# Patient Record
Sex: FEMALE | Race: WHITE | Employment: OTHER | URBAN - METROPOLITAN AREA
[De-identification: names, ages, dates, MRNs, and addresses within clinical notes are randomized per-mention and may not be internally consistent; named-entity substitution may affect disease eponyms.]

---

## 2017-01-18 ENCOUNTER — TELEPHONE (OUTPATIENT)
Dept: FAMILY MEDICINE | Facility: CLINIC | Age: 77
End: 2017-01-18

## 2017-01-18 NOTE — TELEPHONE ENCOUNTER
"Pt concerned about her \"hip stiffness and being tired\".  Feels it maybe caused by the Metformin?  Discussed many things that could cause these symptoms.  Blood sugars are running 142-160 b/4 breakfast.  Pt asking if Metformin could be decreased?  Pt in [place] and not back until May.  Advise.  Vin      "

## 2017-01-18 NOTE — TELEPHONE ENCOUNTER
Reason for call:  Patient reporting a symptom    Symptom or request: Pt read the package insert for her Metformin and she states that she has every single side-effect.  She wants to know if she should quit taking the Metformin.       Duration (how long have symptoms been present): ongoing    Have you been treated for this before? No    Additional comments:     Phone Number patient can be reached at:  Other phone number:  [phone]    Best Time:  any    Can we leave a detailed message on this number:  YES    Call taken on 1/18/2017 at 10:54 AM by Tawanna Astudillo

## 2017-01-18 NOTE — TELEPHONE ENCOUNTER
Based on those blood sugars, I would not recommend decreasing the metformin, however she is free to make this decision if she would like to try going without it to see if symptoms improve.      I would recommend seeing a physician in [place] for further evaluation, blood work, etc.     Morena Mcintosh M.D.

## 2017-02-02 ENCOUNTER — TELEPHONE (OUTPATIENT)
Dept: FAMILY MEDICINE | Facility: CLINIC | Age: 77
End: 2017-02-02

## 2017-02-02 NOTE — TELEPHONE ENCOUNTER
Patient calls with concern of being in [place] and had company over one with bronchitis and one with the flu. She now has a cough and shortness of breath and is sleeping a lot. She would like to have a nebulizer called in. Advised she needs to be seen down in [place] to determine what she has so she can be treated appropriately. She agreed with this plan.    Johanny Edward RN

## 2017-02-02 NOTE — TELEPHONE ENCOUNTER
Reason for Call:  Other shortness of breath     Detailed comments: pt is calling because she is short of breath and has been sick for over a week   [place] is out of state   And has been sleeping a lot and thinks she maybe getting over the flu   You can her the pt on the phone working to catch breath   She is wanting Nebs     Phone Number Patient can be reached at: Other phone number:  [phone]    Best Time: sent to RN     Can we leave a detailed message on this number? Not Applicable    Call taken on 2/2/2017 at 1:46 PM by Megan Mcdonald

## 2017-09-25 DIAGNOSIS — I10 ESSENTIAL HYPERTENSION WITH GOAL BLOOD PRESSURE LESS THAN 140/90: ICD-10-CM

## 2017-09-25 NOTE — TELEPHONE ENCOUNTER
metoprolol       Last Written Prescription Date: 9/1/16  Last Fill Quantity: 90, # refills: 3    Last Office Visit with G, P or [facility] prescribing provider:  9/23/16   Future Office Visit:        BP Readings from Last 3 Encounters:   09/23/16 131/56   09/01/16 118/59   10/29/15 (!) 112/93     2

## 2017-09-26 RX ORDER — METOPROLOL SUCCINATE 200 MG/1
TABLET, EXTENDED RELEASE ORAL
Qty: 90 TABLET | Refills: 0 | Status: SHIPPED | OUTPATIENT
Start: 2017-09-26 | End: 2017-10-23

## 2017-10-02 ENCOUNTER — TELEPHONE (OUTPATIENT)
Dept: FAMILY MEDICINE | Facility: CLINIC | Age: 77
End: 2017-10-02

## 2017-10-02 NOTE — TELEPHONE ENCOUNTER
"Reason for call:  Patient reporting a symptom    Symptom or request: Pt states that she has been \"dizzy for days.\"  Transferred to RN     Duration (how long have symptoms been present): \"days\"    Have you been treated for this before? No    Additional comments:     Phone Number patient can be reached at:  Home number on file [phone] (home)    Best Time:  any    Can we leave a detailed message on this number:  YES    Call taken on 10/2/2017 at 3:29 PM by Tawanna Astudillo    "

## 2017-10-02 NOTE — TELEPHONE ENCOUNTER
S-(situation): Patient has 10/23/17 for physical    B-(background): Patient has had dizzy spells for this week.      A-(assessment): Patient states she has been dizzy for about 6 days.  Patient is diabetic.  Patient checks her blood sugar in the AM at about 140.  Patient reports she has symptoms at any given time.  Patient could be sitting or standing and fell dizzy. Patient states it feels like it could be vertigo.  Patient denies any weakness or numbness.  Patient denies any difficulty walking or speaking.  Patient denies any fevers or cool moist skin.  Patient denies any headache or vision change.       R-(recommendations): Patient was advised to be seen in the next 2-4 hours per nurse triage guidelines.  Patient refuses to be seen today.  Patient will schedule for 10/3/17.  Patient was advised to check her blood sugar more than once a day.  Advised patient to check her blood sugar if she has an episode.  Patient was advised not to check every time just once or twice.  Patient agrees with the plan.    Amanda MCCOY RN

## 2017-10-03 ENCOUNTER — TELEPHONE (OUTPATIENT)
Dept: FAMILY MEDICINE | Facility: CLINIC | Age: 77
End: 2017-10-03

## 2017-10-03 ENCOUNTER — OFFICE VISIT (OUTPATIENT)
Dept: FAMILY MEDICINE | Facility: CLINIC | Age: 77
End: 2017-10-03
Payer: COMMERCIAL

## 2017-10-03 VITALS
WEIGHT: 206 LBS | HEART RATE: 73 BPM | SYSTOLIC BLOOD PRESSURE: 122 MMHG | BODY MASS INDEX: 36.5 KG/M2 | TEMPERATURE: 97.8 F | DIASTOLIC BLOOD PRESSURE: 60 MMHG | HEIGHT: 63 IN

## 2017-10-03 DIAGNOSIS — H81.10 BENIGN PAROXYSMAL POSITIONAL VERTIGO, UNSPECIFIED LATERALITY: Primary | ICD-10-CM

## 2017-10-03 DIAGNOSIS — E11.22 TYPE 2 DIABETES MELLITUS WITH STAGE 3 CHRONIC KIDNEY DISEASE (H): Primary | ICD-10-CM

## 2017-10-03 DIAGNOSIS — L98.9 SKIN LESION: ICD-10-CM

## 2017-10-03 DIAGNOSIS — R60.0 BILATERAL EDEMA OF LOWER EXTREMITY: ICD-10-CM

## 2017-10-03 DIAGNOSIS — N18.30 TYPE 2 DIABETES MELLITUS WITH STAGE 3 CHRONIC KIDNEY DISEASE (H): Primary | ICD-10-CM

## 2017-10-03 PROCEDURE — 99214 OFFICE O/P EST MOD 30 MIN: CPT | Performed by: FAMILY MEDICINE

## 2017-10-03 RX ORDER — FUROSEMIDE 40 MG
80 TABLET ORAL DAILY
Qty: 180 TABLET | Refills: 3 | Status: SHIPPED | OUTPATIENT
Start: 2017-10-03 | End: 2018-09-28

## 2017-10-03 NOTE — PROGRESS NOTES
"  SUBJECTIVE:   Milagro Wallace is a 77 year old female who presents to clinic today for the following health issues:      Dizziness  Onset: 8 days     Description:   Do you feel faint:  YES  Does it feel like the surroundings (bed, room) are moving: YES  Unsteady/off balance: YES  Have you passed out or fallen: no     Intensity: moderate    Progression of Symptoms:  same    Accompanying Signs & Symptoms:  Heart palpitations: no   Nausea, vomiting: no   Weakness in arms or legs: no   Fatigue: no   Vision or speech changes: no   Ringing in ears (Tinnitus): no   Hearing Loss: no     History:   Head trauma/concussion hx: no   Previous similar symptoms: YES  Recent bleeding history: no     Precipitating factors:   Worse with activity or head movement: YES  Any new medications (BP?): no   Alcohol/drug abuse/withdrawal: no     Alleviating factors:   Does staying in a fixed position give relief:  YES    Therapies Tried and outcome: none          Problem list and histories reviewed & adjusted, as indicated.  Additional history:         Reviewed and updated as needed this visit by clinical staffTobacco  Allergies       Reviewed and updated as needed this visit by Provider      Further history obtained, clarified or corrected by physician:  She has several concerns today. She has had lightheadedness or dizziness or a spinning type sensation over the last week though it is gradually getting better. She was worried this may be from her blood sugars although her readings have been generally around 150. Symptoms are worse when she turns her head from side to side particularly rapidly. Secondly she has a skin lesion on her upper lip that has been there for quite some time and she would like it removed. Thirdly she complains of pain in the lower back and back of the thighs bilaterally that is worse in the morning and then improves as the day goes on.    OBJECTIVE:  /60  Pulse 73  Temp 97.8  F (36.6  C)  Ht 5' 2.5\" " (1.588 m)  Wt 206 lb (93.4 kg)  BMI 37.08 kg/m2  LUNGS: clear to auscultation, normal breath sounds  CV: RRR without murmur  ABD: BS+, soft, nontender, no masses, no hepatosplenomegaly  EXTREMITIES: without joint tenderness, swelling or erythema.  No muscle tenderness or abnormality.  SKIN: No rashes or abnormalities, there is a small cystlike lesion on the upper lip right side  NEURO:non focal exam    ASSESSMENT:     Benign paroxysmal positional vertigo, unspecified laterality  Bilateral edema of lower extremity  Skin lesion    PLAN:  She is going to be careful with issues that make cause vertigo but just monitor her symptoms as they are improving, she will return if they persist or worsen  Refill Lasix  Dermatology consult for skin lesion removal  Stretching exercises for the back and leg discomfort.    Orders Placed This Encounter     DERMATOLOGY REFERRAL     furosemide (LASIX) 40 MG tablet

## 2017-10-03 NOTE — MR AVS SNAPSHOT
After Visit Summary   10/3/2017    Milagro Wallace    MRN: [id number]           Patient Information     Date Of Birth          1940        Visit Information        Provider Department      10/3/2017 1:00 PM Nathan Al MD [facility]        Today's Diagnoses     Benign paroxysmal positional vertigo, unspecified laterality    -  1    Bilateral edema of lower extremity        Skin lesion          Care Instructions          Thank you for choosing [facility].  You may be receiving a survey in the mail from [facility] regarding your visit today.  Please take a few minutes to complete and return the survey to let us know how we are doing.      Our Clinic hours are:  Mondays    7:20 am - 7 pm  Tues -  Fri  7:20 am - 5 pm    Clinic Phone: [phone]    The clinic lab opens at 7:30 am Mon - Fri and appointments are required.    [facility]. [phone]  Monday-Thursday 8 am - 7pm  Tues/Wed/Fri 8 am - 5:30 pm                 Follow-ups after your visit        Additional Services     DERMATOLOGY REFERRAL       Your provider has referred you to: [facility]: [facility] [phone]   [email or web address]    Please be aware that coverage of these services is subject to the terms and limitations of your health insurance plan.  Call member services at your health plan with any benefit or coverage questions.      Please bring the following with you to your appointment:    (1) Any X-Rays, CTs or MRIs which have been performed.  Contact the facility where they were done to arrange for  prior to your scheduled appointment.    (2) List of current medications  (3) This referral request   (4) Any documents/labs given to you for this referral                  Your next 10 appointments already scheduled     Oct 23, 2017  9:40 AM CDT   SHORT with Morena Mcintosh MD   [facility] ([facility]  "[place]    [address]  [facility] [address]   [phone]              Who to contact     If you have questions or need follow up information about today's clinic visit or your schedule please contact [facility] directly at [phone].  Normal or non-critical lab and imaging results will be communicated to you by MyChart, letter or phone within 4 business days after the clinic has received the results. If you do not hear from us within 7 days, please contact the clinic through MyChart or phone. If you have a critical or abnormal lab result, we will notify you by phone as soon as possible.  Submit refill requests through [facility] or call your pharmacy and they will forward the refill request to us. Please allow 3 business days for your refill to be completed.          Additional Information About Your Visit        MyChart Information     [facility] lets you send messages to your doctor, view your test results, renew your prescriptions, schedule appointments and more. To sign up, go to [email or web address] . Click on \"Log in\" on the left side of the screen, which will take you to the Welcome page. Then click on \"Sign up Now\" on the right side of the page.     You will be asked to enter the access code listed below, as well as some personal information. Please follow the directions to create your username and password.     Your access code is: [email or web address]  Expires: 2018  1:24 PM     Your access code will  in 90 days. If you need help or a new code, please call your [facility] or [phone].        Care EveryWhere ID     This is your Care EveryWhere ID. This could be used by other organizations to access your [facility] medical records  [id number]        Your Vitals Were     Pulse Temperature Height BMI (Body Mass Index)          73 97.8  F (36.6  C) 5' 2.5\" (1.588 m) 37.08 kg/m2         Blood Pressure from Last 3 Encounters:   10/03/17 122/60   16 131/56 "   09/01/16 118/59    Weight from Last 3 Encounters:   10/03/17 206 lb (93.4 kg)   09/23/16 213 lb (96.6 kg)   09/01/16 218 lb (98.9 kg)              We Performed the Following     DERMATOLOGY REFERRAL          Where to get your medicines      These medications were sent to [id number] - [place], [place] - [address] AT [facility] [address]  [address], [facility] [address]     Phone:  [phone]     furosemide 40 MG tablet          Primary Care Provider Office Phone # Fax #    Morena Mcintosh [phone] [address]  [facility] [address]        Equal Access to Services     JOSEY LU : Hadii aad ku hadasho Socarleyali, waaxda luqadaha, qaybta kaalmada adeegyada, waxjacques dsouza. So [facility] [phone].    ATENCIÓN: Si habla español, tiene a allan disposición servicios gratuitos de asistencia lingüística. [facility] [phone].    We comply with applicable federal civil rights laws and [place] laws. We do not discriminate on the basis of race, color, national origin, age, disability, sex, sexual orientation, or gender identity.            Thank you!     Thank you for choosing [facility]  for your care. Our goal is always to provide you with excellent care. Hearing back from our patients is one way we can continue to improve our services. Please take a few minutes to complete the written survey that you may receive in the mail after your visit with us. Thank you!             Your Updated Medication List - Protect others around you: Learn how to safely use, store and throw away your medicines at www.disposemymeds.org.          This list is accurate as of: 10/3/17  1:39 PM.  Always use your most recent med list.                   Brand Name Dispense Instructions for use Diagnosis    amLODIPine 5 MG tablet    NORVASC    90 tablet    Take 1 tablet (5 mg) by mouth daily    Essential hypertension with goal blood  pressure less than 140/90       aspirin 81 MG tablet      1 TABLET DAILY        blood glucose lancets standard    no brand specified    1 box    use 1 daily    Type II or unspecified type diabetes mellitus without mention of complication, uncontrolled       blood glucose monitoring test strip    ONE TOUCH ULTRA    100 each    Use to test blood sugar 1 times daily or as directed.    Type 2 diabetes mellitus with stage 3 chronic kidney disease (H)       Blood Pressure Kit     1 kit    use as directed    Unspecified essential hypertension       furosemide 40 MG tablet    LASIX    180 tablet    Take 2 tablets (80 mg) by mouth daily    Bilateral edema of lower extremity       lisinopril 40 MG tablet    PRINIVIL/ZESTRIL    90 tablet    Take 1 tablet (40 mg) by mouth daily    Essential hypertension with goal blood pressure less than 140/90       metFORMIN 500 MG tablet    GLUCOPHAGE    180 tablet    Take 1 tablet (500 mg) by mouth 2 times daily (with meals)    Essential hypertension with goal blood pressure less than 140/90       metoprolol 200 MG 24 hr tablet    TOPROL-XL    90 tablet    TAKE 1 TABLET(200 MG) BY MOUTH DAILY    Essential hypertension with goal blood pressure less than 140/90       ONETOUCH DELICA LANCETS 33G Misc     100 each    1 lancet daily Use to test blood sugars 1 times daily or as directed.    Type 2 diabetes, HbA1c goal < 7% (H)       order for DME     1 each    Glucometer, brand as covered by insurance.    Type 2 diabetes, HbA1c goal < 7% (H)       simvastatin 20 MG tablet    ZOCOR    90 tablet    Take 1 tablet (20 mg) by mouth At Bedtime    Hyperlipidemia LDL goal <100

## 2017-10-03 NOTE — PATIENT INSTRUCTIONS
Thank you for choosing [facility].  You may be receiving a survey in the mail from [facility] regarding your visit today.  Please take a few minutes to complete and return the survey to let us know how we are doing.      Our Clinic hours are:  Mondays    7:20 am - 7 pm  Tues -  Fri  7:20 am - 5 pm    Clinic Phone: [phone]    The clinic lab opens at 7:30 am Mon - Fri and appointments are required.    [facility]. [phone]  Monday-Thursday 8 am - 7pm  Tues/Wed/Fri 8 am - 5:30 pm

## 2017-10-03 NOTE — TELEPHONE ENCOUNTER
Reason for Call:  Other prescription    Detailed comments: Patient states she needs a new one touch ultra mini glucometer.    Phone Number Patient can be reached at: Home number on file [phone] (home)    Best Time: any    Can we leave a detailed message on this number? YES    Call taken on 10/3/2017 at 2:25 PM by Julia Graham

## 2017-10-11 ENCOUNTER — TELEPHONE (OUTPATIENT)
Dept: FAMILY MEDICINE | Facility: CLINIC | Age: 77
End: 2017-10-11

## 2017-10-11 NOTE — TELEPHONE ENCOUNTER
Pt states Otilia needs Morena Mcintosh to complete form [facility] (pharmacy faxed over) and have it faxed back so that pt can  her script.  Pt frustrated.    Josefina Rice, [place]

## 2017-10-19 DIAGNOSIS — I10 ESSENTIAL HYPERTENSION WITH GOAL BLOOD PRESSURE LESS THAN 140/90: ICD-10-CM

## 2017-10-19 RX ORDER — AMLODIPINE BESYLATE 5 MG/1
TABLET ORAL
Qty: 90 TABLET | Refills: 0 | Status: CANCELLED | OUTPATIENT
Start: 2017-10-19

## 2017-10-23 ENCOUNTER — MEDICAL CORRESPONDENCE (OUTPATIENT)
Dept: HEALTH INFORMATION MANAGEMENT | Facility: CLINIC | Age: 77
End: 2017-10-23

## 2017-10-23 ENCOUNTER — OFFICE VISIT (OUTPATIENT)
Dept: FAMILY MEDICINE | Facility: CLINIC | Age: 77
End: 2017-10-23
Payer: COMMERCIAL

## 2017-10-23 VITALS
RESPIRATION RATE: 18 BRPM | BODY MASS INDEX: 35.79 KG/M2 | HEART RATE: 69 BPM | WEIGHT: 202 LBS | SYSTOLIC BLOOD PRESSURE: 130 MMHG | DIASTOLIC BLOOD PRESSURE: 60 MMHG | HEIGHT: 63 IN

## 2017-10-23 DIAGNOSIS — N18.30 TYPE 2 DIABETES MELLITUS WITH STAGE 3 CHRONIC KIDNEY DISEASE, WITHOUT LONG-TERM CURRENT USE OF INSULIN (H): Primary | ICD-10-CM

## 2017-10-23 DIAGNOSIS — I89.0 SECONDARY LYMPHEDEMA: ICD-10-CM

## 2017-10-23 DIAGNOSIS — N18.30 CKD (CHRONIC KIDNEY DISEASE) STAGE 3, GFR 30-59 ML/MIN (H): ICD-10-CM

## 2017-10-23 DIAGNOSIS — I10 ESSENTIAL HYPERTENSION WITH GOAL BLOOD PRESSURE LESS THAN 140/90: ICD-10-CM

## 2017-10-23 DIAGNOSIS — E78.5 HYPERLIPIDEMIA LDL GOAL <100: ICD-10-CM

## 2017-10-23 DIAGNOSIS — E11.22 TYPE 2 DIABETES MELLITUS WITH STAGE 3 CHRONIC KIDNEY DISEASE, WITHOUT LONG-TERM CURRENT USE OF INSULIN (H): Primary | ICD-10-CM

## 2017-10-23 LAB
ANION GAP SERPL CALCULATED.3IONS-SCNC: 6 MMOL/L (ref 3–14)
BUN SERPL-MCNC: 20 MG/DL (ref 7–30)
CALCIUM SERPL-MCNC: 9.2 MG/DL (ref 8.5–10.1)
CHLORIDE SERPL-SCNC: 106 MMOL/L (ref 94–109)
CHOLEST SERPL-MCNC: 112 MG/DL
CO2 SERPL-SCNC: 30 MMOL/L (ref 20–32)
CREAT SERPL-MCNC: 1.15 MG/DL (ref 0.52–1.04)
CREAT UR-MCNC: 27 MG/DL
GFR SERPL CREATININE-BSD FRML MDRD: 46 ML/MIN/1.7M2
GLUCOSE SERPL-MCNC: 184 MG/DL (ref 70–99)
HBA1C MFR BLD: 7.2 % (ref 4.3–6)
HDLC SERPL-MCNC: 49 MG/DL
HGB BLD-MCNC: 12 G/DL (ref 11.7–15.7)
LDLC SERPL CALC-MCNC: 39 MG/DL
MICROALBUMIN UR-MCNC: 5 MG/L
MICROALBUMIN/CREAT UR: 20.3 MG/G CR (ref 0–25)
NONHDLC SERPL-MCNC: 63 MG/DL
POTASSIUM SERPL-SCNC: 3.9 MMOL/L (ref 3.4–5.3)
SODIUM SERPL-SCNC: 142 MMOL/L (ref 133–144)
TRIGL SERPL-MCNC: 122 MG/DL

## 2017-10-23 PROCEDURE — 99214 OFFICE O/P EST MOD 30 MIN: CPT | Performed by: FAMILY MEDICINE

## 2017-10-23 PROCEDURE — 85018 HEMOGLOBIN: CPT | Performed by: FAMILY MEDICINE

## 2017-10-23 PROCEDURE — 82043 UR ALBUMIN QUANTITATIVE: CPT | Performed by: FAMILY MEDICINE

## 2017-10-23 PROCEDURE — 80061 LIPID PANEL: CPT | Performed by: FAMILY MEDICINE

## 2017-10-23 PROCEDURE — 80048 BASIC METABOLIC PNL TOTAL CA: CPT | Performed by: FAMILY MEDICINE

## 2017-10-23 PROCEDURE — 36415 COLL VENOUS BLD VENIPUNCTURE: CPT | Performed by: FAMILY MEDICINE

## 2017-10-23 PROCEDURE — 83036 HEMOGLOBIN GLYCOSYLATED A1C: CPT | Performed by: FAMILY MEDICINE

## 2017-10-23 RX ORDER — LISINOPRIL 40 MG/1
40 TABLET ORAL DAILY
Qty: 90 TABLET | Refills: 1 | Status: SHIPPED | OUTPATIENT
Start: 2017-10-23 | End: 2018-05-09

## 2017-10-23 RX ORDER — SIMVASTATIN 20 MG
20 TABLET ORAL AT BEDTIME
Qty: 90 TABLET | Refills: 1 | Status: SHIPPED | OUTPATIENT
Start: 2017-10-23 | End: 2018-04-26

## 2017-10-23 RX ORDER — METOPROLOL SUCCINATE 200 MG/1
200 TABLET, EXTENDED RELEASE ORAL DAILY
Qty: 90 TABLET | Refills: 1 | Status: SHIPPED | OUTPATIENT
Start: 2017-10-23 | End: 2018-04-02

## 2017-10-23 RX ORDER — AMLODIPINE BESYLATE 5 MG/1
5 TABLET ORAL DAILY
Qty: 90 TABLET | Refills: 1 | Status: SHIPPED | OUTPATIENT
Start: 2017-10-23 | End: 2018-04-15

## 2017-10-23 NOTE — LETTER
[facility]  [address]  [facility] [address]  Phone: [phone]      10/25/2017     Milagro Wallace  [address]   [facility] [address]      Dear Milagro:    Thank you for allowing me to participate in your care. Your recent test results were reviewed and listed below.  Kidney function is slightly decreased from last year.  Continue to drink plenty of fluids.   No protein in the urine which is good. Hemoglobin is normal.     Your results are provided below for your review  Results for orders placed or performed in visit on 10/23/17   Basic metabolic panel   Result Value Ref Range    Sodium 142 133 - 144 mmol/L    Potassium 3.9 3.4 - 5.3 mmol/L    Chloride 106 94 - 109 mmol/L    Carbon Dioxide 30 20 - 32 mmol/L    Anion Gap 6 3 - 14 mmol/L    Glucose 184 (H) 70 - 99 mg/dL    Urea Nitrogen 20 7 - 30 mg/dL    Creatinine 1.15 (H) 0.52 - 1.04 mg/dL    GFR Estimate 46 (L) >60 mL/min/1.7m2    GFR Estimate If Black 55 (L) >60 mL/min/1.7m2    Calcium 9.2 8.5 - 10.1 mg/dL   Lipid panel reflex to direct LDL   Result Value Ref Range    Cholesterol 112 <200 mg/dL    Triglycerides 122 <150 mg/dL    HDL Cholesterol 49 (L) >49 mg/dL    LDL Cholesterol Calculated 39 <100 mg/dL    Non HDL Cholesterol 63 <130 mg/dL   Albumin Random Urine Quantitative with Creat Ratio   Result Value Ref Range    Creatinine Urine 27 mg/dL    Albumin Urine mg/L 5 mg/L    Albumin Urine mg/g Cr 20.30 0 - 25 mg/g Cr   Hemoglobin A1c   Result Value Ref Range    Hemoglobin A1C 7.2 (H) 4.3 - 6.0 %   Hemoglobin   Result Value Ref Range    Hemoglobin 12.0 11.7 - 15.7 g/dL                 Thank you for choosing [place]. As a result, please continue with the treatment plan discussed in the office. Return as discussed or sooner if symptoms worsen or fail to improve. If you have any further questions or concerns, please do not hesitate to contact us.      Sincerely,        Dr. Morena Mcintosh

## 2017-10-23 NOTE — NURSING NOTE
"Chief Complaint   Patient presents with     Diabetes       Initial /60  Pulse 69  Resp 18  Ht 5' 2.5\" (1.588 m)  Wt 202 lb (91.6 kg)  BMI 36.36 kg/m2 Estimated body mass index is 36.36 kg/(m^2) as calculated from the following:    Height as of this encounter: 5' 2.5\" (1.588 m).    Weight as of this encounter: 202 lb (91.6 kg).  Medication Reconciliation: complete    "

## 2017-10-23 NOTE — MR AVS SNAPSHOT
After Visit Summary   10/23/2017    Milagro Wallace    MRN: [id number]           Patient Information     Date Of Birth          1940        Visit Information        Provider Department      10/23/2017 9:40 AM Morena Mcintosh MD [facility]        Today's Diagnoses     Type 2 diabetes mellitus with stage 3 chronic kidney disease, without long-term current use of insulin (H)    -  1    Essential hypertension with goal blood pressure less than 140/90        Hyperlipidemia LDL goal <100        CKD (chronic kidney disease) stage 3, GFR 30-59 ml/min        Secondary lymphedema          Care Instructions          Thank you for choosing [facility].  You may be receiving a survey in the mail from [employer] regarding your visit today.  Please take a few minutes to complete and return the survey to let us know how we are doing.      Our Clinic hours are:  Mondays    7:20 am - 7 pm  Tues -  Fri  7:20 am - 5 pm    Clinic Phone: [phone]    The clinic lab opens at 7:30 am Mon - Fri and appointments are required.    [facility]. [phone]  Monday-Thursday 8 am - 7pm  Tues/Wed/Fri 8 am - 5:30 pm                 Follow-ups after your visit        Additional Services     PHYSICAL THERAPY REFERRAL       *This therapy referral will be filtered to a centralized scheduling office at [facility] and the patient will receive a call to schedule an appointment at a [place] location most convenient for them. *     [facility] provides Physical Therapy evaluation and treatment and many specialty services across the [place] system.  If requesting a specialty program, please choose from the list below.    If you have not heard from the scheduling office within 2 business days, please call [phone] for all locations, with the exception of [place], please call [phone].  Treatment: Evaluation & Treatment  Special  "Instructions/Modalities:    Special Programs: [facility]    Please be aware that coverage of these services is subject to the terms and limitations of your health insurance plan.  Call member services at your health plan with any benefit or coverage questions.      **Note to Provider:  If you are referring outside of [place] for the therapy appointment, please list the name of the location in the \"special instructions\" above, print the referral and give to the patient to schedule the appointment.                  Your next 10 appointments already scheduled     Nov 08, 2017  1:00 PM CST   New Visit with Yadiel Glynn MD   [facility]    [address] [address] [phone]              Who to contact     If you have questions or need follow up information about today's clinic visit or your schedule please contact [facility] directly at [phone].  Normal or non-critical lab and imaging results will be communicated to you by MyChart, letter or phone within 4 business days after the clinic has received the results. If you do not hear from us within 7 days, please contact the clinic through MyChart or phone. If you have a critical or abnormal lab result, we will notify you by phone as soon as possible.  Submit refill requests through [employer] or call your pharmacy and they will forward the refill request to us. Please allow 3 business days for your refill to be completed.          Additional Information About Your Visit        [employer] Information     [employer] lets you send messages to your doctor, view your test results, renew your prescriptions, schedule appointments and more. To sign up, go to [email or web address] . Click on \"Log in\" on the left side of the screen, which will take you to the Welcome page. Then click on \"Sign up Now\" on the right side of the page.     You will be asked to enter the access " "code listed below, as well as some personal information. Please follow the directions to create your username and password.     Your access code is: [email or web address]  Expires: 2018  1:24 PM     Your access code will  in 90 days. If you need help or a new code, please call your [place] clinic or [phone].        Care EveryWhere ID     This is your Care EveryWhere ID. This could be used by other organizations to access your [place] medical records  [id number]        Your Vitals Were     Pulse Respirations Height BMI (Body Mass Index)          69 18 5' 2.5\" (1.588 m) 36.36 kg/m2         Blood Pressure from Last 3 Encounters:   10/23/17 130/60   10/03/17 122/60   16 131/56    Weight from Last 3 Encounters:   10/23/17 202 lb (91.6 kg)   10/03/17 206 lb (93.4 kg)   16 213 lb (96.6 kg)              We Performed the Following     Albumin Random Urine Quantitative with Creat Ratio     Basic metabolic panel     Hemoglobin A1c     Hemoglobin     Lipid panel reflex to direct LDL     PHYSICAL THERAPY REFERRAL          Today's Medication Changes          These changes are accurate as of: 10/23/17 10:10 AM.  If you have any questions, ask your nurse or doctor.               These medicines have changed or have updated prescriptions.        Dose/Directions    metoprolol 200 MG 24 hr tablet   Commonly known as:  TOPROL-XL   This may have changed:  See the new instructions.   Used for:  Essential hypertension with goal blood pressure less than 140/90   Changed by:  Morena Mcintosh MD        Dose:  200 mg   Take 1 tablet (200 mg) by mouth daily   Quantity:  90 tablet   Refills:  1            Where to get your medicines      [facility] [address] [place], [place] [address] [facility] AT [facility] OF [address] [place] [address]  [address] [facility] [address]     Phone:  [phone]     amLODIPine 5 MG tablet    blood glucose monitoring test strip    lisinopril 40 MG tablet "    metFORMIN 500 MG tablet    metoprolol 200 MG 24 hr tablet    simvastatin 20 MG tablet         Some of these will need a paper prescription and others can be bought over the counter.  Ask your nurse if you have questions.     Bring a paper prescription for each of these medications     order for DME                Primary Care Provider Office Phone # Fax #    Morena Mcintosh [phone] [address] [facility] [address]        Equal Access to Services     JOSEY LU : Hadii aad ku hadasho Soomaali, waaxda luqadaha, qaybta kaalmada adeegyada, waxay idiin hayaan adeeg kharash la'lety . So [facility] [phone].    ATENCIÓN: Si habla espfreddie, tiene a allan disposición servicios gratuitos de asistencia lingüística. Llame al [phone].    We comply with applicable federal civil rights laws and [place] laws. We do not discriminate on the basis of race, color, national origin, age, disability, sex, sexual orientation, or gender identity.            Thank you!     Thank you for choosing [facility]  for your care. Our goal is always to provide you with excellent care. Hearing back from our patients is one way we can continue to improve our services. Please take a few minutes to complete the written survey that you may receive in the mail after your visit with us. Thank you!             Your Updated Medication List - Protect others around you: Learn how to safely use, store and throw away your medicines at www.disposemymeds.org.          This list is accurate as of: 10/23/17 10:10 AM.  Always use your most recent med list.                   Brand Name Dispense Instructions for use Diagnosis    amLODIPine 5 MG tablet    NORVASC    90 tablet    Take 1 tablet (5 mg) by mouth daily    Essential hypertension with goal blood pressure less than 140/90       aspirin 81 MG tablet      1 TABLET DAILY        blood glucose lancets standard    no brand specified    1 box    use 1 daily     Type II or unspecified type diabetes mellitus without mention of complication, uncontrolled       blood glucose monitoring meter device kit    no brand specified    1 kit    Use to test blood sugar 1 time daily or as directed. One Touch Ultra Mini Glucometer.    Type 2 diabetes mellitus with stage 3 chronic kidney disease (H)       blood glucose monitoring test strip    ONE TOUCH ULTRA    100 each    Use to test blood sugar 1 times daily or as directed.    Type 2 diabetes mellitus with stage 3 chronic kidney disease, without long-term current use of insulin (H)       Blood Pressure Kit     1 kit    use as directed    Unspecified essential hypertension       furosemide 40 MG tablet    LASIX    180 tablet    Take 2 tablets (80 mg) by mouth daily    Bilateral edema of lower extremity       lisinopril 40 MG tablet    PRINIVIL/ZESTRIL    90 tablet    Take 1 tablet (40 mg) by mouth daily    Essential hypertension with goal blood pressure less than 140/90       metFORMIN 500 MG tablet    GLUCOPHAGE    180 tablet    Take 1 tablet (500 mg) by mouth 2 times daily (with meals)    Essential hypertension with goal blood pressure less than 140/90       metoprolol 200 MG 24 hr tablet    TOPROL-XL    90 tablet    Take 1 tablet (200 mg) by mouth daily    Essential hypertension with goal blood pressure less than 140/90       ONEUCH DELICA LANCETS 33G Misc     100 each    1 lancet daily Use to test blood sugars 1 times daily or as directed.    Type 2 diabetes, HbA1c goal < 7% (H)       order for DME     1 each    Glucometer, brand as covered by insurance.    Type 2 diabetes mellitus with stage 3 chronic kidney disease, without long-term current use of insulin (H)       simvastatin 20 MG tablet    ZOCOR    90 tablet    Take 1 tablet (20 mg) by mouth At Bedtime    Hyperlipidemia LDL goal <100

## 2017-10-23 NOTE — PATIENT INSTRUCTIONS
Thank you for choosing [facility].  You may be receiving a survey in the mail from [facility] regarding your visit today.  Please take a few minutes to complete and return the survey to let us know how we are doing.      Our Clinic hours are:  Mondays    7:20 am - 7 pm  Tues -  Fri  7:20 am - 5 pm    Clinic Phone: [phone]    The clinic lab opens at 7:30 am Mon - Fri and appointments are required.    [facility]. [phone]  Monday-Thursday 8 am - 7pm  Tues/Wed/Fri 8 am - 5:30 pm

## 2017-10-23 NOTE — PROGRESS NOTES
"  SUBJECTIVE:   Milagro Wallace is a 77 year old female who presents to clinic today for the following health issues:      Diabetes Follow-up      Patient is checking blood sugars: 1 time a week averaging 150's    Diabetic concerns: None     Symptoms of hypoglycemia (low blood sugar): none     Paresthesias (numbness or burning in feet) or sores: No     Date of last diabetic eye exam: DUE for visit    Hyperlipidemia Follow-Up      Rate your low fat/cholesterol diet?: fair    Taking statin?  Yes, no muscle aches from statin    Other lipid medications/supplements?:  none    Hypertension Follow-up      Outpatient blood pressures are not being checked.    Low Salt Diet: no added salt        Amount of exercise or physical activity: None    Problems taking medications regularly: No    Medication side effects: none    Diet: regular (no restrictions) and low salt        Chronic Kidney Disease Follow-up      Current NSAID use?  No          Problem list and histories reviewed & adjusted, as indicated.  Additional history: as documented    BP Readings from Last 3 Encounters:   10/23/17 130/60   10/03/17 122/60   09/23/16 131/56    Wt Readings from Last 3 Encounters:   10/23/17 202 lb (91.6 kg)   10/03/17 206 lb (93.4 kg)   09/23/16 213 lb (96.6 kg)             Working on weight loss.          Reviewed and updated as needed this visit by clinical staffTobacco  Allergies  Med Hx  Surg Hx  Fam Hx  Soc Hx      Reviewed and updated as needed this visit by Provider        /60  Pulse 69  Resp 18  Ht 5' 2.5\" (1.588 m)  Wt 202 lb (91.6 kg)  BMI 36.36 kg/m2   EXAM: GENERAL APPEARANCE: Alert, no acute distress  RESP: lungs clear to auscultation   CV: normal rate, regular rhythm, no murmur or gallop  ABDOMEN: soft, no organomegaly, masses or tenderness  MS: right leg 2+ edema, left leg 1+ edema  PSYCH: mentation appears normal., affect and mood normal      Results for orders placed or performed in visit on 10/23/17 "   Hemoglobin A1c   Result Value Ref Range    Hemoglobin A1C 7.2 (H) 4.3 - 6.0 %   Hemoglobin   Result Value Ref Range    Hemoglobin 12.0 11.7 - 15.7 g/dL         ASSESSMENT/PLAN:      ICD-10-CM    1. Type 2 diabetes mellitus with stage 3 chronic kidney disease, without long-term current use of insulin (H) E11.22 Basic metabolic panel    N18.3 Albumin Random Urine Quantitative with Creat Ratio     Hemoglobin A1c     blood glucose monitoring (ONE TOUCH ULTRA) test strip     Hemoglobin     order for DME   2. Essential hypertension with goal blood pressure less than 140/90 I10 Basic metabolic panel     metoprolol (TOPROL-XL) 200 MG 24 hr tablet     lisinopril (PRINIVIL/ZESTRIL) 40 MG tablet     amLODIPine (NORVASC) 5 MG tablet     metFORMIN (GLUCOPHAGE) 500 MG tablet   3. Hyperlipidemia LDL goal <100 E78.5 Lipid panel reflex to direct LDL     simvastatin (ZOCOR) 20 MG tablet   4. CKD (chronic kidney disease) stage 3, GFR 30-59 ml/min N18.3    5. Secondary lymphedema I89.0 PHYSICAL THERAPY REFERRAL     Recheck in 6 months, when back from [place] in the spring.     Strongly recommended low carbohydrate dietary changes.     Recommend lymphedema clinic/PT for her lower extremity edema.     Morena Mcintosh M.D.      Patient Instructions         Thank you for choosing [facility].  You may be receiving a survey in the mail from [facility] [facility] your visit today.  Please take a few minutes to complete and return the survey to let us know how we are doing.      Our Clinic hours are:  Mondays    7:20 am - 7 pm  Tues -  Fri  7:20 am - 5 pm    Clinic Phone: [phone]    The clinic lab opens at 7:30 am Mon - Fri and appointments are required.    [facility]. [phone]  Monday-Thursday 8 am - 7pm  Tues/Wed/Fri 8 am - 5:30 pm

## 2017-10-30 ENCOUNTER — MEDICAL CORRESPONDENCE (OUTPATIENT)
Dept: HEALTH INFORMATION MANAGEMENT | Facility: CLINIC | Age: 77
End: 2017-10-30

## 2017-11-08 ENCOUNTER — OFFICE VISIT (OUTPATIENT)
Dept: DERMATOLOGY | Facility: CLINIC | Age: 77
End: 2017-11-08
Payer: COMMERCIAL

## 2017-11-08 VITALS — SYSTOLIC BLOOD PRESSURE: 128 MMHG | HEART RATE: 66 BPM | OXYGEN SATURATION: 93 % | DIASTOLIC BLOOD PRESSURE: 62 MMHG

## 2017-11-08 DIAGNOSIS — L81.4 LENTIGO: ICD-10-CM

## 2017-11-08 DIAGNOSIS — D18.00 ANGIOMA: ICD-10-CM

## 2017-11-08 DIAGNOSIS — L72.0 EPIDERMAL CYST: ICD-10-CM

## 2017-11-08 DIAGNOSIS — L82.1 SK (SEBORRHEIC KERATOSIS): Primary | ICD-10-CM

## 2017-11-08 PROCEDURE — 99203 OFFICE O/P NEW LOW 30 MIN: CPT | Mod: 25 | Performed by: DERMATOLOGY

## 2017-11-08 PROCEDURE — 17110 DESTRUCTION B9 LES UP TO 14: CPT | Mod: 51 | Performed by: DERMATOLOGY

## 2017-11-08 PROCEDURE — 11441 EXC FACE-MM B9+MARG 0.6-1 CM: CPT | Mod: 59 | Performed by: DERMATOLOGY

## 2017-11-08 NOTE — NURSING NOTE
"Chief Complaint   Patient presents with     Derm Problem     spot on lip       Initial /62  Pulse 66  SpO2 93% Estimated body mass index is 36.36 kg/(m^2) as calculated from the following:    Height as of 10/23/17: 1.588 m (5' 2.5\").    Weight as of 10/23/17: 91.6 kg (202 lb).  BP completed using cuff size: ruba Marques LPN    "

## 2017-11-08 NOTE — PATIENT INSTRUCTIONS
Wound Care Instructions     FOR SUPERFICIAL WOUNDS     [facility] [phone]    [facility] [phone]                       AFTER 24 HOURS YOU SHOULD REMOVE THE BANDAGE AND BEGIN DAILY DRESSING CHANGES AS FOLLOWS:     1) Remove Dressing.     2) Clean and dry the area with tap water using a Q-tip or sterile gauze pad.     3) Apply Vaseline, Aquaphor, Polysporin ointment or Bacitracin ointment over entire wound.  Do NOT use Neosporin ointment.     4) Cover the wound with a band-aid, or a sterile non-stick gauze pad and micropore paper tape      REPEAT THESE INSTRUCTIONS AT LEAST ONCE A DAY UNTIL THE WOUND HAS COMPLETELY HEALED.    It is an old wives tale that a wound heals better when it is exposed to air and allowed to dry out. The wound will heal faster with a better cosmetic result if it is kept moist with ointment and covered with a bandage.    **Do not let the wound dry out.**      Supplies Needed:      *Cotton tipped applicators (Q-tips)    *Polysporin Ointment or Bacitracin Ointment (NOT NEOSPORIN)    *Band-aids or non-stick gauze pads and micropore paper tape.      PATIENT INFORMATION:    During the healing process you will notice a number of changes. All wounds develop a small halo of redness surrounding the wound.  This means healing is occurring. Severe itching with extensive redness usually indicates sensitivity to the ointment or bandage tape used to dress the wound.  You should call our office if this develops.      Swelling  and/or discoloration around your surgical site is common, particularly when performed around the eye.    All wounds normally drain.  The larger the wound the more drainage there will be.  After 7-10 days, you will notice the wound beginning to shrink and new skin will begin to grow.  The wound is healed when you can see skin has formed over the entire area.  A healed wound has a healthy, shiny look to the surface and is red to dark pink in color  to normalize.  Wounds may take approximately 4-6 weeks to heal.  Larger wounds may take 6-8 weeks.  After the wound is healed you may discontinue dressing changes.    You may experience a sensation of tightness as your wound heals. This is normal and will gradually subside.    Your healed wound may be sensitive to temperature changes. This sensitivity improves with time, but if you re having a lot of discomfort, try to avoid temperature extremes.    Patients frequently experience itching after their wound appears to have healed because of the continue healing under the skin.  Plain Vaseline will help relieve the itching.        POSSIBLE COMPLICATIONS    BLEEDIN. Leave the bandage in place.  2. Use tightly rolled up gauze or a cloth to apply direct pressure over the bandage for 30  minutes.  3. Reapply pressure for an additional 30 minutes if necessary  4. Use additional gauze and tape to maintain pressure once the bleeding has stopped.      WOUND CARE INSTRUCTIONS   FOR CRYOSURGERY   This area treated with liquid nitrogen will form a blister. You do not need to bandage the area until after the blister forms and breaks (which may be a few days). When the blister breaks, begin daily dressing changes as follows:   1) Clean and dry the area with tap water using clean Q-tip or sterile gauze pad.   2) Apply Polysporin ointment or Bacitracin ointment over entire wound. Do NOT use Neosporin ointment.   3) Cover the wound with a band-aid or sterile non-stick gauze pad and micropore paper tape.   REPEAT THESE INSTRUCTIONS AT LEAST ONCE A DAY UNTIL THE WOUND HAS COMPLETELY HEALED.   It is an old wives tale that a wound heals better when it is exposed to air and allowed to dry out. The wound will heal faster with a better cosmetic result if it is kept moist with ointment and covered with a bandage.   Do not let the wound dry out.   IMPORTANT INFORMATION ON REVERSE SIDE   Supplies Needed:   *Cotton tipped applicators  (Q-tips)   *Polysporin ointment or Bacitracin ointment (NOT NEOSPORIN)   *Band-aids, or non stick gauze pads and micropore paper tape   PATIENT INFORMATION   During the healing process you will notice a number of changes. All wounds develop a small halo of redness surrounding the wound. This means healing is occurring. Severe itching with extensive redness usually indicates sensitivity to the ointment or bandage tape used to dress the wound. You should call our office if this develops.   Swelling and/or discoloration around your surgical site is common, particularly when performed around the eye.   All wounds normally drain. The larger the wound the more drainage there will be. After 7-10 days, you will notice the wound beginning to shrink and new skin will begin to grow. The wound is healed when you can see skin has formed over the entire area. A healed wound has a healthy, shiny look to the surface and is red to dark pink in color to normalize. Wounds may take approximately 4-6 weeks to heal. Larger wounds may take 6-8 weeks. After the wound is healed you may discontinue dressing changes.   You may experience a sensation of tightness as your wound heals. This is normal and will gradually subside.   Your healed wound may be sensitive to temperature changes. This sensitivity improves with time, but if you re having a lot of discomfort, try to avoid temperature extremes.   Patients frequently experience itching after their wound appears to have healed because of the continue healing under the skin. Plain Vaseline will help relieve the itching.

## 2017-11-08 NOTE — MR AVS SNAPSHOT
After Visit Summary   11/8/2017    Milagro Wallace    MRN: [id number]           Patient Information     Date Of Birth          1940        Visit Information        Provider Department      11/8/2017 1:00 PM Yadiel Glynn MD [facility]        Today's Diagnoses     SK (seborrheic keratosis)    -  1    Lentigo        Epidermal cyst        Angioma          Care Instructions          Wound Care Instructions     FOR SUPERFICIAL WOUNDS     [facility] [phone]    [facility] [phone]                       AFTER 24 HOURS YOU SHOULD REMOVE THE BANDAGE AND BEGIN DAILY DRESSING CHANGES AS FOLLOWS:     1) Remove Dressing.     2) Clean and dry the area with tap water using a Q-tip or sterile gauze pad.     3) Apply Vaseline, Aquaphor, Polysporin ointment or Bacitracin ointment over entire wound.  Do NOT use Neosporin ointment.     4) Cover the wound with a band-aid, or a sterile non-stick gauze pad and micropore paper tape      REPEAT THESE INSTRUCTIONS AT LEAST ONCE A DAY UNTIL THE WOUND HAS COMPLETELY HEALED.    It is an old wives tale that a wound heals better when it is exposed to air and allowed to dry out. The wound will heal faster with a better cosmetic result if it is kept moist with ointment and covered with a bandage.    **Do not let the wound dry out.**      Supplies Needed:      *Cotton tipped applicators (Q-tips)    *Polysporin Ointment or Bacitracin Ointment (NOT NEOSPORIN)    *Band-aids or non-stick gauze pads and micropore paper tape.      PATIENT INFORMATION:    During the healing process you will notice a number of changes. All wounds develop a small halo of redness surrounding the wound.  This means healing is occurring. Severe itching with extensive redness usually indicates sensitivity to the ointment or bandage tape used to dress the wound.  You should call our office if this develops.      Swelling  and/or discoloration around  your surgical site is common, particularly when performed around the eye.    All wounds normally drain.  The larger the wound the more drainage there will be.  After 7-10 days, you will notice the wound beginning to shrink and new skin will begin to grow.  The wound is healed when you can see skin has formed over the entire area.  A healed wound has a healthy, shiny look to the surface and is red to dark pink in color to normalize.  Wounds may take approximately 4-6 weeks to heal.  Larger wounds may take 6-8 weeks.  After the wound is healed you may discontinue dressing changes.    You may experience a sensation of tightness as your wound heals. This is normal and will gradually subside.    Your healed wound may be sensitive to temperature changes. This sensitivity improves with time, but if you re having a lot of discomfort, try to avoid temperature extremes.    Patients frequently experience itching after their wound appears to have healed because of the continue healing under the skin.  Plain Vaseline will help relieve the itching.        POSSIBLE COMPLICATIONS    BLEEDIN. Leave the bandage in place.  2. Use tightly rolled up gauze or a cloth to apply direct pressure over the bandage for 30  minutes.  3. Reapply pressure for an additional 30 minutes if necessary  4. Use additional gauze and tape to maintain pressure once the bleeding has stopped.      WOUND CARE INSTRUCTIONS   FOR CRYOSURGERY   This area treated with liquid nitrogen will form a blister. You do not need to bandage the area until after the blister forms and breaks (which may be a few days). When the blister breaks, begin daily dressing changes as follows:   1) Clean and dry the area with tap water using clean Q-tip or sterile gauze pad.   2) Apply Polysporin ointment or Bacitracin ointment over entire wound. Do NOT use Neosporin ointment.   3) Cover the wound with a band-aid or sterile non-stick gauze pad and micropore paper tape.   REPEAT  THESE INSTRUCTIONS AT LEAST ONCE A DAY UNTIL THE WOUND HAS COMPLETELY HEALED.   It is an old wives tale that a wound heals better when it is exposed to air and allowed to dry out. The wound will heal faster with a better cosmetic result if it is kept moist with ointment and covered with a bandage.   Do not let the wound dry out.   IMPORTANT INFORMATION ON REVERSE SIDE   Supplies Needed:   *Cotton tipped applicators (Q-tips)   *Polysporin ointment or Bacitracin ointment (NOT NEOSPORIN)   *Band-aids, or non stick gauze pads and micropore paper tape   PATIENT INFORMATION   During the healing process you will notice a number of changes. All wounds develop a small halo of redness surrounding the wound. This means healing is occurring. Severe itching with extensive redness usually indicates sensitivity to the ointment or bandage tape used to dress the wound. You should call our office if this develops.   Swelling and/or discoloration around your surgical site is common, particularly when performed around the eye.   All wounds normally drain. The larger the wound the more drainage there will be. After 7-10 days, you will notice the wound beginning to shrink and new skin will begin to grow. The wound is healed when you can see skin has formed over the entire area. A healed wound has a healthy, shiny look to the surface and is red to dark pink in color to normalize. Wounds may take approximately 4-6 weeks to heal. Larger wounds may take 6-8 weeks. After the wound is healed you may discontinue dressing changes.   You may experience a sensation of tightness as your wound heals. This is normal and will gradually subside.   Your healed wound may be sensitive to temperature changes. This sensitivity improves with time, but if you re having a lot of discomfort, try to avoid temperature extremes.   Patients frequently experience itching after their wound appears to have healed because of the continue healing under the skin. Plain  "Vaseline will help relieve the itching.                 Follow-ups after your visit        Who to contact     If you have questions or need follow up information about today's clinic visit or your schedule please contact [facility] directly at [phone].  Normal or non-critical lab and imaging results will be communicated to you by [employer], letter or phone within 4 business days after the clinic has received the results. If you do not hear from us within 7 days, please contact the clinic through [employer] or phone. If you have a critical or abnormal lab result, we will notify you by phone as soon as possible.  Submit refill requests through [employer] or call your pharmacy and they will forward the refill request to us. Please allow 3 business days for your refill to be completed.          Additional Information About Your Visit        [employer] Information     [employer] lets you send messages to your doctor, view your test results, renew your prescriptions, schedule appointments and more. To sign up, go to [email or web address] . Click on \"Log in\" on the left side of the screen, which will take you to the Welcome page. Then click on \"Sign up Now\" on the right side of the page.     You will be asked to enter the access code listed below, as well as some personal information. Please follow the directions to create your username and password.     Your access code is: [email or web address]  Expires: 2018 12:24 PM     Your access code will  in 90 days. If you need help or a new code, please call your [facility] or [phone].        Care EveryWhere ID     This is your Care EveryWhere ID. This could be used by other organizations to access your [facility] medical records  [id number]        Your Vitals Were     Pulse Pulse Oximetry                66 93%           Blood Pressure from Last 3 Encounters:   17 128/62   10/23/17 130/60   10/03/17 122/60    Weight from Last 3 Encounters:   10/23/17 91.6 " kg (202 lb)   10/03/17 93.4 kg (206 lb)   09/23/16 96.6 kg (213 lb)              We Performed the Following     95998 - EXC BENIGN SKIN LESION FACE/EARS 0.6-1.0 CM     DESTRUCT BENIGN LESION, UP TO 14        Primary Care Provider Office Phone # Fax #    Morena Mcintosh [phone]       [address] [facility] [address]        Equal Access to Services     JOSEY LU : Hadii aad ku hadasho Soomaali, waaxda luqadaha, qaybta kaalmada adeegyada, waxay idiin hayaan adeeg kharash la'aan . So [facility] [phone].    ATENCIÓN: Si habla español, tiene a allan disposición servicios gratuitos de asistencia lingüística. [facility] [place] [phone].    We comply with applicable federal civil rights laws and [place] laws. We do not discriminate on the basis of race, color, national origin, age, disability, sex, sexual orientation, or gender identity.            Thank you!     Thank you for choosing [facility]  for your care. Our goal is always to provide you with excellent care. Hearing back from our patients is one way we can continue to improve our services. Please take a few minutes to complete the written survey that you may receive in the mail after your visit with us. Thank you!             Your Updated Medication List - Protect others around you: Learn how to safely use, store and throw away your medicines at www.disposemymeds.org.          This list is accurate as of: 11/8/17  1:22 PM.  Always use your most recent med list.                   Brand Name Dispense Instructions for use Diagnosis    amLODIPine 5 MG tablet    NORVASC    90 tablet    Take 1 tablet (5 mg) by mouth daily    Essential hypertension with goal blood pressure less than 140/90       aspirin 81 MG tablet      1 TABLET DAILY        blood glucose lancets standard    no brand specified    1 box    use 1 daily    Type II or unspecified type diabetes mellitus without mention of complication, uncontrolled       blood glucose  monitoring meter device kit    no brand specified    1 kit    Use to test blood sugar 1 time daily or as directed. One Touch Ultra Mini Glucometer.    Type 2 diabetes mellitus with stage 3 chronic kidney disease (H)       blood glucose monitoring test strip    ONETOUCH ULTRA    100 each    Use to test blood sugar 1 times daily or as directed.    Type 2 diabetes mellitus with stage 3 chronic kidney disease, without long-term current use of insulin (H)       Blood Pressure Kit     1 kit    use as directed    Unspecified essential hypertension       furosemide 40 MG tablet    LASIX    180 tablet    Take 2 tablets (80 mg) by mouth daily    Bilateral edema of lower extremity       lisinopril 40 MG tablet    PRINIVIL/ZESTRIL    90 tablet    Take 1 tablet (40 mg) by mouth daily    Essential hypertension with goal blood pressure less than 140/90       metFORMIN 500 MG tablet    GLUCOPHAGE    180 tablet    Take 1 tablet (500 mg) by mouth 2 times daily (with meals)    Essential hypertension with goal blood pressure less than 140/90       metoprolol 200 MG 24 hr tablet    TOPROL-XL    90 tablet    Take 1 tablet (200 mg) by mouth daily    Essential hypertension with goal blood pressure less than 140/90       ONETOUCH DELICA LANCETS 33G Misc     100 each    1 lancet daily Use to test blood sugars 1 times daily or as directed.    Type 2 diabetes, HbA1c goal < 7% (H)       order for DME     1 each    Glucometer, brand as covered by insurance.    Type 2 diabetes mellitus with stage 3 chronic kidney disease, without long-term current use of insulin (H)       simvastatin 20 MG tablet    ZOCOR    90 tablet    Take 1 tablet (20 mg) by mouth At Bedtime    Hyperlipidemia LDL goal <100

## 2017-11-08 NOTE — LETTER
2017         RE: Milagro Wallace  [address] [place] [address]        Dear Colleague,    Thank you for referring your patient, Milagro Wallace, to the [facility]. Please see a copy of my visit note below.    Milagro Wallace is a 77 year old year old female patient here today for itching spots on lip[ and breast.   .  Patient states this has been present for years.  Patient reports the following symptoms:  itching.  Patient reports the following previous treatments none.  Patient reports the following modifying factors none.  Associated symptoms: none.  Patient has no other skin complaints today.  Remainder of the HPI, Meds, PMH, Allergies, FH, and SH was reviewed in chart.      Past Medical History:   Diagnosis Date     Difficult intubation      Hypertension      Malignant hyperthermia      PONV (postoperative nausea and vomiting)      Tobacco use disorder      Type 2 diabetes mellitus without complications (H)        Past Surgical History:   Procedure Laterality Date     COLONOSCOPY       COLONOSCOPY N/A 10/29/2015    Procedure: COLONOSCOPY;  Surgeon: Adan De Santiago MD;  Location: [facility]     EYE SURGERY       PHACOEMULSIFICATION WITH STANDARD INTRAOCULAR LENS IMPLANT  2014    Procedure: PHACOEMULSIFICATION WITH STANDARD INTRAOCULAR LENS IMPLANT;  Surgeon: Jj Payne MD;  Location: [facility]     SURGICAL HISTORY OF -            SURGICAL HISTORY OF -       hysterectomy after C section        Family History   Problem Relation Age of Onset     Alzheimer Disease Mother      C.A.D. Mother        age 75     CEREBROVASCULAR DISEASE Father        Social History     Social History     Marital status:      Spouse name: N/A     Number of children: N/A     Years of education: N/A     Occupational History     Not on file.     Social History Main Topics     Smoking status: Former Smoker     Packs/day: 0.70     Years: 26.00     Types: Cigarettes     Quit  date: 1/16/2009     Smokeless tobacco: Never Used      Comment:       Alcohol use Yes      Comment: 1 wine a day     Drug use: No     Sexual activity: Yes     Partners: Male     Other Topics Concern     Parent/Sibling W/ Cabg, Mi Or Angioplasty Before 65f 55m? No     Social History Narrative       Outpatient Encounter Prescriptions as of 11/8/2017   Medication Sig Dispense Refill     metoprolol (TOPROL-XL) 200 MG 24 hr tablet Take 1 tablet (200 mg) by mouth daily 90 tablet 1     lisinopril (PRINIVIL/ZESTRIL) 40 MG tablet Take 1 tablet (40 mg) by mouth daily 90 tablet 1     simvastatin (ZOCOR) 20 MG tablet Take 1 tablet (20 mg) by mouth At Bedtime 90 tablet 1     amLODIPine (NORVASC) 5 MG tablet Take 1 tablet (5 mg) by mouth daily 90 tablet 1     metFORMIN (GLUCOPHAGE) 500 MG tablet Take 1 tablet (500 mg) by mouth 2 times daily (with meals) 180 tablet 1     blood glucose monitoring (ONE TOUCH ULTRA) test strip Use to test blood sugar 1 times daily or as directed. 100 each 3     order for DME Glucometer, brand as covered by insurance. 1 each 0     furosemide (LASIX) 40 MG tablet Take 2 tablets (80 mg) by mouth daily 180 tablet 3     blood glucose monitoring (NO BRAND SPECIFIED) meter device kit Use to test blood sugar 1 time daily or as directed. One Touch Ultra Mini Glucometer. 1 kit 0     ONETOUCH DELICA LANCETS 33G MISC 1 lancet daily Use to test blood sugars 1 times daily or as directed. 100 each 6     LANCETS MISC. KIT use 1 daily 1 box 12     BLOOD PRESSURE KIT use as directed 1 kit 0     ASPIRIN 81 MG OR TABS 1 TABLET DAILY       No facility-administered encounter medications on file as of 11/8/2017.              Review Of Systems  Skin: As above  Eyes: negative  Ears/Nose/Throat: negative  Respiratory: No shortness of breath, dyspnea on exertion, cough, or hemoptysis  Cardiovascular: negative  Gastrointestinal: negative  Genitourinary: negative  Musculoskeletal: negative  Neurologic: negative  Psychiatric:  negative  Hematologic/Lymphatic/Immunologic: negative  Endocrine: negative      O:   NAD, WDWN, Alert & Oriented, Mood & Affect wnl, Vitals stable   Here today alone   /62  Pulse 66  SpO2 93%   General appearance normal   Vitals stable   Alert, oriented and in no acute distress      Following lymph nodes palpated: Occipital, Cervical, Supraclavicular nolad   Stuck on papules and brown macules on trunk and ext    Red papuleson trunk   Inflamed seborrheic keratosis on trunk    R upper lip firm 8mm nodule with comedone        The remainder of expanded problem focused exam was unremarkable; the following areas were examined:  scalp/hair, conjunctiva/lids, face, neck, lips, back, ab, , chest, digits/nails, RUE, LUE.      Eyes: Conjunctivae/lids:Normal     ENT: Lips, buccal mucosa, tongue: normal    MSK:Normal    Cardiovascular: peripheral edema none    Pulm: Breathing Normal    Lymph Nodes: No Head and Neck Lymphadenopathy     Neuro/Psych: Orientation:Normal; Mood/Affect:Normal      A/P:  1. R upper lip eic  TANGENTIAL EXCISION:  After consent, anesthesia with LEC and prep, tangential excision performed.  No complications and routine wound care.    2. Inflamed seborrheic keratosis x10 breast  LN2:  Treated with LN2 for 5s for 1-2 cycles. Warned risks of blistering, pain, pigment change, scarring, and incomplete resolution.  Advised patient to return if lesions do not completely resolve.  Wound care sheet given.  3. Seborrheic keratosis, lentigo, angioma  BENIGN LESIONS DISCUSSED WITH PATIENT:  I discussed the specifics of tumor, prognosis, and genetics of benign lesions.  I explained that treatment of these lesions would be purely cosmetic and not medically neccessary.  I discussed with patient different removal options including excision, cautery and /or laser.      Nature and genetics of benign skin lesions dicussed with patient.  Signs and Symptoms of skin cancer discussed with patient.  Patient encouraged to  perform monthly skin exams.  UV precautions reviewed with patient.  Skin care regimen reviewed with patient: Eliminate harsh soaps, i.e. Dial, zest, irsih spring; Mild soaps such as Cetaphil or Dove sensitive skin, avoid hot or cold showers, aggressive use of emollients including vanicream, cetaphil or cerave discussed with patient.    Risks of non-melanoma skin cancer discussed with patient   Return to clinic 12 pmtnhs      Again, thank you for allowing me to participate in the care of your patient.        Sincerely,        Yadiel Glynn MD

## 2017-11-08 NOTE — PROGRESS NOTES
Milagro Wallace is a 77 year old year old female patient here today for itching spots on lip[ and breast.   .  Patient states this has been present for years.  Patient reports the following symptoms:  itching.  Patient reports the following previous treatments none.  Patient reports the following modifying factors none.  Associated symptoms: none.  Patient has no other skin complaints today.  Remainder of the HPI, Meds, PMH, Allergies, FH, and SH was reviewed in chart.      Past Medical History:   Diagnosis Date     Difficult intubation      Hypertension      Malignant hyperthermia      PONV (postoperative nausea and vomiting)      Tobacco use disorder      Type 2 diabetes mellitus without complications (H)        Past Surgical History:   Procedure Laterality Date     COLONOSCOPY       COLONOSCOPY N/A 10/29/2015    Procedure: COLONOSCOPY;  Surgeon: Adan De Santiago MD;  Location: [facility]     EYE SURGERY       PHACOEMULSIFICATION WITH STANDARD INTRAOCULAR LENS IMPLANT  2014    Procedure: PHACOEMULSIFICATION WITH STANDARD INTRAOCULAR LENS IMPLANT;  Surgeon: Jj Payne MD;  Location: [facility]     SURGICAL HISTORY OF -            SURGICAL HISTORY OF 1979    hysterectomy after C section        Family History   Problem Relation Age of Onset     Alzheimer Disease Mother      C.A.D. Mother        age 75     CEREBROVASCULAR DISEASE Father        Social History     Social History     Marital status:      Spouse name: N/A     Number of children: N/A     Years of education: N/A     Occupational History     Not on file.     Social History Main Topics     Smoking status: Former Smoker     Packs/day: 0.70     Years: 26.00     Types: Cigarettes     Quit date: 2009     Smokeless tobacco: Never Used      Comment:       Alcohol use Yes      Comment: 1 wine a day     Drug use: No     Sexual activity: Yes     Partners: Male     Other Topics Concern     Parent/Sibling W/ Cabg, Mi Or Angioplasty Before  65f 55m? No     Social History Narrative       Outpatient Encounter Prescriptions as of 11/8/2017   Medication Sig Dispense Refill     metoprolol (TOPROL-XL) 200 MG 24 hr tablet Take 1 tablet (200 mg) by mouth daily 90 tablet 1     lisinopril (PRINIVIL/ZESTRIL) 40 MG tablet Take 1 tablet (40 mg) by mouth daily 90 tablet 1     simvastatin (ZOCOR) 20 MG tablet Take 1 tablet (20 mg) by mouth At Bedtime 90 tablet 1     amLODIPine (NORVASC) 5 MG tablet Take 1 tablet (5 mg) by mouth daily 90 tablet 1     metFORMIN (GLUCOPHAGE) 500 MG tablet Take 1 tablet (500 mg) by mouth 2 times daily (with meals) 180 tablet 1     blood glucose monitoring (ONE TOUCH ULTRA) test strip Use to test blood sugar 1 times daily or as directed. 100 each 3     order for DME Glucometer, brand as covered by insurance. 1 each 0     furosemide (LASIX) 40 MG tablet Take 2 tablets (80 mg) by mouth daily 180 tablet 3     blood glucose monitoring (NO BRAND SPECIFIED) meter device kit Use to test blood sugar 1 time daily or as directed. One Touch Ultra Mini Glucometer. 1 kit 0     ONETOUCH DELICA LANCETS 33G MISC 1 lancet daily Use to test blood sugars 1 times daily or as directed. 100 each 6     LANCETS MISC. KIT use 1 daily 1 box 12     BLOOD PRESSURE KIT use as directed 1 kit 0     ASPIRIN 81 MG OR TABS 1 TABLET DAILY       No facility-administered encounter medications on file as of 11/8/2017.              Review Of Systems  Skin: As above  Eyes: negative  Ears/Nose/Throat: negative  Respiratory: No shortness of breath, dyspnea on exertion, cough, or hemoptysis  Cardiovascular: negative  Gastrointestinal: negative  Genitourinary: negative  Musculoskeletal: negative  Neurologic: negative  Psychiatric: negative  Hematologic/Lymphatic/Immunologic: negative  Endocrine: negative      O:   NAD, WDWN, Alert & Oriented, Mood & Affect wnl, Vitals stable   Here today alone   /62  Pulse 66  SpO2 93%   General appearance normal   Vitals stable   Alert,  oriented and in no acute distress      Following lymph nodes palpated: Occipital, Cervical, Supraclavicular nolad   Stuck on papules and brown macules on trunk and ext    Red papuleson trunk   Inflamed seborrheic keratosis on trunk    R upper lip firm 8mm nodule with comedone        The remainder of expanded problem focused exam was unremarkable; the following areas were examined:  scalp/hair, conjunctiva/lids, face, neck, lips, back, ab, , chest, digits/nails, RUE, LUE.      Eyes: Conjunctivae/lids:Normal     ENT: Lips, buccal mucosa, tongue: normal    MSK:Normal    Cardiovascular: peripheral edema none    Pulm: Breathing Normal    Lymph Nodes: No Head and Neck Lymphadenopathy     Neuro/Psych: Orientation:Normal; Mood/Affect:Normal      A/P:  1. R upper lip eic  TANGENTIAL EXCISION:  After consent, anesthesia with LEC and prep, tangential excision performed.  No complications and routine wound care.    2. Inflamed seborrheic keratosis x10 breast  LN2:  Treated with LN2 for 5s for 1-2 cycles. Warned risks of blistering, pain, pigment change, scarring, and incomplete resolution.  Advised patient to return if lesions do not completely resolve.  Wound care sheet given.  3. Seborrheic keratosis, lentigo, angioma  BENIGN LESIONS DISCUSSED WITH PATIENT:  I discussed the specifics of tumor, prognosis, and genetics of benign lesions.  I explained that treatment of these lesions would be purely cosmetic and not medically neccessary.  I discussed with patient different removal options including excision, cautery and /or laser.      Nature and genetics of benign skin lesions dicussed with patient.  Signs and Symptoms of skin cancer discussed with patient.  Patient encouraged to perform monthly skin exams.  UV precautions reviewed with patient.  Skin care regimen reviewed with patient: Eliminate harsh soaps, i.e. Dial, zest, irsih spring; Mild soaps such as Cetaphil or Dove sensitive skin, avoid hot or cold showers, aggressive  use of emollients including vanicream, cetaphil or cerave discussed with patient.    Risks of non-melanoma skin cancer discussed with patient   Return to clinic [facility]

## 2018-02-08 ENCOUNTER — TELEPHONE (OUTPATIENT)
Dept: FAMILY MEDICINE | Facility: CLINIC | Age: 78
End: 2018-02-08

## 2018-02-08 NOTE — TELEPHONE ENCOUNTER
Patient is in [place] for a few months.  LOV back in 10/2017 it was recommended she see lymphedema clinic.  Patient has not decided she will do this but down in [place] - she will call insurance first and see who is in network and if referral needed.  If referral needed she will call us back and request.    OV notes 10-23-17:  5. Secondary lymphedema I89.0 PHYSICAL THERAPY REFERRAL   Recheck in 6 months, when back from [place] in the spring.   Strongly recommended low carbohydrate dietary changes.   Recommend lymphedema clinic/PT for her lower extremity edema.      Jadyn CASTILLO RN

## 2018-02-08 NOTE — TELEPHONE ENCOUNTER
Reason for Call: Request for an order or referral:    Order or referral being requested[facility]    Date needed: as soon as possible    Has the patient been seen by the PCP for this problem? YES    Additional comments: patients daughter is calling stating her Mother is in [place] for a few months and has now decided she will do the leg wraps from the [facility]. They are in [place], [place]. Daughter states that the XL Pillo Hose will not go on her legs    Phone number Patient can be reached at:  [phone]    Best Time:  any    Can we leave a detailed message on this number?  YES   Haven Reed  Clinic Station  [facility]      Call taken on 2/8/2018 at 12:12 PM by Haven Reed

## 2018-02-21 ENCOUNTER — TRANSFERRED RECORDS (OUTPATIENT)
Dept: HEALTH INFORMATION MANAGEMENT | Facility: CLINIC | Age: 78
End: 2018-02-21

## 2018-02-22 ENCOUNTER — TELEPHONE (OUTPATIENT)
Dept: FAMILY MEDICINE | Facility: CLINIC | Age: 78
End: 2018-02-22

## 2018-02-22 DIAGNOSIS — I89.0 SECONDARY LYMPHEDEMA: Primary | ICD-10-CM

## 2018-02-22 NOTE — TELEPHONE ENCOUNTER
Reason for Call:  Other Referral    Detailed comments: Charu is calling for a referral for Milagro to have lymphedema therapy.  She needs orders to say eval, treat and order supplies for treatment as needed.  Charu works at [facility].  This can be faxed to [phone].  Please advise.    Phone Number Patient can be reached at: Other phone number:  [facility]  Charu  [phone] opt 1    Best Time: any    Can we leave a detailed message on this number? YES    Call taken on 2/22/2018 at 9:45 AM by Soraya Bauer

## 2018-03-05 ENCOUNTER — TELEPHONE (OUTPATIENT)
Dept: FAMILY MEDICINE | Facility: CLINIC | Age: 78
End: 2018-03-05

## 2018-03-05 NOTE — TELEPHONE ENCOUNTER
Reason for Call:  Form, our goal is to have forms completed with 72 hours, however, some forms may require a visit or additional information.    Type of letter, form or note:  medical    Who is the form from?: Home care    Where did the form come from: form was faxed in    What clinic location was the form placed at?: [facility]    Where the form was placed: 's Box    What number is listed as a contact on the form?: fax [phone]       Additional comments:     Call taken on 3/5/2018 at 2:05 PM by Johanny James

## 2018-03-21 ENCOUNTER — MEDICAL CORRESPONDENCE (OUTPATIENT)
Dept: HEALTH INFORMATION MANAGEMENT | Facility: CLINIC | Age: 78
End: 2018-03-21

## 2018-04-02 ENCOUNTER — TELEPHONE (OUTPATIENT)
Dept: FAMILY MEDICINE | Facility: CLINIC | Age: 78
End: 2018-04-02

## 2018-04-02 DIAGNOSIS — I10 ESSENTIAL HYPERTENSION WITH GOAL BLOOD PRESSURE LESS THAN 140/90: ICD-10-CM

## 2018-04-02 RX ORDER — METOPROLOL TARTRATE 50 MG
50 TABLET ORAL 2 TIMES DAILY
Qty: 180 TABLET | Refills: 3 | Status: SHIPPED | OUTPATIENT
Start: 2018-04-02 | End: 2019-03-28

## 2018-04-03 NOTE — TELEPHONE ENCOUNTER
Looks like Dr. Al sent something in already.    Morena Mcintosh M.D.    
Pt is out of meds and  is out of office today and tomorrow.  See note below.  Advise.  Vin  
Reason for Call:  Medication or medication refill:    Do you use a [facility]?  Name of the pharmacy and phone number for the current request:  [facility]    Name of the medication requested: Metoprolol    Other request: She is out of her medication.   She is asking for  Metoprolol Tartrate instead of the Metoprolol Succinate.  The Tartrate is cheaper per the pharmacy.  Please advise.    Can we leave a detailed message on this number? YES    Phone number patient can be reached at: Other phone number:  [phone]    Best Time: any    Call taken on 4/2/2018 at 10:58 AM by Soraya Bauer      
Spoke with pt and she is requesting an order for Metoprolol Tartrate to replace her Metoprolol Succinate due to it being too expensive.  She is ok with taking 2 tabs /day if needed.  Advise.Vin   
- - -

## 2018-04-15 DIAGNOSIS — I10 ESSENTIAL HYPERTENSION WITH GOAL BLOOD PRESSURE LESS THAN 140/90: ICD-10-CM

## 2018-04-16 RX ORDER — AMLODIPINE BESYLATE 5 MG/1
TABLET ORAL
Qty: 90 TABLET | Refills: 0 | Status: SHIPPED | OUTPATIENT
Start: 2018-04-16 | End: 2018-06-06

## 2018-04-16 NOTE — TELEPHONE ENCOUNTER
"Requested Prescriptions   Pending Prescriptions Disp Refills     amLODIPine (NORVASC) 5 MG tablet [Pharmacy Med Name: AMLODIPINE BESYLATE 5MG TABLETS]  Last Written Prescription Date:  10/23/2017  Last Fill Quantity: 90,  # refills: 1   Last office visit: 10/23/2017 with prescribing provider:  Natalie   Future Office Visit:     90 tablet 0     Sig: TAKE 1 TABLET(5 MG) BY MOUTH DAILY    Calcium Channel Blockers Protocol  Failed    4/15/2018 11:57 AM       Failed - Normal serum creatinine on file in past 12 months    Recent Labs   Lab Test  10/23/17   1015   CR  1.15*            Passed - Blood pressure under 140/90 in past 12 months    BP Readings from Last 3 Encounters:   11/08/17 128/62   10/23/17 130/60   10/03/17 122/60                Passed - Recent (12 mo) or future (30 days) visit within the authorizing provider's specialty    Patient had office visit in the last 12 months or has a visit in the next 30 days with authorizing provider or within the authorizing provider's specialty.  See \"Patient Info\" tab in inbasket, or \"Choose Columns\" in Meds & Orders section of the refill encounter.           Passed - Patient is age 18 or older       Passed - No active pregnancy on record       Passed - No positive pregnancy test in past 12 months        Christian Rosen RT (R)    "

## 2018-04-16 NOTE — TELEPHONE ENCOUNTER
Routing refill request to provider for review/approval because:  Labs out of range:  Cr 1.15    Thank you    Amanda MCCOY RN

## 2018-04-26 DIAGNOSIS — E78.5 HYPERLIPIDEMIA LDL GOAL <100: ICD-10-CM

## 2018-04-26 NOTE — TELEPHONE ENCOUNTER
"Requested Prescriptions   Pending Prescriptions Disp Refills     simvastatin (ZOCOR) 20 MG tablet [Pharmacy Med Name: SIMVASTATIN 20MG TABLETS]  Last Written Prescription Date:  10/23/17  Last Fill Quantity: 90,  # refills: 1   Last office visit: 10/23/2017 with prescribing provider:  10/23/17   Future Office Visit:     90 tablet 0     Sig: TAKE 1 TABLET BY MOUTH AT BEDTIME    Statins Protocol Passed    4/26/2018 11:05 AM       Passed - LDL on file in past 12 months    Recent Labs   Lab Test  10/23/17   1015   LDL  39          Passed - No abnormal creatine kinase in past 12 months    No lab results found.        Passed - Recent (12 mo) or future (30 days) visit within the authorizing provider's specialty    Patient had office visit in the last 12 months or has a visit in the next 30 days with authorizing provider or within the authorizing provider's specialty.  See \"Patient Info\" tab in inbasket, or \"Choose Columns\" in Meds & Orders section of the refill encounter.         Passed - Patient is age 18 or older       Passed - No active pregnancy on record       Passed - No positive pregnancy test in past 12 months          "

## 2018-04-27 RX ORDER — SIMVASTATIN 20 MG
TABLET ORAL
Qty: 90 TABLET | Refills: 0 | Status: SHIPPED | OUTPATIENT
Start: 2018-04-27 | End: 2018-07-29

## 2018-05-09 DIAGNOSIS — I10 ESSENTIAL HYPERTENSION WITH GOAL BLOOD PRESSURE LESS THAN 140/90: ICD-10-CM

## 2018-05-09 RX ORDER — LISINOPRIL 40 MG/1
TABLET ORAL
Qty: 90 TABLET | Refills: 1 | Status: SHIPPED | OUTPATIENT
Start: 2018-05-09 | End: 2018-11-04

## 2018-05-13 ENCOUNTER — TELEPHONE (OUTPATIENT)
Dept: FAMILY MEDICINE | Facility: CLINIC | Age: 78
End: 2018-05-13

## 2018-05-13 DIAGNOSIS — E11.22 TYPE 2 DIABETES MELLITUS WITH STAGE 3 CHRONIC KIDNEY DISEASE, WITHOUT LONG-TERM CURRENT USE OF INSULIN (H): Primary | ICD-10-CM

## 2018-05-13 DIAGNOSIS — N18.30 TYPE 2 DIABETES MELLITUS WITH STAGE 3 CHRONIC KIDNEY DISEASE, WITHOUT LONG-TERM CURRENT USE OF INSULIN (H): Primary | ICD-10-CM

## 2018-05-13 DIAGNOSIS — I10 ESSENTIAL HYPERTENSION WITH GOAL BLOOD PRESSURE LESS THAN 140/90: ICD-10-CM

## 2018-05-15 DIAGNOSIS — I10 ESSENTIAL HYPERTENSION WITH GOAL BLOOD PRESSURE LESS THAN 140/90: ICD-10-CM

## 2018-05-15 NOTE — TELEPHONE ENCOUNTER
"Requested Prescriptions   Pending Prescriptions Disp Refills     metFORMIN (GLUCOPHAGE) 500 MG tablet [Pharmacy Med Name: METFORMIN 500MG TABLETS]        Last Written Prescription Date:  10-23-17  Last Fill Quantity: 180,   # refills: 1  Last Office Visit: 10-23-17  Future Office visit:        180 tablet 0     Sig: TAKE 1 TABLET(500 MG) BY MOUTH TWICE DAILY WITH MEALS    Biguanide Agents Failed    5/15/2018  1:11 PM       Failed - Blood pressure less than 140/90 in past 6 months    BP Readings from Last 3 Encounters:   11/08/17 128/62   10/23/17 130/60   10/03/17 122/60                Failed - Patient has documented A1c within the specified period of time.    If HgbA1C is 8 or greater, it needs to be on file within the past 3 months.  If less than 8, must be on file within the past 6 months.     Recent Labs   Lab Test  10/23/17   1015   A1C  7.2*            Failed - Recent (6 mo) or future (30 days) visit within the authorizing provider's specialty    Patient had office visit in the last 6 months or has a visit in the next 30 days with authorizing provider or within the authorizing provider's specialty.  See \"Patient Info\" tab in inbasket, or \"Choose Columns\" in Meds & Orders section of the refill encounter.           Passed - Patient has documented LDL within the past 12 mos.    Recent Labs   Lab Test  10/23/17   1015   LDL  39            Passed - Patient has had a Microalbumin in the past 12 mos.    Recent Labs   Lab Test  10/23/17   1016   MICROL  5   UMALCR  20.30            Passed - Patient is age 10 or older       Passed - Patient's CR is NOT>1.4 OR Patient's EGFR is NOT<45 within past 12 mos.    Recent Labs   Lab Test  10/23/17   1015   GFRESTIMATED  46*   GFRESTBLACK  55*       Recent Labs   Lab Test  10/23/17   1015   CR  1.15*            Passed - Patient does NOT have a diagnosis of CHF.       Passed - Patient is not pregnant       Passed - Patient has not had a positive pregnancy test within the past 12 " mos.

## 2018-05-15 NOTE — TELEPHONE ENCOUNTER
Routing refill request to provider for review/approval because:  Labs out of range:  See below    Johanny Edward RN

## 2018-06-06 ENCOUNTER — OFFICE VISIT (OUTPATIENT)
Dept: FAMILY MEDICINE | Facility: CLINIC | Age: 78
End: 2018-06-06
Payer: COMMERCIAL

## 2018-06-06 VITALS
OXYGEN SATURATION: 94 % | DIASTOLIC BLOOD PRESSURE: 58 MMHG | SYSTOLIC BLOOD PRESSURE: 128 MMHG | RESPIRATION RATE: 18 BRPM | BODY MASS INDEX: 35.26 KG/M2 | WEIGHT: 199 LBS | HEART RATE: 61 BPM | HEIGHT: 63 IN

## 2018-06-06 DIAGNOSIS — E11.22 TYPE 2 DIABETES MELLITUS WITH STAGE 3 CHRONIC KIDNEY DISEASE, WITHOUT LONG-TERM CURRENT USE OF INSULIN (H): Primary | ICD-10-CM

## 2018-06-06 DIAGNOSIS — I10 ESSENTIAL HYPERTENSION WITH GOAL BLOOD PRESSURE LESS THAN 140/90: ICD-10-CM

## 2018-06-06 DIAGNOSIS — N18.30 CKD (CHRONIC KIDNEY DISEASE) STAGE 3, GFR 30-59 ML/MIN (H): ICD-10-CM

## 2018-06-06 DIAGNOSIS — N18.30 TYPE 2 DIABETES MELLITUS WITH STAGE 3 CHRONIC KIDNEY DISEASE, WITHOUT LONG-TERM CURRENT USE OF INSULIN (H): Primary | ICD-10-CM

## 2018-06-06 LAB
ANION GAP SERPL CALCULATED.3IONS-SCNC: 8 MMOL/L (ref 3–14)
BUN SERPL-MCNC: 22 MG/DL (ref 7–30)
CALCIUM SERPL-MCNC: 9.1 MG/DL (ref 8.5–10.1)
CHLORIDE SERPL-SCNC: 102 MMOL/L (ref 94–109)
CO2 SERPL-SCNC: 29 MMOL/L (ref 20–32)
CREAT SERPL-MCNC: 1.1 MG/DL (ref 0.52–1.04)
GFR SERPL CREATININE-BSD FRML MDRD: 48 ML/MIN/1.7M2
GLUCOSE SERPL-MCNC: 153 MG/DL (ref 70–99)
HBA1C MFR BLD: 7.3 % (ref 0–5.6)
POTASSIUM SERPL-SCNC: 4.2 MMOL/L (ref 3.4–5.3)
SODIUM SERPL-SCNC: 139 MMOL/L (ref 133–144)
TSH SERPL DL<=0.005 MIU/L-ACNC: 1.81 MU/L (ref 0.4–4)

## 2018-06-06 PROCEDURE — 36415 COLL VENOUS BLD VENIPUNCTURE: CPT | Performed by: FAMILY MEDICINE

## 2018-06-06 PROCEDURE — 84443 ASSAY THYROID STIM HORMONE: CPT | Performed by: FAMILY MEDICINE

## 2018-06-06 PROCEDURE — 80048 BASIC METABOLIC PNL TOTAL CA: CPT | Performed by: FAMILY MEDICINE

## 2018-06-06 PROCEDURE — 99214 OFFICE O/P EST MOD 30 MIN: CPT | Performed by: FAMILY MEDICINE

## 2018-06-06 PROCEDURE — 83036 HEMOGLOBIN GLYCOSYLATED A1C: CPT | Performed by: FAMILY MEDICINE

## 2018-06-06 RX ORDER — AMLODIPINE BESYLATE 5 MG/1
TABLET ORAL
Qty: 90 TABLET | Refills: 3 | Status: SHIPPED | OUTPATIENT
Start: 2018-06-06 | End: 2019-06-17

## 2018-06-06 ASSESSMENT — PAIN SCALES - GENERAL: PAINLEVEL: NO PAIN (0)

## 2018-06-06 NOTE — MR AVS SNAPSHOT
After Visit Summary   6/6/2018    Milagro Wallace    MRN: [id number]           Patient Information     Date Of Birth          1940        Visit Information        Provider Department      6/6/2018 1:00 PM Morena Mcintosh MD [facility]        Today's Diagnoses     Type 2 diabetes mellitus with stage 3 chronic kidney disease (H)    -  1    Type 2 diabetes mellitus with stage 3 chronic kidney disease, without long-term current use of insulin (H)        Essential hypertension with goal blood pressure less than 140/90        CKD (chronic kidney disease) stage 3, GFR 30-59 ml/min          Care Instructions          Thank you for choosing [facility].  You may be receiving a survey in the mail from [employer] regarding your visit today.  Please take a few minutes to complete and return the survey to let us know how we are doing.      Our Clinic hours are:  Mondays    7:20 am - 7 pm  Tues -  Fri  7:20 am - 5 pm    Clinic Phone: [phone]    The clinic lab opens at 7:30 am Mon - Fri and appointments are required.    [facility]  Ph. [phone]  Monday-Thursday 8 am - 7pm  Tues/Wed/Fri 8 am - 5:30 pm                 Follow-ups after your visit        Who to contact     If you have questions or need follow up information about today's clinic visit or your schedule please contact [facility] directly at [phone].  Normal or non-critical lab and imaging results will be communicated to you by MyChart, letter or phone within 4 business days after the clinic has received the results. If you do not hear from us within 7 days, please contact the clinic through MyChart or phone. If you have a critical or abnormal lab result, we will notify you by phone as soon as possible.  Submit refill requests through [employer] or call your pharmacy and they will forward the refill request to us. Please allow 3 business days for your refill to be completed.     "      Additional Information About Your Visit        Care EveryWhere ID     This is your Care EveryWhere ID. This could be used by other organizations to access your [place] medical records  [id number]        Your Vitals Were     Pulse Respirations Height Pulse Oximetry Breastfeeding? BMI (Body Mass Index)    61 18 5' 2.5\" (1.588 m) 94% No 35.82 kg/m2       Blood Pressure from Last 3 Encounters:   06/06/18 128/58   11/08/17 128/62   10/23/17 130/60    Weight from Last 3 Encounters:   06/06/18 199 lb (90.3 kg)   10/23/17 202 lb (91.6 kg)   10/03/17 206 lb (93.4 kg)              We Performed the Following     Basic metabolic panel     Hemoglobin A1c     TSH with free T4 reflex          Where to get your medicines      These medications were sent to [id number] - [place] [address] AT [facility] OF [address] [place] [address]  [address] [facility] [address]     Phone:  [phone]     amLODIPine 5 MG tablet    metFORMIN 500 MG tablet          Primary Care Provider Office Phone # Fax #    Morena Mcintosh [phone] [address] [facility] [address]        Equal Access to Services     JOSEY LU AH: Hadii aad ku hadasho Soomaali, waaxda luqadaha, qaybta kaalmada adeegyada, waxay idiin hayaan adeeg renetta layenny dsouza. So [facility] [phone].    ATENCIÓN: Si habla español, tiene a allan disposición servicios gratuitos de asistencia lingüística. Llame al [phone].    We comply with applicable federal civil rights laws and [place] laws. We do not discriminate on the basis of race, color, national origin, age, disability, sex, sexual orientation, or gender identity.            Thank you!     Thank you for choosing [facility]  for your care. Our goal is always to provide you with excellent care. Hearing back from our patients is one way we can continue to improve our services. Please take a few minutes to complete the written survey that you " may receive in the mail after your visit with us. Thank you!             Your Updated Medication List - Protect others around you: Learn how to safely use, store and throw away your medicines at www.disposemymeds.org.          This list is accurate as of 6/6/18  1:40 PM.  Always use your most recent med list.                   Brand Name Dispense Instructions for use Diagnosis    amLODIPine 5 MG tablet    NORVASC    90 tablet    TAKE 1 TABLET(5 MG) BY MOUTH DAILY    Essential hypertension with goal blood pressure less than 140/90       aspirin 81 MG tablet      1 TABLET DAILY        blood glucose lancets standard    no brand specified    1 box    use 1 daily    Type II or unspecified type diabetes mellitus without mention of complication, uncontrolled       blood glucose monitoring meter device kit    no brand specified    1 kit    Use to test blood sugar 1 time daily or as directed. One Touch Ultra Mini Glucometer.    Type 2 diabetes mellitus with stage 3 chronic kidney disease (H)       blood glucose monitoring test strip    ONETOUCH ULTRA    100 each    Use to test blood sugar 1 times daily or as directed.    Type 2 diabetes mellitus with stage 3 chronic kidney disease, without long-term current use of insulin (H)       Blood Pressure Kit     1 kit    use as directed    Unspecified essential hypertension       CENTRUM SILVER PO           furosemide 40 MG tablet    LASIX    180 tablet    Take 2 tablets (80 mg) by mouth daily    Bilateral edema of lower extremity       lisinopril 40 MG tablet    PRINIVIL/ZESTRIL    90 tablet    TAKE 1 TABLET(40 MG) BY MOUTH DAILY    Essential hypertension with goal blood pressure less than 140/90       metFORMIN 500 MG tablet    GLUCOPHAGE    180 tablet    TAKE 1 TABLET(500 MG) BY MOUTH TWICE DAILY WITH MEALS    Type 2 diabetes mellitus with stage 3 chronic kidney disease, without long-term current use of insulin (H)       metoprolol tartrate 50 MG tablet    LOPRESSOR    180 tablet     Take 1 tablet (50 mg) by mouth 2 times daily    Essential hypertension with goal blood pressure less than 140/90       ONETOUCH DELICA LANCETS 33G Misc     100 each    1 lancet daily Use to test blood sugars 1 times daily or as directed.    Type 2 diabetes, HbA1c goal < 7% (H)       order for DME     1 each    Glucometer, brand as covered by insurance.    Type 2 diabetes mellitus with stage 3 chronic kidney disease, without long-term current use of insulin (H)       simvastatin 20 MG tablet    ZOCOR    90 tablet    TAKE 1 TABLET BY MOUTH AT BEDTIME    Hyperlipidemia LDL goal <100

## 2018-06-06 NOTE — PATIENT INSTRUCTIONS
Thank you for choosing [facility].  You may be receiving a survey in the mail from [facility] regarding your visit today.  Please take a few minutes to complete and return the survey to let us know how we are doing.      Our Clinic hours are:  Mondays    7:20 am - 7 pm  Tues -  Fri  7:20 am - 5 pm    Clinic Phone: [phone]    The clinic lab opens at 7:30 am Mon - Fri and appointments are required.    [facility]. [phone]  Monday-Thursday 8 am - 7pm  Tues/Wed/Fri 8 am - 5:30 pm

## 2018-06-06 NOTE — PROGRESS NOTES
"  SUBJECTIVE:   Milagro Wallace is a 78 year old female who presents to clinic today for the following health issues:      Diabetes Follow-up      Patient is checking blood sugars: rarely.   160-180    Diabetic concerns: None     Symptoms of hypoglycemia (low blood sugar): dizzy     Paresthesias (numbness or burning in feet) or sores: Yes left big toe     Date of last diabetic eye exam: DUE    Hyperlipidemia Follow-Up      Rate your low fat/cholesterol diet?: fair    Taking statin?  Yes, no muscle aches from statin    Other lipid medications/supplements?:  none    Hypertension Follow-up      Outpatient blood pressures are not being checked.    Low Salt Diet: no added salt    BP Readings from Last 2 Encounters:   06/06/18 128/58   11/08/17 128/62     Hemoglobin A1C (%)   Date Value   06/06/2018 7.3 (H)   10/23/2017 7.2 (H)     LDL Cholesterol Calculated (mg/dL)   Date Value   10/23/2017 39   09/01/2016 54       Amount of exercise or physical activity: 6-7 days/week for an average of 15-30 minutes    Problems taking medications regularly: No    Medication side effects: none    Diet: regular (no restrictions)    Has been doing lymphedema therapy and has found that to be helpful.  Wearing the wrap on her right leg all day and taking it off at night.  Doing lymphedema therapy and was using a machine that didn't seem to work - \"Tactile\" and was billed over $8k - used once for 49 minutes and sent it back.  This was ordered at the recommendation of the Lymphedema therapist.      Added centrum silver vitamin - felt like she was getting a lot of colds that were lasting \"too long\".     Metoprolol was changed from succinate to tartrate due to cost.      Just got over a 2.5 week cold.  Wished she had an inhler and didn't.      /58  Pulse 61  Resp 18  Ht 5' 2.5\" (1.588 m)  Wt 199 lb (90.3 kg)  SpO2 94%  Breastfeeding? No  BMI 35.82 kg/m2  EXAM: GENERAL APPEARANCE: Alert, no acute distress  RESP: lungs clear to " auscultation   CV: normal rate, regular rhythm, no murmur or gallop  ABDOMEN: soft, no organomegaly, masses or tenderness  LYMPHATICS: right leg lymphedema wrap in place.  Foot with 2+ edema    Results for orders placed or performed in visit on 06/06/18   Hemoglobin A1c   Result Value Ref Range    Hemoglobin A1C 7.3 (H) 0 - 5.6 %         ASSESSMENT/PLAN:      ICD-10-CM    1. Type 2 diabetes mellitus with stage 3 chronic kidney disease, without long-term current use of insulin (H) E11.22 metFORMIN (GLUCOPHAGE) 500 MG tablet    N18.3 TSH with free T4 reflex     Basic metabolic panel   2. Essential hypertension with goal blood pressure less than 140/90 I10 amLODIPine (NORVASC) 5 MG tablet   3. CKD (chronic kidney disease) stage 3, GFR 30-59 ml/min N18.3        Patient Instructions         Thank you for choosing [facility].  You may be receiving a survey in the mail from [employer] regarding your visit today.  Please take a few minutes to complete and return the survey to let us know how we are doing.      Our Clinic hours are:  Mondays    7:20 am - 7 pm  Tues -  Fri  7:20 am - 5 pm    Clinic Phone: [phone]    The clinic lab opens at 7:30 am Mon - Fri and appointments are required.    [facility]. [phone]  Monday-Thursday 8 am - 7pm  Tues/Wed/Fri 8 am - 5:30 pm

## 2018-06-07 NOTE — PROGRESS NOTES
Kidney function stable over the past 6 months.   HgbA1c is 7.3% as we discussed.      Thyroid is in normal range.    Morena Mcintosh M.D.

## 2018-07-29 DIAGNOSIS — E78.5 HYPERLIPIDEMIA LDL GOAL <100: ICD-10-CM

## 2018-07-30 NOTE — TELEPHONE ENCOUNTER
"Requested Prescriptions   Pending Prescriptions Disp Refills     simvastatin (ZOCOR) 20 MG tablet [Pharmacy Med Name: SIMVASTATIN 20MG TABLETS] 90 tablet 0     Sig: TAKE 1 TABLET BY MOUTH AT BEDTIME    Statins Protocol Passed    7/29/2018  9:00 PM       Passed - LDL on file in past 12 months    Recent Labs   Lab Test  10/23/17   1015   LDL  39            Passed - No abnormal creatine kinase in past 12 months    No lab results found.            Passed - Recent (12 mo) or future (30 days) visit within the authorizing provider's specialty    Patient had office visit in the last 12 months or has a visit in the next 30 days with authorizing provider or within the authorizing provider's specialty.  See \"Patient Info\" tab in inbasket, or \"Choose Columns\" in Meds & Orders section of the refill encounter.           Passed - Patient is age 18 or older       Passed - No active pregnancy on record       Passed - No positive pregnancy test in past 12 months      Last Written Prescription Date:  4/27/18  Last Fill Quantity: 90,  # refills: 3   Last office visit: 6/6/2018 with prescribing provider:     Future Office Visit:      "

## 2018-07-31 RX ORDER — SIMVASTATIN 20 MG
20 TABLET ORAL AT BEDTIME
Qty: 90 TABLET | Refills: 0 | Status: SHIPPED | OUTPATIENT
Start: 2018-07-31 | End: 2018-10-28

## 2018-08-30 ENCOUNTER — OFFICE VISIT (OUTPATIENT)
Dept: FAMILY MEDICINE | Facility: CLINIC | Age: 78
End: 2018-08-30
Payer: COMMERCIAL

## 2018-08-30 VITALS
WEIGHT: 202 LBS | HEART RATE: 62 BPM | RESPIRATION RATE: 18 BRPM | TEMPERATURE: 97.4 F | OXYGEN SATURATION: 94 % | SYSTOLIC BLOOD PRESSURE: 130 MMHG | BODY MASS INDEX: 35.79 KG/M2 | DIASTOLIC BLOOD PRESSURE: 54 MMHG | HEIGHT: 63 IN

## 2018-08-30 DIAGNOSIS — H69.92 DYSFUNCTION OF LEFT EUSTACHIAN TUBE: ICD-10-CM

## 2018-08-30 DIAGNOSIS — M53.3 COCCYDYNIA: ICD-10-CM

## 2018-08-30 DIAGNOSIS — H69.92 DYSFUNCTION OF LEFT EUSTACHIAN TUBE: Primary | ICD-10-CM

## 2018-08-30 DIAGNOSIS — M54.50 LEFT-SIDED LOW BACK PAIN WITHOUT SCIATICA, UNSPECIFIED CHRONICITY: ICD-10-CM

## 2018-08-30 DIAGNOSIS — J32.0 CHRONIC MAXILLARY SINUSITIS: ICD-10-CM

## 2018-08-30 DIAGNOSIS — H81.10 BENIGN PAROXYSMAL POSITIONAL VERTIGO, UNSPECIFIED LATERALITY: ICD-10-CM

## 2018-08-30 PROCEDURE — 99214 OFFICE O/P EST MOD 30 MIN: CPT | Performed by: FAMILY MEDICINE

## 2018-08-30 RX ORDER — CHLORHEXIDINE GLUCONATE ORAL RINSE 1.2 MG/ML
SOLUTION DENTAL
Refills: 0 | COMMUNITY
Start: 2018-08-06 | End: 2018-09-25

## 2018-08-30 RX ORDER — FLUTICASONE PROPIONATE 50 MCG
SPRAY, SUSPENSION (ML) NASAL
Qty: 48 ML | Refills: 11 | OUTPATIENT
Start: 2018-08-30

## 2018-08-30 RX ORDER — FLUTICASONE PROPIONATE 50 MCG
1-2 SPRAY, SUSPENSION (ML) NASAL DAILY
Qty: 1 BOTTLE | Refills: 11 | Status: SHIPPED | OUTPATIENT
Start: 2018-08-30 | End: 2019-09-08

## 2018-08-30 ASSESSMENT — PAIN SCALES - GENERAL: PAINLEVEL: SEVERE PAIN (6)

## 2018-08-30 NOTE — PROGRESS NOTES
"  SUBJECTIVE:   Milagro Wallace is a 78 year old female who presents to clinic today for the following health issues:      Chief Complaint   Patient presents with     Fall     Patient was pulling weeds 5 weeks ago and started getting vertigo and stood up and started falling backwards. Patient is having most pain while sitting and standing 6/10. Patient is having pain in tailbone. Patient has been using heat and asa.      Flank Pain     Patient is having left flank pain x 3 weeks and she is thinking it is kidney related. Pain came out of blue when she was outside weeding and currently pain has been getting better. Patient denies any urinary concerns.      Ear Problem     Patient's left ear has been plugged X 3 months and wondering if it is triggering by her dizziness.     See above.  Both coccyx and left lower back are gradually improving.  There is no radiation of pain into the legs.  There is no change in bowel/bladder function.  She has gotten a donought pillow to sit on and that has helped.    Left ear has been plugged feeling x 3 months, she also has had a loss of smell and taste.  Facial pain and some postnasal drainage also present she wonders if this is all related.      She has also intermittently felt dizzy with certain head movements, but this isn't consistent.  No visual changes.      /54  Pulse 62  Temp 97.4  F (36.3  C) (Tympanic)  Resp 18  Ht 5' 2.5\" (1.588 m)  Wt 202 lb (91.6 kg)  SpO2 94%  Breastfeeding? No  BMI 36.36 kg/m2  EXAM: GENERAL APPEARANCE: Alert, no acute distress  HENT: right TM normal, left TM abnormal, yellow fluid behind TM, retracted, oral mucous membranes moist, oropharynx clear  RESP: lungs clear to auscultation   CV: normal rate, regular rhythm, no murmur or gallop  ABDOMEN: soft, no organomegaly, masses or tenderness  MS: bilateral edema, right leg compression wrap in place  NEURO: Alert, oriented, speech and mentation normal, Cranial nerves 2-12 are normal., " Strength normal and symmetrical in upper and lower extremities.    ASSESSMENT/PLAN:      ICD-10-CM    1. Dysfunction of left eustachian tube H69.82 fluticasone (FLONASE) 50 MCG/ACT spray   2. Benign paroxysmal positional vertigo, unspecified laterality H81.10    3. Chronic maxillary sinusitis J32.0 amoxicillin-clavulanate (AUGMENTIN) 875-125 MG per tablet   4. Coccydynia M53.3    5. Left-sided low back pain without sciatica, unspecified chronicity M54.5      Patient to continue home measures for the low back pain and coccydynia.  No indication of a need for imaging at this point, she declined PT.    Suspect some of the facial pain, anosmia, left ear pain is due to sinus infection with foul smelling/tasting drainage.  Risks, benefits and alternatives discussed for trial of oral antibiotic for this - patient would like to try this. Augmentin sent to pharmacy.  If not improving, would have her seen ENT for further evaluation. Also discussed flonase and valsalva for auto-insufflation.     Morena Mcintosh M.D.      Patient Instructions         Thank you for choosing [facility]  You may be receiving a survey in the mail from [employer] [facility] regarding your visit today.  Please take a few minutes to complete and return the survey to let us know how we are doing.      Our Clinic hours are:  Mondays    7:20 am - 7 pm  Tues -  Fri  7:20 am - 5 pm    Clinic Phone: [phone]    The clinic lab opens at 7:30 am Mon - Fri and appointments are required.    [facility]. [phone]  Monday  8 am - 7pm  Tues - Fri 8 am - 5:30 pm

## 2018-08-30 NOTE — PATIENT INSTRUCTIONS
Thank you for choosing [facility].  You may be receiving a survey in the mail from [facility] regarding your visit today.  Please take a few minutes to complete and return the survey to let us know how we are doing.      Our Clinic hours are:  Mondays    7:20 am - 7 pm  Tues - Fri  7:20 am - 5 pm    Clinic Phone: [phone]    The clinic lab opens at 7:30 am Mon - Fri and appointments are required.    [facility]. [phone]  Monday  8 am - 7pm  Tues - Fri 8 am - 5:30 pm

## 2018-08-30 NOTE — MR AVS SNAPSHOT
After Visit Summary   8/30/2018    Milagro Wallace    MRN: [id number]           Patient Information     Date Of Birth          1940        Visit Information        Provider Department      8/30/2018 10:40 AM Morena Mcintosh MD [facility]        Today's Diagnoses     Dysfunction of left eustachian tube    -  1    Benign paroxysmal positional vertigo, unspecified laterality        Chronic maxillary sinusitis          Care Instructions          Thank you for choosing [facility].  You may be receiving a survey in the mail from [facility] regarding your visit today.  Please take a few minutes to complete and return the survey to let us know how we are doing.      Our Clinic hours are:  Mondays    7:20 am - 7 pm  Tues - Fri  7:20 am - 5 pm    Clinic Phone: [phone]    The clinic lab opens at 7:30 am Mon - Fri and appointments are required.    [facility]  Ph. [phone]  Monday  8 am - 7pm  Tues - Fri 8 am - 5:30 pm                 Follow-ups after your visit        Who to contact     If you have questions or need follow up information about today's clinic visit or your schedule please contact [facility] directly at [phone].  Normal or non-critical lab and imaging results will be communicated to you by MyChart, letter or phone within 4 business days after the clinic has received the results. If you do not hear from us within 7 days, please contact the clinic through MyChart or phone. If you have a critical or abnormal lab result, we will notify you by phone as soon as possible.  Submit refill requests through [employer] or call your pharmacy and they will forward the refill request to us. Please allow 3 business days for your refill to be completed.          Additional Information About Your Visit        Care EveryWhere ID     This is your Care EveryWhere ID. This could be used by other organizations to access your [place]  "medical records  [id number]        Your Vitals Were     Pulse Temperature Respirations Height Pulse Oximetry Breastfeeding?    62 97.4  F (36.3  C) (Tympanic) 18 5' 2.5\" (1.588 m) 94% No    BMI (Body Mass Index)                   36.36 kg/m2            Blood Pressure from Last 3 Encounters:   08/30/18 130/54   06/06/18 128/58   11/08/17 128/62    Weight from Last 3 Encounters:   08/30/18 202 lb (91.6 kg)   06/06/18 199 lb (90.3 kg)   10/23/17 202 lb (91.6 kg)              Today, you had the following     No orders found for display         Today's Medication Changes          These changes are accurate as of 8/30/18 11:17 AM.  If you have any questions, ask your nurse or doctor.               Start taking these medicines.        Dose/Directions    amoxicillin-clavulanate 875-125 MG per tablet   Commonly known as:  AUGMENTIN   Used for:  Chronic maxillary sinusitis   Started by:  Morena Mcintosh MD        Dose:  1 tablet   Take 1 tablet by mouth 2 times daily   Quantity:  20 tablet   Refills:  0       fluticasone 50 MCG/ACT spray   Commonly known as:  FLONASE   Used for:  Dysfunction of left eustachian tube   Started by:  Morena Mcintosh MD        Dose:  1-2 spray   Spray 1-2 sprays into both nostrils daily   Quantity:  1 Bottle   Refills:  11            Where to get your medicines      These medications were sent to [facility] [address] [place], [place] [address] [facility] AT [facility] [address] [facility]  [address] [facility] [address]     Phone:  [phone]     amoxicillin-clavulanate 875-125 MG per tablet    fluticasone 50 MCG/ACT spray                Primary Care Provider Office Phone # Fax #    Morena Mcintosh [phone] [address] [facility] [address]        Equal Access to Services     [facility]: Natalio Smith, waaxda luqadaha, qaybta kaalmakeri mccabe, lisa dsouza. So [facility] [phone].    ATENCIÓN: Si " cas lewis, tiene a allan disposición servicios gratuitos de asistencia lingüística. Prisclila barboza [phone].    We comply with applicable federal civil rights laws and [place] laws. We do not discriminate on the basis of race, color, national origin, age, disability, sex, sexual orientation, or gender identity.            Thank you!     Thank you for choosing [facility]  for your care. Our goal is always to provide you with excellent care. Hearing back from our patients is one way we can continue to improve our services. Please take a few minutes to complete the written survey that you may receive in the mail after your visit with us. Thank you!             Your Updated Medication List - Protect others around you: Learn how to safely use, store and throw away your medicines at www.disposemymeds.org.          This list is accurate as of 8/30/18 11:17 AM.  Always use your most recent med list.                   Brand Name Dispense Instructions for use Diagnosis    amLODIPine 5 MG tablet    NORVASC    90 tablet    TAKE 1 TABLET(5 MG) BY MOUTH DAILY    Essential hypertension with goal blood pressure less than 140/90       amoxicillin-clavulanate 875-125 MG per tablet    AUGMENTIN    20 tablet    Take 1 tablet by mouth 2 times daily    Chronic maxillary sinusitis       aspirin 81 MG tablet      1 TABLET DAILY        blood glucose lancets standard    no brand specified    1 box    use 1 daily    Type II or unspecified type diabetes mellitus without mention of complication, uncontrolled       blood glucose monitoring meter device kit    no brand specified    1 kit    Use to test blood sugar 1 time daily or as directed. One Touch Ultra Mini Glucometer.    Type 2 diabetes mellitus with stage 3 chronic kidney disease (H)       blood glucose monitoring test strip    ONETOUCH ULTRA    100 each    Use to test blood sugar 1 times daily or as directed.    Type 2 diabetes mellitus with stage 3 chronic kidney  disease, without long-term current use of insulin (H)       Blood Pressure Kit     1 kit    use as directed    Unspecified essential hypertension       CENTRUM SILVER PO           chlorhexidine 0.12 % solution    PERIDEX     USE SYRINGE UTD IN UPPER RIGHT SIDE ONCE D AFTER BRUSHING        fluticasone 50 MCG/ACT spray    FLONASE    1 Bottle    Spray 1-2 sprays into both nostrils daily    Dysfunction of left eustachian tube       furosemide 40 MG tablet    LASIX    180 tablet    Take 2 tablets (80 mg) by mouth daily    Bilateral edema of lower extremity       lisinopril 40 MG tablet    PRINIVIL/ZESTRIL    90 tablet    TAKE 1 TABLET(40 MG) BY MOUTH DAILY    Essential hypertension with goal blood pressure less than 140/90       metFORMIN 500 MG tablet    GLUCOPHAGE    180 tablet    TAKE 1 TABLET(500 MG) BY MOUTH TWICE DAILY WITH MEALS    Type 2 diabetes mellitus with stage 3 chronic kidney disease, without long-term current use of insulin (H)       metoprolol tartrate 50 MG tablet    LOPRESSOR    180 tablet    Take 1 tablet (50 mg) by mouth 2 times daily    Essential hypertension with goal blood pressure less than 140/90       [facility] LANCETS 33G Misc     100 each    1 lancet daily Use to test blood sugars 1 times daily or as directed.    Type 2 diabetes, HbA1c goal < 7% (H)       order for DME     1 each    Glucometer, brand as covered by insurance.    Type 2 diabetes mellitus with stage 3 chronic kidney disease, without long-term current use of insulin (H)       simvastatin 20 MG tablet    ZOCOR    90 tablet    Take 1 tablet (20 mg) by mouth At Bedtime Appt and Labs DUE October 2018    Hyperlipidemia LDL goal <100

## 2018-09-05 ENCOUNTER — TELEPHONE (OUTPATIENT)
Dept: FAMILY MEDICINE | Facility: CLINIC | Age: 78
End: 2018-09-05

## 2018-09-05 NOTE — TELEPHONE ENCOUNTER
Yes, can do immodium.  I'd also recommend a probiotic which can be purchased over the counter, talk to pharmacist for recommendation.     If the diarrhea continues we may need to do some stool studies to r/o c diff.    Morena Mcintosh M.D.

## 2018-09-05 NOTE — TELEPHONE ENCOUNTER
Reason for Call:  Other medication question    Detailed comments: pt is taking amoxicillin for a sinus infection and now has diarrhea. She is wondering if she can take imodium?    Phone Number Patient can be reached at: Home number on file [phone] (home)    Best Time: any    Can we leave a detailed message on this number? YES    Call taken on 9/5/2018 at 10:36 AM by Maria Barnett

## 2018-09-05 NOTE — TELEPHONE ENCOUNTER
Pt is on a 10 day course of Amoxicillin for Sinus Infection.  Pt has 4 days left and is having diarrhea.  Has had loose stools 5 x today.  Asking if she can take Imodium?  Advise.  Vin

## 2018-09-18 ENCOUNTER — APPOINTMENT (OUTPATIENT)
Dept: CT IMAGING | Facility: CLINIC | Age: 78
End: 2018-09-18
Attending: PHYSICIAN ASSISTANT
Payer: MEDICARE

## 2018-09-18 ENCOUNTER — HOSPITAL ENCOUNTER (EMERGENCY)
Facility: CLINIC | Age: 78
Discharge: HOME | End: 2018-09-18
Attending: PHYSICIAN ASSISTANT | Admitting: PHYSICIAN ASSISTANT
Payer: MEDICARE

## 2018-09-18 ENCOUNTER — TELEPHONE (OUTPATIENT)
Dept: FAMILY MEDICINE | Facility: CLINIC | Age: 78
End: 2018-09-18

## 2018-09-18 VITALS
OXYGEN SATURATION: 92 % | BODY MASS INDEX: 35.82 KG/M2 | TEMPERATURE: 98.3 F | SYSTOLIC BLOOD PRESSURE: 117 MMHG | HEART RATE: 66 BPM | WEIGHT: 199 LBS | RESPIRATION RATE: 14 BRPM | DIASTOLIC BLOOD PRESSURE: 75 MMHG

## 2018-09-18 DIAGNOSIS — M54.5 ACUTE MIDLINE LOW BACK PAIN, WITH SCIATICA PRESENCE UNSPECIFIED: ICD-10-CM

## 2018-09-18 DIAGNOSIS — M25.552 HIP PAIN, LEFT: ICD-10-CM

## 2018-09-18 PROCEDURE — G0463 HOSPITAL OUTPT CLINIC VISIT: HCPCS | Mod: 25 | Performed by: PHYSICIAN ASSISTANT

## 2018-09-18 PROCEDURE — 72192 CT PELVIS W/O DYE: CPT

## 2018-09-18 PROCEDURE — 72131 CT LUMBAR SPINE W/O DYE: CPT

## 2018-09-18 PROCEDURE — 99214 OFFICE O/P EST MOD 30 MIN: CPT | Mod: Z6 | Performed by: PHYSICIAN ASSISTANT

## 2018-09-18 ASSESSMENT — ENCOUNTER SYMPTOMS
NEUROLOGICAL NEGATIVE: 1
BACK PAIN: 1
RESPIRATORY NEGATIVE: 1
CONSTITUTIONAL NEGATIVE: 1
GASTROINTESTINAL NEGATIVE: 1
CARDIOVASCULAR NEGATIVE: 1

## 2018-09-18 NOTE — TELEPHONE ENCOUNTER
Reason for call:  Patient reporting a symptom    Symptom or request: Pt's right leg started hurting yesterday.  She did have a fall on her tailbone 7 weeks ago.  She can barely bear weight on her right leg.  Pt could hardly sleep last night, due to the leg pain.  Please call patient and advise.      Duration (how long have symptoms been present): 2 days    Have you been treated for this before? Yes    Additional comments:     Phone Number patient can be reached at:  Home number on file [phone] (home)    Best Time:  any    Can we leave a detailed message on this number:  YES    Call taken on 9/18/2018 at 11:22 AM by Tawanna Astudillo

## 2018-09-18 NOTE — ED AVS SNAPSHOT
[facility] Emergency Department    [address] [facility] [address]    Phone:  [phone]    Fax:  [phone]                                       Milagro Wallace   MRN: [id number]    Department:  [facility] Emergency Department   Date of Visit:  9/18/2018           After Visit Summary Signature Page     I have received my discharge instructions, and my questions have been answered. I have discussed any challenges I see with this plan with the nurse or doctor.    ..........................................................................................................................................  Patient/Patient Representative Signature      ..........................................................................................................................................  Patient Representative Print Name and Relationship to Patient    ..................................................               ................................................  Date                                   Time    ..........................................................................................................................................  Reviewed by Signature/Title    ...................................................              ..............................................  Date                                               Time          22EPIC Rev 08/18

## 2018-09-18 NOTE — ED PROVIDER NOTES
"  History     Chief Complaint   Patient presents with     Leg Pain     fell 5 weeks ago, yesterday started having pain in legs and hip. unable to walk without pain     HPI  Milagro Wallace is a 78 year old female with history of type 2 diabetes mellitus, stage III chronic kidney disease who presents with complaints of left hip pain.  Patient reports that she had a fall onto her tailbone approximately 5 weeks ago.  She states she has had persistent pain throughout her tailbone and low back since that time however reports that she developed right hip pain last night that made it difficult to sleep and ambulate.  She then states the pain seemed to improve in her right hip and subsequently started worsening throughout her left hip.  Patient states she has never broken her hip but states this is what she imagines it feels like.  Patient reports increasing pain throughout her left hip and upper leg with weightbearing.  She has started to use a cane to assist with ambulation.  Denies saddle anesthesia, bowel or bladder incontinence, or lower extremity numbness/tingling/weakness.  Gait is steady but guarded.  Denies fevers, chills, nausea, vomiting, abdominal pain, urinary symptoms, or leg pain/swelling.        Problem List:    Patient Active Problem List    Diagnosis Date Noted     Type 2 diabetes mellitus with stage 3 chronic kidney disease (H) 10/31/2010     Priority: High     Hyperlipidemia LDL goal <100 10/06/2010     Priority: High     overweight BMI >35 10/18/2015     Priority: Medium     CKD (chronic kidney disease) stage 3, GFR 30-59 ml/min 09/17/2015     Priority: Medium     Senile nuclear sclerosis 05/04/2014     Priority: Medium     Advanced directives, counseling/discussion 04/13/2012     Priority: Medium     Patient does not have an Advance/Health Care Directive (HCD), given \"What is Advance Care Planning?\" flyer, accepts referral to Facilitator and requests blank HCD form.    Aure Mcknight  April 13, 2012    "     Pain in thoracic spine 2006     Priority: Medium     HTN, goal below 140/90 2006     Priority: Medium     Hctz- hyponatremia  Lisinopril- cough    Cozaar and metoprolol       Other unspecified back disorder 2006     Priority: Medium     right sided sciatica - intermittent       Encounter for screening for osteoporosis 2006     Priority: Medium     Problem list name updated by automated process. Provider to review       Family history of other cardiovascular diseases 2006     Priority: Medium     MOTHER MI AT 76  Problem list name updated by automated process. Provider to review and confirm  Problem list name updated by automated process. Provider to review          Past Medical History:    Past Medical History:   Diagnosis Date     Difficult intubation      Hypertension      Malignant hyperthermia      PONV (postoperative nausea and vomiting)      Tobacco use disorder      Type 2 diabetes mellitus without complications (H)        Past Surgical History:    Past Surgical History:   Procedure Laterality Date     COLONOSCOPY       COLONOSCOPY N/A 10/29/2015    Procedure: COLONOSCOPY;  Surgeon: Adan De Santiago MD;  Location: [facility]     EYE SURGERY       PHACOEMULSIFICATION WITH STANDARD INTRAOCULAR LENS IMPLANT  2014    Procedure: PHACOEMULSIFICATION WITH STANDARD INTRAOCULAR LENS IMPLANT;  Surgeon: Jj Payne MD;  Location: [facility]     SURGICAL HISTORY OF -            SURGICAL HISTORY OF -       hysterectomy after C section       Family History:    Family History   Problem Relation Age of Onset     Alzheimer Disease Mother      C.A.D. Mother        age 75     Cerebrovascular Disease Father        Social History:  Marital Status:   [2]  Social History   Substance Use Topics     Smoking status: Former Smoker     Packs/day: 0.70     Years: 26.00     Types: Cigarettes     Quit date: 2009     Smokeless tobacco: Never Used      Comment:       Alcohol use  Yes      Comment: 1 wine a day        Medications:      amLODIPine (NORVASC) 5 MG tablet   ASPIRIN 81 MG OR TABS   blood glucose monitoring (NO BRAND SPECIFIED) meter device kit   blood glucose monitoring (ONE TOUCH ULTRA) test strip   BLOOD PRESSURE KIT   chlorhexidine (PERIDEX) 0.12 % solution   fluticasone (FLONASE) 50 MCG/ACT spray   furosemide (LASIX) 40 MG tablet   LANCETS MISC. KIT   lisinopril (PRINIVIL/ZESTRIL) 40 MG tablet   metFORMIN (GLUCOPHAGE) 500 MG tablet   metoprolol tartrate (LOPRESSOR) 50 MG tablet   Multiple Vitamins-Minerals (CENTRUM SILVER PO)   ONETOUCH DELICA LANCETS 33G MISC   order for DME   simvastatin (ZOCOR) 20 MG tablet         Review of Systems   Constitutional: Negative.    Respiratory: Negative.    Cardiovascular: Negative.    Gastrointestinal: Negative.    Genitourinary: Negative.    Musculoskeletal: Positive for back pain.        Left hip pain   Skin: Negative.    Neurological: Negative.    All other systems reviewed and are negative.      Physical Exam   BP: 117/75  Pulse: 66  Temp: 98.3  F (36.8  C)  Resp: 14  Weight: 90.3 kg (199 lb)  SpO2: 92 %      Physical Exam   Constitutional: She appears well-developed and well-nourished. No distress.   HENT:   Head: Normocephalic and atraumatic.   Eyes: Conjunctivae are normal.   Neck: Normal range of motion and full passive range of motion without pain. Neck supple. No spinous process tenderness and no muscular tenderness present. Normal range of motion present.   Cardiovascular: Intact distal pulses.    Pulmonary/Chest: Effort normal.   Musculoskeletal: Normal range of motion.        Right hip: She exhibits bony tenderness (posterior). She exhibits normal range of motion, normal strength, no swelling and no crepitus.        Left hip: She exhibits bony tenderness (posterior). She exhibits normal range of motion, normal strength, no swelling and no crepitus.        Right knee: Normal.        Left knee: Normal.        Cervical back:  Normal. She exhibits normal range of motion, no tenderness and no bony tenderness.        Thoracic back: Normal. She exhibits normal range of motion, no tenderness and no bony tenderness.        Lumbar back: She exhibits tenderness and bony tenderness. She exhibits normal range of motion.        Right upper leg: Normal.        Left upper leg: Normal.        Right lower leg: Normal.        Left lower leg: Normal.   Neurological: She is alert. She has normal strength and normal reflexes. She displays no tremor. No cranial nerve deficit or sensory deficit. She exhibits normal muscle tone.   Gait is guarded but steady with a cane   Skin: Skin is warm and dry.       ED Course     ED Course     Procedures    Results for orders placed or performed during the hospital encounter of 09/18/18 (from the past 24 hour(s))   CT Pelvis Bone wo Contrast    Narrative    CT PELVIS BONE WO CONTRAST    9/18/2018 4:50 PM     HISTORY: fall onto tail[employer]e, worsening pain and difficulties  weightbearing on right hip and now left;     TECHNIQUE:  Radiation dose for this scan was reduced using automated  exposure control, adjustment of the mA and/or kV according to patient  size, or iterative reconstruction technique.  No IV contrast .    COMPARISON: None.    FINDINGS: The sacrum appears intact. The iliac bones appear normal.  The superior and the inferior ischio pubic rami appear intact. The  hips appear intact. No femur fractures are identified on these views.  The sacrum and coccyx appear intact. The soft tissues of the pelvis  are unremarkable. No pelvic hematoma is identified. Mild degenerative  changes seen in the cysts hip joints and sacroiliac joints. Vascular  calcifications are noted.      Impression    IMPRESSION: No hip, pelvic, sacral, or coccygeal fractures are  identified.    LORETA ALCANTAR MD   Lumbar spine CT w/o contrast    Narrative    CT LUMBAR SPINE WITHOUT CONTRAST  9/18/2018 4:50 PM     HISTORY: fall onto tailbone, worsening  pain;      TECHNIQUE: Axial images of the lumbar spine were obtained without  intravenous contrast. Multiplanar reformations were performed.   Radiation dose for this scan was reduced using automated exposure  control, adjustment of the mA and/or kV according to patient size, or  iterative reconstruction technique.    COMPARISON: None.    FINDINGS:  There is mild compression of the superior endplate of L1.  This appears to be due to a chronic fracture. I do not see any acute  fracture lines at this level and there is a prominent anterior  osteophyte at this level. There is also a vacuum disc phenomenon  present at this level.   No other fractures are identified.    T12-L1:  Vacuum disc phenomenon. Mild annular disc bulge with  associated osteophytes. Slight compression of the superior endplate of  L1. This appears chronic.     L1-L2:  Normal disc, facet joints, spinal canal and neural foramina.    L2-L3:  Mild annular disc bulge. Central canal mildly narrowed. Mild  degenerative change in the facet joints.     L3-L4:  Loss of disc space height. Mild-moderate diffuse annular disc  bulge. Degenerative change in the facet joints. These factors are  leading to moderate central spinal stenosis. There is also mild  bilateral foraminal stenosis.     L4-L5:  Degeneration of the disc. Diffuse annular disc bulge.  Degenerative changes in the facet joints. These factors are leading to  moderate-severe central spinal stenosis. There is also mild bilateral  foraminal stenosis.     L5-S1:  Mild annular disc bulge. Mild degenerative change in the facet  joints. Neural foramen are patent.    Incidentally noted are calcifications within both right and left  kidneys. These appear to be vascular calcifications. There is also  calcification in the abdominal aorta.        Impression    IMPRESSION:    1. Compression fracture of the superior endplate of L1. This appears  chronic.  2. No acute fractures are identified.  3. Multilevel  degenerative change. The degenerative changes are  leading to moderate central spinal stenosis at L3-4 and  moderate-severe central spinal stenosis at L4-5.    LORETA ALCANTAR MD       Medications - No data to display    Assessments & Plan (with Medical Decision Making)     Pt is a 78 year old female with history of type 2 diabetes mellitus, stage III chronic kidney disease who presents with complaints of left hip pain.  Patient reports that she had a fall onto her tailbone approximately 5 weeks ago.  She states she has had persistent pain throughout her tailbone and low back since that time however reports that she developed right hip pain last night that made it difficult to sleep and ambulate.  She then states the pain seemed to improve in her right hip and subsequently started worsening throughout her left hip.  Patient states she has never broken her hip but states this is what she imagines it feels like.  Patient reports increasing pain throughout her left hip and upper leg with weightbearing.  She has started to use a cane to assist with ambulation.  Pt is afebrile on arrival.  No evidence of cauda equina.  Denies saddle anesthesia, bowel or bladder incontinence, or lower extremity numbness/tingling/weakness.  Normal lower extremity strength.  Gait is steady.  No indication for emergent MRI imaging today as pt has no new neurologic symptoms or objective findings of weakness or signs of cauda equina on exam.  CTs of the pelvis and lumbar spine were obtained given patient's increasing difficulties weightbearing secondary to pain throughout her back and hip following a fall 5 weeks ago.  CT of the pelvis bone without contrast was negative for hip, pelvic, sacral, or coccygeal fractures.  No hematoma is evident.  CT of the lumbar spine reveals an old chronic appearing compression fracture of the endplate of L1.  No acute fracture is identified.  Multilevel degenerative changes noted leading to moderate spinal stenosis  at L3 through L5.  Discussed results with patient.  We discussed that her pain may likely be coming from her back with radiation into both of her posterior hips and upper legs.  Encouraged symptomatic treatments at home.  We discussed that physical therapy may be an option if her pain persists.  Encouraged continued use of her cane to assist with ambulation.  Hand-outs were provided.    Patient was instructed to follow-up with PCP in 5-7 days for continued care and management or sooner if new or worsening symptoms.  She is to return to the ED for persistent and/or worsening symptoms.  Patient expressed understanding of the diagnosis and plan and was discharged home in good condition.    I have reviewed the nursing notes.    I have reviewed the findings, diagnosis, plan and need for follow up with the patient.    Discharge Medication List as of 9/18/2018  5:46 PM          Final diagnoses:   Acute midline low back pain, with sciatica presence unspecified   Hip pain, left       9/18/2018   [facility] EMERGENCY DEPARTMENT     Inga Novak PA-C  09/18/18 2351

## 2018-09-18 NOTE — TELEPHONE ENCOUNTER
"Pt having severe R leg pain.  Difficulty walking.  Feels she may have \"broke her hip\".  Recommended ER and she agreed.  KPavelRN  "

## 2018-09-18 NOTE — ED AVS SNAPSHOT
[facility]    [address] [facility] [address]    Phone:  [phone]    Fax:  [phone]                                       Milagro Wallace   MRN: [id number]    Department:  [facility]   Date of Visit:  9/18/2018           Patient Information     Date Of Birth          1940        Your diagnoses for this visit were:     Acute midline low back pain, with sciatica presence unspecified     Hip pain, left        You were seen by Inga Novak PA-C.      Follow-up Information     Follow up with Morena Mcintosh MD. Call in 5 days.    Specialty:  Family Practice    Why:  For follow-up    Contact information:    [address] [facility] [address]  [phone]          Follow up with [facility].    Specialty:  EMERGENCY MEDICINE    Why:  As needed, If symptoms worsen    Contact information:    [address] [place] [address] [phone]    Additional information:    The medical center is located at   [address] ([address]   [facility] [address] in [place], [place] miles north   of [place]).      24 Hour Appointment Hotline       To make an appointment at any [facility], call [phone] ([phone]). If you don't have a family doctor or clinic, we will help you find one. [facility] are conveniently located to serve the needs of you and your family.             Review of your medicines      Our records show that you are taking the medicines listed below. If these are incorrect, please call your family doctor or clinic.        Dose / Directions Last dose taken    amLODIPine 5 MG tablet   Commonly known as:  NORVASC   Quantity:  90 tablet        TAKE 1 TABLET(5 MG) BY MOUTH DAILY   Refills:  3        aspirin 81 MG tablet        1 TABLET DAILY   Refills:  0        blood glucose lancets standard   Commonly known as:  no brand specified   Quantity:  1 box        use 1 daily    Refills:  12        blood glucose monitoring meter device kit   Commonly known as:  no brand specified   Quantity:  1 kit        Use to test blood sugar 1 time daily or as directed. One Touch Ultra Mini Glucometer.   Refills:  0        blood glucose monitoring test strip   Commonly known as:  ONETOUCH ULTRA   Quantity:  100 each        Use to test blood sugar 1 times daily or as directed.   Refills:  3        Blood Pressure Kit   Quantity:  1 kit        use as directed   Refills:  0        CENTRUM SILVER PO        Refills:  0        chlorhexidine 0.12 % solution   Commonly known as:  PERIDEX        USE SYRINGE UTD IN UPPER RIGHT SIDE ONCE D AFTER BRUSHING   Refills:  0        fluticasone 50 MCG/ACT spray   Commonly known as:  FLONASE   Dose:  1-2 spray   Quantity:  1 Bottle        Spray 1-2 sprays into both nostrils daily   Refills:  11        furosemide 40 MG tablet   Commonly known as:  LASIX   Dose:  80 mg   Quantity:  180 tablet        Take 2 tablets (80 mg) by mouth daily   Refills:  3        lisinopril 40 MG tablet   Commonly known as:  PRINIVIL/ZESTRIL   Quantity:  90 tablet        TAKE 1 TABLET(40 MG) BY MOUTH DAILY   Refills:  1        metFORMIN 500 MG tablet   Commonly known as:  GLUCOPHAGE   Quantity:  180 tablet        TAKE 1 TABLET(500 MG) BY MOUTH TWICE DAILY WITH MEALS   Refills:  3        metoprolol tartrate 50 MG tablet   Commonly known as:  LOPRESSOR   Dose:  50 mg   Quantity:  180 tablet        Take 1 tablet (50 mg) by mouth 2 times daily   Refills:  3        ONETOUCH DELICA LANCETS 33G Misc   Dose:  1 lancet   Quantity:  100 each        1 lancet daily Use to test blood sugars 1 times daily or as directed.   Refills:  6        order for DME   Quantity:  1 each        Glucometer, brand as covered by insurance.   Refills:  0        simvastatin 20 MG tablet   Commonly known as:  ZOCOR   Dose:  20 mg   Quantity:  90 tablet        Take 1 tablet (20 mg) by mouth At Bedtime Appt and Labs DUE October  2018   Refills:  0                Procedures and tests performed during your visit     CT Pelvis Bone wo Contrast    Lumbar spine CT w/o contrast      Orders Needing Specimen Collection     None      Pending Results     Date and Time Order Name Status Description    9/18/2018 1537 CT Pelvis Bone wo Contrast Preliminary     9/18/2018 1537 Lumbar spine CT w/o contrast Preliminary             Pending Culture Results     No orders found from 9/16/2018 to 9/19/2018.            Pending Results Instructions     If you had any lab results that were not finalized at the time of your Discharge, you can call the ED Lab Result RN at [phone]. You will be contacted by this team for any positive Lab results or changes in treatment. The nurses are available 7 days a week from 10A to 6:30P.  You can leave a message 24 hours per day and they will return your call.        Test Results From Your Hospital Stay        9/18/2018  5:37 PM      Narrative     CT LUMBAR SPINE WITHOUT CONTRAST  9/18/2018 4:50 PM     HISTORY: fall onto tailbone, worsening pain;      TECHNIQUE: Axial images of the lumbar spine were obtained without  intravenous contrast. Multiplanar reformations were performed.   Radiation dose for this scan was reduced using automated exposure  control, adjustment of the mA and/or kV according to patient size, or  iterative reconstruction technique.    COMPARISON: None.    FINDINGS:  There is mild compression of the superior endplate of L1.  This appears to be due to a chronic fracture. I do not see any acute  fracture lines at this level and there is a prominent anterior  osteophyte at this level. There is also a vacuum disc phenomenon  present at this level.   No other fractures are identified.    T12-L1:  Vacuum disc phenomenon. Mild annular disc bulge with  associated osteophytes. Slight compression of the superior endplate of  L1. This appears chronic.     L1-L2:  Normal disc, facet joints, spinal canal and neural  foramina.    L2-L3:  Mild annular disc bulge. Central canal mildly narrowed. Mild  degenerative change in the facet joints.     L3-L4:  Loss of disc space height. Mild-moderate diffuse annular disc  bulge. Degenerative change in the facet joints. These factors are  leading to moderate central spinal stenosis. There is also mild  bilateral foraminal stenosis.     L4-L5:  Degeneration of the disc. Diffuse annular disc bulge.  Degenerative changes in the facet joints. These factors are leading to  moderate-severe central spinal stenosis. There is also mild bilateral  foraminal stenosis.     L5-S1:  Mild annular disc bulge. Mild degenerative change in the facet  joints. Neural foramen are patent.    Incidentally noted are calcifications within both right and left  kidneys. These appear to be vascular calcifications. There is also  calcification in the abdominal aorta.          Impression     IMPRESSION:    1. Compression fracture of the superior endplate of L1. This appears  chronic.  2. No acute fractures are identified.  3. Multilevel degenerative change. The degenerative changes are  leading to moderate central spinal stenosis at L3-4 and  moderate-severe central spinal stenosis at L4-5.         9/18/2018  5:12 PM      Narrative     CT PELVIS BONE WO CONTRAST    9/18/2018 4:50 PM     HISTORY: fall onto tailbone, worsening pain and difficulties  weightbearing on right hip and now left;     TECHNIQUE:  Radiation dose for this scan was reduced using automated  exposure control, adjustment of the mA and/or kV according to patient  size, or iterative reconstruction technique.  No IV contrast .    COMPARISON: None.    FINDINGS: The sacrum appears intact. The iliac bones appear normal.  The superior and the inferior ischio pubic rami appear intact. The  hips appear intact. No femur fractures are identified on these views.  The sacrum and coccyx appear intact. The soft tissues of the pelvis  are unremarkable. No pelvic  "hematoma is identified. Mild degenerative  changes seen in the cysts hip joints and sacroiliac joints. Vascular  calcifications are noted.        Impression     IMPRESSION: No hip, pelvic, sacral, or coccygeal fractures are  identified.                Thank you for choosing [facility]       Thank you for choosing [facility] for your care. Our goal is always to provide you with excellent care. Hearing back from our patients is one way we can continue to improve our services. Please take a few minutes to complete the written survey that you may receive in the mail after you visit with us. Thank you!        "[employer] Information     [employer] lets you send messages to your doctor, view your test results, renew your prescriptions, schedule appointments and more. To sign up, go to [email or web address] . Click on \"Log in\" on the left side of the screen, which will take you to the Welcome page. Then click on \"Sign up Now\" on the right side of the page.     You will be asked to enter the access code listed below, as well as some personal information. Please follow the directions to create your username and password.     Your access code is: [email or web address]  Expires: 2018  5:46 PM     Your access code will  in 90 days. If you need help or a new code, please call your [facility] clinic or [phone].        Care EveryWhere ID     This is your Care EveryWhere ID. This could be used by other organizations to access your [facility] medical records  [id number]        Equal Access to Services     JOSEY LU : Hadii ivy rodrigezo Socarleyali, waaxda luqadaha, qaybta kaalmada adeegyada, lisa rush . [facility] [phone].    ATENCIÓN: Si habla español, tiene a allan disposición servicios gratuitos de asistencia lingüística. Llame al [phone].    We comply with applicable federal civil rights laws and [place] laws. We do not discriminate on the basis of race, color, national origin, age, " disability, sex, sexual orientation, or gender identity.            After Visit Summary       This is your record. Keep this with you and show to your community pharmacist(s) and doctor(s) at your next visit.

## 2018-09-22 ENCOUNTER — TELEPHONE (OUTPATIENT)
Dept: FAMILY MEDICINE | Facility: CLINIC | Age: 78
End: 2018-09-22

## 2018-09-22 NOTE — TELEPHONE ENCOUNTER
Reason for Call:  Other appointment    Detailed comments: Patient was seen in ER last Tuesday and is needing a hospital follow up. Wants to get in on Monday. Can you fit you in? Please give patient a call to discuss. Thank you.    Phone Number Patient can be reached at: Home number on file [phone] (home)    Best Time: Anytime.    Can we leave a detailed message on this number? YES    Call taken on 9/22/2018 at 4:22 PM by Delroy Valle

## 2018-09-24 NOTE — TELEPHONE ENCOUNTER
LM to call clinic/RN.  ER f/u appt with PCP in 5-7 days.  No openings today, LUIS Díaz tomorrow.  Need to discuss symptoms.  Susan

## 2018-09-25 ENCOUNTER — OFFICE VISIT (OUTPATIENT)
Dept: FAMILY MEDICINE | Facility: CLINIC | Age: 78
End: 2018-09-25
Payer: COMMERCIAL

## 2018-09-25 VITALS
TEMPERATURE: 98.1 F | SYSTOLIC BLOOD PRESSURE: 122 MMHG | DIASTOLIC BLOOD PRESSURE: 54 MMHG | RESPIRATION RATE: 16 BRPM | HEIGHT: 63 IN | OXYGEN SATURATION: 94 % | HEART RATE: 65 BPM | BODY MASS INDEX: 35.82 KG/M2

## 2018-09-25 DIAGNOSIS — E66.01 MORBID OBESITY (H): ICD-10-CM

## 2018-09-25 DIAGNOSIS — M51.369 DDD (DEGENERATIVE DISC DISEASE), LUMBAR: ICD-10-CM

## 2018-09-25 DIAGNOSIS — S32.010S CLOSED COMPRESSION FRACTURE OF FIRST LUMBAR VERTEBRA, SEQUELA: Primary | ICD-10-CM

## 2018-09-25 PROCEDURE — 99214 OFFICE O/P EST MOD 30 MIN: CPT | Performed by: NURSE PRACTITIONER

## 2018-09-25 RX ORDER — TRAMADOL HYDROCHLORIDE 50 MG/1
50 TABLET ORAL EVERY 6 HOURS PRN
Qty: 20 TABLET | Refills: 0 | Status: SHIPPED | OUTPATIENT
Start: 2018-09-25 | End: 2018-09-28

## 2018-09-25 ASSESSMENT — PAIN SCALES - GENERAL: PAINLEVEL: EXTREME PAIN (9)

## 2018-09-25 NOTE — PROGRESS NOTES
"  SUBJECTIVE:   Milagro Wallace is a 78 year old female who presents to clinic today for the following health issues:      ED/UC Followup:    Facility:  [facility]  Date of visit: 918/2018  Reason for visit: Acute midline low back pain and left hip pain.  Current Status: not sleeping due to pain, she is unable to lay down and has to sleep in a chair. She is taking Tylenol 500 mg 4 times a day with no relief.             Problem list and histories reviewed & adjusted, as indicated.  Additional history: patient accompanied by her . She is vague with her history. She reports having back problems on and off but \"never like this\"  She was pulling weeds about 6 weeks ago and fell backwards onto her [place] driveway. She was seen in ED for this continuing pain 7 days ago.  CT of back revealed old compression fracture L1 and DDD and stenosis of lumbar spine. She states she has to sleep in a chair, she cannot lay down due to pain, so her edema has worsened. She has been taking tylenol 4 times daily and that isn't relieving her pain which is located in lumbar area. Denies radiation. Denies weakness into legs.        IMPRESSION:    1. Compression fracture of the superior endplate of L1. This appears  chronic.  2. No acute fractures are identified.  3. Multilevel degenerative change. The degenerative changes are  leading to moderate central spinal stenosis at L3-4 and  moderate-severe central spinal stenosis at L4-5.     LORETA ALCANTAR MD  Patient Active Problem List   Diagnosis     HTN, goal below 140/90     Other unspecified back disorder     Encounter for screening for osteoporosis     Family history of other cardiovascular diseases     Pain in thoracic spine     Hyperlipidemia LDL goal <100     Type 2 diabetes mellitus with stage 3 chronic kidney disease (H)     Advanced directives, counseling/discussion     Senile nuclear sclerosis     CKD (chronic kidney disease) stage 3, GFR 30-59 ml/min     overweight BMI >35 "     Past Surgical History:   Procedure Laterality Date     COLONOSCOPY       COLONOSCOPY N/A 10/29/2015    Procedure: COLONOSCOPY;  Surgeon: Adan De Santiago MD;  Location: [facility]     EYE SURGERY       PHACOEMULSIFICATION WITH STANDARD INTRAOCULAR LENS IMPLANT  2014    Procedure: PHACOEMULSIFICATION WITH STANDARD INTRAOCULAR LENS IMPLANT;  Surgeon: Jj Payne MD;  Location: [facility]     SURGICAL HISTORY OF 1979         SURGICAL HISTORY OF 1979    hysterectomy after C section       Social History   Substance Use Topics     Smoking status: Former Smoker     Packs/day: 0.70     Years: 26.00     Types: Cigarettes     Quit date: 2009     Smokeless tobacco: Never Used      Comment:       Alcohol use Yes      Comment: occ     Family History   Problem Relation Age of Onset     Alzheimer Disease Mother      C.A.D. Mother        age 75     Cerebrovascular Disease Father          Current Outpatient Prescriptions   Medication Sig Dispense Refill     amLODIPine (NORVASC) 5 MG tablet TAKE 1 TABLET(5 MG) BY MOUTH DAILY 90 tablet 3     ASPIRIN 81 MG OR TABS 1 TABLET DAILY       blood glucose monitoring (NO BRAND SPECIFIED) meter device kit Use to test blood sugar 1 time daily or as directed. One Touch Ultra Mini Glucometer. 1 kit 0     blood glucose monitoring (ONE TOUCH ULTRA) test strip Use to test blood sugar 1 times daily or as directed. 100 each 3     BLOOD PRESSURE KIT use as directed 1 kit 0     fluticasone (FLONASE) 50 MCG/ACT spray Spray 1-2 sprays into both nostrils daily 1 Bottle 11     furosemide (LASIX) 40 MG tablet Take 2 tablets (80 mg) by mouth daily 180 tablet 3     LANCETS MISC. KIT use 1 daily 1 box 12     lisinopril (PRINIVIL/ZESTRIL) 40 MG tablet TAKE 1 TABLET(40 MG) BY MOUTH DAILY 90 tablet 1     metFORMIN (GLUCOPHAGE) 500 MG tablet TAKE 1 TABLET(500 MG) BY MOUTH TWICE DAILY WITH MEALS 180 tablet 3     metoprolol tartrate (LOPRESSOR) 50 MG tablet Take 1 tablet (50 mg) by mouth 2  times daily 180 tablet 3     Multiple Vitamins-Minerals (CENTRUM SILVER PO)        ONETOUCH DELICA LANCETS 33G MISC 1 lancet daily Use to test blood sugars 1 times daily or as directed. 100 each 6     order for DME Glucometer, brand as covered by insurance. 1 each 0     simvastatin (ZOCOR) 20 MG tablet Take 1 tablet (20 mg) by mouth At Bedtime Appt and Labs DUE October 2018 90 tablet 0     traMADol (ULTRAM) 50 MG tablet Take 1 tablet (50 mg) by mouth every 6 hours as needed for severe pain 20 tablet 0     Allergies   Allergen Reactions     Hctz      Hyponatremia     Shellfish Allergy Hives     Recent Labs   Lab Test  06/06/18   1311  10/23/17   1015   09/01/16   1030  09/01/16   0900   09/16/15   1407   11/12/13   0938  10/17/12   0949   10/04/11   0801   12/13/10   1437   A1C  7.3*  7.2*   --   7.7*   --    --   7.1*   < >  7.0*  7.2*   < >  6.8*   --    --    LDL   --   39   --    --   54   --   58   --   58  83   --   71   --   71   HDL   --   49*   --    --   53   --    --    --   44*  39*   --    --    --    --    TRIG   --   122   --    --   135   --    --    --   131  120   --    --    --    --    ALT   --    --    --    --    --    --    --    --    --   26   --   29   --   23   CR  1.10*  1.15*   < >   --   1.35*   < >  0.99   < >  1.03   --    < >  0.75   --   0.73   GFRESTIMATED  48*  46*   < >   --   38*   < >  55*   < >  53*   --    < >  76   --   79   GFRESTBLACK  58*  55*   < >   --   46*   < >  66   < >  64   --    < >  >90   --   >90   POTASSIUM  4.2  3.9   < >   --   4.4   < >  4.7   < >  4.5   --    < >  5.0   --   4.4   TSH  1.81   --    --    --   2.90   --   1.71   --   3.09   --    --   3.21   < >   --     < > = values in this interval not displayed.      BP Readings from Last 3 Encounters:   09/25/18 122/54   09/18/18 117/75   08/30/18 130/54    Wt Readings from Last 3 Encounters:   09/18/18 199 lb (90.3 kg)   08/30/18 202 lb (91.6 kg)   06/06/18 199 lb (90.3 kg)                    Reviewed  "and updated as needed this visit by clinical staff  Tobacco  Allergies  Meds  Med Hx  Surg Hx  Fam Hx  Soc Hx      Reviewed and updated as needed this visit by Provider          ROS: 10 point ROS neg other than the symptoms noted above in the HPI.    OBJECTIVE:     /54 (BP Location: Right arm, Patient Position: Chair, Cuff Size: Adult Large)  Pulse 65  Temp 98.1  F (36.7  C) (Oral)  Resp 16  Ht 5' 2.5\" (1.588 m)  LMP 09/25/1979 (Approximate)  SpO2 94%  BMI 35.82 kg/m2  Body mass index is 35.82 kg/(m^2).  GENERAL: healthy, alert and no distress  HENT: pharynx without erythema  NECK: no adenopathy, no asymmetry  RESP: lungs clear to auscultation - no rales, rhonchi or wheezes  CV: regular rate and rhythm  ABDOMEN: soft, obese  MS: she has stooped posture, walking without an obvious limp, using cane and wheelchair, right lower leg edema with wrap on      Diagnostic Test Results:  No results found for this or any previous visit (from the past 24 hour(s)).    ASSESSMENT/PLAN:             1. Closed compression fracture of first lumbar vertebra, sequela    - PHYSICAL THERAPY REFERRAL; Future  - ORTHO  REFERRAL  - traMADol (ULTRAM) 50 MG tablet; Take 1 tablet (50 mg) by mouth every 6 hours as needed for severe pain  Dispense: 20 tablet; Refill: 0  Discussed how to take the medication(s), expected outcomes, potential side effects.  Use medication with caution, reviewed risks with patient and . Follow up with spine specialist to see if she would be a candidate for injection or other interventions to alleviate pain.  Recommended PT as well.    2. DDD (degenerative disc disease), lumbar    - PHYSICAL THERAPY REFERRAL; Future  - ORTHO  REFERRAL  - traMADol (ULTRAM) 50 MG tablet; Take 1 tablet (50 mg) by mouth every 6 hours as needed for severe pain  Dispense: 20 tablet; Refill: 0    3. Morbid obesity (H)        See Patient Instructions  Patient Instructions   Follow up with spine " specialist and start PT.  You may continue with tylenol and use the Tramadol as needed for really bad pain.  Follow up if symptoms persist or worsen and as needed.        Thank you for choosing [facility].  You may be receiving a survey in the mail from [employer] regarding your visit today.  Please take a few minutes to complete and return the survey to let us know how we are doing.      Our Clinic hours are:  Mondays    7:20 am - 7 pm  Tues -  Fri  7:20 am - 5 pm    Clinic Phone: [phone]    The clinic lab opens at 7:30 am Mon - Fri and appointments are required.    [facility] [place]  Ph. [phone]  Monday  8 am - 7pm  Tues - Fri 8 am - 5:30 pm             SUE Alejandro CNP  [facility]

## 2018-09-25 NOTE — MR AVS SNAPSHOT
After Visit Summary   9/25/2018    Milagro Wallace    MRN: [id number]           Patient Information     Date Of Birth          1940        Visit Information        Provider Department      9/25/2018 1:40 PM Johanny Ayon APRN CNP [facility]        Today's Diagnoses     Closed compression fracture of first lumbar vertebra, sequela    -  1    DDD (degenerative disc disease), lumbar          Care Instructions    Follow up with spine specialist and start PT.  You may continue with tylenol and use the Tramadol as needed for really bad pain.  Follow up if symptoms persist or worsen and as needed.        Thank you for choosing [facility].  You may be receiving a survey in the mail from [employer] regarding your visit today.  Please take a few minutes to complete and return the survey to let us know how we are doing.      Our Clinic hours are:  Mondays    7:20 am - 7 pm  Tues - Fri  7:20 am - 5 pm    Clinic Phone: [phone]    The clinic lab opens at 7:30 am Mon - Fri and appointments are required.    [facility]. [phone]  Monday  8 am - 7pm  Tues - Fri 8 am - 5:30 pm                 Follow-ups after your visit        Additional Services     ORTHO  REFERRAL       [facility] is referring you to the Orthopedic  Services at [facility].       The  Representative will assist you in the coordination of your Orthopedic and Musculoskeletal Care as prescribed by your physician.    The  Representative will call you within 1 business day to help schedule your appointment, or you may contact the  Representative at:    All areas ~ [phone]     Type of Referral : Spine: Lumbar: Medical Spine/Non-Surgical Specialist        Timeframe requested: 1 - 2 days    Coverage of these services is subject to the terms and limitations of your health insurance plan.  Please call  member services at your health plan with any benefit or coverage questions.      If X-rays, CT or MRI's have been performed, please contact the facility where they were done to arrange for , prior to your scheduled appointment.  Please bring this referral request to your appointment and present it to your specialist.            PHYSICAL THERAPY REFERRAL       If you have not heard from the scheduling office within 2 business days, please call [phone] for all locations, with the exception of [place], please call [phone] and [place], please call [phone].    Please be aware that coverage of these services is subject to the terms and limitations of your health insurance plan.  Call member services at your health plan with any benefit or coverage questions.                  Future tests that were ordered for you today     Open Future Orders        Priority Expected Expires Ordered    PHYSICAL THERAPY REFERRAL Routine  9/25/2019 9/25/2018            Who to contact     If you have questions or need follow up information about today's clinic visit or your schedule please contact [facility] directly at [phone].  Normal or non-critical lab and imaging results will be communicated to you by MyChart, letter or phone within 4 business days after the clinic has received the results. If you do not hear from us within 7 days, please contact the clinic through MyChart or phone. If you have a critical or abnormal lab result, we will notify you by phone as soon as possible.  Submit refill requests through [employer] or call your pharmacy and they will forward the refill request to us. Please allow 3 business days for your refill to be completed.          Additional Information About Your Visit        [employer] Information     [employer] lets you send messages to your doctor, view your test results, renew your prescriptions, schedule appointments and more. To sign up, go to  [email or web address] . Click on \"Log in\" on the left side of the screen, which will take you to the Welcome page. Then click on \"Sign up Now\" on the right side of the page.     You will be asked to enter the access code listed below, as well as some personal information. Please follow the directions to create your username and password.     Your access code is: [email or web address]  Expires: 2018  5:46 PM     Your access code will  in 90 days. If you need help or a new code, please call your [facility] clinic or [phone].        Care EveryWhere ID     This is your Care EveryWhere ID. This could be used by other organizations to access your [facility] medical records  [id number]        Your Vitals Were     Pulse Temperature Respirations Height Last Period Pulse Oximetry    65 98.1  F (36.7  C) (Oral) 16 5' 2.5\" (1.588 m) 1979 (Approximate) 94%    BMI (Body Mass Index)                   35.82 kg/m2            Blood Pressure from Last 3 Encounters:   18 122/54   18 117/75   18 130/54    Weight from Last 3 Encounters:   18 199 lb (90.3 kg)   18 202 lb (91.6 kg)   18 199 lb (90.3 kg)              We Performed the Following     ORTHO  REFERRAL          Today's Medication Changes          These changes are accurate as of 18  2:18 PM.  If you have any questions, ask your nurse or doctor.               Start taking these medicines.        Dose/Directions    traMADol 50 MG tablet   Commonly known as:  ULTRAM   Used for:  Closed compression fracture of first lumbar vertebra, sequela, DDD (degenerative disc disease), lumbar   Started by:  Johanny Ayon APRN CNP        Dose:  50 mg   Take 1 tablet (50 mg) by mouth every 6 hours as needed for severe pain   Quantity:  20 tablet   Refills:  0            Where to get your medicines      Some of these will need a paper prescription and others can be bought over the counter.  Ask your nurse if you have questions.  "    Bring a paper prescription for each of these medications     traMADol 50 MG tablet               Information about OPIOIDS     PRESCRIPTION OPIOIDS: WHAT YOU NEED TO KNOW   We gave you an opioid (narcotic) pain medicine. It is important to manage your pain, but opioids are not always the best choice. You should first try all the other options your care team gave you. Take this medicine for as short a time (and as few doses) as possible.    Some activities can increase your pain, such as bandage changes or therapy sessions. It may help to take your pain medicine 30 to 60 minutes before these activities. Reduce your stress by getting enough sleep, working on hobbies you enjoy and practicing relaxation or meditation. Talk to your care team about ways to manage your pain beyond prescription opioids.    These medicines have risks:    DO NOT drive when on new or higher doses of pain medicine. These medicines can affect your alertness and reaction times, and you could be arrested for driving under the influence (DUI). If you need to use opioids long-term, talk to your care team about driving.    DO NOT operate heavy machinery    DO NOT do any other dangerous activities while taking these medicines.    DO NOT drink any alcohol while taking these medicines.     If the opioid prescribed includes acetaminophen, DO NOT take with any other medicines that contain acetaminophen. Read all labels carefully. Look for the word  acetaminophen  or  Tylenol.  Ask your pharmacist if you have questions or are unsure.    You can get addicted to pain medicines, especially if you have a history of addiction (chemical, alcohol or substance dependence). Talk to your care team about ways to reduce this risk.    All opioids tend to cause constipation. Drink plenty of water and eat foods that have a lot of fiber, such as fruits, vegetables, prune juice, apple juice and high-fiber cereal. Take a laxative (Miralax, milk of magnesia, Colace,  Senna) if you don t move your bowels at least every other day. Other side effects include upset stomach, sleepiness, dizziness, throwing up, tolerance (needing more of the medicine to have the same effect), physical dependence and slowed breathing.    Store your pills in a secure place, locked if possible. We will not replace any lost or stolen medicine. If you don t finish your medicine, please throw away (dispose) as directed by your pharmacist. The [place] Pollution [employer] has more information about safe disposal: https://www.6sicuro.it.[facility].[place]/living-green/managing-unwanted-medications         Primary Care Provider Office Phone # Fax #    Morena Mcintosh [phone] [id number] [address] [facility] [address]        Equal Access to Services     JOSEY LU : Naatlio singh Soanjali, waaxkeri luqadaha, qaybta kaalmada adejeannieyakeri, lisa rush . So [facility] [phone].    ATENCIÓN: Si habla español, tiene a allan disposición servicios gratuitos de asistencia lingüística. Bryannaame al [phone].    We comply with applicable federal civil rights laws and [place] laws. We do not discriminate on the basis of race, color, national origin, age, disability, sex, sexual orientation, or gender identity.            Thank you!     Thank you for choosing [facility]  for your care. Our goal is always to provide you with excellent care. Hearing back from our patients is one way we can continue to improve our services. Please take a few minutes to complete the written survey that you may receive in the mail after your visit with us. Thank you!             Your Updated Medication List - Protect others around you: Learn how to safely use, store and throw away your medicines at www.disposemymeds.org.          This list is accurate as of 9/25/18  2:18 PM.  Always use your most recent med list.                   Brand Name Dispense Instructions for use  Diagnosis    amLODIPine 5 MG tablet    NORVASC    90 tablet    TAKE 1 TABLET(5 MG) BY MOUTH DAILY    Essential hypertension with goal blood pressure less than 140/90       aspirin 81 MG tablet      1 TABLET DAILY        blood glucose lancets standard    no brand specified    1 box    use 1 daily    Type II or unspecified type diabetes mellitus without mention of complication, uncontrolled       blood glucose monitoring meter device kit    no brand specified    1 kit    Use to test blood sugar 1 time daily or as directed. One Touch Ultra Mini Glucometer.    Type 2 diabetes mellitus with stage 3 chronic kidney disease (H)       blood glucose monitoring test strip    ONETOUCH ULTRA    100 each    Use to test blood sugar 1 times daily or as directed.    Type 2 diabetes mellitus with stage 3 chronic kidney disease, without long-term current use of insulin (H)       Blood Pressure Kit     1 kit    use as directed    Unspecified essential hypertension       CENTRUM SILVER PO           fluticasone 50 MCG/ACT spray    FLONASE    1 Bottle    Spray 1-2 sprays into both nostrils daily    Dysfunction of left eustachian tube       furosemide 40 MG tablet    LASIX    180 tablet    Take 2 tablets (80 mg) by mouth daily    Bilateral edema of lower extremity       lisinopril 40 MG tablet    PRINIVIL/ZESTRIL    90 tablet    TAKE 1 TABLET(40 MG) BY MOUTH DAILY    Essential hypertension with goal blood pressure less than 140/90       metFORMIN 500 MG tablet    GLUCOPHAGE    180 tablet    TAKE 1 TABLET(500 MG) BY MOUTH TWICE DAILY WITH MEALS    Type 2 diabetes mellitus with stage 3 chronic kidney disease, without long-term current use of insulin (H)       metoprolol tartrate 50 MG tablet    LOPRESSOR    180 tablet    Take 1 tablet (50 mg) by mouth 2 times daily    Essential hypertension with goal blood pressure less than 140/90       ONETOUCH DELICA LANCETS 33G Misc     100 each    1 lancet daily Use to test blood sugars 1 times daily or  as directed.    Type 2 diabetes, HbA1c goal < 7% (H)       order for DME     1 each    Glucometer, brand as covered by insurance.    Type 2 diabetes mellitus with stage 3 chronic kidney disease, without long-term current use of insulin (H)       simvastatin 20 MG tablet    ZOCOR    90 tablet    Take 1 tablet (20 mg) by mouth At Bedtime Appt and Labs DUE October 2018    Hyperlipidemia LDL goal <100       traMADol 50 MG tablet    ULTRAM    20 tablet    Take 1 tablet (50 mg) by mouth every 6 hours as needed for severe pain    Closed compression fracture of first lumbar vertebra, sequela, DDD (degenerative disc disease), lumbar

## 2018-09-28 ENCOUNTER — TELEPHONE (OUTPATIENT)
Dept: PALLIATIVE MEDICINE | Facility: CLINIC | Age: 78
End: 2018-09-28

## 2018-09-28 ENCOUNTER — TELEPHONE (OUTPATIENT)
Dept: FAMILY MEDICINE | Facility: CLINIC | Age: 78
End: 2018-09-28

## 2018-09-28 ENCOUNTER — OFFICE VISIT (OUTPATIENT)
Dept: ORTHOPEDICS | Facility: CLINIC | Age: 78
End: 2018-09-28
Payer: COMMERCIAL

## 2018-09-28 VITALS — HEIGHT: 63 IN | WEIGHT: 199 LBS | BODY MASS INDEX: 35.26 KG/M2

## 2018-09-28 DIAGNOSIS — S32.010S CLOSED COMPRESSION FRACTURE OF FIRST LUMBAR VERTEBRA, SEQUELA: ICD-10-CM

## 2018-09-28 DIAGNOSIS — G89.29 CHRONIC LEFT-SIDED LOW BACK PAIN WITH LEFT-SIDED SCIATICA: Primary | ICD-10-CM

## 2018-09-28 DIAGNOSIS — M51.369 DDD (DEGENERATIVE DISC DISEASE), LUMBAR: ICD-10-CM

## 2018-09-28 DIAGNOSIS — R60.0 BILATERAL EDEMA OF LOWER EXTREMITY: ICD-10-CM

## 2018-09-28 DIAGNOSIS — M54.42 CHRONIC LEFT-SIDED LOW BACK PAIN WITH LEFT-SIDED SCIATICA: Primary | ICD-10-CM

## 2018-09-28 PROCEDURE — 99204 OFFICE O/P NEW MOD 45 MIN: CPT | Performed by: FAMILY MEDICINE

## 2018-09-28 RX ORDER — FUROSEMIDE 40 MG
TABLET ORAL
Qty: 180 TABLET | Refills: 3 | Status: SHIPPED | OUTPATIENT
Start: 2018-09-28 | End: 2019-07-19

## 2018-09-28 RX ORDER — TRAMADOL HYDROCHLORIDE 50 MG/1
50 TABLET ORAL EVERY 6 HOURS PRN
Qty: 20 TABLET | Refills: 0 | Status: SHIPPED | OUTPATIENT
Start: 2018-09-28 | End: 2018-10-12

## 2018-09-28 NOTE — TELEPHONE ENCOUNTER
Routing refill request to provider for review/approval because:  Labs out of range:  Cr high  Lab result notes 6-6-18:   Notes Recorded by Morena Mcintosh MD on 6/7/2018 at 7:12 AM  Kidney function stable over the past 6 months.   HgbA1c is 7.3% as we discussed.      Thyroid is in normal range.    Morena Mcintosh M.D.    Are you ok to refill for a year?    Jaydn CASTILLO RN

## 2018-09-28 NOTE — MR AVS SNAPSHOT
After Visit Summary   9/28/2018    Milagro Wallace    MRN: [id number]           Patient Information     Date Of Birth          1940        Visit Information        Provider Department      9/28/2018 2:00 PM Paul Gautam MD [facility]        Today's Diagnoses     Chronic left-sided low back pain with left-sided sciatica    -  1    Closed compression fracture of first lumbar vertebra, sequela        DDD (degenerative disc disease), lumbar          Care Instructions      1.  Schedule low back MRI  2. Schedule physical therapy  3. Schedule Pain Clinic visit for lumbar injection after MRI  4. Get lumbar brace wear it for comfort as much as you feel you need to  5. Follow-up with your primary care provider in 1-2 months  6. Continue tylenol as you are taking it  7. Please limit tramadol use    Understanding Lumbar Radiculopathy    Lumbar radiculopathy is irritation or inflammation of a nerve root in the low back. It causes symptoms that spread out from the back down one or both legs. To understand this condition, it helps to understand the parts of the spine:    Vertebrae. These are bones that stack to form the spine. The lumbar spine contains the 5 bottom vertebrae.    Disks. These are soft pads of tissue between the vertebrae. They act as shock absorbers for the spine.    Spinal canal. This is a tunnel formed within the stacked vertebrae. In the lumbar spine, nerves run through this canal.    Nerves. These branch off and leave the spinal canal, traveling out to parts of the body. As they leave the spinal canal, nerves pass through openings between the vertebrae. The nerve root is the part of the nerve that is closest to the spinal canal.    Sciatic nerve. This is a large nerve formed from several nerve roots in the low back. This nerve extends down the back of the leg to the foot.  With lumbar radiculopathy, nerve roots in the low back become irritated. This  leads to pain and symptoms. The sciatic nerve is commonly involved, so the condition is often called sciatica.  What causes lumbar radiculopathy?  Aging, injury, poor posture, extra body weight, and other issues can lead to problems in the low back. These problems may then irritate nerve roots. They include:    Damage to a disk in the lumbar spine. The damaged disk may then press on nearby nerve roots.    Degeneration from wear and tear, and aging. This can lead to narrowing (stenosis) of the openings between the vertebrae. The narrowed openings press on nerve roots as they leave the spinal canal.    Unstable spine. This is when a vertebra slips forward. It can then press on a nerve root.  Other, less common things can put pressure on nerves in the low back. These include diabetes, infection, or a tumor.  Symptoms of lumbar radiculopathy  These include:    Pain in the low back    Pain, numbness, tingling, or weakness that travels into the buttocks, hip, groin, or leg    Muscle spasms  Treatment for lumbar radiculopathy  In most cases, your healthcare provider will first try treatments that help relieve symptoms. These may include:    Prescription and over-the-counter pain medicines. These help relieve pain, swelling, and irritation.    Limits on positions and activities that increase pain. But lying in bed or avoiding all movement is only recommended for a short period of time.    Physical therapy, including exercises and stretches. This helps decrease pain and increase movement and function.    Steroid shots into the lower back. This may help relieve symptoms for a time.    Weight-loss program. If you are overweight, losing extra pounds may help relieve symptoms.  In some cases, you may need surgery to fix the underlying problem. This depends on the cause, the symptoms, and how long the pain has lasted.  Possible complications  Over time, an irritated and inflamed nerve may become damaged. This may lead to  long-lasting (permanent) numbness or weakness in your legs and feet. If symptoms change suddenly or get worse, be sure to let your healthcare provider know.  When to call your healthcare provider  Call your healthcare provider right away if you have any of these:    New pain or pain that gets worse    New or increasing weakness, tingling, or numbness in your leg or foot    Problems controlling your bladder or bowel   Date Last Reviewed: 3/10/2016    2959-7159 [employer]. [address] [place] [address]. All rights reserved. This information is not intended as a substitute for professional medical care. Always follow your healthcare professional's instructions.                Follow-ups after your visit        Additional Services     DME REFERRAL       Please be aware that coverage of these services is subject to the terms and limitations of your health insurance plan.  Call member services at your health plan with any benefit or coverage questions.      Please bring the following to your appointment:  Any x-rays, CTs or MRIs which have been performed.  Contact the facility where they were done to arrange for  prior to your scheduled appointment.    List of current medications   This referral request   Any documents/labs given to you for this referral            PAIN MANAGEMENT REFERRAL       Your provider has referred you to: [facility]: [facility] -    Reason for Referral: Procedure Order Epidural:  Lumbar (Advanced imaging required in the last 3 years)      What is your diagnosis for the patient's pain? Left sciatica      For any questions, contact the [facility] at [phone].     **ANY DIAGNOSTIC TESTS THAT ARE NOT IN EPIC SHOULD BE SENT TO THE PAIN CENTER**    REGARDING OPIOID MEDICATIONS:  The discussion of opioids management, appropriateness of therapy, and dosing will be discussed in patients being seen for evaluation.  The pain  management clinics are not long-term prescribing clinics, with transition of prescribing of medications ultimately going back to the referring provider/PCP.  If prescribing is taken over at the pain clinic, it is in actively involved patients whom are appropriate for opioids, urine drug screening is completed, and long-term prescribing plan has been determined.  Therefore, we will not be automatically taking over prescribing at the patient's first visit.  Is this agreeable to you? agrees.     Please be aware that coverage of these services is subject to the terms and limitations of your health insurance plan.  Call member services at your health plan with any benefit or coverage questions.      Please bring the following with you to your appointment:    (1) Any X-Rays, CTs or MRIs which have been performed.  Contact the facility where they were done to arrange for  prior to your scheduled appointment.    (2) List of current medications   (3) This referral request   (4) Any documents/labs given to you for this referral            PHYSICAL THERAPY REFERRAL (Internal)       Physical Therapy Referral                  Follow-up notes from your care team     Return if symptoms worsen or fail to improve.      Future tests that were ordered for you today     Open Future Orders        Priority Expected Expires Ordered    MRI Lumbar spine w/o contrast Routine  9/28/2019 9/28/2018    PHYSICAL THERAPY REFERRAL (Internal) Routine  9/28/2019 9/28/2018            Who to contact     If you have questions or need follow up information about today's clinic visit or your schedule please contact [facility] directly at [phone].  Normal or non-critical lab and imaging results will be communicated to you by MyChart, letter or phone within 4 business days after the clinic has received the results. If you do not hear from us within 7 days, please contact the clinic through MyChart or phone. If  "you have a critical or abnormal lab result, we will notify you by phone as soon as possible.  Submit refill requests through [employer] or call your pharmacy and they will forward the refill request to us. Please allow 3 business days for your refill to be completed.          Additional Information About Your Visit        [employer] Information     [employer] lets you send messages to your doctor, view your test results, renew your prescriptions, schedule appointments and more. To sign up, go to [email or web address] . Click on \"Log in\" on the left side of the screen, which will take you to the Welcome page. Then click on \"Sign up Now\" on the right side of the page.     You will be asked to enter the access code listed below, as well as some personal information. Please follow the directions to create your username and password.     Your access code is: [email or web address]  Expires: 2018  5:46 PM     Your access code will  in 90 days. If you need help or a new code, please call your [place] clinic or [phone].        Care EveryWhere ID     This is your Care EveryWhere ID. This could be used by other organizations to access your [place] medical records  [id number]        Your Vitals Were     Height Last Period BMI (Body Mass Index)             5' 2.5\" (1.588 m) 1979 (Approximate) 35.82 kg/m2          Blood Pressure from Last 3 Encounters:   18 (P) 116/46   18 122/54   18 117/75    Weight from Last 3 Encounters:   18 199 lb (90.3 kg)   18 199 lb (90.3 kg)   18 202 lb (91.6 kg)              We Performed the Following     DME REFERRAL     PAIN MANAGEMENT REFERRAL          Today's Medication Changes          These changes are accurate as of 18  2:38 PM.  If you have any questions, ask your nurse or doctor.               These medicines have changed or have updated prescriptions.        Dose/Directions    furosemide 40 MG tablet   Commonly known as:  LASIX   This may " have changed:  See the new instructions.   Used for:  Bilateral edema of lower extremity   Changed by:  Nathan Al MD        TAKE 2 TABLETS(80 MG) BY MOUTH DAILY   Quantity:  180 tablet   Refills:  3            Where to get your medicines      These medications were sent to [id number] - [facility] [address] AT [facility] [address]  [address], [facility] [address]     Phone:  [phone]     furosemide 40 MG tablet         Some of these will need a paper prescription and others can be bought over the counter.  Ask your nurse if you have questions.     Bring a paper prescription for each of these medications     traMADol 50 MG tablet               Information about OPIOIDS     PRESCRIPTION OPIOIDS: WHAT YOU NEED TO KNOW   We gave you an opioid (narcotic) pain medicine. It is important to manage your pain, but opioids are not always the best choice. You should first try all the other options your care team gave you. Take this medicine for as short a time (and as few doses) as possible.    Some activities can increase your pain, such as bandage changes or therapy sessions. It may help to take your pain medicine 30 to 60 minutes before these activities. Reduce your stress by getting enough sleep, working on hobbies you enjoy and practicing relaxation or meditation. Talk to your care team about ways to manage your pain beyond prescription opioids.    These medicines have risks:    DO NOT drive when on new or higher doses of pain medicine. These medicines can affect your alertness and reaction times, and you could be arrested for driving under the influence (DUI). If you need to use opioids long-term, talk to your care team about driving.    DO NOT operate heavy machinery    DO NOT do any other dangerous activities while taking these medicines.    DO NOT drink any alcohol while taking these medicines.     If the opioid prescribed includes acetaminophen, DO NOT take  with any other medicines that contain acetaminophen. Read all labels carefully. Look for the word  acetaminophen  or  Tylenol.  Ask your pharmacist if you have questions or are unsure.    You can get addicted to pain medicines, especially if you have a history of addiction (chemical, alcohol or substance dependence). Talk to your care team about ways to reduce this risk.    All opioids tend to cause constipation. Drink plenty of water and eat foods that have a lot of fiber, such as fruits, vegetables, prune juice, apple juice and high-fiber cereal. Take a laxative (Miralax, milk of magnesia, Colace, Senna) if you don t move your bowels at least every other day. Other side effects include upset stomach, sleepiness, dizziness, throwing up, tolerance (needing more of the medicine to have the same effect), physical dependence and slowed breathing.    Store your pills in a secure place, locked if possible. We will not replace any lost or stolen medicine. If you don t finish your medicine, please throw away (dispose) as directed by your pharmacist. The [place] Pollution [employer] Agency has more information about safe disposal: [email or web address]://[email or web address].[facility].mn.us/living-green/[email or web address]-[email or web address]-medications         Primary Care Provider Office Phone # Fax #    Morena Mcintosh [phone] [address] [facility] [address]        Equal Access to Services     JOSEY LU : Hadii ivy dong hadasho Soomaali, waaxda luqadaha, qaybta kaalmada adeegyada, lisa dsouza. So [facility] [phone].    ATENCIÓN: Si habla español, tiene a allan disposición servicios gratuitos de asistencia lingüística. Llame al [phone].    We comply with applicable federal civil rights laws and [place] laws. We do not discriminate on the basis of race, color, national origin, age, disability, sex, sexual orientation, or gender identity.            Thank you!     Thank you for choosing [facility]  for your care. Our goal is always to provide you with excellent care. Hearing back from our patients is one way we can continue to improve our services. Please take a few minutes to complete the written survey that you may receive in the mail after your visit with us. Thank you!             Your Updated Medication List - Protect others around you: Learn how to safely use, store and throw away your medicines at www.disposemymeds.org.          This list is accurate as of 9/28/18  2:38 PM.  Always use your most recent med list.                   Brand Name Dispense Instructions for use Diagnosis    amLODIPine 5 MG tablet    NORVASC    90 tablet    TAKE 1 TABLET(5 MG) BY MOUTH DAILY    Essential hypertension with goal blood pressure less than 140/90       aspirin 81 MG tablet      1 TABLET DAILY        blood glucose lancets standard    no brand specified    1 box    use 1 daily    Type II or unspecified type diabetes mellitus without mention of complication, uncontrolled       blood glucose monitoring meter device kit    no brand specified    1 kit    Use to test blood sugar 1 time daily or as directed. One Touch Ultra Mini Glucometer.    Type 2 diabetes mellitus with stage 3 chronic kidney disease (H)       blood glucose monitoring test strip    ONETOUCH ULTRA    100 each    Use to test blood sugar 1 times daily or as directed.    Type 2 diabetes mellitus with stage 3 chronic kidney disease, without long-term current use of insulin (H)       Blood Pressure Kit     1 kit    use as directed    Unspecified essential hypertension       CENTRUM SILVER PO           fluticasone 50 MCG/ACT spray    FLONASE    1 Bottle    Spray 1-2 sprays into both nostrils daily    Dysfunction of left eustachian tube       furosemide 40 MG tablet    LASIX    180 tablet    TAKE 2 TABLETS(80 MG) BY MOUTH DAILY    Bilateral edema of lower extremity       lisinopril 40 MG tablet    PRINIVIL/ZESTRIL    90 tablet    TAKE 1  TABLET(40 MG) BY MOUTH DAILY    Essential hypertension with goal blood pressure less than 140/90       metFORMIN 500 MG tablet    GLUCOPHAGE    180 tablet    TAKE 1 TABLET(500 MG) BY MOUTH TWICE DAILY WITH MEALS    Type 2 diabetes mellitus with stage 3 chronic kidney disease, without long-term current use of insulin (H)       metoprolol tartrate 50 MG tablet    LOPRESSOR    180 tablet    Take 1 tablet (50 mg) by mouth 2 times daily    Essential hypertension with goal blood pressure less than 140/90       ONETOUCH DELICA LANCETS 33G Misc     100 each    1 lancet daily Use to test blood sugars 1 times daily or as directed.    Type 2 diabetes, HbA1c goal < 7% (H)       order for DME     1 each    Glucometer, brand as covered by insurance.    Type 2 diabetes mellitus with stage 3 chronic kidney disease, without long-term current use of insulin (H)       simvastatin 20 MG tablet    ZOCOR    90 tablet    Take 1 tablet (20 mg) by mouth At Bedtime Appt and Labs DUE October 2018    Hyperlipidemia LDL goal <100       traMADol 50 MG tablet    ULTRAM    20 tablet    Take 1 tablet (50 mg) by mouth every 6 hours as needed for severe pain    Closed compression fracture of first lumbar vertebra, sequela, DDD (degenerative disc disease), lumbar

## 2018-09-28 NOTE — LETTER
9/28/2018         RE: Milagro Wallace  [address] [place] [address]        Dear Colleague,    Thank you for referring your patient, Milagro Wallace, to the [facility]. Please see a copy of my visit note below.    ASSESSMENT & PLAN  Milagro was seen today for pain.    Diagnoses and all orders for this visit:    Chronic left-sided low back pain with left-sided sciatica  -     Cancel: Lumbar Pad XL  -     PAIN MANAGEMENT REFERRAL  -     PHYSICAL THERAPY REFERRAL (Internal); Future  -     DME REFERRAL  -     MRI Lumbar spine w/o contrast; Future    Closed compression fracture of first lumbar vertebra, sequela  -     traMADol (ULTRAM) 50 MG tablet; Take 1 tablet (50 mg) by mouth every 6 hours as needed for severe pain  -     PHYSICAL THERAPY REFERRAL (Internal); Future  -     DME REFERRAL    DDD (degenerative disc disease), lumbar  -     traMADol (ULTRAM) 50 MG tablet; Take 1 tablet (50 mg) by mouth every 6 hours as needed for severe pain  -     PHYSICAL THERAPY REFERRAL (Internal); Future    Patient is a 78-year-old female with past medical history of chronic low back pain with acute worsening over the past couple weeks in the setting of a fall.  CT showing chronic compression fracture with degenerative disc disease otherwise no acute fractures apparent.  No red flag sign/sx on today's visit.  No sig hip pain on examination.  Symptoms of sciatic and positive straight leg raise on the left side concerning for possible disc herniation on left side.  Sx could be related to stenosis given worse with standing and lying down as well.  Given this we will order MRI, refer to pain for consideration of a epidural steroid injection to help with her pain.  In the interim we will provide patient with back brace, have her see physical therapy.  She was given a limited refill of Tramadol and instructed to use it sparingly in addition to tylenol.  Pt agrees.  Pt to f/u with PCP after  MRI, pain eval for reassessment.  All questions were answered, pt understands and agrees with plan    -----    SUBJECTIVE  Milagro Wallace is a/an 78 year old female who is seen in consultation at the request of  Johanny Ayon C.N.P. for evaluation of low back pain. The patient is seen with their , Jonnathan.     Onset: 2 week(s) ago. Patient describes injury as fall outside in driveway 5 weeks ago.  Pain came on sudden 9/17 goes down left leg, never had this before.  Pt was started on Tramadol only takes q12 helps some.  Pt has to sleep in a chair, cannot lie flat or stand up.  Pt uses cane now since onset, never used in the past.  Pt also taking tylenol.  Pain about the same, limits sleep.  No urinary retention, bowel incontinence, saddle paresthesias.  No Sig LE weakness.  Pt wants to be mobile, move more.  She wondering about steroid injection and utility of PT.  She did go to ER had CT completed showing old compression fx with no acute injury apparent.  Location of Pain: left sided low back pain radiating to toes   Rating of Pain at worst: 8/10  Rating of Pain Currently: 8/10  Worsened by: lying down, standing up   Better with: Tramadol, Tylenol, cane   Treatments tried: rest/activity avoidance  Quality: aching, dull  Red flags: Weakness: no sig weakness, bowel/bladder loss: No, foot drop: No  Associated symptoms: numbness in left foot, none currently   Orthopedic history: YES - multiple falls   Relevant surgical history: NO  Past Medical History:   Diagnosis Date     Difficult intubation      Hypertension      Malignant hyperthermia      PONV (postoperative nausea and vomiting)      Tobacco use disorder      Type 2 diabetes mellitus without complications (H)      Social History     Social History     Marital status:      Spouse name: N/A     Number of children: N/A     Years of education: N/A     Social History Main Topics     Smoking status: Former Smoker     Packs/day: 0.70     Years: 26.00  "    Types: Cigarettes     Quit date: 1/16/2009     Smokeless tobacco: Never Used      Comment:       Alcohol use Yes      Comment: occ     Drug use: No     Sexual activity: No     Other Topics Concern     Parent/Sibling W/ Cabg, Mi Or Angioplasty Before 65f 55m? No     Social History Narrative         Patient's past medical, surgical, social, and family histories were reviewed today and no changes are noted.    REVIEW OF SYSTEMS:  10 point ROS is negative other than symptoms noted above in HPI, Past Medical History or as stated below  Constitutional: NEGATIVE for fever, chills, change in weight  Skin: NEGATIVE for worrisome rashes, moles or lesions  GI/: NEGATIVE for bowel or bladder changes  Neuro: NEGATIVE for weakness, dizziness or paresthesias    OBJECTIVE:  BP (P) 116/46  Ht 5' 2.5\" (1.588 m)  Wt 199 lb (90.3 kg)  LMP 09/25/1979 (Approximate)  BMI 35.82 kg/m2   General: healthy, alert and in no distress  HEENT: no scleral icterus or conjunctival erythema  Skin: no suspicious lesions or rash. No jaundice.  CV: no pedal edema  Resp: normal respiratory effort without conversational dyspnea   Psych: normal mood and affect  Gait: normal steady gait with appropriate coordination and balance  Neuro: normal light touch sensory exam of the bilateral lower extremities.  DTR's 1+ patella and achilles bilaterally.  MSK:  THORACIC/LUMBAR SPINE  Inspection:    No gross deformity/asymmetry  Palpation:    Tender about the lumbar facet joints and left para lumbar muscles. Otherwise remainder of landmarks are nontender.  Range of Motion:     Lumbar flexion limited slightly by pain    Lumbar extension limited slightly by pain  Strength:    Full strength throughout hips/quads/hamstrings  Special Tests:    Positive: straight leg raise (left)  Negative: slump test (bilateral), stork test (bilateral), ISRAEL (bilateral), FADIR (bilateral)  Sensation: Intact and symmetric in LE bilaterally  LE: Pitting edema to knees bilaterally " Rt>Lt    BILATERAL HIP  Inspection:    No obvious deformity or asymmetry, pelvis level  Palpation:    Tender about the gluteus medius insertion on left, none on right. Otherwise all other landmarks are nontender.  Active Range of Motion:     Flexion within normal limits, IR 10 deg left, 30 deg right, ER  30 deg left, 45 deg right  Strength:    Flexion 5/5, adduction 5/5, abduction 5/5  Special Tests:    Positive: None    Negative: ISRAEL Monet    Independent visualization of the below image:  No results found for this or any previous visit (from the past 24 hour(s)).      IMPRESSION:    1. Compression fracture of the superior endplate of L1. This appears  chronic.  2. No acute fractures are identified.  3. Multilevel degenerative change. The degenerative changes are  leading to moderate central spinal stenosis at L3-4 and  moderate-severe central spinal stenosis at L4-5.     LORETA ALCANTAR MD    Patient's conditions were thoroughly discussed during today's visit with greater than 50% of the visit spent counseling the patient with total time spent face-to-face with the patient being 25 minutes.    Paul Gautam MD [facility]      Again, thank you for allowing me to participate in the care of your patient.        Sincerely,        Paul Gautam MD

## 2018-09-28 NOTE — PROGRESS NOTES
ASSESSMENT & PLAN  Milagro was seen today for pain.    Diagnoses and all orders for this visit:    Chronic left-sided low back pain with left-sided sciatica  -     Cancel: Lumbar Pad XL  -     PAIN MANAGEMENT REFERRAL  -     PHYSICAL THERAPY REFERRAL (Internal); Future  -     DME REFERRAL  -     MRI Lumbar spine w/o contrast; Future    Closed compression fracture of first lumbar vertebra, sequela  -     traMADol (ULTRAM) 50 MG tablet; Take 1 tablet (50 mg) by mouth every 6 hours as needed for severe pain  -     PHYSICAL THERAPY REFERRAL (Internal); Future  -     DME REFERRAL    DDD (degenerative disc disease), lumbar  -     traMADol (ULTRAM) 50 MG tablet; Take 1 tablet (50 mg) by mouth every 6 hours as needed for severe pain  -     PHYSICAL THERAPY REFERRAL (Internal); Future    Patient is a 78-year-old female with past medical history of chronic low back pain with acute worsening over the past couple weeks in the setting of a fall.  CT showing chronic compression fracture with degenerative disc disease otherwise no acute fractures apparent.  No red flag sign/sx on today's visit.  No sig hip pain on examination.  Symptoms of sciatic and positive straight leg raise on the left side concerning for possible disc herniation on left side.  Sx could be related to stenosis given worse with standing and lying down as well.  Given this we will order MRI, refer to pain for consideration of a epidural steroid injection to help with her pain.  In the interim we will provide patient with back brace, have her see physical therapy.  She was given a limited refill of Tramadol and instructed to use it sparingly in addition to tylenol.  Pt agrees.  Pt to f/u with PCP after MRI, pain eval for reassessment.  All questions were answered, pt understands and agrees with plan    -----    SUBJECTIVE  Milagro JUNI Wallace is a/an 78 year old female who is seen in consultation at the request of  Johanny Ayon C.N.P. for evaluation of low back  pain. The patient is seen with their , Jonnathan.     Onset: 2 week(s) ago. Patient describes injury as fall outside in driveway 5 weeks ago.  Pain came on sudden 9/17 goes down left leg, never had this before.  Pt was started on Tramadol only takes q12 helps some.  Pt has to sleep in a chair, cannot lie flat or stand up.  Pt uses cane now since onset, never used in the past.  Pt also taking tylenol.  Pain about the same, limits sleep.  No urinary retention, bowel incontinence, saddle paresthesias.  No Sig LE weakness.  Pt wants to be mobile, move more.  She wondering about steroid injection and utility of PT.  She did go to ER had CT completed showing old compression fx with no acute injury apparent.  Location of Pain: left sided low back pain radiating to toes   Rating of Pain at worst: 8/10  Rating of Pain Currently: 8/10  Worsened by: lying down, standing up   Better with: Tramadol, Tylenol, cane   Treatments tried: rest/activity avoidance  Quality: aching, dull  Red flags: Weakness: no sig weakness, bowel/bladder loss: No, foot drop: No  Associated symptoms: numbness in left foot, none currently   Orthopedic history: YES - multiple falls   Relevant surgical history: NO  Past Medical History:   Diagnosis Date     Difficult intubation      Hypertension      Malignant hyperthermia      PONV (postoperative nausea and vomiting)      Tobacco use disorder      Type 2 diabetes mellitus without complications (H)      Social History     Social History     Marital status:      Spouse name: N/A     Number of children: N/A     Years of education: N/A     Social History Main Topics     Smoking status: Former Smoker     Packs/day: 0.70     Years: 26.00     Types: Cigarettes     Quit date: 1/16/2009     Smokeless tobacco: Never Used      Comment:       Alcohol use Yes      Comment: occ     Drug use: No     Sexual activity: No     Other Topics Concern     Parent/Sibling W/ Cabg, Mi Or Angioplasty Before 65f 55m? No  "    Social History Narrative         Patient's past medical, surgical, social, and family histories were reviewed today and no changes are noted.    REVIEW OF SYSTEMS:  10 point ROS is negative other than symptoms noted above in HPI, Past Medical History or as stated below  Constitutional: NEGATIVE for fever, chills, change in weight  Skin: NEGATIVE for worrisome rashes, moles or lesions  GI/: NEGATIVE for bowel or bladder changes  Neuro: NEGATIVE for weakness, dizziness or paresthesias    OBJECTIVE:  BP (P) 116/46  Ht 5' 2.5\" (1.588 m)  Wt 199 lb (90.3 kg)  LMP 09/25/1979 (Approximate)  BMI 35.82 kg/m2   General: healthy, alert and in no distress  HEENT: no scleral icterus or conjunctival erythema  Skin: no suspicious lesions or rash. No jaundice.  CV: no pedal edema  Resp: normal respiratory effort without conversational dyspnea   Psych: normal mood and affect  Gait: normal steady gait with appropriate coordination and balance  Neuro: normal light touch sensory exam of the bilateral lower extremities.  DTR's 1+ patella and achilles bilaterally.  MSK:  THORACIC/LUMBAR SPINE  Inspection:    No gross deformity/asymmetry  Palpation:    Tender about the lumbar facet joints and left para lumbar muscles. Otherwise remainder of landmarks are nontender.  Range of Motion:     Lumbar flexion limited slightly by pain    Lumbar extension limited slightly by pain  Strength:    Full strength throughout hips/quads/hamstrings  Special Tests:    Positive: straight leg raise (left)  Negative: slump test (bilateral), stork test (bilateral), ISRAEL (bilateral), FADIR (bilateral)  Sensation: Intact and symmetric in LE bilaterally  LE: Pitting edema to knees bilaterally Rt>Lt    BILATERAL HIP  Inspection:    No obvious deformity or asymmetry, pelvis level  Palpation:    Tender about the gluteus medius insertion on left, none on right. Otherwise all other landmarks are nontender.  Active Range of Motion:     Flexion within normal " limits, IR 10 deg left, 30 deg right, ER  30 deg left, 45 deg right  Strength:    Flexion 5/5, adduction 5/5, abduction 5/5  Special Tests:    Positive: None    Negative: ISRAEL Monet    Independent visualization of the below image:  No results found for this or any previous visit (from the past 24 hour(s)).      IMPRESSION:    1. Compression fracture of the superior endplate of L1. This appears  chronic.  2. No acute fractures are identified.  3. Multilevel degenerative change. The degenerative changes are  leading to moderate central spinal stenosis at L3-4 and  moderate-severe central spinal stenosis at L4-5.     LORETA ALCANTAR MD    Patient's conditions were thoroughly discussed during today's visit with greater than 50% of the visit spent counseling the patient with total time spent face-to-face with the patient being 25 minutes.    Paul Gautam MD [facility]

## 2018-09-28 NOTE — TELEPHONE ENCOUNTER
Called patient to schedule Lumbar CHELSEA. Patient stated that she wanted to schedule her MRI and PT first and would contact us when ready to schedule her injection. Advised phone number.      Gail Nicholas    [facility]

## 2018-09-28 NOTE — PATIENT INSTRUCTIONS
1.  Schedule low back MRI  2. Schedule physical therapy  3. Schedule Pain Clinic visit for lumbar injection after MRI  4. Get lumbar brace wear it for comfort as much as you feel you need to  5. Follow-up with your primary care provider in 1-2 months  6. Continue tylenol as you are taking it  7. Please limit tramadol use    Understanding Lumbar Radiculopathy    Lumbar radiculopathy is irritation or inflammation of a nerve root in the low back. It causes symptoms that spread out from the back down one or both legs. To understand this condition, it helps to understand the parts of the spine:    Vertebrae. These are bones that stack to form the spine. The lumbar spine contains the 5 bottom vertebrae.    Disks. These are soft pads of tissue between the vertebrae. They act as shock absorbers for the spine.    Spinal canal. This is a tunnel formed within the stacked vertebrae. In the lumbar spine, nerves run through this canal.    Nerves. These branch off and leave the spinal canal, traveling out to parts of the body. As they leave the spinal canal, nerves pass through openings between the vertebrae. The nerve root is the part of the nerve that is closest to the spinal canal.    Sciatic nerve. This is a large nerve formed from several nerve roots in the low back. This nerve extends down the back of the leg to the foot.  With lumbar radiculopathy, nerve roots in the low back become irritated. This leads to pain and symptoms. The sciatic nerve is commonly involved, so the condition is often called sciatica.  What causes lumbar radiculopathy?  Aging, injury, poor posture, extra body weight, and other issues can lead to problems in the low back. These problems may then irritate nerve roots. They include:    Damage to a disk in the lumbar spine. The damaged disk may then press on nearby nerve roots.    Degeneration from wear and tear, and aging. This can lead to narrowing (stenosis) of the openings between the vertebrae. The  narrowed openings press on nerve roots as they leave the spinal canal.    Unstable spine. This is when a vertebra slips forward. It can then press on a nerve root.  Other, less common things can put pressure on nerves in the low back. These include diabetes, infection, or a tumor.  Symptoms of lumbar radiculopathy  These include:    Pain in the low back    Pain, numbness, tingling, or weakness that travels into the buttocks, hip, groin, or leg    Muscle spasms  Treatment for lumbar radiculopathy  In most cases, your healthcare provider will first try treatments that help relieve symptoms. These may include:    Prescription and over-the-counter pain medicines. These help relieve pain, swelling, and irritation.    Limits on positions and activities that increase pain. But lying in bed or avoiding all movement is only recommended for a short period of time.    Physical therapy, including exercises and stretches. This helps decrease pain and increase movement and function.    Steroid shots into the lower back. This may help relieve symptoms for a time.    Weight-loss program. If you are overweight, losing extra pounds may help relieve symptoms.  In some cases, you may need surgery to fix the underlying problem. This depends on the cause, the symptoms, and how long the pain has lasted.  Possible complications  Over time, an irritated and inflamed nerve may become damaged. This may lead to long-lasting (permanent) numbness or weakness in your legs and feet. If symptoms change suddenly or get worse, be sure to let your healthcare provider know.  When to call your healthcare provider  Call your healthcare provider right away if you have any of these:    New pain or pain that gets worse    New or increasing weakness, tingling, or numbness in your leg or foot    Problems controlling your bladder or bowel   Date Last Reviewed: 3/10/2016    [id number] [employer]. [address] [place] [address], [place] [address]. All  rights reserved. This information is not intended as a substitute for professional medical care. Always follow your healthcare professional's instructions.

## 2018-09-28 NOTE — TELEPHONE ENCOUNTER
"Requested Prescriptions   Pending Prescriptions Disp Refills     furosemide (LASIX) 40 MG tablet [Pharmacy Med Name: FUROSEMIDE 40MG TABLETS] 180 tablet 0     Sig: TAKE 2 TABLETS(80 MG) BY MOUTH DAILY    Diuretics (Including Combos) Protocol Failed    9/28/2018 11:55 AM       Failed - Normal serum creatinine on file in past 12 months    Recent Labs   Lab Test  06/06/18   1311   CR  1.10*             Passed - Blood pressure under 140/90 in past 12 months    BP Readings from Last 3 Encounters:   09/25/18 122/54   09/18/18 117/75   08/30/18 130/54                Passed - Recent (12 mo) or future (30 days) visit within the authorizing provider's specialty    Patient had office visit in the last 12 months or has a visit in the next 30 days with authorizing provider or within the authorizing provider's specialty.  See \"Patient Info\" tab in inbasket, or \"Choose Columns\" in Meds & Orders section of the refill encounter.           Passed - Patient is age 18 or older       Passed - No active pregancy on record       Passed - Normal serum potassium on file in past 12 months    Recent Labs   Lab Test  06/06/18   1311   POTASSIUM  4.2                   Passed - Normal serum sodium on file in past 12 months    Recent Labs   Lab Test  06/06/18   1311   NA  139             Passed - No positive pregnancy test in past 12 months          "

## 2018-10-02 ENCOUNTER — HOSPITAL ENCOUNTER (OUTPATIENT)
Dept: MRI IMAGING | Facility: CLINIC | Age: 78
Discharge: HOME | End: 2018-10-02
Attending: FAMILY MEDICINE | Admitting: FAMILY MEDICINE
Payer: MEDICARE

## 2018-10-02 DIAGNOSIS — G89.29 CHRONIC LEFT-SIDED LOW BACK PAIN WITH LEFT-SIDED SCIATICA: ICD-10-CM

## 2018-10-02 DIAGNOSIS — M54.42 CHRONIC LEFT-SIDED LOW BACK PAIN WITH LEFT-SIDED SCIATICA: ICD-10-CM

## 2018-10-02 PROCEDURE — 72148 MRI LUMBAR SPINE W/O DYE: CPT

## 2018-10-09 ENCOUNTER — TELEPHONE (OUTPATIENT)
Dept: ORTHOPEDICS | Facility: CLINIC | Age: 78
End: 2018-10-09

## 2018-10-09 DIAGNOSIS — M51.369 DDD (DEGENERATIVE DISC DISEASE), LUMBAR: ICD-10-CM

## 2018-10-09 DIAGNOSIS — M48.062 SPINAL STENOSIS OF LUMBAR REGION WITH NEUROGENIC CLAUDICATION: Primary | ICD-10-CM

## 2018-10-09 DIAGNOSIS — S32.010S CLOSED COMPRESSION FRACTURE OF FIRST LUMBAR VERTEBRA, SEQUELA: ICD-10-CM

## 2018-10-09 NOTE — TELEPHONE ENCOUNTER
Reason for Call:  Request for results:    Name of test or procedure: Lumbar spine MRI    Date of test of procedure: 10/02/2018    Location of the test or procedure: [facility]    OK to leave the result message on voice mail or with a family member? YES    Phone number Patient can be reached at:  Home number on file [phone] (home)    Additional comments: NA    Call taken on 10/9/2018 at 3:12 PM by Denise Behrendt

## 2018-10-09 NOTE — TELEPHONE ENCOUNTER
Reason for Call:  Request for results:    Name of test or procedure: MRI of Lumbar Spine    Date of test of procedure: 10/2/18    Results:   Results for orders placed or performed during the hospital encounter of 10/02/18   MRI Lumbar spine w/o contrast    Narrative    MRI LUMBAR SPINE WITHOUT CONTRAST October 2, 2018 2:33 PM     HISTORY: Chronic left-sided low back pain with left-sided sciatica.      TECHNIQUE: Multiplanar multisequence MRI of the lumbar spine without  contrast.    COMPARISON: CT scan from 9/18/2018.    FINDINGS: The report is dictated assuming five lumbar-type vertebral  bodies. Sagittal images demonstrate a mild old superior endplate  compression of L1. No acute fracture identified. Bone marrow signal is  unremarkable. Tip of the conus medullaris and cauda equina are  unremarkable.     T12-L1: No disc herniation or stenosis. Facet joints are unremarkable.    L1-L2: No disc herniation or stenosis. Facet joints are unremarkable.       L2-L3: Mild broad-based disc bulge and facet hypertrophy result in  mild to moderate central stenosis. Neural foramen are patent.    L3-L4: Moderate to severe central stenosis as result of disc bulge and  facet hypertrophy. Mild to moderate bilateral foraminal stenosis.    L4-L5: Broad-based disc bulge and facet hypertrophy result in severe  central stenosis. Mild to moderate bilateral foraminal stenosis.    L5-S1: Mild degenerative disc disease and disc bulge. Mild facet  hypertrophy. No central stenosis. Neural foramen are patent.    Paraspinous soft tissues: Unremarkable.      Impression    IMPRESSION:    1. At L2-L3 there is mild to moderate central stenosis.  2. At L3-L4 there is moderate to severe central stenosis and mild to  moderate bilateral foraminal stenosis.  3. At L4-L5 there is severe central stenosis. Mild-to-moderate  bilateral foraminal stenosis.  4. At L5-S1 there is mild degenerative disc disease and disc bulge  without stenosis.    NATALIE REA MD        Plan for results from last OV: call patient, patient already referred for injection    OK to leave the result message on voice mail or with a family member?     Phone number Patient can be reached at:  Home number on file [phone] (home)    Additional comments:   Dr Gautam please advise on results.  Patient has not scheduled CHELSEA at this time.    Kavin Martinez ATC

## 2018-10-12 RX ORDER — TRAMADOL HYDROCHLORIDE 50 MG/1
50 TABLET ORAL EVERY 12 HOURS PRN
Qty: 20 TABLET | Refills: 0 | Status: SHIPPED | OUTPATIENT
Start: 2018-10-12 | End: 2018-10-13

## 2018-10-12 NOTE — TELEPHONE ENCOUNTER
Patient was called and informed of her results that included  severe lumbar stenosis in the L4/L5 region.  This is likely the contributing to her  symptoms.  She was informed that she should follow-up with physical therapy but reported she was in a significant pain particularly in the morning that she can barely walk.  Given this we will have her try an epidural steroid injection to see if that will help with her symptoms.  In the interim one last prescription of tramadol was ordered.  She was informed that no more opiate prescriptions would be signed by his provider.  Patient understands and agrees with plan.    Paul Gautam

## 2018-10-13 DIAGNOSIS — S32.010S CLOSED COMPRESSION FRACTURE OF FIRST LUMBAR VERTEBRA, SEQUELA: ICD-10-CM

## 2018-10-13 DIAGNOSIS — M51.369 DDD (DEGENERATIVE DISC DISEASE), LUMBAR: ICD-10-CM

## 2018-10-13 RX ORDER — TRAMADOL HYDROCHLORIDE 50 MG/1
50 TABLET ORAL EVERY 12 HOURS PRN
Qty: 20 TABLET | Refills: 0 | Status: SHIPPED | OUTPATIENT
Start: 2018-10-13 | End: 2018-11-16

## 2018-10-16 NOTE — TELEPHONE ENCOUNTER
Pre-screening Questions for Radiology Injections:    Injection to be done at which interventional clinic site? [facility]    Instruct patient to arrive as directed prior to the scheduled appointment time:    [place] AND [place]: 30 minutes before      Procedure ordered by MARIE    Procedure ordered? Lumbar Epidural Steroid Injection    What insurance would patient like us to bill for this procedure? [employer] PLATINUM      Worker's comp or MVA (motor vehicle accident) -Any injection DO NOT SCHEDULE and route to Lashay Banks.      [employer] - For SI joint injections, DO NOT SCHEDULE and route Lashay Banks. Tengion FREEDOM NO PA REQUIRED EFFECTIVE 11/1/2017      [employer]- MBB's must be scheduled at LEAST two weeks apart      [employer] - Any injection besides hip/shoulder/knee joint DO NOT SCHEDULE and route to Lashay Banks. She will obtain PA and call pt back to schedule procedure or notify pt of denial.       [employer]-Route to Lashay for review    Any chance of pregnancy? NO   If YES, do NOT schedule and route to RN pool    Is an  needed? No     Patient has a drive home? (mandatory) YES:     Is patient taking any blood thinners (plavix, coumadin, jantoven, warfarin, heparin, pradaxa or dabigatran )? No   If hold needed, do NOT schedule, route to RN pool     Is patient taking any aspirin products (includes Excedrin and Fiorinal)? Yes - Pt takes 81 mg daily; instructed to hold 0 day(s) prior to procedure.      If more than 325mg/day do NOT schedule; route to RN pool     For CERVICAL procedures, hold all aspirin products for 6 days.     Tell pt that if aspirin product is not held for 6 days, the procedure WILL BE cancelled.      Does the patient have a bleeding or clotting disorder? No     If YES, okay to schedule AND route to RN nurse pool    For any patients with platelet count <100, must be forwarded to provider    Is patient diabetic?  Yes  If YES, have them bring their  glucometer.    Does patient have an active infection or treated for one within the past week? No     Is patient currently taking any antibiotics?  No     For patients on chronic, preventative, or prophylactic antibiotics, procedures may be scheduled.     For patients on antibiotics for active or recent infection:    Jessica Flores Burton, [place]-antibiotic course must have been completed for 4 days    Is patient currently taking any steroid medications? (i.e. Prednisone, Medrol)  No     For patients on steroid medications:    Jessica Flores Burton, [place]-steroid course must have been completed for 4 days    Reviewed with patient:  If you are started on any steroids or antibiotics between now and your appointment, you must contact us because the procedure may need to be cancelled.  Yes    Is patient actively being treated for cancer or immunocompromised? No  If YES, do NOT schedule and route to RN pool     Are you able to get on and off an exam table with minimal or no assistance? Yes  If NO, do NOT schedule and route to RN pool    Are you able to roll over and lay on your stomach with minimal or no assistance? Yes  If NO, do NOT schedule and route to RN pool     Any allergies to contrast dye, iodine, shellfish, or numbing and steroid medications? Yes - SHELLFISH- NOTED IN APPT NOTES  If YES, route to RN pool AND add allergy information to appointment notes    Allergies: Hctz and Shellfish allergy      Has the patient had a flu shot or any other vaccinations within 7 days before or after the procedure.  No     Does patient have an MRI/CT?  YES: MRI  (SI joint, hip injections, lumbar sympathetic blocks, and stellate ganglion blocks do not require an MRI)    Was the MRI done w/in the last 3 years?  Yes    Was MRI done at [place]? Yes      If not, where was it done? N/A       If MRI was not done at [place], [facility] or [facility] do NOT schedule and route to nursing.  If pt has an imaging  disc, the injection may be scheduled but pt has to bring disc to appt. If they show up w/out disc the injection cannot be done    Reminders (please tell patient if applicable):       Instructed pt to arrive 30 minutes early for IV start if this is for a cervical procedure, ALL sympathetic (stellate ganglion, hypogastric, or lumbar sympathetic block) and all sedation procedures (RFA, spinal cord stimulation trials).  Not Applicable   -IVs are not routinely placed for Dr. Manzo cervical cases   -Dr. Padilla: IVs for cervical ESIs and cervical TBDs (not CMBBs/facet inj)      If NPO for sedation, informed patient that it is okay to take medications with sips of water (except if they are to hold blood thinners).  Not Applicable   *DO take blood pressure medication if it is prescribed*      If this is for a cervical CHELSEA, informed patient that aspirin needs to be held for 6 days.   Not Applicable      For all patients not having spinal cord stimulator (SCS) trials or radiofrequency ablations (RFAs), informed patient:    IV sedation is not provided for this procedure.  If you feel that an oral anti-anxiety medication is needed, you can discuss this further with your referring provider or primary care provider.  The Pain Clinic provider will discuss specifics of what the procedure includes at your appointment.  Most procedures last 10-20 minutes.  We use numbing medications to help with any discomfort during the procedure.  NO      Do not schedule procedures requiring IV placement in the first appointment of the day or first appointment after lunch. Do NOT schedule at 0745, 0815 or 1245.       For patients 85 or older we recommend having an adult stay w/ them for the remainder of the day.       Does the patient have any questions?  NO  Holli Sood  [facility]

## 2018-10-19 ENCOUNTER — RADIOLOGY INJECTION OFFICE VISIT (OUTPATIENT)
Dept: PALLIATIVE MEDICINE | Facility: CLINIC | Age: 78
End: 2018-10-19
Attending: FAMILY MEDICINE
Payer: COMMERCIAL

## 2018-10-19 ENCOUNTER — HOSPITAL ENCOUNTER (OUTPATIENT)
Dept: RADIOLOGY | Facility: CLINIC | Age: 78
Discharge: HOME | End: 2018-10-19
Attending: ANESTHESIOLOGY | Admitting: ANESTHESIOLOGY
Payer: MEDICARE

## 2018-10-19 VITALS — RESPIRATION RATE: 16 BRPM | SYSTOLIC BLOOD PRESSURE: 163 MMHG | DIASTOLIC BLOOD PRESSURE: 57 MMHG | HEART RATE: 70 BPM

## 2018-10-19 DIAGNOSIS — M51.369 DDD (DEGENERATIVE DISC DISEASE), LUMBAR: ICD-10-CM

## 2018-10-19 DIAGNOSIS — M54.16 LUMBAR RADICULOPATHY: Primary | ICD-10-CM

## 2018-10-19 DIAGNOSIS — M54.16 LUMBAR RADICULOPATHY: ICD-10-CM

## 2018-10-19 PROCEDURE — 25000128 H RX IP 250 OP 636: Performed by: ANESTHESIOLOGY

## 2018-10-19 PROCEDURE — 64483 NJX AA&/STRD TFRM EPI L/S 1: CPT | Mod: LT | Performed by: ANESTHESIOLOGY

## 2018-10-19 PROCEDURE — 62323 NJX INTERLAMINAR LMBR/SAC: CPT | Mod: TC

## 2018-10-19 RX ORDER — BUPIVACAINE HYDROCHLORIDE 5 MG/ML
10 INJECTION, SOLUTION PERINEURAL ONCE
Status: COMPLETED | OUTPATIENT
Start: 2018-10-19 | End: 2018-10-19

## 2018-10-19 RX ORDER — LIDOCAINE HYDROCHLORIDE 10 MG/ML
5 INJECTION, SOLUTION EPIDURAL; INFILTRATION; INTRACAUDAL; PERINEURAL ONCE
Status: COMPLETED | OUTPATIENT
Start: 2018-10-19 | End: 2018-10-19

## 2018-10-19 RX ORDER — METHYLPREDNISOLONE ACETATE 40 MG/ML
40 INJECTION, SUSPENSION INTRA-ARTICULAR; INTRALESIONAL; INTRAMUSCULAR; SOFT TISSUE ONCE
Status: COMPLETED | OUTPATIENT
Start: 2018-10-19 | End: 2018-10-19

## 2018-10-19 RX ORDER — IOPAMIDOL 612 MG/ML
3 INJECTION, SOLUTION INTRATHECAL ONCE
Status: COMPLETED | OUTPATIENT
Start: 2018-10-19 | End: 2018-10-19

## 2018-10-19 RX ORDER — DEXAMETHASONE SODIUM PHOSPHATE 10 MG/ML
20 INJECTION, SOLUTION INTRAMUSCULAR; INTRAVENOUS ONCE
Status: COMPLETED | OUTPATIENT
Start: 2018-10-19 | End: 2018-10-19

## 2018-10-19 RX ADMIN — IOPAMIDOL 1 ML: 612 INJECTION, SOLUTION INTRATHECAL at 08:09

## 2018-10-19 RX ADMIN — METHYLPREDNISOLONE ACETATE 40 MG: 40 INJECTION, SUSPENSION INTRA-ARTICULAR; INTRALESIONAL; INTRAMUSCULAR; SOFT TISSUE at 08:10

## 2018-10-19 RX ADMIN — DEXAMETHASONE SODIUM PHOSPHATE 5 MG: 10 INJECTION, SOLUTION INTRAMUSCULAR; INTRAVENOUS at 08:09

## 2018-10-19 RX ADMIN — BUPIVACAINE HYDROCHLORIDE 1 ML: 5 INJECTION, SOLUTION EPIDURAL; INTRACAUDAL at 08:09

## 2018-10-19 RX ADMIN — LIDOCAINE HYDROCHLORIDE 1 ML: 10 INJECTION, SOLUTION EPIDURAL; INFILTRATION; INTRACAUDAL; PERINEURAL at 08:10

## 2018-10-19 ASSESSMENT — PAIN SCALES - GENERAL
PAINLEVEL: NO PAIN (0)
PAINLEVEL: SEVERE PAIN (6)

## 2018-10-19 NOTE — NURSING NOTE
Pre-procedure Intake    Have you been fasting? No     If yes, for how long?     Are you taking a prescribed blood thinner such as coumadin, Plavix, Xarelto?    No    If yes, when did you take your last dose?     Do you take aspirin?  Yes -       If cervical procedure, have you held aspirin for 6 days?   No     Do you have any allergies to contrast dye, iodine, steroid and/or numbing medications?  NO    Are you currently taking antibiotics or have an active infection?  NO    Have you had a fever/elevated temperature within the past week? NO    Are you currently taking oral steroids? NO    Do you have a ? Yes       Are you pregnant or breastfeeding?  NO    Are the vital signs normal?  Yes

## 2018-10-19 NOTE — PROGRESS NOTES
Pre procedure Diagnosis: lumbar radiculopathy, lumbar degenerative disc disease   Post procedure Diagnosis: Same  Procedure performed: lumbar transforaminal epidural steroid injection at L4-5 on the left, fluoroscopically guided, contrast controlled  Anesthesia: none  Complications: none  Operators: Favian Shell MD     Indications:   Milagro Wallace is a 78 year old female was sent by Dr. Gautam for lumbar epidural steroid injection.  They have a history of chronic lower back pain with pain radiating down the left lower extremity.  Exam shows mildly antalgic gait, lumbar tenderness, and equivocal SLR and they have tried conservative treatment including physical therapy, medications.    MRI was done on 10/2/18 which showed   FINDINGS: The report is dictated assuming five lumbar-type vertebral  bodies. Sagittal images demonstrate a mild old superior endplate  compression of L1. No acute fracture identified. Bone marrow signal is  unremarkable. Tip of the conus medullaris and cauda equina are  unremarkable.      T12-L1: No disc herniation or stenosis. Facet joints are unremarkable.     L1-L2: No disc herniation or stenosis. Facet joints are unremarkable.       L2-L3: Mild broad-based disc bulge and facet hypertrophy result in  mild to moderate central stenosis. Neural foramen are patent.     L3-L4: Moderate to severe central stenosis as result of disc bulge and  facet hypertrophy. Mild to moderate bilateral foraminal stenosis.     L4-L5: Broad-based disc bulge and facet hypertrophy result in severe  central stenosis. Mild to moderate bilateral foraminal stenosis.     L5-S1: Mild degenerative disc disease and disc bulge. Mild facet  hypertrophy. No central stenosis. Neural foramen are patent.     Paraspinous soft tissues: Unremarkable.       IMPRESSION:    1. At L2-L3 there is mild to moderate central stenosis.  2. At L3-L4 there is moderate to severe central stenosis and mild to  moderate bilateral foraminal  stenosis.  3. At L4-L5 there is severe central stenosis. Mild-to-moderate  bilateral foraminal stenosis.  4. At L5-S1 there is mild degenerative disc disease and disc bulge  without stenosis.    Options/alternatives, benefits and risks were discussed with the patient including bleeding, infection, tissue trauma, numbness, weakness, paralysis, spinal cord injury, radiation exposure, headache and reaction to medications. Questions were answered to her satisfaction and she agrees to proceed. Voluntary informed consent was obtained and signed.     Vitals were reviewed: Yes  /57  Pulse 70  Resp 16  LMP 09/25/1979 (Approximate)  Allergies were reviewed:  Yes   Medications were reviewed:  Yes   Pre-procedure pain score: 6/10    Procedure:  After getting informed consent, patient was brought into the procedure suite and was placed in a prone position on the procedure table.   A Pause for the Cause was performed.  Patient was prepped and draped in sterile fashion.     After identifying the left L4-5 neuroforamen, the C-arm was rotated to a left lateral oblique angle.  A total of 1 ml of Lidocaine 1% was used to anesthetize the skin and the needle track at a skin entry site coaxial with the fluoroscopy beam, and overriding the superior aspect of the neuroforamen.  A 25 gauge 5 inch spinal needle was advanced under intermittent fluoroscopy until it entered the foramen superiorly.    The needle position was then inspected from anteroposterior and lateral views, and the needle adjusted appropriately.  A total of 1 ml of Isovue-300 was injected, confirming appropriate position, with spread into the nerve root sheath and the epidural space, with no intravascular uptake. 14 ml was wasted    Then, after repeated negative aspiration, 2.5 ml of a combination of Depomedrol 40 mg, Decadron 5 mg, 0.5% bupivacaine 1 ml was injected and the needle was flushed with lidocaine and removed.    During the procedure, there was not a  paresthesia.  Hemostasis was achieved, the area was cleaned, and bandaids were placed when appropriate.  The patient tolerated the procedure well, and was taken to the recovery room.    Images were saved to PACS.    Post-procedure pain score: 1/10  Follow-up includes:   -f/u phone call in one week  -f/u with referring provider    Favian Shell MD  [facility]

## 2018-10-19 NOTE — IP AVS SNAPSHOT
MRN:[id number]                      After Visit Summary   10/19/2018    Milagro Wallace    MRN: [id number]           Visit Information        Provider Department      10/19/2018  7:45 AM [facility]IEZ[facility]           Review of your medicines      UNREVIEWED medicines. Ask your doctor about these medicines        Dose / Directions    amLODIPine 5 MG tablet   Commonly known as:  NORVASC   Used for:  Essential hypertension with goal blood pressure less than 140/90        TAKE 1 TABLET(5 MG) BY MOUTH DAILY   Quantity:  90 tablet   Refills:  3       aspirin 81 MG tablet        1 TABLET DAILY   Refills:  0       CENTRUM SILVER PO        Refills:  0       fluticasone 50 MCG/ACT spray   Commonly known as:  FLONASE   Used for:  Dysfunction of left eustachian tube        Dose:  1-2 spray   Spray 1-2 sprays into both nostrils daily   Quantity:  1 Bottle   Refills:  11       furosemide 40 MG tablet   Commonly known as:  LASIX   Used for:  Bilateral edema of lower extremity        TAKE 2 TABLETS(80 MG) BY MOUTH DAILY   Quantity:  180 tablet   Refills:  3       lisinopril 40 MG tablet   Commonly known as:  PRINIVIL/ZESTRIL   Used for:  Essential hypertension with goal blood pressure less than 140/90        TAKE 1 TABLET(40 MG) BY MOUTH DAILY   Quantity:  90 tablet   Refills:  1       metFORMIN 500 MG tablet   Commonly known as:  GLUCOPHAGE   Used for:  Type 2 diabetes mellitus with stage 3 chronic kidney disease, without long-term current use of insulin (H)        TAKE 1 TABLET(500 MG) BY MOUTH TWICE DAILY WITH MEALS   Quantity:  180 tablet   Refills:  3       metoprolol tartrate 50 MG tablet   Commonly known as:  LOPRESSOR   Used for:  Essential hypertension with goal blood pressure less than 140/90        Dose:  50 mg   Take 1 tablet (50 mg) by mouth 2 times daily   Quantity:  180 tablet   Refills:  3       simvastatin 20 MG tablet   Commonly known as:  ZOCOR   Used for:  Hyperlipidemia LDL  goal <100        Dose:  20 mg   Take 1 tablet (20 mg) by mouth At Bedtime Appt and Labs DUE October 2018   Quantity:  90 tablet   Refills:  0       traMADol 50 MG tablet   Commonly known as:  ULTRAM   Used for:  Closed compression fracture of first lumbar vertebra, sequela, DDD (degenerative disc disease), lumbar        Dose:  50 mg   Take 1 tablet (50 mg) by mouth every 12 hours as needed for severe pain   Quantity:  20 tablet   Refills:  0         CONTINUE these medicines which have NOT CHANGED        Dose / Directions    blood glucose lancets standard   Commonly known as:  no brand specified   Used for:  Type II or unspecified type diabetes mellitus without mention of complication, uncontrolled        use 1 daily   Quantity:  1 box   Refills:  12       blood glucose monitoring meter device kit   Commonly known as:  no brand specified   Used for:  Type 2 diabetes mellitus with stage 3 chronic kidney disease (H)        Use to test blood sugar 1 time daily or as directed. One Touch Ultra Mini Glucometer.   Quantity:  1 kit   Refills:  0       blood glucose monitoring test strip   Commonly known as:  ONETOUCH ULTRA   Used for:  Type 2 diabetes mellitus with stage 3 chronic kidney disease, without long-term current use of insulin (H)        Use to test blood sugar 1 times daily or as directed.   Quantity:  100 each   Refills:  3       Blood Pressure Kit   Used for:  Unspecified essential hypertension        use as directed   Quantity:  1 kit   Refills:  0       ONETOUCH DELICA LANCETS 33G Misc   Used for:  Type 2 diabetes, HbA1c goal < 7% (H)        Dose:  1 lancet   1 lancet daily Use to test blood sugars 1 times daily or as directed.   Quantity:  100 each   Refills:  6       order for DME   Used for:  Type 2 diabetes mellitus with stage 3 chronic kidney disease, without long-term current use of insulin (H)        Glucometer, brand as covered by insurance.   Quantity:  1 each   Refills:  0                Protect  others around you: Learn how to safely use, store and throw away your medicines at www.disposemymeds.org.         Follow-ups after your visit        Your next 10 appointments already scheduled     Oct 23, 2018  3:30 PM CDT   Ortho Eval with Ranjan Ho, PT   [facility] ([facility])    [address]  [facility] [address]   [phone]               Care Instructions        Further instructions from your care team       [facility]   Procedure Discharge Instructions    Today you saw:    Dr. Favian Shell      You had an:  Lumbar Trans-foraminal Epidural steroid injection, L4-5    Medications used:  Lidocaine   Bupivacaine   Dexamethasone  Depo-medrol  IsoVue      Be cautious when walking. Numbness and/or weakness in the lower extremities may occur for up to 6-8 hours after the procedure due to effect of the local anesthetic    Do not drive for 6 hours. The effect of the local anesthetic could slow your reflexes.     You may resume your regular activities after 24 hours    Avoid strenuous activity for the first 24 hours    You may shower, however avoid swimming, tub baths or hot tubs for 24 hours following your procedure    You may have a mild to moderate increase in pain for several days following the injection.    It may take up to 14 days for the steroid medication to start working although you may feel the effect as early as a few days after the procedure.       You may use ice packs for 10-15 minutes, 3 to 4 times a day at the injection site for comfort    Do not use heat to painful areas for 6 to 8 hours. This will give the local anesthetic time to wear off and prevent you from accidentally burning your skin.     You may use anti-inflammatory medications (such as Ibuprofen or Aleve or Advil) or Tylenol for pain control if necessary    If you were fasting, you may resume your normal diet and medications after the procedure    If you  "have diabetes, check your blood sugar more frequently than usual as your blood sugar may be higher than normal for 10-14 days following a steroid injection. Contact your doctor who manages your diabetes if your blood sugar is higher than usual    If you experience any of the following, call the Pain Clinic during work hours at [phone] or the Provider Line after hours at [phone]:  -Fever over 100 degree F  -Swelling, bleeding, redness, drainage, warmth at the injection site  -Progressive weakness or numbness in your legs or arms  -Loss of bowel or bladder function  -Unusual headache that is not relieved by Tylenol or other pain reliever  -Unusual new onset of pain that is not improving           Additional Information About Your Visit        MyChart Information     [employer] lets you send messages to your doctor, view your test results, renew your prescriptions, schedule appointments and more. To sign up, go to [email or web address] . Click on \"Log in\" on the left side of the screen, which will take you to the Welcome page. Then click on \"Sign up Now\" on the right side of the page.     You will be asked to enter the access code listed below, as well as some personal information. Please follow the directions to create your username and password.     Your access code is: [email or web address]  Expires: 2018  5:46 PM     Your access code will  in 90 days. If you need help or a new code, please call your [facility] clinic or [phone].        Care EveryWhere ID     This is your Care EveryWhere ID. This could be used by other organizations to access your [facility] medical records  [id number]        Your Vitals Were     Last Period                   1979 (Approximate)            Primary Care Provider Office Phone # Fax #    Morena Mcintosh [phone]      Equal Access to Services     JOSEY LU : Natalio Smith, sixto zavala, lisa harris " shani herrera eliankhurram rush ah. So [facility] [phone].    ATENCIÓN: Si habla debbie, tiene a allan disposición servicios gratuitos de asistencia lingüística. Priscilla al [phone].    We comply with applicable federal civil rights laws and [place] laws. We do not discriminate on the basis of race, color, national origin, age, disability, sex, sexual orientation, or gender identity.            Thank you!     Thank you for choosing [facility] for your care. Our goal is always to provide you with excellent care. Hearing back from our patients is one way we can continue to improve our services. Please take a few minutes to complete the written survey that you may receive in the mail after you visit with us. Thank you!             Medication List: This is a list of all your medications and when to take them. Check marks below indicate your daily home schedule. Keep this list as a reference.      Medications           Morning Afternoon Evening Bedtime As Needed    amLODIPine 5 MG tablet   Commonly known as:  NORVASC   TAKE 1 TABLET(5 MG) BY MOUTH DAILY                                aspirin 81 MG tablet   1 TABLET DAILY                                blood glucose lancets standard   Commonly known as:  no brand specified   use 1 daily                                blood glucose monitoring meter device kit   Commonly known as:  no brand specified   Use to test blood sugar 1 time daily or as directed. One Touch Ultra Mini Glucometer.                                blood glucose monitoring test strip   Commonly known as:  ONETOUCH ULTRA   Use to test blood sugar 1 times daily or as directed.                                Blood Pressure Kit   use as directed                                CENTRUM SILVER PO                                fluticasone 50 MCG/ACT spray   Commonly known as:  FLONASE   Spray 1-2 sprays into both nostrils daily                                furosemide 40 MG tablet   Commonly known as:  LASIX   TAKE 2  TABLETS(80 MG) BY MOUTH DAILY                                lisinopril 40 MG tablet   Commonly known as:  PRINIVIL/ZESTRIL   TAKE 1 TABLET(40 MG) BY MOUTH DAILY                                metFORMIN 500 MG tablet   Commonly known as:  GLUCOPHAGE   TAKE 1 TABLET(500 MG) BY MOUTH TWICE DAILY WITH MEALS                                metoprolol tartrate 50 MG tablet   Commonly known as:  LOPRESSOR   Take 1 tablet (50 mg) by mouth 2 times daily                                ONETOUCH DELICA LANCETS 33G Misc   1 lancet daily Use to test blood sugars 1 times daily or as directed.                                order for DME   Glucometer, brand as covered by insurance.                                simvastatin 20 MG tablet   Commonly known as:  ZOCOR   Take 1 tablet (20 mg) by mouth At Bedtime Appt and Labs DUE October 2018                                traMADol 50 MG tablet   Commonly known as:  ULTRAM   Take 1 tablet (50 mg) by mouth every 12 hours as needed for severe pain

## 2018-10-19 NOTE — IP AVS SNAPSHOT
[facility]    [address]    [facility] [address]    Phone:  [phone]    Fax:  [phone]                                       After Visit Summary   10/19/2018    Milagro Wallace    MRN: [id number]           After Visit Summary Signature Page     I have received my discharge instructions, and my questions have been answered. I have discussed any challenges I see with this plan with the nurse or doctor.    ..........................................................................................................................................  Patient/Patient Representative Signature      ..........................................................................................................................................  Patient Representative Print Name and Relationship to Patient    ..................................................               ................................................  Date                                   Time    ..........................................................................................................................................  Reviewed by Signature/Title    ...................................................              ..............................................  Date                                               Time          22EPIC Rev 08/18

## 2018-10-23 ENCOUNTER — HOSPITAL ENCOUNTER (OUTPATIENT)
Dept: PHYSICAL THERAPY | Facility: CLINIC | Age: 78
Setting detail: THERAPIES SERIES
End: 2018-10-23
Attending: FAMILY MEDICINE
Payer: MEDICARE

## 2018-10-23 DIAGNOSIS — G89.29 CHRONIC LEFT-SIDED LOW BACK PAIN WITH LEFT-SIDED SCIATICA: ICD-10-CM

## 2018-10-23 DIAGNOSIS — M51.369 DDD (DEGENERATIVE DISC DISEASE), LUMBAR: ICD-10-CM

## 2018-10-23 DIAGNOSIS — S32.010S CLOSED COMPRESSION FRACTURE OF FIRST LUMBAR VERTEBRA, SEQUELA: ICD-10-CM

## 2018-10-23 DIAGNOSIS — M54.42 CHRONIC LEFT-SIDED LOW BACK PAIN WITH LEFT-SIDED SCIATICA: ICD-10-CM

## 2018-10-23 PROCEDURE — 97110 THERAPEUTIC EXERCISES: CPT | Mod: GP | Performed by: PHYSICAL THERAPIST

## 2018-10-23 PROCEDURE — G8978 MOBILITY CURRENT STATUS: HCPCS | Mod: GP,CK | Performed by: PHYSICAL THERAPIST

## 2018-10-23 PROCEDURE — 97140 MANUAL THERAPY 1/> REGIONS: CPT | Mod: GP | Performed by: PHYSICAL THERAPIST

## 2018-10-23 PROCEDURE — 97161 PT EVAL LOW COMPLEX 20 MIN: CPT | Mod: GP | Performed by: PHYSICAL THERAPIST

## 2018-10-23 PROCEDURE — 40000718 ZZHC STATISTIC PT DEPARTMENT ORTHO VISIT: Performed by: PHYSICAL THERAPIST

## 2018-10-23 PROCEDURE — G8979 MOBILITY GOAL STATUS: HCPCS | Mod: GP,CI | Performed by: PHYSICAL THERAPIST

## 2018-10-23 NOTE — PROGRESS NOTES
Milagro Wallace  1940 Physical Therapy Initial Evaluation  10/23/18 1500   General Information   Type of Visit Initial OP Ortho PT Evaluation   Start of Care Date 10/23/18   Referring Physician Paul Gautam   Patient/Family Goals Statement Walk without pain   Orders Evaluate and Treat   Date of Order 09/29/18   Insurance Type Medicare;[employer]   Medical Diagnosis Chronic left-sided low back pain with left-sided sciatica / Closed compression fracture of first lumbar vertebra / DDD (degenerative disc disease), lumbar      Surgical/Medical history reviewed No   Precautions/Limitations no known precautions/limitations   Body Part(s)   Body Part(s) Lumbar Spine/SI   Presentation and Etiology   Pertinent history of current problem (include personal factors and/or comorbidities that impact the POC) Had a fall in September. Was weeding in the yard and stood up and things started spinning and she fell backwards. Started having symptoms down her leg after that. Has had trouble sleeping since then. Sitting with pillow under knees will help with pain. Walking is difficult, standing to make dinner is also difficult. 4/10 pain currently. No longer having dizziness. Had an injection about 3 days ago. / Comorbidities - HTN, Diabetes mellitus Type 2 with chronic kidney disease stage 3    Impairments A. Pain;B. Decreased WB tolerance;C. Swelling;E. Decreased flexibility;F. Decreased strength and endurance;H. Impaired gait;L. Tingling;R. Other   Impairment comment Vertigo   Functional Limitations perform activities of daily living;perform desired leisure / sports activities   Symptom Location Lumbar spine and Left LE   How/Where did it occur With a fall   Onset date of current episode/exacerbation 09/29/18  (Date of Order)   Chronicity New   Pain rating (0-10 point scale) Best (/10);Worst (/10)   Best (/10) 3   Worst (/10) 7   Pain quality B. Dull   Frequency of pain/symptoms B. Intermittent   Pain/symptoms exacerbated by  B. Walking;I. Bending   Pain/symptoms eased by A. Sitting;K. Other   Pain eased by comment Cane   Progression of symptoms since onset: Unchanged   Current Level of Function   Patient role/employment history F. Retired   Living environment House/townhome   Home/community accessibility 0 stairs   Current equipment-Gait/Locomotion Standard cane   Fall Risk Screen   Fall screen completed by PT   Have you fallen 2 or more times in the past year? Yes   Have you fallen and had an injury in the past year? Yes   Timed Up and Go score (seconds) 18   Is patient a fall risk? Yes;Department fall risk interventions implemented   Lumbar Spine/SI Objective Findings   Gait/Locomotion Slow and deliberate gait with use of standard cane for balance.   Flexion ROM 8 inches from floor   Extension ROM 13 degrees   Right Side Bending ROM To knee joint   Left Side Bending ROM To knee joint with pain in left lumbar spine   Repeated Extension-Standing ROM Pain in lumbar spine   Repeated Flexion-Standing ROM No change in symptoms   Pelvic Screen Positive for sacral thrust and negative for SI compression, SI gapping and thigh thrust   Hip Flexion (L2) Strength 4/5 B   Hip Abduction Strength 4-/5 B   Hip Extension Strength 3+/5 B   Knee Extension (L3) Strength 4/5 B   Ankle Dorsiflexion (L4) Strength 4/5 B   Great Toe Extension (L5) Strength 4/5 B   Ankle Plantar Flexion (S1) Strength 4/5 B   Hamstring Flexibility Moderately restricted B   Piriformis Flexibility Moderately restricted B   SLR Negative B   Slump Test Negative B   Segmental Mobility Restricted L1-L5   Sensation Testing Deficits at L5-S1 dermatome B   Patellar Tendon Reflexes  2+ B   Palpation Hypertonicity of piriformis B and lumbar paraspinals B   Planned Therapy Interventions   Planned Therapy Interventions joint mobilization;manual therapy;neuromuscular re-education;ROM;strengthening;stretching;balance training;gait training   Planned Modality Interventions   Planned Modality  Interventions Cryotherapy;Hot packs;Traction;TENS   Clinical Impression   Criteria for Skilled Therapeutic Interventions Met yes, treatment indicated   PT Diagnosis Low back pain with likely radicular symptoms   Influenced by the following impairments Pain, Flexibility deficits, Strength deficits   Functional limitations due to impairments Difficulty with ambulation, standing   Clinical Presentation Stable/Uncomplicated   Clinical Presentation Rationale Several comorbidities impacting PT / 1 body system / Stable   Clinical Decision Making (Complexity) Low complexity   Therapy Frequency 1 time/week   Predicted Duration of Therapy Intervention (days/wks) 8 weeks   Risk & Benefits of therapy have been explained Yes   Patient, Family & other staff in agreement with plan of care Yes   Education Assessment   Preferred Learning Style Listening;Reading;Demonstration;Pictures/video   Barriers to Learning No barriers   ORTHO GOALS   PT Ortho Eval Goals 1;2;4;3   Ortho Goal 1   Goal Identifier HEP   Goal Description Pt will be independent in HEP in order to achieve and maintain long term treatment goals.   Target Date 11/23/18   Ortho Goal 2   Goal Identifier Ambulation   Goal Description Pt will be able to ambulate community distances without the use of an assistive device safely.   Target Date 12/18/18   Ortho Goal 3   Goal Identifier Stand   Goal Description Pt will be able to stand and make a meal without having to sit due to back or LE pain.   Target Date 12/18/18   Total Evaluation Time   Total Evaluation Time 22   Therapy Certification   Certification date from 10/23/18   Certification date to 12/18/18   Medical Diagnosis Chronic left-sided low back pain with left-sided sciatica / Closed compression fracture of first lumbar vertebra / DDD (degenerative disc disease), lumbar        Kareem Bloemke, PT, DPT

## 2018-10-23 NOTE — PROGRESS NOTES
[facility]          OUTPATIENT PHYSICAL THERAPY ORTHOPEDIC EVALUATION  PLAN OF TREATMENT FOR OUTPATIENT REHABILITATION  (COMPLETE FOR INITIAL CLAIMS ONLY)  Patient's Last Name, First Name, M.I.  YOB: 1940  Milagro Wallace    Provider s Name:  [facility]   Medical Record No.  [id number]   Start of Care Date:  10/23/18   Onset Date:  09/29/18 (Date of Order)   Type:     _X__PT   ___OT   ___SLP Medical Diagnosis:  Chronic left-sided low back pain with left-sided sciatica / Closed compression fracture of first lumbar vertebra / DDD (degenerative disc disease), lumbar        PT Diagnosis:  Low back pain with likely radicular symptoms   Visits from SOC:  1      _________________________________________________________________________________  Plan of Treatment/Functional Goals:  joint mobilization, manual therapy, neuromuscular re-education, ROM, strengthening, stretching, balance training, gait training     Cryotherapy, Hot packs, Traction, TENS     Goals  Goal Identifier: HEP  Goal Description: Pt will be independent in [facility] in order to achieve and maintain long term treatment goals.  Target Date: 11/23/18    Goal Identifier: Ambulation  Goal Description: Pt will be able to ambulate community distances without the use of an assistive device safely.  Target Date: 12/18/18    Goal Identifier: Stand  Goal Description: Pt will be able to stand and make a meal without having to sit due to back or LE pain.  Target Date: 12/18/18                                                           Therapy Frequency:  1 time/week  Predicted Duration of Therapy Intervention:  8 weeks    Ranjan Ho PT                 I CERTIFY THE NEED FOR THESE SERVICES FURNISHED UNDER        THIS PLAN OF TREATMENT AND WHILE UNDER MY CARE     (Physician co-signature of this document indicates review and certification of the therapy plan).                       Certification Date From:   10/23/18   Certification Date To:  12/18/18    Referring Provider:  Paul Gautam    Initial Assessment        See Epic Evaluation Start of Care Date: 10/23/18

## 2018-10-28 DIAGNOSIS — E11.22 TYPE 2 DIABETES MELLITUS WITH STAGE 3 CHRONIC KIDNEY DISEASE (H): Primary | ICD-10-CM

## 2018-10-28 DIAGNOSIS — N18.30 TYPE 2 DIABETES MELLITUS WITH STAGE 3 CHRONIC KIDNEY DISEASE (H): Primary | ICD-10-CM

## 2018-10-28 DIAGNOSIS — E78.5 HYPERLIPIDEMIA LDL GOAL <100: ICD-10-CM

## 2018-10-29 DIAGNOSIS — E78.5 HYPERLIPIDEMIA LDL GOAL <100: ICD-10-CM

## 2018-10-29 RX ORDER — SIMVASTATIN 20 MG
TABLET ORAL
Qty: 30 TABLET | Refills: 0 | Status: SHIPPED | OUTPATIENT
Start: 2018-10-29 | End: 2018-11-16

## 2018-10-29 RX ORDER — SIMVASTATIN 20 MG
TABLET ORAL
Qty: 90 TABLET | Refills: 0 | OUTPATIENT
Start: 2018-10-29

## 2018-10-29 NOTE — TELEPHONE ENCOUNTER
"Requested Prescriptions   Pending Prescriptions Disp Refills     simvastatin (ZOCOR) 20 MG tablet [Pharmacy Med Name: SIMVASTATIN 20MG TABLETS] 90 tablet 0     Sig: TAKE 1 TABLET BY MOUTH EVERY NIGHT AT BEDTIME    Statins Protocol Failed    10/28/2018  9:47 PM       Failed - LDL on file in past 12 months    Recent Labs   Lab Test  10/23/17   1015   LDL  39            Passed - No abnormal creatine kinase in past 12 months    No lab results found.            Passed - Recent (12 mo) or future (30 days) visit within the authorizing provider's specialty    Patient had office visit in the last 12 months or has a visit in the next 30 days with authorizing provider or within the authorizing provider's specialty.  See \"Patient Info\" tab in inbasket, or \"Choose Columns\" in Meds & Orders section of the refill encounter.             Passed - Patient is age 18 or older       Passed - No active pregnancy on record       Passed - No positive pregnancy test in past 12 months          "

## 2018-10-30 DIAGNOSIS — E78.5 HYPERLIPIDEMIA LDL GOAL <100: ICD-10-CM

## 2018-10-30 LAB
CHOLEST SERPL-MCNC: 129 MG/DL
ERYTHROCYTE [DISTWIDTH] IN BLOOD BY AUTOMATED COUNT: 13.2 % (ref 10–15)
HCT VFR BLD AUTO: 37.5 % (ref 35–47)
HDLC SERPL-MCNC: 49 MG/DL
HGB BLD-MCNC: 12.2 G/DL (ref 11.7–15.7)
LDLC SERPL CALC-MCNC: 49 MG/DL
MCH RBC QN AUTO: 30.2 PG (ref 26.5–33)
MCHC RBC AUTO-ENTMCNC: 32.5 G/DL (ref 31.5–36.5)
MCV RBC AUTO: 93 FL (ref 78–100)
NONHDLC SERPL-MCNC: 80 MG/DL
PLATELET # BLD AUTO: 344 10E9/L (ref 150–450)
RBC # BLD AUTO: 4.04 10E12/L (ref 3.8–5.2)
TRIGL SERPL-MCNC: 156 MG/DL
WBC # BLD AUTO: 12 10E9/L (ref 4–11)

## 2018-10-30 PROCEDURE — 36415 COLL VENOUS BLD VENIPUNCTURE: CPT | Performed by: FAMILY MEDICINE

## 2018-10-30 PROCEDURE — 80061 LIPID PANEL: CPT | Performed by: FAMILY MEDICINE

## 2018-10-30 PROCEDURE — 85027 COMPLETE CBC AUTOMATED: CPT | Performed by: FAMILY MEDICINE

## 2018-10-30 RX ORDER — SIMVASTATIN 20 MG
TABLET ORAL
Qty: 90 TABLET | Refills: 0 | OUTPATIENT
Start: 2018-10-30

## 2018-11-04 ENCOUNTER — TELEPHONE (OUTPATIENT)
Dept: FAMILY MEDICINE | Facility: CLINIC | Age: 78
End: 2018-11-04

## 2018-11-04 DIAGNOSIS — I10 ESSENTIAL HYPERTENSION WITH GOAL BLOOD PRESSURE LESS THAN 140/90: ICD-10-CM

## 2018-11-05 ENCOUNTER — HOSPITAL ENCOUNTER (OUTPATIENT)
Dept: PHYSICAL THERAPY | Facility: CLINIC | Age: 78
Setting detail: THERAPIES SERIES
End: 2018-11-05
Attending: FAMILY MEDICINE
Payer: MEDICARE

## 2018-11-05 PROCEDURE — 97110 THERAPEUTIC EXERCISES: CPT | Mod: GP | Performed by: PHYSICAL THERAPIST

## 2018-11-05 PROCEDURE — 40000718 ZZHC STATISTIC PT DEPARTMENT ORTHO VISIT: Performed by: PHYSICAL THERAPIST

## 2018-11-05 PROCEDURE — 97140 MANUAL THERAPY 1/> REGIONS: CPT | Mod: GP | Performed by: PHYSICAL THERAPIST

## 2018-11-05 RX ORDER — LISINOPRIL 40 MG/1
TABLET ORAL
Qty: 90 TABLET | Refills: 1 | Status: SHIPPED | OUTPATIENT
Start: 2018-11-05 | End: 2019-04-27

## 2018-11-05 NOTE — TELEPHONE ENCOUNTER
"Requested Prescriptions   Pending Prescriptions Disp Refills     lisinopril (PRINIVIL/ZESTRIL) 40 MG tablet [Pharmacy Med Name: LISINOPRIL 40MG TABLETS]  Last Written Prescription Date:  5/9/2018  Last Fill Quantity: 90,  # refills: 1  Last office visit: 8/30/2018 with prescribing provider:  Arnaldo    Future Office Visit:   Next 5 appointments (look out 90 days)     Nov 16, 2018  3:00 PM CST   [facility] with Morena Mcintosh MD   [facility]    [address]  [facility] [address]   [phone]                  90 tablet 0     Sig: TAKE 1 TABLET(40 MG) BY MOUTH DAILY    ACE Inhibitors (Including Combos) Protocol Failed    11/4/2018 10:49 AM       Failed - Blood pressure under 140/90 in past 12 months    BP Readings from Last 3 Encounters:   10/19/18 163/57   09/28/18 (P) 116/46   09/25/18 122/54                Failed - Normal serum creatinine on file in past 12 months    Recent Labs   Lab Test  06/06/18   1311   CR  1.10*            Passed - Recent (12 mo) or future (30 days) visit within the authorizing provider's specialty    Patient had office visit in the last 12 months or has a visit in the next 30 days with authorizing provider or within the authorizing provider's specialty.  See \"Patient Info\" tab in inbasket, or \"Choose Columns\" in Meds & Orders section of the refill encounter.             Passed - Patient is age 18 or older       Passed - No active pregnancy on record       Passed - Normal serum potassium on file in past 12 months    Recent Labs   Lab Test  06/06/18   1311   POTASSIUM  4.2            Passed - No positive pregnancy test in past 12 months          "

## 2018-11-05 NOTE — TELEPHONE ENCOUNTER
Routing refill request to provider for review/approval because:  Labs out of range:  bp and cr high    Bp 10-19-18 was taken prior to an inj  LOV bp 9-25-18 = 122/54    BMP 6-6-18 notes:  Notes Recorded by Morena Mcintosh MD on 6/7/2018 at 7:12 AM  Kidney function stable over the past 6 months.   HgbA1c is 7.3% as we discussed.      Thyroid is in normal range.    Morena Mcintosh M.D.    Jadyn CASTILLO RN

## 2018-11-05 NOTE — TELEPHONE ENCOUNTER
BP Readings from Last 6 Encounters:   10/19/18 163/57   09/28/18 (P) 116/46   09/25/18 122/54   09/18/18 117/75   08/30/18 130/54   06/06/18 128/58     Okay to refill.    Morena Mcintosh M.D.

## 2018-11-14 ENCOUNTER — HOSPITAL ENCOUNTER (OUTPATIENT)
Dept: PHYSICAL THERAPY | Facility: CLINIC | Age: 78
Setting detail: THERAPIES SERIES
End: 2018-11-14
Attending: FAMILY MEDICINE
Payer: MEDICARE

## 2018-11-14 PROCEDURE — 97110 THERAPEUTIC EXERCISES: CPT | Mod: GP | Performed by: PHYSICAL THERAPIST

## 2018-11-14 PROCEDURE — 40000718 ZZHC STATISTIC PT DEPARTMENT ORTHO VISIT: Performed by: PHYSICAL THERAPIST

## 2018-11-16 ENCOUNTER — OFFICE VISIT (OUTPATIENT)
Dept: FAMILY MEDICINE | Facility: CLINIC | Age: 78
End: 2018-11-16
Payer: COMMERCIAL

## 2018-11-16 VITALS
TEMPERATURE: 97.4 F | HEART RATE: 60 BPM | BODY MASS INDEX: 34.45 KG/M2 | SYSTOLIC BLOOD PRESSURE: 128 MMHG | DIASTOLIC BLOOD PRESSURE: 62 MMHG | OXYGEN SATURATION: 95 % | RESPIRATION RATE: 20 BRPM | WEIGHT: 191.4 LBS

## 2018-11-16 DIAGNOSIS — N18.30 CKD (CHRONIC KIDNEY DISEASE) STAGE 3, GFR 30-59 ML/MIN (H): ICD-10-CM

## 2018-11-16 DIAGNOSIS — I10 HTN, GOAL BELOW 140/90: ICD-10-CM

## 2018-11-16 DIAGNOSIS — S32.010S CLOSED COMPRESSION FRACTURE OF FIRST LUMBAR VERTEBRA, SEQUELA: ICD-10-CM

## 2018-11-16 DIAGNOSIS — M51.369 DDD (DEGENERATIVE DISC DISEASE), LUMBAR: ICD-10-CM

## 2018-11-16 DIAGNOSIS — E78.5 HYPERLIPIDEMIA LDL GOAL <100: ICD-10-CM

## 2018-11-16 DIAGNOSIS — N18.30 TYPE 2 DIABETES MELLITUS WITH STAGE 3 CHRONIC KIDNEY DISEASE, WITHOUT LONG-TERM CURRENT USE OF INSULIN (H): ICD-10-CM

## 2018-11-16 DIAGNOSIS — M48.061 SPINAL STENOSIS OF LUMBAR REGION AT MULTIPLE LEVELS: Primary | ICD-10-CM

## 2018-11-16 DIAGNOSIS — E11.22 TYPE 2 DIABETES MELLITUS WITH STAGE 3 CHRONIC KIDNEY DISEASE, WITHOUT LONG-TERM CURRENT USE OF INSULIN (H): ICD-10-CM

## 2018-11-16 LAB
ANION GAP SERPL CALCULATED.3IONS-SCNC: 6 MMOL/L (ref 3–14)
BUN SERPL-MCNC: 33 MG/DL (ref 7–30)
CALCIUM SERPL-MCNC: 9.1 MG/DL (ref 8.5–10.1)
CHLORIDE SERPL-SCNC: 101 MMOL/L (ref 94–109)
CO2 SERPL-SCNC: 30 MMOL/L (ref 20–32)
CREAT SERPL-MCNC: 1.18 MG/DL (ref 0.52–1.04)
CREAT UR-MCNC: 20 MG/DL
GFR SERPL CREATININE-BSD FRML MDRD: 44 ML/MIN/1.7M2
GLUCOSE SERPL-MCNC: 142 MG/DL (ref 70–99)
HBA1C MFR BLD: 7.7 % (ref 0–5.6)
MICROALBUMIN UR-MCNC: <5 MG/L
MICROALBUMIN/CREAT UR: NORMAL MG/G CR (ref 0–25)
POTASSIUM SERPL-SCNC: 4.4 MMOL/L (ref 3.4–5.3)
SODIUM SERPL-SCNC: 137 MMOL/L (ref 133–144)

## 2018-11-16 PROCEDURE — 82043 UR ALBUMIN QUANTITATIVE: CPT | Performed by: FAMILY MEDICINE

## 2018-11-16 PROCEDURE — 83036 HEMOGLOBIN GLYCOSYLATED A1C: CPT | Performed by: FAMILY MEDICINE

## 2018-11-16 PROCEDURE — 99214 OFFICE O/P EST MOD 30 MIN: CPT | Performed by: FAMILY MEDICINE

## 2018-11-16 PROCEDURE — 36415 COLL VENOUS BLD VENIPUNCTURE: CPT | Performed by: FAMILY MEDICINE

## 2018-11-16 PROCEDURE — 80048 BASIC METABOLIC PNL TOTAL CA: CPT | Performed by: FAMILY MEDICINE

## 2018-11-16 RX ORDER — TRAMADOL HYDROCHLORIDE 50 MG/1
50 TABLET ORAL EVERY 12 HOURS PRN
Qty: 60 TABLET | Refills: 0 | Status: SHIPPED | OUTPATIENT
Start: 2018-11-16 | End: 2018-12-17

## 2018-11-16 RX ORDER — SIMVASTATIN 20 MG
20 TABLET ORAL AT BEDTIME
Qty: 90 TABLET | Refills: 3 | Status: SHIPPED | OUTPATIENT
Start: 2018-11-16 | End: 2019-11-26

## 2018-11-16 RX ORDER — GABAPENTIN 300 MG/1
CAPSULE ORAL
Qty: 90 CAPSULE | Refills: 1 | Status: SHIPPED | OUTPATIENT
Start: 2018-11-16 | End: 2018-12-17

## 2018-11-16 ASSESSMENT — PAIN SCALES - GENERAL: PAINLEVEL: MODERATE PAIN (4)

## 2018-11-16 NOTE — PATIENT INSTRUCTIONS
Start Gabapentin (neurontin) 300 mg at night for three nights, then 300 mg twice daily for three days, then 300 mg three times daily  See me in 1 month to recheck.    See Dr. Wilson - spine surgeon          Thank you for choosing [facility]  You may be receiving a survey in the mail from [facility] regarding your visit today.  Please take a few minutes to complete and return the survey to let us know how we are doing.      Our Clinic hours are:  Mondays    7:20 am - 7 pm  Tues -  Fri  7:20 am - 5 pm    Clinic Phone: [phone]    The clinic lab opens at 7:30 am Mon - Fri and appointments are required.    [facility]. [phone]  Monday  8 am - 7pm  Tues - Fri 8 am - 5:30 pm

## 2018-11-16 NOTE — LETTER
[facility]  [address]  [place] [address]  Phone: [phone]      11/19/2018     Milagro Wallace  [address]   [facility] [address]      Dear Milagro:    Thank you for allowing me to participate in your care. Your recent test results were reviewed and listed below.      Your results are provided below for your review  Results for orders placed or performed in visit on 11/16/18   Albumin Random Urine Quantitative with Creat Ratio   Result Value Ref Range    Creatinine Urine 20 mg/dL    Albumin Urine mg/L <5 mg/L    Albumin Urine mg/g Cr Unable to calculate due to low value 0 - 25 mg/g Cr   Hemoglobin A1c   Result Value Ref Range    Hemoglobin A1C 7.7 (H) 0 - 5.6 %   Basic metabolic panel   Result Value Ref Range    Sodium 137 133 - 144 mmol/L    Potassium 4.4 3.4 - 5.3 mmol/L    Chloride 101 94 - 109 mmol/L    Carbon Dioxide 30 20 - 32 mmol/L    Anion Gap 6 3 - 14 mmol/L    Glucose 142 (H) 70 - 99 mg/dL    Urea Nitrogen 33 (H) 7 - 30 mg/dL    Creatinine 1.18 (H) 0.52 - 1.04 mg/dL    GFR Estimate 44 (L) >60 mL/min/1.7m2    GFR Estimate If Black 54 (L) >60 mL/min/1.7m2    Calcium 9.1 8.5 - 10.1 mg/dL     Kidney function stable.     No protein in the urine.       HgbA1c 7.7% as we discussed in clinic.             Thank you for choosing [facility]. As a result, please continue with the treatment plan discussed in the office. Return as discussed or sooner if symptoms worsen or fail to improve.     If you have any further questions or concerns, please do not hesitate to contact us.      Sincerely,        Dr. Morena Mcintosh

## 2018-11-16 NOTE — MR AVS SNAPSHOT
After Visit Summary   11/16/2018    Milagro Wallace    MRN: [id number]           Patient Information     Date Of Birth          1940        Visit Information        Provider Department      11/16/2018 3:00 PM Morena Mcintosh MD [facility]        Today's Diagnoses     Spinal stenosis of lumbar region at multiple levels    -  1    DDD (degenerative disc disease), lumbar        Hyperlipidemia LDL goal <100        HTN, goal below 140/90        CKD (chronic kidney disease) stage 3, GFR 30-59 ml/min (H)        Closed compression fracture of first lumbar vertebra, sequela        Type 2 diabetes mellitus with stage 3 chronic kidney disease, without long-term current use of insulin (H)          Care Instructions    Start Gabapentin (neurontin) 300 mg at night for three nights, then 300 mg twice daily for three days, then 300 mg three times daily  See me in 1 month to recheck.    See Dr. Wilson - spine surgeon          Thank you for choosing [facility].  You may be receiving a survey in the mail from [employer] regarding your visit today.  Please take a few minutes to complete and return the survey to let us know how we are doing.      Our Clinic hours are:  Mondays    7:20 am - 7 pm  Tues -  Fri  7:20 am - 5 pm    Clinic Phone: [phone]    The clinic lab opens at 7:30 am Mon - Fri and appointments are required.    [facility]  Ph. [phone]  Monday  8 am - 7pm  Tues - Fri 8 am - 5:30 pm                 Follow-ups after your visit        Additional Services     ORTHO  REFERRAL       [facility] is referring you to the Orthopedic  Services at [facility].       The  Representative will assist you in the coordination of your Orthopedic and Musculoskeletal Care as prescribed by your physician.    The  Representative will call you within 1 business day to help schedule your appointment,  or you may contact the [facility] Representative at:    All areas ~ [phone]     Type of Referral : Spine: Lumbar: Spine Surgeon        Timeframe requested: Routine    Coverage of these services is subject to the terms and limitations of your health insurance plan.  Please call member services at your health plan with any benefit or coverage questions.      If X-rays, CT or MRI's have been performed, please contact the facility where they were done to arrange for , prior to your scheduled appointment.  Please bring this referral request to your appointment and present it to your specialist.                  Your next 10 appointments already scheduled     Nov 19, 2018  2:30 PM CST   [facility]    [address] [address]   [phone]            Nov 26, 2018  2:30 PM CST   [facility]tho Treatment with [facility], PT   [facility]    [address] [address]   [phone]              Who to contact     If you have questions or need follow up information about today's clinic visit or your schedule please contact [facility] directly at [phone].  Normal or non-critical lab and imaging results will be communicated to you by MyChart, letter or phone within 4 business days after the clinic has received the results. If you do not hear from us within 7 days, please contact the clinic through MyChart or phone. If you have a critical or abnormal lab result, we will notify you by phone as soon as possible.  Submit refill requests through [employer] or call your pharmacy and they will forward the refill request to us. Please allow 3 business days for your refill to be completed.          Additional Information About Your Visit        Care EveryWhere ID     This is your Care EveryWhere ID. This could be used by  other organizations to access your [place] medical records  [id number]        Your Vitals Were     Pulse Temperature Respirations Last Period Pulse Oximetry BMI (Body Mass Index)    60 97.4  F (36.3  C) (Tympanic) 20 09/25/1979 (Approximate) 95% 34.45 kg/m2       Blood Pressure from Last 3 Encounters:   11/16/18 128/62   10/19/18 163/57   09/28/18 (P) 116/46    Weight from Last 3 Encounters:   11/16/18 191 lb 6.4 oz (86.8 kg)   09/28/18 199 lb (90.3 kg)   09/18/18 199 lb (90.3 kg)              We Performed the Following     Albumin Random Urine Quantitative with Creat Ratio     Basic metabolic panel     Hemoglobin A1c     ORTHO  REFERRAL          Today's Medication Changes          These changes are accurate as of 11/16/18  3:42 PM.  If you have any questions, ask your nurse or doctor.               Start taking these medicines.        Dose/Directions    gabapentin 300 MG capsule   Commonly known as:  NEURONTIN   Used for:  Spinal stenosis of lumbar region at multiple levels, DDD (degenerative disc disease), lumbar   Started by:  Morena Mcintosh MD        Take 1 tablet (300 mg) every night for 3 days, then 1 tablet twice daily for 3 days, then 1 tablet three times daily   Quantity:  90 capsule   Refills:  1         These medicines have changed or have updated prescriptions.        Dose/Directions    simvastatin 20 MG tablet   Commonly known as:  ZOCOR   This may have changed:  See the new instructions.   Used for:  Hyperlipidemia LDL goal <100   Changed by:  Morena Mcintosh MD        Dose:  20 mg   Take 1 tablet (20 mg) by mouth At Bedtime   Quantity:  90 tablet   Refills:  3            Where to get your medicines      These medications were sent to [facility] [address] [place], [place] [address] [facility] AT [facility] OF [address] [place] [address]  [address] [facility] [address]     Phone:  [phone]     gabapentin 300 MG capsule    simvastatin 20 MG tablet         Some of these will need a  paper prescription and others can be bought over the counter.  Ask your nurse if you have questions.     Bring a paper prescription for each of these medications     traMADol 50 MG tablet               Information about OPIOIDS     PRESCRIPTION OPIOIDS: WHAT YOU NEED TO KNOW   We gave you an opioid (narcotic) pain medicine. It is important to manage your pain, but opioids are not always the best choice. You should first try all the other options your care team gave you. Take this medicine for as short a time (and as few doses) as possible.    Some activities can increase your pain, such as bandage changes or therapy sessions. It may help to take your pain medicine 30 to 60 minutes before these activities. Reduce your stress by getting enough sleep, working on hobbies you enjoy and practicing relaxation or meditation. Talk to your care team about ways to manage your pain beyond prescription opioids.    These medicines have risks:    DO NOT drive when on new or higher doses of pain medicine. These medicines can affect your alertness and reaction times, and you could be arrested for driving under the influence (DUI). If you need to use opioids long-term, talk to your care team about driving.    DO NOT operate heavy machinery    DO NOT do any other dangerous activities while taking these medicines.    DO NOT drink any alcohol while taking these medicines.     If the opioid prescribed includes acetaminophen, DO NOT take with any other medicines that contain acetaminophen. Read all labels carefully. Look for the word  acetaminophen  or  Tylenol.  Ask your pharmacist if you have questions or are unsure.    You can get addicted to pain medicines, especially if you have a history of addiction (chemical, alcohol or substance dependence). Talk to your care team about ways to reduce this risk.    All opioids tend to cause constipation. Drink plenty of water and eat foods that have a lot of fiber, such as fruits, vegetables,  prune juice, apple juice and high-fiber cereal. Take a laxative (Miralax, milk of magnesia, Colace, Senna) if you don t move your bowels at least every other day. Other side effects include upset stomach, sleepiness, dizziness, throwing up, tolerance (needing more of the medicine to have the same effect), physical dependence and slowed breathing.    Store your pills in a secure place, locked if possible. We will not replace any lost or stolen medicine. If you don t finish your medicine, please throw away (dispose) as directed by your pharmacist. The [place] Pollution Control Agency has more information about safe disposal: https://www.pca.[facility].mn.us/living-green/managing-unwanted-medications         Primary Care Provider Office Phone # Fax #    Morena Mcintosh [phone] [address] [facility] [address]        Equal Access to Services     [facility] Center[facility] : Hadii ivy dong hadasho Soanjali, waaxda luqadaha, qaybta kaalmada adeegyada, lisa rush . So [facility] [phone].    ATENCIÓN: Si habla español, tiene a allan disposición servicios gratuitos de asistencia lingüística. Priscilla al [phone].    We comply with applicable federal civil rights laws and [place] laws. We do not discriminate on the basis of race, color, national origin, age, disability, sex, sexual orientation, or gender identity.            Thank you!     Thank you for choosing [facility]  for your care. Our goal is always to provide you with excellent care. Hearing back from our patients is one way we can continue to improve our services. Please take a few minutes to complete the written survey that you may receive in the mail after your visit with us. Thank you!             Your Updated Medication List - Protect others around you: Learn how to safely use, store and throw away your medicines at www.disposemymeds.org.          This list is accurate as of 11/16/18  3:42 PM.   Always use your most recent med list.                   Brand Name Dispense Instructions for use Diagnosis    amLODIPine 5 MG tablet    NORVASC    90 tablet    TAKE 1 TABLET(5 MG) BY MOUTH DAILY    Essential hypertension with goal blood pressure less than 140/90       aspirin 81 MG tablet      1 TABLET DAILY        blood glucose lancets standard    no brand specified    1 box    use 1 daily    Type II or unspecified type diabetes mellitus without mention of complication, uncontrolled       blood glucose monitoring meter device kit    no brand specified    1 kit    Use to test blood sugar 1 time daily or as directed. One Touch Ultra Mini Glucometer.    Type 2 diabetes mellitus with stage 3 chronic kidney disease (H)       blood glucose monitoring test strip    ONETOUCH ULTRA    100 each    Use to test blood sugar 1 times daily or as directed.    Type 2 diabetes mellitus with stage 3 chronic kidney disease, without long-term current use of insulin (H)       Blood Pressure Kit     1 kit    use as directed    Unspecified essential hypertension       CENTRUM SILVER PO           fluticasone 50 MCG/ACT spray    FLONASE    1 Bottle    Spray 1-2 sprays into both nostrils daily    Dysfunction of left eustachian tube       furosemide 40 MG tablet    LASIX    180 tablet    TAKE 2 TABLETS(80 MG) BY MOUTH DAILY    Bilateral edema of lower extremity       gabapentin 300 MG capsule    NEURONTIN    90 capsule    Take 1 tablet (300 mg) every night for 3 days, then 1 tablet twice daily for 3 days, then 1 tablet three times daily    Spinal stenosis of lumbar region at multiple levels, DDD (degenerative disc disease), lumbar       lisinopril 40 MG tablet    PRINIVIL/ZESTRIL    90 tablet    TAKE 1 TABLET(40 MG) BY MOUTH DAILY    Essential hypertension with goal blood pressure less than 140/90       metFORMIN 500 MG tablet    GLUCOPHAGE    180 tablet    TAKE 1 TABLET(500 MG) BY MOUTH TWICE DAILY WITH MEALS    Type 2 diabetes mellitus with  stage 3 chronic kidney disease, without long-term current use of insulin (H)       metoprolol tartrate 50 MG tablet    LOPRESSOR    180 tablet    Take 1 tablet (50 mg) by mouth 2 times daily    Essential hypertension with goal blood pressure less than 140/90       ONETOUCH DELICA LANCETS 33G Misc     100 each    1 lancet daily Use to test blood sugars 1 times daily or as directed.    Type 2 diabetes, HbA1c goal < 7% (H)       order for DME     1 each    Glucometer, brand as covered by insurance.    Type 2 diabetes mellitus with stage 3 chronic kidney disease, without long-term current use of insulin (H)       simvastatin 20 MG tablet    ZOCOR    90 tablet    Take 1 tablet (20 mg) by mouth At Bedtime    Hyperlipidemia LDL goal <100       traMADol 50 MG tablet    ULTRAM    60 tablet    Take 1 tablet (50 mg) by mouth every 12 hours as needed for severe pain    Closed compression fracture of first lumbar vertebra, sequela, DDD (degenerative disc disease), lumbar

## 2018-11-19 ENCOUNTER — HOSPITAL ENCOUNTER (OUTPATIENT)
Dept: PHYSICAL THERAPY | Facility: CLINIC | Age: 78
Setting detail: THERAPIES SERIES
End: 2018-11-19
Attending: FAMILY MEDICINE
Payer: MEDICARE

## 2018-11-19 PROCEDURE — G8978 MOBILITY CURRENT STATUS: HCPCS | Mod: GP,CK | Performed by: PHYSICAL THERAPIST

## 2018-11-19 PROCEDURE — G8979 MOBILITY GOAL STATUS: HCPCS | Mod: GP,CI | Performed by: PHYSICAL THERAPIST

## 2018-11-19 PROCEDURE — 97110 THERAPEUTIC EXERCISES: CPT | Mod: GP | Performed by: PHYSICAL THERAPIST

## 2018-11-19 PROCEDURE — 97140 MANUAL THERAPY 1/> REGIONS: CPT | Mod: GP | Performed by: PHYSICAL THERAPIST

## 2018-11-19 PROCEDURE — 40000718 ZZHC STATISTIC PT DEPARTMENT ORTHO VISIT: Performed by: PHYSICAL THERAPIST

## 2018-11-19 NOTE — PROGRESS NOTES
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 / Closed compression fracture of 1st lumbar vertebrae / Degenerative disc disease lumbar Physical Therapy Progress Note  11/19/18 1400   Signing Clinician's Name / Credentials   Signing clinician's name / credentials Kareem Ho, PT, DPT   Session Number   Session Number 4 (Start of Care Date - 10/23/2018)   Progress Note/Recertification   Progress Note Due Date 11/23/18   Progress Note Completed Date 11/19/18   Recertification Due Date 12/18/18   PT Medicare Only G-code   G-code Mobility: Walking & Moving Around   Mobility: Walking & Moving Around   Mobility Current Status,  (eval/re-eval & every progress note) CK: 40-59% impairment   Current Mobility Modifier Rationale Based on ability to ambulate and stand, RIK score, Strength, and clinical judgment   Mobility Goal,  (eval/re-eval, every progress note, & discharge) CI: 1-19% impairment   Adult Goals   PT Ortho Eval Goals 1;2;4;3   Ortho Goal 1   Goal Identifier HEP   Goal Description Pt will be independent in HEP in order to achieve and maintain long term treatment goals.   Target Date 11/23/18   Date Met 11/19/18   Ortho Goal 2   Goal Identifier Ambulation   Goal Description Pt will be able to ambulate community distances without the use of an assistive device safely.  (Not met)   Target Date 12/18/18   Ortho Goal 3   Goal Identifier Stand   Goal Description Pt will be able to stand and make a meal without having to sit due to back or LE pain.  (Not met)   Target Date 12/18/18   Subjective Report   Subjective Report Still having numbness and pain into right leg. Had discomfort after last session so did not do new exercises at home.    Objective Measures   Objective Measures Objective Measure 1;Objective Measure 2   Objective Measure 1   Objective Measure Observation   Details Hypomobility of L1-L5   Objective Measure 2   Objective Measure Transfers    Details Independent in transfer from sidelying to sitting   Treatment Interventions   Interventions Therapeutic Procedure/Exercise;Manual Therapy   Therapeutic Procedure/exercise   Minutes 12   Skilled Intervention Stretching and strengthening exercise instruction   Patient Response No increase in discomfort noted   Treatment Detail Supine marching x10 B with 5 second holds / Standing hip abduction x15 B / Reverse seated crunches x15 /     Manual Therapy   Minutes 14   Skilled Intervention STM / Joint mobilizations   Patient Response No increase in discomfort noted   Treatment Detail STM to lumbar paraspinals to reduce pain and improve motion / PA lumbar mobilizations grades 1-3 to improve motion and reduce pain   Education   Learner Patient   Readiness Acceptance   Method Booklet/handout;Explanation;Demonstration   Response Verbalizes Understanding;Demonstrates Understanding   Education Comments VHI handout   Plan   Home program Reverse seated crunches / Supine knee push / Lumbar rotations x5B with 10 second holds / Piriformis stretch x30 seconds B / HS stretch x30 seconds B / Pelvic tilts x10 with 5 second holds   Plan for next session STM / MET lumbar / Progress core strengthening   Comments   Comments Pt states that things are getting better overall but she had some discomfort following last session. Bridges were removed from HEP which should make a difference in pain level. Pt has currently met 1 of 3 goals and would benefit from continued skilled physical therapy in order to build on strenght gains and meet remaining goals.   Total Session Time   Timed Code Treatment Minutes 26   Total Treatment Time (sum of timed and untimed services) 26     Referring Physician - Paul Gautam

## 2018-11-26 ENCOUNTER — TELEPHONE (OUTPATIENT)
Dept: FAMILY MEDICINE | Facility: CLINIC | Age: 78
End: 2018-11-26

## 2018-11-26 DIAGNOSIS — B02.9 HERPES ZOSTER WITHOUT COMPLICATION: Primary | ICD-10-CM

## 2018-11-26 RX ORDER — VALACYCLOVIR HYDROCHLORIDE 1 G/1
1000 TABLET, FILM COATED ORAL 2 TIMES DAILY
Qty: 14 TABLET | Refills: 0 | Status: SHIPPED | OUTPATIENT
Start: 2018-11-26 | End: 2018-12-17

## 2018-11-26 NOTE — TELEPHONE ENCOUNTER
Reason for call:  Patient reporting a symptom    Symptom or request: Pt states that she broke out in shingles yesterday and she wants to know if she should continue taking the Gabapentin for her back pain, while she has the shingles?  Please call patient and advise.      Duration (how long have symptoms been present): today    Have you been treated for this before? No    Additional comments:     Phone Number patient can be reached at:  Home number on file [phone] (home)    Best Time:  any    Can we leave a detailed message on this number:  YES    Call taken on 11/26/2018 at 10:29 AM by Tawanna Astudillo

## 2018-11-26 NOTE — TELEPHONE ENCOUNTER
No pharmacy noted.     Valacyclovir is pended.  Can send it when we have a pharmacy.    Yes, can continue the gabapentin which should help with the pain.    Morena Mcintosh M.D.

## 2018-11-26 NOTE — TELEPHONE ENCOUNTER
Pt notified, [facility] preferred pharmacy.  Dose should be TWICE DAILY, verbal confirmation with Dr. Mcintosh.    GFR Estimate   Date Value Ref Range Status   11/16/2018 44 (L) >60 mL/min/1.7m2 Final     Comment:     Non  GFR Calc     GFR Estimate If Black   Date Value Ref Range Status   11/16/2018 54 (L) >60 mL/min/1.7m2 Final     Comment:      GFR Calc     Valerie LUIS RN

## 2018-11-28 ENCOUNTER — TELEPHONE (OUTPATIENT)
Dept: FAMILY MEDICINE | Facility: CLINIC | Age: 78
End: 2018-11-28

## 2018-11-28 NOTE — TELEPHONE ENCOUNTER
Patient notified and will follow instructions and monitor, verbalizes understanding that needs to be seen if not improving or worsening.    SUSHILA Gomez

## 2018-11-28 NOTE — TELEPHONE ENCOUNTER
Symptoms may be related to the gabapentin.  Try cutting back to just taking it at bedtime for now and monitor symptoms for a few days.  Do not take the daytime doses at this timer.     If symptoms are not improving or are worsening, should be seen.    Morena Mcintosh M.D.

## 2018-11-28 NOTE — TELEPHONE ENCOUNTER
Dr. Mcintosh,  Patient is reporting that since she started taking the 3 tablets of Gabapentin (says started on 11-23), patient has been having trouble with reading, says letters are blurry, legs are twitching, blood sugar is 201 and usually is 170 in the morning, says she has blisters that look like shingles on the left side of belly button that are painful. Patient is wondering if this medication caused these concerns. Last took Gabapentin at 11 pm last night. Patient says she has trouble walking but is not new, is not having headache, no chest pain or shortness of breath, no fever, but is reporting that she has urinated less over the last 2 days but denies having odor or blood inurine or flank pain. Patient says she usually urinates during the night but did not last night and reports only went 3 times per day yesterday. Patient says she is drinking water and is on Lasix. Patient told needs to be seen in urgent care, patient declines this advise and wants to know if PCP thinks this is related to the Gabapentin and if she should stop taking the medication or what she should do. Please advise.    SUSHILA Gomez

## 2018-11-28 NOTE — TELEPHONE ENCOUNTER
Reason for call:  Patient reporting a symptom    Symptom or request: Pt has been tapering up on Gabapentin dose since 11/17 and she has several concerns:  1.  Her legs are twitching  2.  Blood Sugar was 201 this am   3.  Double vision  4.  Could this have caused shingles flare-up?     Transferred call to Clinic RN as high priority    Duration (how long have symptoms been present): ongoing    Have you been treated for this before? Yes    Additional comments:     Phone Number patient can be reached at:  Home number on file [phone] (home)    Best Time:  any    Can we leave a detailed message on this number:  YES    Call taken on 11/28/2018 at 11:00 AM by Tawanna Astudillo

## 2018-11-30 ENCOUNTER — TELEPHONE (OUTPATIENT)
Dept: FAMILY MEDICINE | Facility: CLINIC | Age: 78
End: 2018-11-30

## 2018-11-30 NOTE — TELEPHONE ENCOUNTER
Reason for call:  Patient reporting a symptom    Symptom or request: Pt reporting bloating after starting Valacyclovir for shingles, she drank two pints of water yesterday and still was not urinating often, wondering if she can decrease dose to one tablet per day, please advise.    Phone Number patient can be reached at:  Home number on file [phone] (home)    Best Time:  any    Can we leave a detailed message on this number:  YES    Call taken on 11/30/2018 at 10:37 AM by Nancy Mcarthur

## 2018-12-11 ENCOUNTER — HOSPITAL ENCOUNTER (OUTPATIENT)
Dept: PHYSICAL THERAPY | Facility: CLINIC | Age: 78
Setting detail: THERAPIES SERIES
End: 2018-12-11
Attending: FAMILY MEDICINE
Payer: MEDICARE

## 2018-12-11 PROCEDURE — 97110 THERAPEUTIC EXERCISES: CPT | Mod: GP | Performed by: PHYSICAL THERAPIST

## 2018-12-17 ENCOUNTER — OFFICE VISIT (OUTPATIENT)
Dept: FAMILY MEDICINE | Facility: CLINIC | Age: 78
End: 2018-12-17
Payer: COMMERCIAL

## 2018-12-17 VITALS
SYSTOLIC BLOOD PRESSURE: 108 MMHG | WEIGHT: 193 LBS | TEMPERATURE: 97.8 F | RESPIRATION RATE: 18 BRPM | OXYGEN SATURATION: 95 % | HEART RATE: 64 BPM | HEIGHT: 63 IN | BODY MASS INDEX: 34.2 KG/M2 | DIASTOLIC BLOOD PRESSURE: 50 MMHG

## 2018-12-17 DIAGNOSIS — M51.369 DDD (DEGENERATIVE DISC DISEASE), LUMBAR: ICD-10-CM

## 2018-12-17 DIAGNOSIS — M48.061 SPINAL STENOSIS OF LUMBAR REGION AT MULTIPLE LEVELS: ICD-10-CM

## 2018-12-17 PROCEDURE — 99213 OFFICE O/P EST LOW 20 MIN: CPT | Performed by: FAMILY MEDICINE

## 2018-12-17 RX ORDER — GABAPENTIN 300 MG/1
300 CAPSULE ORAL AT BEDTIME
Qty: 90 CAPSULE | Refills: 3 | Status: SHIPPED | OUTPATIENT
Start: 2018-12-17 | End: 2019-06-17 | Stop reason: SINTOL

## 2018-12-17 ASSESSMENT — MIFFLIN-ST. JEOR: SCORE: 1321.63

## 2018-12-17 ASSESSMENT — PAIN SCALES - GENERAL: PAINLEVEL: MODERATE PAIN (5)

## 2018-12-17 NOTE — PATIENT INSTRUCTIONS
The new Shingrix is supposed to be more effective at preventing shingles.  Even if you had Zostavax, this is recommended. I encourage people to check with their pharmacy (or ours) and have them run it through to check the cost.  It's often better covered through your pharmacy benefit.  It is a two part vaccine series - one now and one in 2 months.        Thank you for choosing [facility].  You may be receiving a survey in the mail from [facility] regarding your visit today.  Please take a few minutes to complete and return the survey to let us know how we are doing.      Our Clinic hours are:  Mondays    7:20 am - 7 pm  Tues -  Fri  7:20 am - 5 pm    Clinic Phone: [phone]    The clinic lab opens at 7:30 am Mon - Fri and appointments are required.    [facility]. [phone]  Monday  8 am - 7pm  Tues - Fri 8 am - 5:30 pm

## 2018-12-17 NOTE — PROGRESS NOTES
"  SUBJECTIVE:   Milagro Wallace is a 78 year old female who presents to clinic today for the following health issues:      Chief Complaint   Patient presents with     Recheck Medication     Patient is here to follow-up on gabapentin. Patient tried increasing to 3 tablet and she didn't like the side effects and now is only taking 1 tablet at night. Patient rates pain at a 10/10 in the morning and throughout the day 5/10. Patient hasn't seen Dr. Wilson yet due to her having shingles.      Taking the increased dose of gabapentin seemed to cause jerking/shaking.    Hasn't made the apt to see the spine specialist yet.  Is sleeping better, taking just 300 mg at bedtime.     Has had shingles 4 times in 25 years.  This time with the valacylovir it seemed to be much more mild, she's thankful for that.         /50   Pulse 64   Temp 97.8  F (36.6  C) (Tympanic)   Resp 18   Ht 1.588 m (5' 2.5\")   Wt 87.5 kg (193 lb)   LMP 09/25/1979 (Approximate)   SpO2 95%   BMI 34.74 kg/m    EXAM: GENERAL APPEARANCE ADULT: Alert, no acute distress  CV: normal rate, regular rhythm, no murmur or gallop  PSYCH: mentation appears normal., affect and mood normal    ASSESSMENT/PLAN:      ICD-10-CM    1. Spinal stenosis of lumbar region at multiple levels M48.061 gabapentin (NEURONTIN) 300 MG capsule   2. DDD (degenerative disc disease), lumbar M51.36 gabapentin (NEURONTIN) 300 MG capsule     Continue working on PT  Continue gabapentin 300 mg at hs  See Dr. Steve Mcintosh M.D.      Patient Instructions   The new Shingrix is supposed to be more effective at preventing shingles.  Even if you had Zostavax, this is recommended. I encourage people to check with their pharmacy (or ours) and have them run it through to check the cost.  It's often better covered through your pharmacy benefit.  It is a two part vaccine series - one now and one in 2 months.        Thank you for choosing [facility].  You may be receiving a " survey in the mail from [employer] regarding your visit today.  Please take a few minutes to complete and return the survey to let us know how we are doing.      Our Clinic hours are:  Mondays    7:20 am - 7 pm  Tues -  Fri  7:20 am - 5 pm    Clinic Phone: [phone]    The clinic lab opens at 7:30 am Mon - Fri and appointments are required.    [facility]. [phone]  Monday  8 am - 7pm  Tues - Fri 8 am - 5:30 pm

## 2018-12-18 ENCOUNTER — HOSPITAL ENCOUNTER (OUTPATIENT)
Dept: PHYSICAL THERAPY | Facility: CLINIC | Age: 78
Setting detail: THERAPIES SERIES
End: 2018-12-18
Attending: FAMILY MEDICINE
Payer: MEDICARE

## 2018-12-18 PROCEDURE — G8979 MOBILITY GOAL STATUS: HCPCS | Mod: GP,CI | Performed by: PHYSICAL THERAPIST

## 2018-12-18 PROCEDURE — G8978 MOBILITY CURRENT STATUS: HCPCS | Mod: GP,CJ | Performed by: PHYSICAL THERAPIST

## 2018-12-18 PROCEDURE — 97140 MANUAL THERAPY 1/> REGIONS: CPT | Mod: GP | Performed by: PHYSICAL THERAPIST

## 2018-12-18 NOTE — PROGRESS NOTES
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 / Closed compression fracture of 1st lumbar vertebrae / DDD (degenerative disc disease) lumbar Physical Therapy Progress Note  12/18/18 1000   Signing Clinician's Name / Credentials   Signing clinician's name / credentials Kareem Ho, PT, DPT   Session Number   Session Number 5 (Start of Care Date - 10/23/2018)   Progress Note/Recertification   Progress Note Due Date 12/18/18   Progress Note Completed Date 12/18/18   Recertification Due Date 12/18/18   PT G-Codes   G-code Mobility: Walking and moving around   Mobility: Walking and Moving Around Current Status () CJ   Current Mobility Modifier Rationale Based on a ability to transfer, ambulate, pain level, RIK score, clinical judgement   Mobility: Walking and Moving Around Goal Status () CI   Ortho Goal 1   Goal Description Pt will be independent in HEP in order to achieve and maintain long term treatment goals.   Target Date 11/23/18   Date Met 12/18/18   Goal Identifier HEP   Ortho Goal 2   Goal Description Pt will be able to ambulate community distances without the use of an assistive device safely.  (Not met. Uses cane.)   Target Date 12/18/18   Goal Identifier Ambulation   Ortho Goal 3   Goal Description Pt will be able to stand and make a meal without having to sit due to back or LE pain.  (Mostly met. Uses cane to stand and that feels fine.)   Target Date 12/18/18   Goal Identifier Stand   Subjective Report   Subjective Report Things are better now than they were an hour ago. Stiff first thing the morning. 50% improvement since beginning physical therapy.    Objective Measures   Objective Measures Objective Measure 1;Objective Measure 2;Objective Measure 3   Objective Measure 1   Objective Measure Strength   Details HIp flexion - 4/5 / Knee extension - 4/5 B / Ankle DF - 5/5 B / Great toe extension - 3+/5   Objective Measure 2   Objective Measure  Transfers   Details Independent with sidelying to sit transfer   Objective Measure 3   Objective Measure RIK   Details 26.67   Treatment Interventions   Interventions Therapeutic Procedure/Exercise;Manual Therapy   Manual Therapy   Skilled Intervention STM / Joint mobilizations / MET   Patient Response Improved ROM   Treatment Detail MET for ERSL at L4 / STM to lumbar paraspinals to reduce pain / PA lumbar mobilizations grades 1-3 to reduce tone and pain   Manual Therapy: Mobilization, MFR, MLD, friction massage minutes ([id number]) 15   Plan   Home program Reverse seated crunches / Supine knee push / Lumbar rotations x5B with 10 second holds / Piriformis stretch x30 seconds B / HS stretch x30 seconds B / Pelvic tilts x10 with 5 second holds   Updates to plan of care 1 time / week for 6 weeks   Plan for next session STM / MET lumbar / Progress core strengthening   Comments   Comments Pt is making gains and has met 1 of 3 goals currently. Pt's strength is improving now the she is no long er feeling ill and is able to do her exercises again. Pt states that she has seen a 50% improvement in regards to her back pain and her RIK score has improved from 44% on her initial visit to 26.67% at today's visit. Pt would benefit from additional physical therapy to build on these gains and meet remaining goals.    Total Session Time   Timed Code Treatment Minutes 15   Total Treatment Time (sum of timed and untimed services) 15     Referring Physician - Paul Gautam

## 2018-12-18 NOTE — PROGRESS NOTES
[facility]      OUTPATIENT PHYSICAL THERAPY  PLAN OF TREATMENT FOR OUTPATIENT REHABILITATION    Patient's Last Name, First Name, M.I.                YOB: 1940  Milagro Wallace                        Provider's Name  [facility] Medical Record No.  [id number]                               Onset Date: 09/29/18 (Date of Order)   Start of Care Date: 10/23/2018   Type:     _X_PT   ___OT   ___SLP Medical Diagnosis: Chronic left-sided low back pain with left-sided sciatica / Closed compression fracture of first lumbar vertebra / DDD (degenerative disc disease), lumbar                        PT Diagnosis: Low back pain with likely radicular symptoms    _________________________________________________________________________________  Plan of Treatment:    Frequency/Duration: 1 time / week for 6 weeks     Goals:  Goal Identifier HEP   Goal Description Pt will be independent in HEP in order to achieve and maintain long term treatment goals.   Target Date 11/23/18   Date Met  12/18/18   Progress:     Goal Identifier Ambulation   Goal Description Pt will be able to ambulate community distances without the use of an assistive device safely.(Not met. Uses cane.)   Target Date 12/18/18   Date Met      Progress:     Goal Identifier Stand   Goal Description Pt will be able to stand and make a meal without having to sit due to back or LE pain.(Mostly met. Uses cane to stand and that feels fine.)   Target Date 12/18/18   Date Met      Progress:           Certification date from 12/19/2018 to 1/29/2019    Ranjan Ho, PT          I CERTIFY THE NEED FOR THESE SERVICES FURNISHED UNDER        THIS PLAN OF TREATMENT AND WHILE UNDER MY CARE     (Physician co-signature of this document indicates review and certification of the therapy plan).                Referring Provider: Paul Gautam

## 2018-12-27 ENCOUNTER — HOSPITAL ENCOUNTER (OUTPATIENT)
Dept: PHYSICAL THERAPY | Facility: CLINIC | Age: 78
Setting detail: THERAPIES SERIES
End: 2018-12-27
Attending: FAMILY MEDICINE
Payer: MEDICARE

## 2018-12-27 PROCEDURE — 97140 MANUAL THERAPY 1/> REGIONS: CPT | Mod: GP | Performed by: PHYSICAL THERAPIST

## 2018-12-27 PROCEDURE — 97110 THERAPEUTIC EXERCISES: CPT | Mod: GP | Performed by: PHYSICAL THERAPIST

## 2018-12-31 ENCOUNTER — HOSPITAL ENCOUNTER (OUTPATIENT)
Dept: PHYSICAL THERAPY | Facility: CLINIC | Age: 78
Setting detail: THERAPIES SERIES
End: 2018-12-31
Attending: FAMILY MEDICINE
Payer: MEDICARE

## 2018-12-31 PROCEDURE — 97110 THERAPEUTIC EXERCISES: CPT | Mod: GP | Performed by: PHYSICAL THERAPIST

## 2018-12-31 PROCEDURE — 97140 MANUAL THERAPY 1/> REGIONS: CPT | Mod: GP | Performed by: PHYSICAL THERAPIST

## 2019-01-02 ENCOUNTER — TRANSFERRED RECORDS (OUTPATIENT)
Dept: HEALTH INFORMATION MANAGEMENT | Facility: CLINIC | Age: 79
End: 2019-01-02

## 2019-01-04 ENCOUNTER — TELEPHONE (OUTPATIENT)
Dept: PALLIATIVE MEDICINE | Facility: CLINIC | Age: 79
End: 2019-01-04

## 2019-01-04 NOTE — TELEPHONE ENCOUNTER
Faxed 1-2-2019 and 1-3-2019. Received 1-4-2019.    Incoming fax from Dr. Nabor Wilson with [facility] [place] for an L5-S1 interlaminar CHELSEA for lumbar radiculitis.  Updated P/F insurance through [employer] and [employer] queries. Plan changed 1-1-2019.    Routing to scheduling coordinators to schedule procedure at [facility].    Naomy Pengilly  Patient Representative  [facility]

## 2019-01-07 ENCOUNTER — HOSPITAL ENCOUNTER (OUTPATIENT)
Dept: PHYSICAL THERAPY | Facility: CLINIC | Age: 79
Setting detail: THERAPIES SERIES
End: 2019-01-07
Attending: FAMILY MEDICINE
Payer: COMMERCIAL

## 2019-01-07 PROCEDURE — 97140 MANUAL THERAPY 1/> REGIONS: CPT | Mod: GP | Performed by: PHYSICAL THERAPIST

## 2019-01-07 PROCEDURE — 97110 THERAPEUTIC EXERCISES: CPT | Mod: GP | Performed by: PHYSICAL THERAPIST

## 2019-01-07 NOTE — TELEPHONE ENCOUNTER
Pre-screening Questions for Radiology Injections:    Injection to be done at which interventional clinic site? [facility]    Instruct patient to arrive as directed prior to the scheduled appointment time:    [place] [facility] [place]: 30 minutes before      Procedure ordered by Steve    Procedure ordered? Lumbar Epidural Steroid Injection    What insurance would patient like us to bill for this procedure? BC      Worker's comp or MVA (motor vehicle accident) -Any injection DO NOT SCHEDULE and route to Lashay Banks.      [employer] - For SI joint injections, DO NOT SCHEDULE and route Lashay Banks. [employer] NO PA REQUIRED EFFECTIVE 11/1/2017      [employer]- MBB's must be scheduled at LEAST two weeks apart      [employer] - Any injection besides hip/shoulder/knee joint DO NOT SCHEDULE and route to Lashay Banks. She will obtain PA and call pt back to schedule procedure or notify pt of denial.        [employer]-Route to Lashay for review    Any chance of pregnancy? NO   If YES, do NOT schedule and route to RN pool    Is an  needed? No     Patient has a drive home? (mandatory) YES: ok    Is patient taking any blood thinners (plavix, coumadin, jantoven, warfarin, heparin, pradaxa or dabigatran )? No   If hold needed, do NOT schedule, route to RN pool     Is patient taking any aspirin products (includes Excedrin and Fiorinal)? Yes - Pt takes 81mg daily; instructed to hold 0 day(s) prior to procedure.      If more than 325mg/day do NOT schedule; route to RN pool     For CERVICAL procedures, hold all aspirin products for 6 days.     Tell pt that if aspirin product is not held for 6 days, the procedure WILL BE cancelled.      Does the patient have a bleeding or clotting disorder? No     If YES, okay to schedule AND route to RN nurse pool    For any patients with platelet count <100, must be forwarded to provider    Is patient diabetic?  Yes  If YES, have them bring their  glucometer.    Does patient have an active infection or treated for one within the past week? No     Is patient currently taking any antibiotics?  No     For patients on chronic, preventative, or prophylactic antibiotics, procedures may be scheduled.     For patients on antibiotics for active or recent infection:    Jessica Flores Burton, [place]-antibiotic course must have been completed for 4 days    Is patient currently taking any steroid medications? (i.e. Prednisone, Medrol)  No     For patients on steroid medications:    Jessica Flores Burton, [place]-steroid course must have been completed for 4 days    Reviewed with patient:  If you are started on any steroids or antibiotics between now and your appointment, you must contact us because the procedure may need to be cancelled.  Yes    Is patient actively being treated for cancer or immunocompromised? No  If YES, do NOT schedule and route to RN pool     Are you able to get on and off an exam table with minimal or no assistance? Yes  If NO, do NOT schedule and route to RN pool    Are you able to roll over and lay on your stomach with minimal or no assistance? Yes  If NO, do NOT schedule and route to RN pool     Any allergies to contrast dye, iodine, shellfish, or numbing and steroid medications? Yes - Shellfish  If YES, route to RN pool AND add allergy information to appointment notes    Allergies: Hctz and Shellfish allergy      Has the patient had a flu shot or any other vaccinations within 7 days before or after the procedure.  No     Does patient have an MRI/CT?  YES: mri  (SI joint, hip injections, lumbar sympathetic blocks, and stellate ganglion blocks do not require an MRI)    Was the MRI done w/in the last 3 years?  Yes    Was MRI done at [place]? Yes      If not, where was it done? N/A       If MRI was not done at [place], [facility] or [facility] do NOT schedule and route to nursing.  If pt has an imaging disc, the injection may  be scheduled but pt has to bring disc to appt. If they show up w/out disc the injection cannot be done    Reminders (please tell patient if applicable):       Instructed pt to arrive 30 minutes early for IV start if this is for a cervical procedure, ALL sympathetic (stellate ganglion, hypogastric, or lumbar sympathetic block) and all sedation procedures (RFA, spinal cord stimulation trials).  Not Applicable   -IVs are not routinely placed for Dr. Manzo cervical cases   -Dr. Padilla: IVs for cervical ESIs and cervical TBDs (not CMBBs/facet inj)      If NPO for sedation, informed patient that it is okay to take medications with sips of water (except if they are to hold blood thinners).  Not Applicable   *DO take blood pressure medication if it is prescribed*      If this is for a cervical CHELSEA, informed patient that aspirin needs to be held for 6 days.   Not Applicable      For all patients not having spinal cord stimulator (SCS) trials or radiofrequency ablations (RFAs), informed patient:    IV sedation is not provided for this procedure.  If you feel that an oral anti-anxiety medication is needed, you can discuss this further with your referring provider or primary care provider.  The Pain Clinic provider will discuss specifics of what the procedure includes at your appointment.  Most procedures last 10-20 minutes.  We use numbing medications to help with any discomfort during the procedure.  Not Applicable      Do not schedule procedures requiring IV placement in the first appointment of the day or first appointment after lunch. Do NOT schedule at 0745, 0815 or 1245.       For patients 85 or older we recommend having an adult stay w/ them for the remainder of the day.       Does the patient have any questions?  NO  Carol Meek  [facility]

## 2019-01-14 ENCOUNTER — OFFICE VISIT (OUTPATIENT)
Dept: FAMILY MEDICINE | Facility: CLINIC | Age: 79
End: 2019-01-14
Payer: COMMERCIAL

## 2019-01-14 ENCOUNTER — TELEPHONE (OUTPATIENT)
Dept: FAMILY MEDICINE | Facility: CLINIC | Age: 79
End: 2019-01-14

## 2019-01-14 ENCOUNTER — TELEPHONE (OUTPATIENT)
Dept: PALLIATIVE MEDICINE | Facility: CLINIC | Age: 79
End: 2019-01-14

## 2019-01-14 ENCOUNTER — HOSPITAL ENCOUNTER (OUTPATIENT)
Dept: PHYSICAL THERAPY | Facility: CLINIC | Age: 79
Setting detail: THERAPIES SERIES
End: 2019-01-14
Attending: FAMILY MEDICINE
Payer: COMMERCIAL

## 2019-01-14 VITALS
WEIGHT: 190 LBS | TEMPERATURE: 97 F | HEIGHT: 63 IN | SYSTOLIC BLOOD PRESSURE: 112 MMHG | BODY MASS INDEX: 33.66 KG/M2 | DIASTOLIC BLOOD PRESSURE: 52 MMHG | OXYGEN SATURATION: 95 % | RESPIRATION RATE: 18 BRPM | HEART RATE: 60 BPM

## 2019-01-14 DIAGNOSIS — R68.2 DRY MOUTH: Primary | ICD-10-CM

## 2019-01-14 DIAGNOSIS — T88.7XXA MEDICATION SIDE EFFECTS: ICD-10-CM

## 2019-01-14 PROCEDURE — 97110 THERAPEUTIC EXERCISES: CPT | Mod: GP | Performed by: PHYSICAL THERAPIST

## 2019-01-14 PROCEDURE — 97140 MANUAL THERAPY 1/> REGIONS: CPT | Mod: GP | Performed by: PHYSICAL THERAPIST

## 2019-01-14 PROCEDURE — 99213 OFFICE O/P EST LOW 20 MIN: CPT | Performed by: FAMILY MEDICINE

## 2019-01-14 ASSESSMENT — MIFFLIN-ST. JEOR: SCORE: 1308.02

## 2019-01-14 ASSESSMENT — PAIN SCALES - GENERAL: PAINLEVEL: NO PAIN (0)

## 2019-01-14 NOTE — PATIENT INSTRUCTIONS
Thank you for choosing [facility].  You may be receiving a survey in the mail from [facility] regarding your visit today.  Please take a few minutes to complete and return the survey to let us know how we are doing.      Our Clinic hours are:  Mondays    7:20 am - 7 pm  Tues - Fri  7:20 am - 5 pm    Clinic Phone: [phone]    The clinic lab opens at 7:30 am Mon - Fri and appointments are required.    [facility]. [phone]  Monday  8 am - 7pm  Tues - Fri 8 am - 5:30 pm

## 2019-01-14 NOTE — TELEPHONE ENCOUNTER
Reason for call:  Patient reporting a symptom    Symptom or request: pt says her mouth is peeling inside and she looked this up online and it says thrush. Pt also states she has had this before. She was told she shouldn't have the procedure tomorrow, lumbar epidural, because of this.    Duration (how long have symptoms been present): a week and half    Have you been treated for this before? No    Additional comments: pt claims she has had this many times before but it goes away on its own    Phone Number patient can be reached at:  Home number on file [phone] (home)    Best Time:  any    Can we leave a detailed message on this number:  YES    Call taken on 1/14/2019 at 11:00 AM by Mirtha Felder

## 2019-01-14 NOTE — TELEPHONE ENCOUNTER
Noted.  Pt is still scheduled.    Rosana Sharpe, RN-BSN  [facility]

## 2019-01-14 NOTE — TELEPHONE ENCOUNTER
Pt called to inform nursing that she went to the Dr's and she does not have thrush she has dry mouth. She is still coming to her appt tomorrow.      Tim KAUFMAN    [facility]

## 2019-01-14 NOTE — PROGRESS NOTES
"  SUBJECTIVE:   Milagro Wallace is a 78 year old female who presents to clinic today for the following health issues:      Chief Complaint   Patient presents with     Mouth Problem     Patient has some white film in mouth that will come in morning and night. Patient has had this before and it will go away by it's self. Patient is scheduled for steroid injection tomorrow and will need to be cleared.      Medication Problem     Gabapentin causing diarrhea      mouth is feeling like there is \"white stringy material\" in the morning when she first gets up.     Only thing new recently is gabapentin which may be causing mouth breathing or dry mouth.  She also feels it's causing diarrhea in the morning and not sure that it's helping her back pain any.    She is to have a CHELSEA tomorrow and she told them about the white film - she googled this and thinks that she has thrush.    /52   Pulse 60   Temp 97  F (36.1  C) (Tympanic)   Resp 18   Ht 1.588 m (5' 2.5\")   Wt 86.2 kg (190 lb)   LMP 09/25/1979 (Approximate)   SpO2 95%   BMI 34.20 kg/m    EXAM: GENERAL APPEARANCE ADULT: Alert, no acute distress  HENT: oral mucous membranes moist, oropharynx clear, no thrush      ASSESSMENT/PLAN:      ICD-10-CM    1. Dry mouth R68.2    2. Medication side effects T88.7XXA      Suspect that this is due to dry mouth, mouth breathing overnight, medication side effects.   Try Biotene mouthwash at bedtime.  She wants to stop the gabapentin for a while and see if it improves.  She's on a relatively low dose and hasn't been on it long, I think she can just stop it and see.    Okay to have CHELSEA tomorrow as there is no thrush.     Morena Mcintosh M.D.      Patient Instructions         Thank you for choosing [facility].  You may be receiving a survey in the mail from [employer] regarding your visit today.  Please take a few minutes to complete and return the survey to let us know how we are doing.      Our Clinic hours " are:  Mondays    7:20 am - 7 pm  Tues -  Fri  7:20 am - 5 pm    Clinic Phone: [phone]    The clinic lab opens at 7:30 am Mon - Fri and appointments are required.    [facility]. [phone]  Monday  8 am - 7pm  Tues - Fri 8 am - 5:30 pm

## 2019-01-14 NOTE — TELEPHONE ENCOUNTER
"S; see note below.   B: called her,   No swelling in throat or tongue.   No jaw stiffness  Able to swallow ok.   Eating and drinking as normal.     No bleeding.   No pain  No recent fevers  She hasn't looked in her mouth. \"I just have skin that is sloughing off. \"  \"I have had this before and it always goes away on it's own. \"    No pain with biting chewing or opening her mouth.   Feeling well. \"I have sciatica, and supposed to have the steroid injection tomorrow. \"  \"the medical assistant called this morning about what to bring tomorrow and I mentioned I have thrush in my mouth and they said they probably can't do it tomorrow and told me to call my doctor. \"    A; mouth \"skin peeling\" per her report and concern about whether she should have epidural tomorrow or not per Dr Sue Shell staff that called her today.       R: She has a PT appt today here , and I put her in Dr Mcintosh's hold spot to be checked .     CRISTOPHER Mcginnis, she needs to be seen for what she thinks is thrush, and determine if ok to have epidural tomorrow .     Eneida Dunlap RNC    "

## 2019-01-15 ENCOUNTER — RADIOLOGY INJECTION OFFICE VISIT (OUTPATIENT)
Dept: PALLIATIVE MEDICINE | Facility: CLINIC | Age: 79
End: 2019-01-15
Payer: COMMERCIAL

## 2019-01-15 ENCOUNTER — HOSPITAL ENCOUNTER (OUTPATIENT)
Dept: RADIOLOGY | Facility: CLINIC | Age: 79
Discharge: HOME | End: 2019-01-15
Attending: FAMILY MEDICINE | Admitting: FAMILY MEDICINE
Payer: COMMERCIAL

## 2019-01-15 VITALS — HEART RATE: 60 BPM | DIASTOLIC BLOOD PRESSURE: 51 MMHG | SYSTOLIC BLOOD PRESSURE: 130 MMHG | RESPIRATION RATE: 16 BRPM

## 2019-01-15 DIAGNOSIS — M99.43 CONNECTIVE TISSUE STENOSIS OF NEURAL CANAL OF LUMBAR REGION: Primary | ICD-10-CM

## 2019-01-15 DIAGNOSIS — M54.16 LUMBAR RADICULOPATHY: ICD-10-CM

## 2019-01-15 DIAGNOSIS — M51.369 DDD (DEGENERATIVE DISC DISEASE), LUMBAR: ICD-10-CM

## 2019-01-15 PROCEDURE — 25000128 H RX IP 250 OP 636: Performed by: ANESTHESIOLOGY

## 2019-01-15 PROCEDURE — 27210202 XR LUMBAR EPIDURAL INJECTION INCL IMAGING: Mod: TC

## 2019-01-15 PROCEDURE — 62323 NJX INTERLAMINAR LMBR/SAC: CPT | Performed by: ANESTHESIOLOGY

## 2019-01-15 PROCEDURE — 25000125 ZZHC RX 250: Performed by: ANESTHESIOLOGY

## 2019-01-15 RX ORDER — IOPAMIDOL 612 MG/ML
15 INJECTION, SOLUTION INTRATHECAL ONCE
Status: COMPLETED | OUTPATIENT
Start: 2019-01-15 | End: 2019-01-15

## 2019-01-15 RX ORDER — METHYLPREDNISOLONE ACETATE 40 MG/ML
40 INJECTION, SUSPENSION INTRA-ARTICULAR; INTRALESIONAL; INTRAMUSCULAR; SOFT TISSUE ONCE
Status: COMPLETED | OUTPATIENT
Start: 2019-01-15 | End: 2019-01-15

## 2019-01-15 RX ORDER — LIDOCAINE HYDROCHLORIDE AND EPINEPHRINE 10; 10 MG/ML; UG/ML
1 INJECTION, SOLUTION INFILTRATION; PERINEURAL ONCE
Status: COMPLETED | OUTPATIENT
Start: 2019-01-15 | End: 2019-01-15

## 2019-01-15 RX ORDER — BUPIVACAINE HYDROCHLORIDE 5 MG/ML
10 INJECTION, SOLUTION PERINEURAL ONCE
Status: COMPLETED | OUTPATIENT
Start: 2019-01-15 | End: 2019-01-15

## 2019-01-15 RX ORDER — DEXAMETHASONE SODIUM PHOSPHATE 10 MG/ML
10 INJECTION, SOLUTION INTRAMUSCULAR; INTRAVENOUS ONCE
Status: COMPLETED | OUTPATIENT
Start: 2019-01-15 | End: 2019-01-15

## 2019-01-15 RX ADMIN — IOPAMIDOL 1 ML: 612 INJECTION, SOLUTION INTRATHECAL at 11:16

## 2019-01-15 RX ADMIN — DEXAMETHASONE SODIUM PHOSPHATE 5 MG: 10 INJECTION, SOLUTION INTRAMUSCULAR; INTRAVENOUS at 11:16

## 2019-01-15 RX ADMIN — METHYLPREDNISOLONE ACETATE 40 MG: 40 INJECTION, SUSPENSION INTRA-ARTICULAR; INTRALESIONAL; INTRAMUSCULAR; SOFT TISSUE at 11:16

## 2019-01-15 RX ADMIN — LIDOCAINE HYDROCHLORIDE,EPINEPHRINE BITARTRATE 1 ML: 10; .01 INJECTION, SOLUTION INFILTRATION; PERINEURAL at 11:16

## 2019-01-15 RX ADMIN — BUPIVACAINE HYDROCHLORIDE 1 ML: 5 INJECTION, SOLUTION EPIDURAL; INTRACAUDAL at 11:16

## 2019-01-15 ASSESSMENT — PAIN SCALES - GENERAL
PAINLEVEL: NO PAIN (0)
PAINLEVEL: SEVERE PAIN (7)

## 2019-01-15 NOTE — IP AVS SNAPSHOT
[facility]  [address]  [facility] [address]  Phone:  [phone]  Fax:  [phone]                                    After Visit Summary   1/15/2019    Milagro Wallace    MRN: [id number]           After Visit Summary Signature Page    I have received my discharge instructions, and my questions have been answered. I have discussed any challenges I see with this plan with the nurse or doctor.    ..........................................................................................................................................  Patient/Patient Representative Signature      ..........................................................................................................................................  Patient Representative Print Name and Relationship to Patient    ..................................................               ................................................  Date                                   Time    ..........................................................................................................................................  Reviewed by Signature/Title    ...................................................              ..............................................  Date                                               Time          22EPIC Rev 08/18

## 2019-01-15 NOTE — DISCHARGE INSTRUCTIONS
[facility]   Procedure Discharge Instructions    Today you saw:    Dr. Favian Shell      You had a:  Lumbar epidural steroid injection, L5-S1    Medications used:  Lidocaine   Bupivacaine   Dexamethasone Depo-medrol   IsoVue            Be cautious when walking. Numbness and/or weakness in the lower extremities may occur for up to 6-8 hours after the procedure due to effect of the local anesthetic    Do not drive for 6 hours. The effect of the local anesthetic could slow your reflexes.     You may resume your regular activities after 24 hours    Avoid strenuous activity for the first 24 hours    You may shower, however avoid swimming, tub baths or hot tubs for 24 hours following your procedure    You may have a mild to moderate increase in pain for several days following the injection.    It may take up to 14 days for the steroid medication to start working although you may feel the effect as early as a few days after the procedure.       You may use ice packs for 10-15 minutes, 3 to 4 times a day at the injection site for comfort    Do not use heat to painful areas for 6 to 8 hours. This will give the local anesthetic time to wear off and prevent you from accidentally burning your skin.     You may use anti-inflammatory medications (such as Ibuprofen or Aleve or Advil) or Tylenol for pain control if necessary    If you were fasting, you may resume your normal diet and medications after the procedure    If you have diabetes, check your blood sugar more frequently than usual as your blood sugar may be higher than normal for 10-14 days following a steroid injection. Contact your doctor who manages your diabetes if your blood sugar is higher than usual    If you experience any of the following, call the Pain Clinic during work hours at [phone] or the Provider Line after hours at [phone]:  -Fever over 100 degree F  -Swelling, bleeding, redness, drainage, warmth at the injection  site  -Progressive weakness or numbness in your legs or arms  -Loss of bowel or bladder function  -Unusual headache that is not relieved by Tylenol or other pain reliever  -Unusual new onset of pain that is not improving

## 2019-01-15 NOTE — PROGRESS NOTES
Pre procedure Diagnosis: lumbar radiculopathy, lumbar degenerative disc disease, lumbar stenosis   Post procedure Diagnosis: Same  Procedure performed: lumbar interlaminar epidural steroid injection at L5-S1, fluoroscopically guided, contrast controlled  Anesthesia: local  Complications: none  Operators: Favian Shell MD     Indications:   Milagro Wallace is a 78 year old female was sent by Dr. Nabor Wilson for lumbar epidural steroid injection.  The patient has a history of chronic lower back pain with pain radiating down the back of the thighs to the knees bilaterally.  Examination shows antalgic gait, lumbar tenderness, and equivocal SLR.  she has tried conservative treatment including physical therapy, medications, and interventional procedures.    MRI was done on 10/2/18 which showed   FINDINGS: The report is dictated assuming five lumbar-type vertebral  bodies. Sagittal images demonstrate a mild old superior endplate  compression of L1. No acute fracture identified. Bone marrow signal is  unremarkable. Tip of the conus medullaris and cauda equina are  unremarkable.      T12-L1: No disc herniation or stenosis. Facet joints are unremarkable.     L1-L2: No disc herniation or stenosis. Facet joints are unremarkable.       L2-L3: Mild broad-based disc bulge and facet hypertrophy result in  mild to moderate central stenosis. Neural foramen are patent.     L3-L4: Moderate to severe central stenosis as result of disc bulge and  facet hypertrophy. Mild to moderate bilateral foraminal stenosis.     L4-L5: Broad-based disc bulge and facet hypertrophy result in severe  central stenosis. Mild to moderate bilateral foraminal stenosis.     L5-S1: Mild degenerative disc disease and disc bulge. Mild facet  hypertrophy. No central stenosis. Neural foramen are patent.     Paraspinous soft tissues: Unremarkable.                                                                    IMPRESSION:    1. At L2-L3 there is mild to  moderate central stenosis.  2. At L3-L4 there is moderate to severe central stenosis and mild to  moderate bilateral foraminal stenosis.  3. At L4-L5 there is severe central stenosis. Mild-to-moderate  bilateral foraminal stenosis.  4. At L5-S1 there is mild degenerative disc disease and disc bulge  without stenosis.     NATALIE REA MD    Options/alternatives, benefits and risks were discussed with the patient including but not limited to bleeding, infection, no pain relief, tissue trauma, exposure to radiation, reaction to medications, spinal cord injury, dural puncture, weakness, numbness and headache.  Questions were answered to her satisfaction and she wishes to proceed. Voluntary informed consent was obtained and signed.     Vitals were reviewed: Yes  /51   Pulse 60   Resp 16   LMP 09/25/1979 (Approximate)   Allergies were reviewed:  Yes   Medications were reviewed:  Yes   Pre-procedure pain score: 7/10    Procedure:  The patient's medical history, medications, and allergies were reviewed and reconciled.  After obtaining signed informed consent, the patient was brought into the procedure suite and was placed in a prone position on the procedure table.   A Pause for the Cause was performed.  Patient was prepped and draped in the usual sterile fashion.     The L5-S1 interspace was identified with AP fluoroscopy.  A total of 3 ml of 1% lidocaine was used to anesthetize the skin and subcutaneous tissues for a midline approach.    A 20 gauge 4.5 inch Touhy needle was advanced utilizing intermittent AP and Lateral fluoroscopy and air for loss of resistance.  The epidural space was encountered on the first pass without difficulties.  Aspiration for blood and CSF was negative.  Needle position was verified by injecting 1 ml of Isovue 300 utilizing real-time fluoroscopy that showed good needle placement and epidural spread without signs of intravascular or intrathecal uptake.  Isovue wasted:  14 ml.    Then,  after repeated negative aspiration for blood or CSF, a test dose was performed by injecting 1 ml of Lidocaine 1% with epinephrine into the epidural space which was negative for heart rate or blood pressure changes, tinnitus, metallic taste, lower extremity paresthesias, or other complaints.    Then, after repeated negative aspiration for blood or CSF, a combination of Depomedrol 40 mg, Dexamethasone 5 mg, Bupivicaine 0.5% 1 ml, diluted with 2.5 ml of normal saline to a total injectate volume of 5 ml was injected into the epidural space in a slow and incremental fashion and the needle was restyletted and withdrawn.  All injected medications were preservative free.    The injection site was cleaned and a sterile dressing was applied.    The patient tolerated the procedure well without complications and was taken to the recovery room for continued observation.    Images were saved to PACS.    Post-procedure pain score: 0/10  Follow-up includes:   -f/u phone call in one week  -f/u with referring provider    Favian Shell MD  [facility]

## 2019-01-16 ENCOUNTER — TELEPHONE (OUTPATIENT)
Dept: FAMILY MEDICINE | Facility: CLINIC | Age: 79
End: 2019-01-16

## 2019-01-16 DIAGNOSIS — N18.30 TYPE 2 DIABETES MELLITUS WITH STAGE 3 CHRONIC KIDNEY DISEASE, WITHOUT LONG-TERM CURRENT USE OF INSULIN (H): ICD-10-CM

## 2019-01-16 DIAGNOSIS — E11.22 TYPE 2 DIABETES MELLITUS WITH STAGE 3 CHRONIC KIDNEY DISEASE, WITHOUT LONG-TERM CURRENT USE OF INSULIN (H): Primary | ICD-10-CM

## 2019-01-16 DIAGNOSIS — E11.22 TYPE 2 DIABETES MELLITUS WITH STAGE 3 CHRONIC KIDNEY DISEASE, WITHOUT LONG-TERM CURRENT USE OF INSULIN (H): ICD-10-CM

## 2019-01-16 DIAGNOSIS — N18.30 TYPE 2 DIABETES MELLITUS WITH STAGE 3 CHRONIC KIDNEY DISEASE, WITHOUT LONG-TERM CURRENT USE OF INSULIN (H): Primary | ICD-10-CM

## 2019-01-16 NOTE — TELEPHONE ENCOUNTER
Per fax received from [employer] -  One Touch Ultra Blue Test Strips is not covered by patient's Insurance Company  Dr. Mcintosh - Please choose:  1.  Change medication that is not covered to a different medication and send new prescription to patient's pharmacy?  2.  Patient will need to pay for the non-covered medication out-of-pocket?   3.  Try for Prior Authorization with Insurance Company to get medication covered?        P.A. Phone #:[phone]       P.A.  ID#: [id number]   [facility]  [facility]

## 2019-01-16 NOTE — TELEPHONE ENCOUNTER
"Requested Prescriptions   Pending Prescriptions Disp Refills     blood glucose (ONETOUCH ULTRA) test strip [Pharmacy Med Name: ONE TOUCH ULTRA BLUE TESTST(NEW)100]  Last Written Prescription Date:  10/23/17  Last Fill Quantity: 100,  # refills: 3   Last office visit: 1/14/2019 with prescribing provider:  Morena Mcintosh   Future Office Visit:     100 strip 0     Sig: TEST ONCE DAILY AS DIRECTED    Diabetic Supplies Protocol Passed - 1/16/2019 10:46 AM       Passed - Medication is active on med list       Passed - Patient is 18 years of age or older       Passed - Recent (6 mo) or future (30 days) visit within the authorizing provider's specialty    Patient had office visit in the last 6 months or has a visit in the next 30 days with authorizing provider.  See \"Patient Info\" tab in inbasket, or \"Choose Columns\" in Meds & Orders section of the refill encounter.                "

## 2019-01-16 NOTE — TELEPHONE ENCOUNTER
Please find out what is covered.  Talk to pharmacy.  Does she need a new order for a new meter and test strips?      Morena Mcintosh M.D.

## 2019-01-16 NOTE — TELEPHONE ENCOUNTER
Pharmacist at [facility] states that [employer] of Contour will be covered. Yes, we will need a new meter and strips for the patient.  [facility]  [facility]

## 2019-01-21 ENCOUNTER — HOSPITAL ENCOUNTER (OUTPATIENT)
Dept: PHYSICAL THERAPY | Facility: CLINIC | Age: 79
Setting detail: THERAPIES SERIES
End: 2019-01-21
Attending: FAMILY MEDICINE
Payer: COMMERCIAL

## 2019-01-21 PROCEDURE — 97110 THERAPEUTIC EXERCISES: CPT | Mod: GP | Performed by: PHYSICAL THERAPIST

## 2019-01-22 ENCOUNTER — TELEPHONE (OUTPATIENT)
Dept: PALLIATIVE MEDICINE | Facility: CLINIC | Age: 79
End: 2019-01-22

## 2019-01-22 NOTE — TELEPHONE ENCOUNTER
Patient had a lumbar interlaminar epidural steroid injection at L5-S1 on 1/15/19.  Called patient for an update.      Left message that we were calling for an update about how s/he was doing after the injection.  LM that if s/he has any problems or questions to call the clinic at [phone].

## 2019-01-23 ENCOUNTER — TELEPHONE (OUTPATIENT)
Dept: FAMILY MEDICINE | Facility: CLINIC | Age: 79
End: 2019-01-23

## 2019-02-07 ENCOUNTER — DOCUMENTATION ONLY (OUTPATIENT)
Dept: OTHER | Facility: CLINIC | Age: 79
End: 2019-02-07

## 2019-02-21 ENCOUNTER — DOCUMENTATION ONLY (OUTPATIENT)
Dept: PHYSICAL THERAPY | Facility: CLINIC | Age: 79
End: 2019-02-21

## 2019-02-21 NOTE — PROGRESS NOTES
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 Will be  leaving for [place]  next week.    Objective Measurements: (From date of last visit)  Observation - Several rests required  during performance of  exercises / Good balance  ambulating with cane in  PT gym    Treatment Has Consisted Of:  Stretching and strengthening exercise education / Soft tissue mobilization / Joint mobilization / Pt education regarding diagnosis    Goals:  Pt will be independent  in HEP in order to  achieve and maintain  long term treatment  Goals. GOAL MET    Pt will be able to  ambulate community  distances without the  use of an assistive  device safely.  GOAL NET MET. Uses cane.    Pt will be able to stand  and make a meal without  having to sit due to  back or LE pain.  GOAL MOSTLY MET. Uses cane to stand and that feels fine.        Plan:  Discharge from therapy.    Discharge:    Reason for Discharge: Patient has failed to schedule further appointments.  Medicare G-code: Patient did not attend a final scheduled session prior to discharge. Unable to determine discharge functional status.    Equipment Issued: None    Discharge Plan: Patient to continue home program.    Kareem Ho, PT, DPT

## 2019-03-06 ENCOUNTER — TELEPHONE (OUTPATIENT)
Dept: FAMILY MEDICINE | Facility: CLINIC | Age: 79
End: 2019-03-06

## 2019-03-06 DIAGNOSIS — M54.6 PAIN IN THORACIC SPINE: Primary | ICD-10-CM

## 2019-03-06 NOTE — TELEPHONE ENCOUNTER
Reason for Call: Request for an order or referral:    Order or referral being requested: Pt is in [place] and wants a referral for P.T. down there for her ongoing back pain.  Fax referral to:  [facility] - Fax # [phone].  Please call patient and advise.      Date needed: at your convenience    Has the patient been seen by the PCP for this problem? YES    Additional comments:     Phone number Patient can be reached at:  Cell number on file:    Telephone Information:   Mobile [phone]       Best Time:  any    Can we leave a detailed message on this number?  YES    Call taken on 3/6/2019 at 3:17 PM by Tawanna Astudillo

## 2019-03-28 DIAGNOSIS — I10 ESSENTIAL HYPERTENSION WITH GOAL BLOOD PRESSURE LESS THAN 140/90: ICD-10-CM

## 2019-03-28 RX ORDER — METOPROLOL TARTRATE 50 MG
TABLET ORAL
Qty: 180 TABLET | Refills: 0 | Status: SHIPPED | OUTPATIENT
Start: 2019-03-28 | End: 2019-06-17

## 2019-03-28 NOTE — TELEPHONE ENCOUNTER
Prescription approved per [facility] Refill Protocol.    Valerie LUIS RN

## 2019-03-28 NOTE — TELEPHONE ENCOUNTER
"Requested Prescriptions   Pending Prescriptions Disp Refills     metoprolol tartrate (LOPRESSOR) 50 MG tablet [Pharmacy Med Name: METOPROLOL TARTRATE 50MG TABLETS]  Last Written Prescription Date:  4/2/2018  Last Fill Quantity: 180,  # refills: 3   Last office visit: 1/14/2019 with prescribing provider:  Arnaldo   Future Office Visit:   Next 5 appointments (look out 90 days)    Jun 17, 2019  1:40 PM CDT  SHORT with Morena Mcintosh MD  [facility] ([facility]) [address]  [facility] [address]  [phone]          180 tablet 0     Sig: TAKE 1 TABLET BY MOUTH TWICE DAILY    Beta-Blockers Protocol Passed - 3/28/2019 10:16 AM       Passed - Blood pressure under 140/90 in past 12 months    BP Readings from Last 3 Encounters:   01/15/19 130/51   01/14/19 112/52   12/17/18 108/50                Passed - Patient is age 6 or older       Passed - Recent (12 mo) or future (30 days) visit within the authorizing provider's specialty    Patient had office visit in the last 12 months or has a visit in the next 30 days with authorizing provider or within the authorizing provider's specialty.  See \"Patient Info\" tab in inbasket, or \"Choose Columns\" in Meds & Orders section of the refill encounter.             Passed - Medication is active on med list          "

## 2019-04-27 DIAGNOSIS — I10 ESSENTIAL HYPERTENSION WITH GOAL BLOOD PRESSURE LESS THAN 140/90: ICD-10-CM

## 2019-04-29 NOTE — TELEPHONE ENCOUNTER
"Requested Prescriptions   Pending Prescriptions Disp Refills     lisinopril (PRINIVIL/ZESTRIL) 40 MG tablet [Pharmacy Med Name: LISINOPRIL 40MG TABLETS] 90 tablet 0     Sig: TAKE 1 TABLET(40 MG) BY MOUTH DAILY  Last Written Prescription Date:  11/5/2018  Last Fill Quantity: 90,  # refills: 1   Last office visit: 1/14/2019 with prescribing provider:  Arnaldo  Future Office Visit:   Next 5 appointments (look out 90 days)    Jun 17, 2019  1:40 PM CDT  SHORT with Morena Mcintosh MD  [facility] ([facility]) [address]  [facility] [address]  [phone]                  ACE Inhibitors (Including Combos) Protocol Failed - 4/27/2019 12:32 PM        Failed - Normal serum creatinine on file in past 12 months     Recent Labs   Lab Test 11/16/18  1547   CR 1.18*             Passed - Blood pressure under 140/90 in past 12 months     BP Readings from Last 3 Encounters:   01/15/19 130/51   01/14/19 112/52   12/17/18 108/50                 Passed - Recent (12 mo) or future (30 days) visit within the authorizing provider's specialty     Patient had office visit in the last 12 months or has a visit in the next 30 days with authorizing provider or within the authorizing provider's specialty.  See \"Patient Info\" tab in inbasket, or \"Choose Columns\" in Meds & Orders section of the refill encounter.              Passed - Medication is active on med list        Passed - Patient is age 18 or older        Passed - No active pregnancy on record        Passed - Normal serum potassium on file in past 12 months     Recent Labs   Lab Test 11/16/18  1547   POTASSIUM 4.4             Passed - No positive pregnancy test within past 12 months          "

## 2019-04-30 RX ORDER — LISINOPRIL 40 MG/1
TABLET ORAL
Qty: 90 TABLET | Refills: 1 | Status: SHIPPED | OUTPATIENT
Start: 2019-04-30 | End: 2019-10-23

## 2019-04-30 NOTE — TELEPHONE ENCOUNTER
Patient has upcoming OV. However RN unable to fill due to Creat.    Routing refill request to provider for review/approval because:  Labs out of range:  creat    Thanks,  Karlee SANABRIA RN

## 2019-06-08 DIAGNOSIS — E11.22 TYPE 2 DIABETES MELLITUS WITH STAGE 3 CHRONIC KIDNEY DISEASE, WITHOUT LONG-TERM CURRENT USE OF INSULIN (H): ICD-10-CM

## 2019-06-08 DIAGNOSIS — N18.30 TYPE 2 DIABETES MELLITUS WITH STAGE 3 CHRONIC KIDNEY DISEASE, WITHOUT LONG-TERM CURRENT USE OF INSULIN (H): ICD-10-CM

## 2019-06-10 NOTE — TELEPHONE ENCOUNTER
Requested Prescriptions   Pending Prescriptions Disp Refills     metFORMIN (GLUCOPHAGE) 500 MG tablet [Pharmacy Med Name: METFORMIN 500MG TABLETS] 180 tablet 0     Sig: TAKE 1 TABLET BY MOUTH TWICE DAILY WITH MEALS  Last Written Prescription Date:  6/6/2018  Last Fill Quantity: 180,  # refills: 3   Last office visit: 1/14/2019 with prescribing provider:  Arnaldo   Future Office Visit:   Next 5 appointments (look out 90 days)    Jun 17, 2019  1:40 PM CDT  SHORT with Morena Mcintosh MD  [facility] ([facility]) [address]  [facility] [address]  [phone]                Biguanide Agents Failed - 6/8/2019 10:09 AM        Failed - Patient has documented A1c within the specified period of time.     If HgbA1C is 8 or greater, it needs to be on file within the past 3 months.  If less than 8, must be on file within the past 6 months.     Recent Labs   Lab Test 11/16/18  1547   A1C 7.7*             Passed - Blood pressure less than 140/90 in past 6 months     BP Readings from Last 3 Encounters:   01/15/19 130/51   01/14/19 112/52   12/17/18 108/50                 Passed - Patient has documented LDL within the past 12 mos.     Recent Labs   Lab Test 10/30/18  0835   LDL 49             Passed - Patient has had a Microalbumin in the past 15 mos.     Recent Labs   Lab Test 11/16/18  1556   MICROL <5   UMALCR Unable to calculate due to low value             Passed - Patient is age 10 or older        Passed - Patient's CR is NOT>1.4 OR Patient's EGFR is NOT<45 within past 12 mos.     Recent Labs   Lab Test 11/16/18  1547   GFRESTIMATED 44*   GFRESTBLACK 54*       Recent Labs   Lab Test 11/16/18  1547   CR 1.18*             Passed - Patient does NOT have a diagnosis of CHF.        Passed - Medication is active on med list        Passed - Patient is not pregnant        Passed - Patient has not had a positive pregnancy test within the past 12 mos.         Passed - Recent (6 mo) or  "future (30 days) visit within the authorizing provider's specialty     Patient had office visit in the last 6 months or has a visit in the next 30 days with authorizing provider or within the authorizing provider's specialty.  See \"Patient Info\" tab in inbasket, or \"Choose Columns\" in Meds & Orders section of the refill encounter.              "

## 2019-06-17 ENCOUNTER — OFFICE VISIT (OUTPATIENT)
Dept: FAMILY MEDICINE | Facility: CLINIC | Age: 79
End: 2019-06-17
Payer: COMMERCIAL

## 2019-06-17 VITALS
SYSTOLIC BLOOD PRESSURE: 115 MMHG | RESPIRATION RATE: 16 BRPM | TEMPERATURE: 97 F | WEIGHT: 182 LBS | DIASTOLIC BLOOD PRESSURE: 54 MMHG | OXYGEN SATURATION: 96 % | HEIGHT: 63 IN | BODY MASS INDEX: 32.25 KG/M2 | HEART RATE: 58 BPM

## 2019-06-17 DIAGNOSIS — M54.16 LUMBAR RADICULOPATHY: ICD-10-CM

## 2019-06-17 DIAGNOSIS — E11.22 TYPE 2 DIABETES MELLITUS WITH STAGE 3 CHRONIC KIDNEY DISEASE, WITHOUT LONG-TERM CURRENT USE OF INSULIN (H): Primary | ICD-10-CM

## 2019-06-17 DIAGNOSIS — N18.30 CKD (CHRONIC KIDNEY DISEASE) STAGE 3, GFR 30-59 ML/MIN (H): ICD-10-CM

## 2019-06-17 DIAGNOSIS — I10 ESSENTIAL HYPERTENSION WITH GOAL BLOOD PRESSURE LESS THAN 140/90: ICD-10-CM

## 2019-06-17 DIAGNOSIS — E78.5 HYPERLIPIDEMIA LDL GOAL <100: ICD-10-CM

## 2019-06-17 DIAGNOSIS — I10 HTN, GOAL BELOW 140/90: ICD-10-CM

## 2019-06-17 DIAGNOSIS — N18.30 TYPE 2 DIABETES MELLITUS WITH STAGE 3 CHRONIC KIDNEY DISEASE, WITHOUT LONG-TERM CURRENT USE OF INSULIN (H): Primary | ICD-10-CM

## 2019-06-17 LAB
ANION GAP SERPL CALCULATED.3IONS-SCNC: 6 MMOL/L (ref 3–14)
BUN SERPL-MCNC: 32 MG/DL (ref 7–30)
CALCIUM SERPL-MCNC: 9.3 MG/DL (ref 8.5–10.1)
CHLORIDE SERPL-SCNC: 103 MMOL/L (ref 94–109)
CO2 SERPL-SCNC: 29 MMOL/L (ref 20–32)
CREAT SERPL-MCNC: 1.31 MG/DL (ref 0.52–1.04)
GFR SERPL CREATININE-BSD FRML MDRD: 39 ML/MIN/{1.73_M2}
GLUCOSE SERPL-MCNC: 130 MG/DL (ref 70–99)
HBA1C MFR BLD: 7.2 % (ref 0–5.6)
POTASSIUM SERPL-SCNC: 4.8 MMOL/L (ref 3.4–5.3)
SODIUM SERPL-SCNC: 138 MMOL/L (ref 133–144)

## 2019-06-17 PROCEDURE — 80048 BASIC METABOLIC PNL TOTAL CA: CPT | Performed by: FAMILY MEDICINE

## 2019-06-17 PROCEDURE — 83036 HEMOGLOBIN GLYCOSYLATED A1C: CPT | Performed by: FAMILY MEDICINE

## 2019-06-17 PROCEDURE — 99214 OFFICE O/P EST MOD 30 MIN: CPT | Performed by: FAMILY MEDICINE

## 2019-06-17 PROCEDURE — 36415 COLL VENOUS BLD VENIPUNCTURE: CPT | Performed by: FAMILY MEDICINE

## 2019-06-17 PROCEDURE — 99207 C FOOT EXAM  NO CHARGE: CPT | Mod: 25 | Performed by: FAMILY MEDICINE

## 2019-06-17 RX ORDER — AMLODIPINE BESYLATE 5 MG/1
TABLET ORAL
Qty: 90 TABLET | Refills: 3 | Status: SHIPPED | OUTPATIENT
Start: 2019-06-17 | End: 2019-07-18

## 2019-06-17 RX ORDER — METOPROLOL TARTRATE 50 MG
50 TABLET ORAL 2 TIMES DAILY
Qty: 180 TABLET | Refills: 3 | Status: SHIPPED | OUTPATIENT
Start: 2019-06-17 | End: 2020-06-25

## 2019-06-17 ASSESSMENT — PAIN SCALES - GENERAL: PAINLEVEL: MILD PAIN (2)

## 2019-06-17 ASSESSMENT — MIFFLIN-ST. JEOR: SCORE: 1266.74

## 2019-06-17 NOTE — PROGRESS NOTES
Subjective     Milagro Wallace is a 79 year old female who presents to clinic today for the following health issues:    HPI   Diabetes Follow-up      How often are you checking your blood sugar? A few times a month    What time of day are you checking your blood sugars (select all that apply)?  Before meals    Have you had any blood sugars above 200?  No    Have you had any blood sugars below 70?  No    What symptoms do you notice when your blood sugar is low?  Shaky, Dizzy and Weak    What concerns do you have today about your diabetes? None     Do you have any of these symptoms? (Select all that apply)  No numbness or tingling in feet.  No redness, sores or blisters on feet.  No complaints of excessive thirst.  No reports of blurry vision.  No significant changes to weight.     Have you had a diabetic eye exam in the last 12 months? No    BP Readings from Last 2 Encounters:   06/17/19 115/54   01/15/19 130/51     Hemoglobin A1C (%)   Date Value   11/16/2018 7.7 (H)   06/06/2018 7.3 (H)     LDL Cholesterol Calculated (mg/dL)   Date Value   10/30/2018 49   10/23/2017 39       Diabetes Management Resources    Amount of exercise or physical activity: None    Problems taking medications regularly: No    Medication side effects: none    Diet: regular (no restrictions) - cutting back on sweets     Hyperlipidemia Follow-Up      Are you having any of the following symptoms? (Select all that apply)  No complaints of shortness of breath, chest pain or pressure.  No increased sweating or nausea with activity.  No left-sided neck or arm pain.  No complaints of pain in calves when walking 1-2 blocks.    Are you regularly taking any medication or supplement to lower your cholesterol?   Yes- simvastatin    Are you having muscle aches or other side effects that you think could be caused by your cholesterol lowering medication?  No      Hypertension Follow-up      Do you check your blood pressure regularly outside of the clinic?  "No     Are you following a low salt diet? Yes    Are your blood pressures ever more than 140 on the top number (systolic) OR more   than 90 on the bottom number (diastolic), for example 140/90? No  Chronic Kidney Disease Follow-up      Current NSAID use?  No    Reviewed and updated as needed this visit by Provider         Review of Systems   ROS COMP: Constitutional, HEENT, cardiovascular, pulmonary, gi and gu systems are negative, except as otherwise noted.      Objective    /54 (BP Location: Right arm, Patient Position: Sitting, Cuff Size: Adult Large)   Pulse 58   Temp 97  F (36.1  C) (Tympanic)   Resp 16   Ht 1.588 m (5' 2.5\")   Wt 82.6 kg (182 lb)   LMP 09/25/1979 (Approximate)   SpO2 96%   Breastfeeding? No   BMI 32.76 kg/m    Body mass index is 32.76 kg/m .  Physical Exam   GENERAL: healthy, alert and no distress  NECK: no adenopathy, no asymmetry, masses, or scars and thyroid normal to palpation  RESP: lungs clear to auscultation - no rales, rhonchi or wheezes  CV: regular rate and rhythm, normal S1 S2, no S3 or S4, no murmur, click or rub, no peripheral edema and peripheral pulses strong  ABDOMEN: soft, nontender, no hepatosplenomegaly, no masses and bowel sounds normal  MS: edema of the right ankle is 2+    Diabetic Foot Screen:  Any complaints of increased pain or numbness ?  YES more numbness  Is there a foot ulcer now or a history of foot ulcer? No  Does the foot have an abnormal shape? No  Are the nails thick, too long or ingrown? No  Are there any redness or open areas? No         Sensation Testing done at all points on the diagram with monofilament     Right Foot: Sensation Normal at all points  Left Foot: Sensation Absent at the following points  and 1     Risk Category: 1- Loss of protective sensation with normal appearing foot (no weakness, deformity, callous, pre-ulcer or h/o ulceration)  Performed by Morena Mcintosh MD          Results for orders placed or performed in visit on " "06/17/19   Hemoglobin A1c   Result Value Ref Range    Hemoglobin A1C 7.2 (H) 0 - 5.6 %             Diagnostic Test Results:  pending        Assessment & Plan     1. Type 2 diabetes mellitus with stage 3 chronic kidney disease, without long-term current use of insulin (H)   fairly well controlled  - Hemoglobin A1c  - FOOT EXAM  - Basic metabolic panel    2. HTN, goal below 140/90  Well controlled    3. CKD (chronic kidney disease) stage 3, GFR 30-59 ml/min (H)  Recheck renal function   - Basic metabolic panel    4. Hyperlipidemia LDL goal <100       5. Lumbar radiculopathy  Wants to do PT again and not consider surgery at this point.   - PHYSICAL THERAPY REFERRAL; Future    6. Essential hypertension with goal blood pressure less than 140/90     - amLODIPine (NORVASC) 5 MG tablet; TAKE 1 TABLET(5 MG) BY MOUTH DAILY  Dispense: 90 tablet; Refill: 3  - metoprolol tartrate (LOPRESSOR) 50 MG tablet; Take 1 tablet (50 mg) by mouth 2 times daily  Dispense: 180 tablet; Refill: 3     BMI:   Estimated body mass index is 32.76 kg/m  as calculated from the following:    Height as of this encounter: 1.588 m (5' 2.5\").    Weight as of this encounter: 82.6 kg (182 lb).   Weight management plan: Discussed healthy diet and exercise guidelines            No follow-ups on file.    Morena Mcintosh MD  [facility]        "

## 2019-06-18 DIAGNOSIS — N18.30 CKD (CHRONIC KIDNEY DISEASE) STAGE 3, GFR 30-59 ML/MIN (H): Primary | ICD-10-CM

## 2019-06-18 NOTE — RESULT ENCOUNTER NOTE
Kidney function decreased slightly.  Yesterday blood pressure was a bit low, may be on the dehydrated side.  I'd like to decrease the furosemide to 40 mg daily and recheck kidney function in 1 month.    HgbA1c is improved slightly at 7.2%.  Keep working on lower sugar choices and weight loss.      Please notify patient and schedule for a RN blood pressure recheck and lab in 1 month.    Morena Mcintosh M.D.

## 2019-07-01 ENCOUNTER — HOSPITAL ENCOUNTER (OUTPATIENT)
Dept: PHYSICAL THERAPY | Facility: CLINIC | Age: 79
Setting detail: THERAPIES SERIES
End: 2019-07-01
Attending: FAMILY MEDICINE
Payer: COMMERCIAL

## 2019-07-01 DIAGNOSIS — M54.16 LUMBAR RADICULOPATHY: ICD-10-CM

## 2019-07-01 PROCEDURE — 97140 MANUAL THERAPY 1/> REGIONS: CPT | Mod: GP | Performed by: PHYSICAL THERAPIST

## 2019-07-01 PROCEDURE — 97110 THERAPEUTIC EXERCISES: CPT | Mod: GP | Performed by: PHYSICAL THERAPIST

## 2019-07-01 PROCEDURE — 97161 PT EVAL LOW COMPLEX 20 MIN: CPT | Mod: GP | Performed by: PHYSICAL THERAPIST

## 2019-07-01 NOTE — PROGRESS NOTES
[facility]          OUTPATIENT PHYSICAL THERAPY ORTHOPEDIC EVALUATION  PLAN OF TREATMENT FOR OUTPATIENT REHABILITATION  (COMPLETE FOR INITIAL CLAIMS ONLY)  Patient's Last Name, First Name, M.I.  YOB: 1940  RodrigoMilagro  JUNI    Provider s Name:  [facility]   Medical Record No.  [id number]   Start of Care Date:  07/01/19   Onset Date:  09/01/18   Type:     _X__PT   ___OT   ___SLP Medical Diagnosis:  Lumbar radiculopathy     PT Diagnosis:  Low back pain with possible radicular symptoms exacerbated by flexibility and strength deficits of core musculauture   Visits from SOC:  1      _________________________________________________________________________________  Plan of Treatment/Functional Goals:  joint mobilization, manual therapy, neuromuscular re-education, ROM, strengthening, stretching, balance training, gait training     Cryotherapy, Hot packs     Goals  Goal Identifier: HEP  Goal Description: Pt will be independent in [facility] in order to achieve and maintain long term treatment goals.  Target Date: 08/01/19    Goal Identifier: Ambulation  Goal Description: Pt will be able to ambulate 1 mile for exercise without having to stop due to back pain.  Target Date: 08/26/19    Goal Identifier: Standing  Goal Description: Pt will be able to stand for 1 hour in order to make dinner without having to sit due to back pain.  Target Date: 08/26/19        Therapy Frequency:  1 time/week  Predicted Duration of Therapy Intervention:  8 weeks    Ranjan Ho PT                 I CERTIFY THE NEED FOR THESE SERVICES FURNISHED UNDER        THIS PLAN OF TREATMENT AND WHILE UNDER MY CARE     (Physician co-signature of this document indicates review and certification of the therapy plan).                       Certification Date From:  07/01/19   Certification Date To:  08/26/19    Referring Provider:  Morena Mcintosh MD    Initial Assessment        See Epic Evaluation Start  of Care Date: 07/01/19

## 2019-07-01 NOTE — PROGRESS NOTES
Milagro JUNI Wallace  1940 Physical Therapy Initial Evaluation  07/01/19 1000   General Information   Type of Visit Initial OP Ortho PT Evaluation   Start of Care Date 07/01/19   Referring Physician Morena Mcintosh MD   Patient/Family Goals Statement Stand without pain   Orders Evaluate and Treat   Date of Order 06/17/19   Certification Required? Yes   Medical Diagnosis Lumbar radiculopathy   Surgical/Medical history reviewed Yes   Precautions/Limitations no known precautions/limitations   Body Part(s)   Body Part(s) Lumbar Spine/SI   Presentation and Etiology   Pertinent history of current problem (include personal factors and/or comorbidities that impact the POC) Had a fall in September 2018 and has had back pain since then. Was weeding at home and stood up and lost her balance and landed on her tailbone. Has been having pain down both legs. 4/10 pain right now. Standing, walking, lifting will set off back pain. 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 Pain;B. Decreased WB tolerance;C. Swelling;D. Decreased ROM;E. Decreased flexibility;F. Decreased strength and endurance;H. Impaired gait;K. Numbness   Functional Limitations perform activities of daily living;perform desired leisure / sports activities   Symptom Location Lumbar spine and B lower extremities   How/Where did it occur With a fall;At home   Onset date of current episode/exacerbation 09/01/18   Chronicity Chronic   Pain rating (0-10 point scale) Worst (/10);Best (/10)   Best (/10) 5   Pain quality B. Dull;C. Aching   Frequency of pain/symptoms B. Intermittent   Pain/symptoms exacerbated by A. Sitting;B. Walking;G. Certain positions;I. Bending;K. Home tasks   Pain/symptoms eased by C. Rest;G. Heat;I. OTC medication(s)   Progression of symptoms since onset: Unchanged   Current Level of Function   Patient role/employment history F. Retired   Living environment House/townhome    Home/community accessibility 0 stairs   Current equipment-Gait/Locomotion Standard cane   Fall Risk Screen   Fall screen completed by PT   Have you fallen 2 or more times in the past year? Yes   Have you fallen and had an injury in the past year? Yes   Timed Up and Go score (seconds) 20   Is patient a fall risk? Yes;Department fall risk interventions implemented   Abuse Screen (yes response referral indicated)   Feels Unsafe at Home or Work/School no   Feels Threatened by Someone no   Does Anyone Try to Keep You From Having Contact with Others or Doing Things Outside Your Home? no   Physical Signs of Abuse Present no   Lumbar Spine/SI Objective Findings   Gait/Locomotion Slow and deliberate with use of standard cane and minimal lumbar rotation and heel strike   Hamstring Flexibility Moderately restricted on left   Piriformis Flexibility Moderately restricted B   Flexion ROM 9 inches from floor   Extension ROM 10 degrees   Right Side Bending ROM 2 inches from knee joint with pain in hip and thigh   Left Side Bending ROM 2 inches from knee joint with pain in hip and thigh   Repeated Extension-Standing ROM No change in symptoms   Repeated Flexion-Standing ROM No change in symptoms   Pelvic Screen Positive sacral thrust / Negative Thigh thrust, SI compression, SI gapping   Hip Flexion (L2) Strength 4/5 B   Hip Abduction Strength 4-/5 B   Hip Extension Strength 4-/5 B   Knee Extension (L3) Strength 4/5 B   Ankle Dorsiflexion (L4) Strength 4/5 B   Great Toe Extension (L5) Strength 4/5 on right   Ankle Plantar Flexion (S1) Strength 4/5 B   SLR Negative B   Segmental Mobility Restricted T10-L5   Palpation Hypertonicity of hamstrings B   Slump Test Negative B   Hip Screen Positive hip scour on left   Planned Therapy Interventions   Planned Therapy Interventions joint mobilization;manual therapy;neuromuscular re-education;ROM;strengthening;stretching;balance training;gait training   Planned Modality Interventions   Planned  Modality Interventions Cryotherapy;Hot packs   Clinical Impression   Criteria for Skilled Therapeutic Interventions Met yes, treatment indicated   PT Diagnosis Low back pain with possible radicular symptoms exacerbated by flexibility and strength deficits of core musculauture   Influenced by the following impairments Pain, Flexibility and Strength deficits   Functional limitations due to impairments Difficulty with ambulation and standing   Clinical Presentation Stable/Uncomplicated   Clinical Presentation Rationale Several comorbidities impacting PT / 1 body system / Stable   Clinical Decision Making (Complexity) Low complexity   Therapy Frequency 1 time/week   Predicted Duration of Therapy Intervention (days/wks) 8 weeks   Risk & Benefits of therapy have been explained Yes   Patient, Family & other staff in agreement with plan of care Yes   Education Assessment   Preferred Learning Style Listening;Reading;Demonstration;Pictures/video   Barriers to Learning No barriers   ORTHO GOALS   PT Ortho Eval Goals 1;2;3;4   Ortho Goal 1   Goal Identifier HEP   Goal Description Pt will be independent in HEP in order to achieve and maintain long term treatment goals.   Target Date 08/01/19   Ortho Goal 2   Goal Identifier Ambulation   Goal Description Pt will be able to ambulate 1 mile for exercise without having to stop due to back pain.   Target Date 08/26/19   Ortho Goal 3   Goal Identifier Standing   Goal Description Pt will be able to stand for 1 hour in order to make dinner without having to sit due to back pain.   Target Date 08/26/19   Total Evaluation Time   PT Eval, Low Complexity Minutes (33194) 23   Therapy Certification   Certification date from 07/01/19   Certification date to 08/26/19   Medical Diagnosis Lumbar radiculopathy     Kareem Ho PT, DPT

## 2019-07-08 ENCOUNTER — HOSPITAL ENCOUNTER (OUTPATIENT)
Dept: PHYSICAL THERAPY | Facility: CLINIC | Age: 79
Setting detail: THERAPIES SERIES
End: 2019-07-08
Attending: FAMILY MEDICINE
Payer: COMMERCIAL

## 2019-07-08 PROCEDURE — 97140 MANUAL THERAPY 1/> REGIONS: CPT | Mod: GP | Performed by: PHYSICAL THERAPIST

## 2019-07-08 PROCEDURE — 97110 THERAPEUTIC EXERCISES: CPT | Mod: GP | Performed by: PHYSICAL THERAPIST

## 2019-07-15 ENCOUNTER — HOSPITAL ENCOUNTER (OUTPATIENT)
Dept: PHYSICAL THERAPY | Facility: CLINIC | Age: 79
Setting detail: THERAPIES SERIES
End: 2019-07-15
Attending: FAMILY MEDICINE
Payer: COMMERCIAL

## 2019-07-15 PROCEDURE — 97140 MANUAL THERAPY 1/> REGIONS: CPT | Mod: GP | Performed by: PHYSICAL THERAPIST

## 2019-07-15 PROCEDURE — 97110 THERAPEUTIC EXERCISES: CPT | Mod: GP | Performed by: PHYSICAL THERAPIST

## 2019-07-18 ENCOUNTER — OFFICE VISIT (OUTPATIENT)
Dept: FAMILY MEDICINE | Facility: CLINIC | Age: 79
End: 2019-07-18
Payer: COMMERCIAL

## 2019-07-18 ENCOUNTER — TELEPHONE (OUTPATIENT)
Dept: FAMILY MEDICINE | Facility: CLINIC | Age: 79
End: 2019-07-18

## 2019-07-18 VITALS — HEART RATE: 64 BPM | DIASTOLIC BLOOD PRESSURE: 46 MMHG | SYSTOLIC BLOOD PRESSURE: 98 MMHG

## 2019-07-18 DIAGNOSIS — I10 HTN, GOAL BELOW 140/90: Primary | ICD-10-CM

## 2019-07-18 DIAGNOSIS — N18.30 CKD (CHRONIC KIDNEY DISEASE) STAGE 3, GFR 30-59 ML/MIN (H): ICD-10-CM

## 2019-07-18 LAB
ANION GAP SERPL CALCULATED.3IONS-SCNC: 5 MMOL/L (ref 3–14)
BUN SERPL-MCNC: 24 MG/DL (ref 7–30)
CALCIUM SERPL-MCNC: 9.2 MG/DL (ref 8.5–10.1)
CHLORIDE SERPL-SCNC: 105 MMOL/L (ref 94–109)
CO2 SERPL-SCNC: 31 MMOL/L (ref 20–32)
CREAT SERPL-MCNC: 1.45 MG/DL (ref 0.52–1.04)
GFR SERPL CREATININE-BSD FRML MDRD: 34 ML/MIN/{1.73_M2}
GLUCOSE SERPL-MCNC: 129 MG/DL (ref 70–99)
POTASSIUM SERPL-SCNC: 4.3 MMOL/L (ref 3.4–5.3)
SODIUM SERPL-SCNC: 141 MMOL/L (ref 133–144)

## 2019-07-18 PROCEDURE — 80048 BASIC METABOLIC PNL TOTAL CA: CPT | Performed by: FAMILY MEDICINE

## 2019-07-18 PROCEDURE — 99207 ZZC NO CHARGE NURSE ONLY: CPT

## 2019-07-18 PROCEDURE — 36415 COLL VENOUS BLD VENIPUNCTURE: CPT | Performed by: FAMILY MEDICINE

## 2019-07-18 NOTE — PROGRESS NOTES
Patient came into clinic today to recheck her blood pressure. Patient denies any signs or symptoms of high or low blood pressure.  Patient did decrease her Lasix to 40 mg daily.  Patient reports she has cut sugar from her diet and is constantly losing weight. Patient reports she has been losing about 2 pounds a week.     Will send to provider to review and advise.    Thank you    Amanda MCCOY RN      BP 98/46 (BP Location: Right arm, Patient Position: Chair, Cuff Size: Adult Large)   Pulse 64   LMP 09/25/1979 (Approximate)     BP Readings from Last 6 Encounters:   07/18/19 98/46   06/17/19 115/54   01/15/19 130/51   01/14/19 112/52   12/17/18 108/50   11/16/18 128/62

## 2019-07-18 NOTE — TELEPHONE ENCOUNTER
Patient came into clinic today to recheck her blood pressure. Patient denies any signs or symptoms of high or low blood pressure.  Patient did decrease her Lasix to 40 mg daily.  Patient reports she has cut sugar from her diet and is constantly losing weight. Patient reports she has been losing about 2 pounds a week.     Will send to provider to review and advise.    Thank you    Amanda MCCOY RN      BP 98/46 (BP Location: Right arm, Patient Position: Chair, Cuff Size: Adult Large)   Pulse 64   LMP 09/25/1979 (Approximate)     BP Readings from Last 6 Encounters:   07/18/19 98/46   06/17/19 115/54   01/15/19 130/51   01/14/19 112/52   12/17/18 108/50   11/16/18 128/62     Pharmacy [facility] [place]

## 2019-07-18 NOTE — TELEPHONE ENCOUNTER
Blood pressure has been very good.  With the weight loss, I think she could also discontinue her amlodipine at this time.  Keep an eye on blood pressure or have her back in 2-3 weeks to recheck this.    I don't want her blood pressure so low that she's dizzy/lightheaded and at increased risk of falls.    Morena Mcintosh M.D.

## 2019-07-19 DIAGNOSIS — N18.30 CKD (CHRONIC KIDNEY DISEASE) STAGE 3, GFR 30-59 ML/MIN (H): Primary | ICD-10-CM

## 2019-07-19 DIAGNOSIS — N18.3 ACUTE RENAL FAILURE SUPERIMPOSED ON STAGE 3 CHRONIC KIDNEY DISEASE, UNSPECIFIED ACUTE RENAL FAILURE TYPE: ICD-10-CM

## 2019-07-19 DIAGNOSIS — I10 HTN, GOAL BELOW 140/90: Primary | ICD-10-CM

## 2019-07-19 DIAGNOSIS — N17.9 ACUTE RENAL FAILURE SUPERIMPOSED ON STAGE 3 CHRONIC KIDNEY DISEASE, UNSPECIFIED ACUTE RENAL FAILURE TYPE: ICD-10-CM

## 2019-07-19 DIAGNOSIS — R94.4 DECREASED GFR: ICD-10-CM

## 2019-07-19 DIAGNOSIS — R60.0 BILATERAL EDEMA OF LOWER EXTREMITY: ICD-10-CM

## 2019-07-19 DIAGNOSIS — N18.30 CKD (CHRONIC KIDNEY DISEASE) STAGE 3, GFR 30-59 ML/MIN (H): ICD-10-CM

## 2019-07-19 RX ORDER — FUROSEMIDE 40 MG
40 TABLET ORAL DAILY
Qty: 90 TABLET | Refills: 3 | Status: SHIPPED | OUTPATIENT
Start: 2019-07-19 | End: 2020-07-01

## 2019-07-19 NOTE — RESULT ENCOUNTER NOTE
Kidney function still decreased from 8 months ago.   If her swelling is still minimal in her legs, further decrease the furosemide to 20 mg daily.  Recheck blood pressure and BMP in 2 weeks with the nurse.    I think this represents that we're just causing some dehydration.  Keep drinking plenty of fluids.     Morena Mcintosh M.D.

## 2019-07-22 ENCOUNTER — HOSPITAL ENCOUNTER (OUTPATIENT)
Dept: PHYSICAL THERAPY | Facility: CLINIC | Age: 79
Setting detail: THERAPIES SERIES
End: 2019-07-22
Attending: FAMILY MEDICINE
Payer: COMMERCIAL

## 2019-07-22 PROCEDURE — 97140 MANUAL THERAPY 1/> REGIONS: CPT | Mod: GP | Performed by: PHYSICAL THERAPIST

## 2019-07-29 ENCOUNTER — HOSPITAL ENCOUNTER (OUTPATIENT)
Dept: PHYSICAL THERAPY | Facility: CLINIC | Age: 79
Setting detail: THERAPIES SERIES
End: 2019-07-29
Attending: FAMILY MEDICINE
Payer: COMMERCIAL

## 2019-07-29 PROCEDURE — 97140 MANUAL THERAPY 1/> REGIONS: CPT | Mod: GP | Performed by: PHYSICAL THERAPIST

## 2019-07-29 PROCEDURE — 97110 THERAPEUTIC EXERCISES: CPT | Mod: GP | Performed by: PHYSICAL THERAPIST

## 2019-08-05 ENCOUNTER — HOSPITAL ENCOUNTER (OUTPATIENT)
Dept: PHYSICAL THERAPY | Facility: CLINIC | Age: 79
Setting detail: THERAPIES SERIES
End: 2019-08-05
Attending: FAMILY MEDICINE
Payer: COMMERCIAL

## 2019-08-05 PROCEDURE — 97140 MANUAL THERAPY 1/> REGIONS: CPT | Mod: GP | Performed by: PHYSICAL THERAPIST

## 2019-08-12 ENCOUNTER — HOSPITAL ENCOUNTER (OUTPATIENT)
Dept: PHYSICAL THERAPY | Facility: CLINIC | Age: 79
Setting detail: THERAPIES SERIES
End: 2019-08-12
Attending: FAMILY MEDICINE
Payer: COMMERCIAL

## 2019-08-12 ENCOUNTER — ALLIED HEALTH/NURSE VISIT (OUTPATIENT)
Dept: FAMILY MEDICINE | Facility: CLINIC | Age: 79
End: 2019-08-12
Payer: COMMERCIAL

## 2019-08-12 VITALS — HEART RATE: 60 BPM | DIASTOLIC BLOOD PRESSURE: 58 MMHG | SYSTOLIC BLOOD PRESSURE: 132 MMHG

## 2019-08-12 DIAGNOSIS — I10 HTN, GOAL BELOW 140/90: Primary | ICD-10-CM

## 2019-08-12 PROCEDURE — 97140 MANUAL THERAPY 1/> REGIONS: CPT | Mod: GP | Performed by: PHYSICAL THERAPIST

## 2019-08-12 PROCEDURE — 99207 ZZC NO CHARGE NURSE ONLY: CPT

## 2019-08-12 NOTE — NURSING NOTE
Milagro Wallace is a 79 year old year old patient who comes in today for a Blood Pressure check because of ongoing blood pressure monitoring.  Patient is not monitoring Blood Pressure at home.  Current complaints: none  Disposition:  patient instructed to take 40 mg furosemide daily.  She had been getting 80 mg tablets from [facility] and using one daily.  When she was advised to cut her dose in half she started cutting the 80 mg tablets in half.  She got a new prescription for 40 mg tablets and did not notice the dose increase so she has been cutting the 40 mg dose in half. She has some increase in leg swelling.

## 2019-08-12 NOTE — PATIENT INSTRUCTIONS
Take 40 mg furosemide daily.  Either a full tablet of 40 mg dose or half tablet of 80 mg dose (look at bottle).    Follow up with Dr. Mcintosh for diabetic check in December as scheduled.

## 2019-08-19 ENCOUNTER — HOSPITAL ENCOUNTER (OUTPATIENT)
Dept: PHYSICAL THERAPY | Facility: CLINIC | Age: 79
Setting detail: THERAPIES SERIES
End: 2019-08-19
Attending: FAMILY MEDICINE
Payer: COMMERCIAL

## 2019-08-19 PROCEDURE — 97140 MANUAL THERAPY 1/> REGIONS: CPT | Mod: GP | Performed by: PHYSICAL THERAPIST

## 2019-08-19 PROCEDURE — 97110 THERAPEUTIC EXERCISES: CPT | Mod: GP | Performed by: PHYSICAL THERAPIST

## 2019-09-03 ENCOUNTER — HOSPITAL ENCOUNTER (OUTPATIENT)
Dept: PHYSICAL THERAPY | Facility: CLINIC | Age: 79
Setting detail: THERAPIES SERIES
End: 2019-09-03
Attending: FAMILY MEDICINE
Payer: COMMERCIAL

## 2019-09-03 PROCEDURE — 97140 MANUAL THERAPY 1/> REGIONS: CPT | Mod: GP | Performed by: PHYSICAL THERAPIST

## 2019-09-03 PROCEDURE — 97110 THERAPEUTIC EXERCISES: CPT | Mod: GP | Performed by: PHYSICAL THERAPIST

## 2019-09-03 NOTE — PROGRESS NOTES
Milagro Wallace  1940  Diagnosis - Lumbar radiculopathy Physical Therapy Progress Note  09/03/19 1400   Signing Clinician's Name / Credentials   Signing clinician's name / credentials Kareem Ho PT, DPT   Session Number   Session Number 9 (Start of Care Date - 7/1/2019)   Progress Note/Recertification   Progress Note Due Date 08/26/19   Progress Note Completed Date 09/03/19   Recertification Due Date 08/26/19   Adult Goals   PT Ortho Eval Goals 1;2;3;4   Ortho Goal 1   Goal Identifier HEP   Goal Description Pt will be independent in HEP in order to achieve and maintain long term treatment goals.   Target Date 08/01/19   Date Met 07/29/19   Ortho Goal 2   Goal Identifier Ambulation   Goal Description Pt will be able to ambulate 1 mile for exercise without having to stop due to back pain.  (Partially met. Can walk 1/4 of a mile each day.)   Target Date 08/26/19   Ortho Goal 3   Goal Identifier Standing   Goal Description Pt will be able to stand for 1 hour in order to make dinner without having to sit due to back pain.   Target Date 08/26/19   Date Met 07/29/19   Subjective Report   Subjective Report No issues with walking recently. Still can't walk 1 mile though. 0/10 pain currently. 50% improvement since beginning physical therapy.    Objective Measures   Objective Measures Objective Measure 1;Objective Measure 2;Objective Measure 3;Objective Measure 4   Objective Measure 1   Objective Measure Strength   Details Hip flexion - 5/5 B / Knee extension - 4/5 B / Ankle DF - 5/5 B / Ankle PF - 5/5 B / Great toe extension - 5/5 B   Objective Measure 2   Objective Measure Patellar reflexes   Details 2+ B   Objective Measure 3   Objective Measure Palpation   Details Hypertonicity of lumbar paraspinals on left reduced from previous visit   Objective Measure 4   Objective Measure RIK   Details 20%   Treatment Interventions   Interventions Therapeutic Procedure/Exercise;Manual Therapy;Self Care/Home Management    Therapeutic Procedure/exercise   Therapeutic Procedures: strength, endurance, ROM, flexibillity minutes (96081) 14   Skilled Intervention Strengthening exercise education   Patient Response No increase in discomfort noted   Treatment Detail Mini squats at counter x15 and x10 / Side steps with YTB x4 lengths and x2 lengths without band / Piriformis stretch 2x30 seconds   Manual Therapy   Manual Therapy: Mobilization, MFR, MLD, friction massage minutes (18072) 15   Skilled Intervention STM / Joint mobilizations   Patient Response Improved motion   Treatment Detail MET for hip ER to improve motion / STM to lumbar paraspinals on left to reduce tone and pain   Plan   Home program Mini squats at sink / Reverse crunches / Side steps / Lumbar rotations x5 B with 5 second holds / Seated HS stretch 2x30 seconds / Piriformis stretch 3x30 seconds B / Pelvic tilts   Updates to plan of care 1 time every other weeks for 6 weeks   Plan for next session MET for hip ER / Joint mobs / Bridges / Pelvic tilts / Supine marching / Standing marching / Check side steps with resistance /    Comments   Comments Pt is doing well overall in physical therapy and has had a reduction in low back and LE pain. Pt's RIK score has improved from 28.89% at initial visit to 20% at today's visit. Pt has met 2 of 3 goals and is making progress towards her last goal of ambulating for 1 mile. Currently pt has ambulated 1/4 mile without an increase in back pain. Pt would benefit from continued skilled physical therapy in order to build up strength and endurance and meet remaining goal.    Total Session Time   Timed Code Treatment Minutes 29   Total Treatment Time (sum of timed and untimed services) 29   AMBULATORY CLINICS ONLY-MEDICAL AND TREATMENT DIAGNOSIS   PT Diagnosis Low back pain with possible radicular symptoms exacerbated by flexibility and strength deficits of core musculauture     Referring Physician - Morena Mcintosh MD

## 2019-09-03 NOTE — PROGRESS NOTES
[facility]      OUTPATIENT PHYSICAL THERAPY  PLAN OF TREATMENT FOR OUTPATIENT REHABILITATION    Patient's Last Name, First Name, M.I.                YOB: 1940  Milagro Wallace                        Provider's Name  [facility] Medical Record No.  [id number]                               Onset Date: 09/01/18   Start of Care Date: 7/1/2019   Type:     _X_PT   ___OT   ___SLP Medical Diagnosis: Lumbar radiculopathy                       PT Diagnosis: Low back pain with possible radicular symptoms exacerbated by flexibility and strength deficits of core musculauture    _________________________________________________________________________________  Plan of Treatment:    Frequency/Duration: 1 time every other week for 6 weeks     Goals:  Goal Identifier HEP   Goal Description Pt will be independent in HEP in order to achieve and maintain long term treatment goals.   Target Date 08/01/19   Date Met  07/29/19   Progress:     Goal Identifier Ambulation   Goal Description Pt will be able to ambulate 1 mile for exercise without having to stop due to back pain.(Partially met. Can walk 1/4 of a mile each day.)   Target Date 08/26/19   Date Met      Progress:     Goal Identifier Standing   Goal Description Pt will be able to stand for 1 hour in order to make dinner without having to sit due to back pain.   Target Date 08/26/19   Date Met  07/29/19   Progress:         Progress Toward Goals:   See Progress Note from 9/3/2019          Certification date from 8/27/2019 to 10/15/2019.    Ranjan Ho, PT          I CERTIFY THE NEED FOR THESE SERVICES FURNISHED UNDER        THIS PLAN OF TREATMENT AND WHILE UNDER MY CARE     (Physician co-signature of this document indicates review and certification of the therapy plan).                Referring Provider: Morena Mcintosh MD

## 2019-09-08 DIAGNOSIS — H69.92 DYSFUNCTION OF LEFT EUSTACHIAN TUBE: ICD-10-CM

## 2019-09-09 NOTE — TELEPHONE ENCOUNTER
"Requested Prescriptions   Pending Prescriptions Disp Refills     fluticasone (FLONASE) 50 MCG/ACT nasal spray [Pharmacy Med Name: FLUTICASONE 50MCG NASAL SP (120) RX] 16 mL 0     Sig: SHAKE LIQUID AND USE 1 TO 2 SPRAYS IN EACH NOSTRIL DAILY       Inhaled Steroids Protocol Passed - 9/8/2019  9:03 AM        Passed - Patient is age 12 or older        Passed - Recent (12 mo) or future (30 days) visit within the authorizing provider's specialty     Patient had office visit in the last 12 months or has a visit in the next 30 days with authorizing provider or within the authorizing provider's specialty.  See \"Patient Info\" tab in inbasket, or \"Choose Columns\" in Meds & Orders section of the refill encounter.              Passed - Medication is active on med list        Last Written Prescription Date:  8/30/2018  Last Fill Quantity: 1 bottle,  # refills: 11   Last office visit: 6/17/2019 with prescribing provider:  Arnaldo   Future Office Visit:      "

## 2019-09-10 RX ORDER — FLUTICASONE PROPIONATE 50 MCG
SPRAY, SUSPENSION (ML) NASAL
Qty: 16 ML | Refills: 0 | Status: SHIPPED | OUTPATIENT
Start: 2019-09-10 | End: 2019-11-01

## 2019-09-17 ENCOUNTER — HOSPITAL ENCOUNTER (OUTPATIENT)
Dept: PHYSICAL THERAPY | Facility: CLINIC | Age: 79
Setting detail: THERAPIES SERIES
End: 2019-09-17
Attending: FAMILY MEDICINE
Payer: COMMERCIAL

## 2019-09-17 PROCEDURE — 97110 THERAPEUTIC EXERCISES: CPT | Mod: GP | Performed by: PHYSICAL THERAPIST

## 2019-10-01 ENCOUNTER — HOSPITAL ENCOUNTER (OUTPATIENT)
Dept: PHYSICAL THERAPY | Facility: CLINIC | Age: 79
Setting detail: THERAPIES SERIES
End: 2019-10-01
Attending: FAMILY MEDICINE
Payer: COMMERCIAL

## 2019-10-01 PROCEDURE — 97110 THERAPEUTIC EXERCISES: CPT | Mod: GP | Performed by: PHYSICAL THERAPIST

## 2019-10-01 PROCEDURE — 97140 MANUAL THERAPY 1/> REGIONS: CPT | Mod: GP | Performed by: PHYSICAL THERAPIST

## 2019-10-15 ENCOUNTER — HOSPITAL ENCOUNTER (OUTPATIENT)
Dept: PHYSICAL THERAPY | Facility: CLINIC | Age: 79
Setting detail: THERAPIES SERIES
End: 2019-10-15
Attending: FAMILY MEDICINE
Payer: COMMERCIAL

## 2019-10-15 PROCEDURE — 97140 MANUAL THERAPY 1/> REGIONS: CPT | Mod: GP | Performed by: PHYSICAL THERAPIST

## 2019-10-15 NOTE — PROGRESS NOTES
Mialgro Wallace  1940  Diagnosis - Lumbar radiculopathy Physical Therapy Discharge Note  10/15/19 1300   Signing Clinician's Name / Credentials   Signing clinician's name / credentials Kareem Ho, PT, DPT   Session Number   Session Number 12 (Start of Care Date - 7/1/2019)   Progress Note/Recertification   Progress Note Due Date 10/15/19   Progress Note Completed Date 10/15/19   Recertification Due Date 10/15/19   Adult Goals   PT Ortho Eval Goals 1;2;3;4   Ortho Goal 1   Goal Identifier HEP   Goal Description Pt will be independent in HEP in order to achieve and maintain long term treatment goals.   Target Date 08/01/19   Date Met 07/29/19   Ortho Goal 2   Goal Identifier Ambulation   Goal Description Pt will be able to ambulate 1 mile for exercise without having to stop due to back pain.  (Partially met. Can walk 1/4 of a mile each day.)   Target Date 08/26/19   Ortho Goal 3   Goal Identifier Standing   Goal Description Pt will be able to stand for 1 hour in order to make dinner without having to sit due to back pain.   Target Date 08/26/19   Date Met 07/29/19   Subjective Report   Subjective Report HEP going well. Was sore after last session. Not a lot of back pain since last session. Leg is about the same. 50% improvement since beginning physical therapy.   Objective Measures   Objective Measures Objective Measure 1;Objective Measure 2;Objective Measure 3;Objective Measure 4   Objective Measure 1   Objective Measure Strength   Details Flexion - 4/5 B / Knee extension - 4/5 on right and 5/5 on left / Ankle DF - 5/5 B / Ankle PF - 5/5 B / Great toe extension - 5/5 B   Objective Measure 2   Objective Measure Patellar reflexes   Details 2+ B   Objective Measure 3   Objective Measure Palpation   Details Hypertonicity of piriformis reduced from previous visit   Treatment Interventions   Interventions Therapeutic Procedure/Exercise;Manual Therapy;Self Care/Home Management   Manual Therapy   Manual Therapy:  Mobilization, MFR, MLD, friction massage minutes (23077) 18   Skilled Intervention MET / STM   Patient Response Tone reduction / Improved motion   Treatment Detail Long axis hip distraction to improve motion and reduce pain / MET for hip ER to improve motion / STM to pirformis on left to reduce tone and pain   Plan   Home program Mini squats at sink / Reverse crunches / Side steps / Lumbar rotations x5 B with 5 second holds / Seated HS stretch 2x30 seconds / Piriformis stretch 3x30 seconds B   Updates to plan of care Discharged   Comments   Comments Pt has done well in physical therapy and states that she has seen a 50% improvement since beginning physical therapy. Pt still has difficulty ambulating for longer distances. Pt has met 2 of 3 goals but unfortunately progress has plateaued recently. Pt will be discharged to [facility] at this time. Pt is in agreement with this plan.   Total Session Time   Timed Code Treatment Minutes 18   Total Treatment Time (sum of timed and untimed services) 18   AMBULATORY CLINICS ONLY-MEDICAL AND TREATMENT DIAGNOSIS   PT Diagnosis Low back pain with possible radicular symptoms exacerbated by flexibility and strength deficits of core musculauture     Referring Physician - Morena Mcintosh MD

## 2019-10-23 DIAGNOSIS — I10 ESSENTIAL HYPERTENSION WITH GOAL BLOOD PRESSURE LESS THAN 140/90: ICD-10-CM

## 2019-10-23 NOTE — TELEPHONE ENCOUNTER
"Requested Prescriptions   Pending Prescriptions Disp Refills     lisinopril (PRINIVIL/ZESTRIL) 40 MG tablet [Pharmacy Med Name: LISINOPRIL 40MG TABLETS] 90 tablet 0     Sig: TAKE 1 TABLET BY MOUTH DAILY   Last Written Prescription Date:  4/30/19  Last Fill Quantity: 90 tab,  # refills: 1   Last office visit: 8/12/2019 with prescribing provider:  Nurse   Future Office Visit:   Next 5 appointments (look out 90 days)    Dec 16, 2019  1:20 PM CST  SHORT with Morena Mcintosh MD  [facility] ([facility]) [id number]  [facility] [address]  [phone]             ACE Inhibitors (Including Combos) Protocol Failed - 10/23/2019 11:17 AM        Failed - Normal serum creatinine on file in past 12 months     Recent Labs   Lab Test 07/18/19  1304   CR 1.45*             Passed - Blood pressure under 140/90 in past 12 months     BP Readings from Last 3 Encounters:   08/12/19 132/58   07/18/19 98/46   06/17/19 115/54                 Passed - Recent (12 mo) or future (30 days) visit within the authorizing provider's specialty     Patient has had an office visit with the authorizing provider or a provider within the authorizing providers department within the previous 12 mos or has a future within next 30 days. See \"Patient Info\" tab in inbasket, or \"Choose Columns\" in Meds & Orders section of the refill encounter.              Passed - Medication is active on med list        Passed - Patient is age 18 or older        Passed - No active pregnancy on record        Passed - Normal serum potassium on file in past 12 months     Recent Labs   Lab Test 07/18/19  1304   POTASSIUM 4.3             Passed - No positive pregnancy test within past 12 months          "

## 2019-10-24 RX ORDER — LISINOPRIL 40 MG/1
TABLET ORAL
Qty: 90 TABLET | Refills: 0 | Status: SHIPPED | OUTPATIENT
Start: 2019-10-24 | End: 2019-12-16

## 2019-11-01 DIAGNOSIS — H69.92 DYSFUNCTION OF LEFT EUSTACHIAN TUBE: ICD-10-CM

## 2019-11-01 NOTE — TELEPHONE ENCOUNTER
"Requested Prescriptions   Pending Prescriptions Disp Refills     fluticasone (FLONASE) 50 MCG/ACT nasal spray [Pharmacy Med Name: FLUTICASONE 50MCG NASAL SP (120) RX] 16 mL 0     Sig: SHAKE LIQUID AND USE 1 TO 2 SPRAYS IN EACH NOSTRIL DAILY       Inhaled Steroids Protocol Passed - 11/1/2019  2:39 PM        Passed - Patient is age 12 or older        Passed - Recent (12 mo) or future (30 days) visit within the authorizing provider's specialty     Patient has had an office visit with the authorizing provider or a provider within the authorizing providers department within the previous 12 mos or has a future within next 30 days. See \"Patient Info\" tab in inbasket, or \"Choose Columns\" in Meds & Orders section of the refill encounter.              Passed - Medication is active on med list        Last Written Prescription Date:  9/10/19  Last Fill Quantity: 16,  # refills: 0   Last office visit: 8/12/2019 with prescribing provider:  Arnaldo   Future Office Visit:   Next 5 appointments (look out 90 days)    Dec 16, 2019  1:20 PM CST  SHORT with Morena Mcintosh MD  [facility] ([facility]) [address] [facility] [id number] [phone]                   "

## 2019-11-05 RX ORDER — FLUTICASONE PROPIONATE 50 MCG
SPRAY, SUSPENSION (ML) NASAL
Qty: 16 ML | Refills: 3 | Status: SHIPPED | OUTPATIENT
Start: 2019-11-05 | End: 2020-04-02

## 2019-11-05 NOTE — TELEPHONE ENCOUNTER
Prescription approved per [facility].  Eneida Dunlap RNC

## 2019-11-26 DIAGNOSIS — E78.5 HYPERLIPIDEMIA LDL GOAL <100: ICD-10-CM

## 2019-11-26 NOTE — TELEPHONE ENCOUNTER
"Requested Prescriptions   Pending Prescriptions Disp Refills     simvastatin (ZOCOR) 20 MG tablet [Pharmacy Med Name: SIMVASTATIN 20MG TABLETS] 90 tablet 0     Sig: TAKE 1 TABLET(20 MG) BY MOUTH AT BEDTIME       Statins Protocol Failed - 11/26/2019 10:53 AM        Failed - LDL on file in past 12 months     Recent Labs   Lab Test 10/30/18  0835   LDL 49             Passed - No abnormal creatine kinase in past 12 months     No lab results found.             Passed - Recent (12 mo) or future (30 days) visit within the authorizing provider's specialty     Patient has had an office visit with the authorizing provider or a provider within the authorizing providers department within the previous 12 mos or has a future within next 30 days. See \"Patient Info\" tab in inbasket, or \"Choose Columns\" in Meds & Orders section of the refill encounter.              Passed - Medication is active on med list        Passed - Patient is age 18 or older        Passed - No active pregnancy on record        Passed - No positive pregnancy test in past 12 months        Last Written Prescription Date:  11/16/2018  Last Fill Quantity: 90,  # refills: 3   Last office visit: 6/17/2019 with prescribing provider:  Arnaldo    Future Office Visit:   Next 5 appointments (look out 90 days)    Dec 16, 2019  1:20 PM CST  SHORT with Morena Mcintosh MD  [facility] ([facility]) [address]  [facility] [id number] [phone]           "

## 2019-11-27 ENCOUNTER — NURSE TRIAGE (OUTPATIENT)
Dept: NURSING | Facility: CLINIC | Age: 79
End: 2019-11-27

## 2019-11-27 RX ORDER — SIMVASTATIN 20 MG
TABLET ORAL
Qty: 90 TABLET | Refills: 0 | Status: SHIPPED | OUTPATIENT
Start: 2019-11-27 | End: 2019-12-16

## 2019-11-27 NOTE — TELEPHONE ENCOUNTER
Patient is returning a call from the clinic. Advised patient that a prescription for Simvastatin was sent to [facility] pharmacy in [place]. Advised that she is also due for cholesterol check. Patient will call the clinic on Friday.     Salina Rasmussen RN/[facility]    Reason for Disposition    General information question, no triage required and triager able to answer question    Protocols used: INFORMATION ONLY CALL-A-

## 2019-12-05 DIAGNOSIS — N18.30 TYPE 2 DIABETES MELLITUS WITH STAGE 3 CHRONIC KIDNEY DISEASE, WITHOUT LONG-TERM CURRENT USE OF INSULIN (H): ICD-10-CM

## 2019-12-05 DIAGNOSIS — E11.22 TYPE 2 DIABETES MELLITUS WITH STAGE 3 CHRONIC KIDNEY DISEASE, WITHOUT LONG-TERM CURRENT USE OF INSULIN (H): ICD-10-CM

## 2019-12-05 NOTE — TELEPHONE ENCOUNTER
Routing refill request to provider for review/approval because:  Failed RN protocol below: labs not in normal range/current. Due for appointment for diabetic check, has appointment on 12-16-19.    SUSHILA Gomez

## 2019-12-05 NOTE — TELEPHONE ENCOUNTER
"Requested Prescriptions   Pending Prescriptions Disp Refills     metFORMIN (GLUCOPHAGE) 500 MG tablet [Pharmacy Med Name: METFORMIN 500MG TABLETS] 180 tablet 0     Sig: TAKE 1 TABLET BY MOUTH TWICE DAILY WITH MEALS       Biguanide Agents Failed - 12/5/2019 10:58 AM        Failed - Patient has documented LDL within the past 12 mos.     Recent Labs   Lab Test 10/30/18  0835   LDL 49             Failed - Patient's CR is NOT>1.4 OR Patient's EGFR is NOT<45 within past 12 mos.     Recent Labs   Lab Test 07/18/19  1304   GFRESTIMATED 34*   GFRESTBLACK 40*       Recent Labs   Lab Test 07/18/19  1304   CR 1.45*             Passed - Blood pressure less than 140/90 in past 6 months     BP Readings from Last 3 Encounters:   08/12/19 132/58   07/18/19 98/46   06/17/19 115/54                 Passed - Patient has had a Microalbumin in the past 15 mos.     Recent Labs   Lab Test 11/16/18  1556   MICROL <5   UMALCR Unable to calculate due to low value             Passed - Patient is age 10 or older        Passed - Patient has documented A1c within the specified period of time.     If HgbA1C is 8 or greater, it needs to be on file within the past 3 months.  If less than 8, must be on file within the past 6 months.     Recent Labs   Lab Test 06/17/19  1412   A1C 7.2*             Passed - Patient does NOT have a diagnosis of CHF.        Passed - Medication is active on med list        Passed - Patient is not pregnant        Passed - Patient has not had a positive pregnancy test within the past 12 mos.         Passed - Recent (6 mo) or future (30 days) visit within the authorizing provider's specialty     Patient had office visit in the last 6 months or has a visit in the next 30 days with authorizing provider or within the authorizing provider's specialty.  See \"Patient Info\" tab in inbasket, or \"Choose Columns\" in Meds & Orders section of the refill encounter.            Last Written Prescription Date:  6/10/2019  Last Fill Quantity: " 180,  # refills: 1   Last office visit: 6/17/2019 with prescribing provider:  Arnaldo    Future Office Visit:   Next 5 appointments (look out 90 days)    Dec 16, 2019  1:20 PM CST  SHORT with Morena Mcintosh MD  [facility] ([facility]) [address] [facility] [address]  [phone]

## 2019-12-16 ENCOUNTER — OFFICE VISIT (OUTPATIENT)
Dept: FAMILY MEDICINE | Facility: CLINIC | Age: 79
End: 2019-12-16
Payer: COMMERCIAL

## 2019-12-16 VITALS
OXYGEN SATURATION: 96 % | BODY MASS INDEX: 31.36 KG/M2 | TEMPERATURE: 98.3 F | HEIGHT: 63 IN | WEIGHT: 177 LBS | SYSTOLIC BLOOD PRESSURE: 118 MMHG | RESPIRATION RATE: 18 BRPM | HEART RATE: 60 BPM | DIASTOLIC BLOOD PRESSURE: 68 MMHG

## 2019-12-16 DIAGNOSIS — I10 ESSENTIAL HYPERTENSION WITH GOAL BLOOD PRESSURE LESS THAN 140/90: ICD-10-CM

## 2019-12-16 DIAGNOSIS — E11.22 TYPE 2 DIABETES MELLITUS WITH STAGE 3 CHRONIC KIDNEY DISEASE, WITHOUT LONG-TERM CURRENT USE OF INSULIN (H): Primary | ICD-10-CM

## 2019-12-16 DIAGNOSIS — N18.30 CKD (CHRONIC KIDNEY DISEASE) STAGE 3, GFR 30-59 ML/MIN (H): ICD-10-CM

## 2019-12-16 DIAGNOSIS — N18.30 TYPE 2 DIABETES MELLITUS WITH STAGE 3 CHRONIC KIDNEY DISEASE, WITHOUT LONG-TERM CURRENT USE OF INSULIN (H): Primary | ICD-10-CM

## 2019-12-16 DIAGNOSIS — I10 HTN, GOAL BELOW 140/90: ICD-10-CM

## 2019-12-16 DIAGNOSIS — E78.5 HYPERLIPIDEMIA LDL GOAL <100: ICD-10-CM

## 2019-12-16 LAB
ALBUMIN SERPL-MCNC: 3.2 G/DL (ref 3.4–5)
ALP SERPL-CCNC: 78 U/L (ref 40–150)
ALT SERPL W P-5'-P-CCNC: 22 U/L (ref 0–50)
ANION GAP SERPL CALCULATED.3IONS-SCNC: 5 MMOL/L (ref 3–14)
AST SERPL W P-5'-P-CCNC: 25 U/L (ref 0–45)
BILIRUB SERPL-MCNC: 1 MG/DL (ref 0.2–1.3)
BUN SERPL-MCNC: 27 MG/DL (ref 7–30)
CALCIUM SERPL-MCNC: 8.9 MG/DL (ref 8.5–10.1)
CHLORIDE SERPL-SCNC: 106 MMOL/L (ref 94–109)
CHOLEST SERPL-MCNC: 130 MG/DL
CO2 SERPL-SCNC: 32 MMOL/L (ref 20–32)
CREAT SERPL-MCNC: 1.19 MG/DL (ref 0.52–1.04)
GFR SERPL CREATININE-BSD FRML MDRD: 43 ML/MIN/{1.73_M2}
GLUCOSE SERPL-MCNC: 153 MG/DL (ref 70–99)
HBA1C MFR BLD: 7 % (ref 0–5.6)
HDLC SERPL-MCNC: 56 MG/DL
LDLC SERPL CALC-MCNC: 45 MG/DL
NONHDLC SERPL-MCNC: 74 MG/DL
POTASSIUM SERPL-SCNC: 3.8 MMOL/L (ref 3.4–5.3)
PROT SERPL-MCNC: 7.1 G/DL (ref 6.8–8.8)
SODIUM SERPL-SCNC: 143 MMOL/L (ref 133–144)
TRIGL SERPL-MCNC: 146 MG/DL
TSH SERPL DL<=0.005 MIU/L-ACNC: 2.28 MU/L (ref 0.4–4)
VIT B12 SERPL-MCNC: 372 PG/ML (ref 193–986)

## 2019-12-16 PROCEDURE — 82607 VITAMIN B-12: CPT | Performed by: FAMILY MEDICINE

## 2019-12-16 PROCEDURE — 99214 OFFICE O/P EST MOD 30 MIN: CPT | Performed by: FAMILY MEDICINE

## 2019-12-16 PROCEDURE — 83036 HEMOGLOBIN GLYCOSYLATED A1C: CPT | Performed by: FAMILY MEDICINE

## 2019-12-16 PROCEDURE — 80061 LIPID PANEL: CPT | Performed by: FAMILY MEDICINE

## 2019-12-16 PROCEDURE — 84443 ASSAY THYROID STIM HORMONE: CPT | Performed by: FAMILY MEDICINE

## 2019-12-16 PROCEDURE — 36415 COLL VENOUS BLD VENIPUNCTURE: CPT | Performed by: FAMILY MEDICINE

## 2019-12-16 PROCEDURE — 80053 COMPREHEN METABOLIC PANEL: CPT | Performed by: FAMILY MEDICINE

## 2019-12-16 RX ORDER — LISINOPRIL 40 MG/1
40 TABLET ORAL DAILY
Qty: 90 TABLET | Refills: 3 | Status: SHIPPED | OUTPATIENT
Start: 2019-12-16 | End: 2020-07-24

## 2019-12-16 RX ORDER — SIMVASTATIN 20 MG
20 TABLET ORAL AT BEDTIME
Qty: 90 TABLET | Refills: 3 | Status: SHIPPED | OUTPATIENT
Start: 2019-12-16 | End: 2021-02-23

## 2019-12-16 ASSESSMENT — PAIN SCALES - GENERAL: PAINLEVEL: NO PAIN (0)

## 2019-12-16 ASSESSMENT — MIFFLIN-ST. JEOR: SCORE: 1244.06

## 2019-12-16 NOTE — PROGRESS NOTES
Subjective     Milagro Wallace is a 79 year old female who presents to clinic today for the following health issues:    HPI   Diabetes Follow-up    How often are you checking your blood sugar? A few times a month  What time of day are you checking your blood sugars (select all that apply)?  Before meals  Have you had any blood sugars above 200?  No  Have you had any blood sugars below 70?  No    What symptoms do you notice when your blood sugar is low?  None    What concerns do you have today about your diabetes? None     Do you have any of these symptoms? (Select all that apply)  Numbness in feet big toes     Have you had a diabetic eye exam in the last 12 months? No    Diabetes Management Resources    Hyperlipidemia Follow-Up      Are you regularly taking any medication or supplement to lower your cholesterol?   Yes- simvastatin    Are you having muscle aches or other side effects that you think could be caused by your cholesterol lowering medication?  No    Hypertension Follow-up      Do you check your blood pressure regularly outside of the clinic? No     Are you following a low salt diet? Yes    Are your blood pressures ever more than 140 on the top number (systolic) OR more   than 90 on the bottom number (diastolic), for example 140/90? No    BP Readings from Last 2 Encounters:   12/16/19 118/68   08/12/19 132/58     Hemoglobin A1C (%)   Date Value   12/16/2019 7.0 (H)   06/17/2019 7.2 (H)     LDL Cholesterol Calculated (mg/dL)   Date Value   10/30/2018 49   10/23/2017 39         How many servings of fruits and vegetables do you eat daily?  2-3    On average, how many sweetened beverages do you drink each day (Examples: soda, juice, sweet tea, etc.  Do NOT count diet or artificially sweetened beverages)?   8oz cranberry juice daily     How many days per week do you miss taking your medication? 0              Reviewed and updated as needed this visit by Provider         Review of Systems   ROS COMP:  "Constitutional, HEENT, cardiovascular, pulmonary, gi and gu systems are negative, except as otherwise noted.      Objective    /68   Pulse 60   Temp 98.3  F (36.8  C) (Tympanic)   Resp 18   Ht 1.588 m (5' 2.5\")   Wt 80.3 kg (177 lb)   LMP 09/25/1979 (Approximate)   SpO2 96%   Breastfeeding No   BMI 31.86 kg/m    Body mass index is 31.86 kg/m .  Physical Exam   GENERAL: healthy, alert and no distress  HENT: ear canals and TM's normal, nose and mouth without ulcers or lesions  NECK: no adenopathy, no asymmetry, masses, or scars and thyroid normal to palpation  RESP: lungs clear to auscultation - no rales, rhonchi or wheezes  CV: regular rate and rhythm, normal S1 S2, no S3 or S4, no murmur, click or rub, no peripheral edema and peripheral pulses strong  ABDOMEN: soft, nontender, no hepatosplenomegaly, no masses and bowel sounds normal  MS: trace edema to ankles bilaterally L>R    Diagnostic Test Results:  Labs reviewed in Epic  Results for orders placed or performed in visit on 12/16/19 (from the past 24 hour(s))   Hemoglobin A1c   Result Value Ref Range    Hemoglobin A1C 7.0 (H) 0 - 5.6 %           Assessment & Plan     1. Type 2 diabetes mellitus with stage 3 chronic kidney disease, without long-term current use of insulin (H)   well controlled for age  - Albumin Random Urine Quantitative with Creat Ratio  - Comprehensive metabolic panel  - Lipid panel reflex to direct LDL Non-fasting  - TSH with free T4 reflex  - Hemoglobin A1c  - Vitamin B12  - metFORMIN (GLUCOPHAGE) 500 MG tablet; Take 1 tablet (500 mg) by mouth 2 times daily (with meals)  Dispense: 180 tablet; Refill: 1    2. CKD (chronic kidney disease) stage 3, GFR 30-59 ml/min (H)   will recheck this, was to have been done late summer, but her furosemide wasn't adjusted as I recommended  May  Need to drop lisinopril or furosemide dose depending on lab work  - Albumin Random Urine Quantitative with Creat Ratio  - Comprehensive metabolic " "panel    3. HTN, goal below 140/90  Well controlled  - Comprehensive metabolic panel    4. Hyperlipidemia LDL goal <100     - Lipid panel reflex to direct LDL Non-fasting  - simvastatin (ZOCOR) 20 MG tablet; Take 1 tablet (20 mg) by mouth At Bedtime  Dispense: 90 tablet; Refill: 3    5. Essential hypertension with goal blood pressure less than 140/90     - lisinopril (PRINIVIL/ZESTRIL) 40 MG tablet; Take 1 tablet (40 mg) by mouth daily  Dispense: 90 tablet; Refill: 3     BMI:   Estimated body mass index is 31.86 kg/m  as calculated from the following:    Height as of this encounter: 1.588 m (5' 2.5\").    Weight as of this encounter: 80.3 kg (177 lb).   Weight management plan: Discussed healthy diet and exercise guidelines            Return in about 6 months (around 6/16/2020) for diabetes recheck.    Morena Mcintosh MD  [facility]      "

## 2019-12-16 NOTE — PATIENT INSTRUCTIONS
Have the pharmacy in [place] call the [facility] pharmacy here and the prescriptions can be transferred         Our Clinic hours are:  Mondays    7:20 am - 7 pm  Tues -  Fri  7:20 am - 5 pm    Clinic Phone: [phone]    The clinic lab opens at 7:30 am Mon - Fri and appointments are required.    [facility] [place]  Ph. [phone]  Monday  8 am - 7pm  Tues - Fri 8 am - 5:30 pm

## 2019-12-16 NOTE — LETTER
[facility]  [address]  [place] [address]  Phone: [phone]      12/18/2019     Milagro Wallace  [address]   [place] [address]      Dear Milagro:    Thank you for allowing me to participate in your care. Your recent test results were reviewed and listed below.      HgbA1c, as we discussed is 7%, good control.   Thyroid is in normal range.     Kidney function is at baseline, improved from six months ago.     Vitamin B12 is slightly low, I would like you to start 1000 mcg of Vitamin B12 over the counter daily.     Lipids/cholesterol is well controlled.     Your results are provided below for your review  Results for orders placed or performed in visit on 12/16/19   Comprehensive metabolic panel     Status: Abnormal   Result Value Ref Range    Sodium 143 133 - 144 mmol/L    Potassium 3.8 3.4 - 5.3 mmol/L    Chloride 106 94 - 109 mmol/L    Carbon Dioxide 32 20 - 32 mmol/L    Anion Gap 5 3 - 14 mmol/L    Glucose 153 (H) 70 - 99 mg/dL    Urea Nitrogen 27 7 - 30 mg/dL    Creatinine 1.19 (H) 0.52 - 1.04 mg/dL    GFR Estimate 43 (L) >60 mL/min/[1.73_m2]    GFR Estimate If Black 50 (L) >60 mL/min/[1.73_m2]    Calcium 8.9 8.5 - 10.1 mg/dL    Bilirubin Total 1.0 0.2 - 1.3 mg/dL    Albumin 3.2 (L) 3.4 - 5.0 g/dL    Protein Total 7.1 6.8 - 8.8 g/dL    Alkaline Phosphatase 78 40 - 150 U/L    ALT 22 0 - 50 U/L    AST 25 0 - 45 U/L   Lipid panel reflex to direct LDL Non-fasting     Status: None   Result Value Ref Range    Cholesterol 130 <200 mg/dL    Triglycerides 146 <150 mg/dL    HDL Cholesterol 56 >49 mg/dL    LDL Cholesterol Calculated 45 <100 mg/dL    Non HDL Cholesterol 74 <130 mg/dL   TSH with free T4 reflex     Status: None   Result Value Ref Range    TSH 2.28 0.40 - 4.00 mU/L   Hemoglobin A1c     Status: Abnormal   Result Value Ref Range    Hemoglobin A1C 7.0 (H) 0 - 5.6 %   Vitamin B12     Status: None   Result Value Ref Range    Vitamin B12 372 193 - 986 pg/mL                  Thank you for choosing [place]. As a result, please continue with the treatment plan discussed in the office. Return as discussed or sooner if symptoms worsen or fail to improve.     If you have any further questions or concerns, please do not hesitate to contact us.      Sincerely,        Dr. Morena Mcintosh/[facility]

## 2020-02-10 NOTE — TELEPHONE ENCOUNTER
"Requested Prescriptions   Pending Prescriptions Disp Refills     metFORMIN (GLUCOPHAGE) 500 MG tablet [Pharmacy Med Name: METFORMIN 500MG TABLETS]  Last Written Prescription Date:  10/23/2017  Last Fill Quantity: 180,  # refills: 1   Last office visit: 10/23/2017 with prescribing provider:  Arnaldo   Future Office Visit:     180 tablet 0     Sig: TAKE 1 TABLET(500 MG) BY MOUTH TWICE DAILY WITH MEALS    Biguanide Agents Failed    5/13/2018  2:00 PM       Failed - Blood pressure less than 140/90 in past 6 months    BP Readings from Last 3 Encounters:   11/08/17 128/62   10/23/17 130/60   10/03/17 122/60                Failed - Patient has documented A1c within the specified period of time.    If HgbA1C is 8 or greater, it needs to be on file within the past 3 months.  If less than 8, must be on file within the past 6 months.     Recent Labs   Lab Test  10/23/17   1015   A1C  7.2*            Failed - Recent (6 mo) or future (30 days) visit within the authorizing provider's specialty    Patient had office visit in the last 6 months or has a visit in the next 30 days with authorizing provider or within the authorizing provider's specialty.  See \"Patient Info\" tab in inbasket, or \"Choose Columns\" in Meds & Orders section of the refill encounter.           Passed - Patient has documented LDL within the past 12 mos.    Recent Labs   Lab Test  10/23/17   1015   LDL  39            Passed - Patient has had a Microalbumin in the past 12 mos.    Recent Labs   Lab Test  10/23/17   1016   MICROL  5   UMALCR  20.30            Passed - Patient is age 10 or older       Passed - Patient's CR is NOT>1.4 OR Patient's EGFR is NOT<45 within past 12 mos.    Recent Labs   Lab Test  10/23/17   1015   GFRESTIMATED  46*   GFRESTBLACK  55*       Recent Labs   Lab Test  10/23/17   1015   CR  1.15*            Passed - Patient does NOT have a diagnosis of CHF.       Passed - Patient is not pregnant       Passed - Patient has not had a positive " pregnancy test within the past 12 mos.            Chonodrocutaneous Helical Advancement Flap Text: The defect edges were debeveled with a #15 scalpel blade.  Given the location of the defect and the proximity to free margins a chondrocutaneous helical advancement flap was deemed most appropriate.  Using a sterile surgical marker, the appropriate advancement flap was drawn incorporating the defect and placing the expected incisions within the relaxed skin tension lines where possible.    The area thus outlined was incised deep to adipose tissue with a #15 scalpel blade.  The skin margins were undermined to an appropriate distance in all directions utilizing iris scissors.

## 2020-04-02 DIAGNOSIS — H69.92 DYSFUNCTION OF LEFT EUSTACHIAN TUBE: ICD-10-CM

## 2020-04-02 RX ORDER — FLUTICASONE PROPIONATE 50 MCG
SPRAY, SUSPENSION (ML) NASAL
Qty: 16 ML | Refills: 1 | Status: SHIPPED | OUTPATIENT
Start: 2020-04-02 | End: 2020-05-07

## 2020-05-04 DIAGNOSIS — H69.92 DYSFUNCTION OF LEFT EUSTACHIAN TUBE: ICD-10-CM

## 2020-05-06 NOTE — TELEPHONE ENCOUNTER
"Requested Prescriptions   Pending Prescriptions Disp Refills     fluticasone (FLONASE) 50 MCG/ACT nasal spray [Pharmacy Med Name: FLUTICASONE 50MCG NASAL SP (120) RX] 16 g      Sig: SHAKE LIQUID AND USE 1 TO 2 SPRAYS IN EACH NOSTRIL DAILY       Nasal Allergy Protocol Passed - 5/4/2020 11:17 AM        Passed - Patient is age 12 or older        Passed - Recent (12 mo) or future (30 days) visit within the authorizing provider's specialty     Patient has had an office visit with the authorizing provider or a provider within the authorizing providers department within the previous 12 mos or has a future within next 30 days. See \"Patient Info\" tab in inbasket, or \"Choose Columns\" in Meds & Orders section of the refill encounter.              Passed - Medication is active on med list           Last Written Prescription Date:  4/2/20  Last Fill Quantity: 16,  # refills: 1   Last office visit: 12/16/2019 with prescribing provider:  Arnaldo   Future Office Visit:   Next 5 appointments (look out 90 days)    Jun 17, 2020  1:20 PM CDT  SHORT with Morena Mcintosh MD  [facility] ([facility]) [address] [facility] [id number] [phone]                 "

## 2020-05-07 RX ORDER — FLUTICASONE PROPIONATE 50 MCG
SPRAY, SUSPENSION (ML) NASAL
Qty: 16 G | Refills: 3 | Status: SHIPPED | OUTPATIENT
Start: 2020-05-07 | End: 2020-09-21

## 2020-05-27 DIAGNOSIS — E78.5 HYPERLIPIDEMIA LDL GOAL <100: ICD-10-CM

## 2020-05-28 RX ORDER — SIMVASTATIN 20 MG
TABLET ORAL
Qty: 90 TABLET | Refills: 3 | OUTPATIENT
Start: 2020-05-28

## 2020-05-28 NOTE — TELEPHONE ENCOUNTER
5 months ago (12/16/2019)    simvastatin (ZOCOR) 20 MG tablet    Take 1 tablet (20 mg) by mouth At Bedtime    Dispense: 90 tablet     Refills: 3     Start: 12/16/2019      By: Morena Mcintosh MD

## 2020-06-24 DIAGNOSIS — I10 ESSENTIAL HYPERTENSION WITH GOAL BLOOD PRESSURE LESS THAN 140/90: ICD-10-CM

## 2020-06-25 ENCOUNTER — TELEPHONE (OUTPATIENT)
Dept: FAMILY MEDICINE | Facility: CLINIC | Age: 80
End: 2020-06-25

## 2020-06-25 DIAGNOSIS — I10 ESSENTIAL HYPERTENSION WITH GOAL BLOOD PRESSURE LESS THAN 140/90: ICD-10-CM

## 2020-06-25 RX ORDER — METOPROLOL TARTRATE 50 MG
TABLET ORAL
Qty: 60 TABLET | Refills: 0 | Status: SHIPPED | OUTPATIENT
Start: 2020-06-25 | End: 2020-06-26

## 2020-06-25 RX ORDER — METOPROLOL TARTRATE 50 MG
TABLET ORAL
Qty: 180 TABLET | OUTPATIENT
Start: 2020-06-25

## 2020-06-25 NOTE — TELEPHONE ENCOUNTER
"Requested Prescriptions   Pending Prescriptions Disp Refills     metoprolol tartrate (LOPRESSOR) 50 MG tablet [Pharmacy Med Name: METOPROLOL TARTRATE 50MG TABLETS] 180 tablet      Sig: TAKE 1 TABLET(50 MG) BY MOUTH TWICE DAILY       Beta-Blockers Protocol Passed - 6/25/2020 10:46 AM        Passed - Blood pressure under 140/90 in past 12 months     BP Readings from Last 3 Encounters:   12/16/19 118/68   08/12/19 132/58   07/18/19 98/46                 Passed - Patient is age 6 or older        Passed - Recent (12 mo) or future (30 days) visit within the authorizing provider's specialty     Patient has had an office visit with the authorizing provider or a provider within the authorizing providers department within the previous 12 mos or has a future within next 30 days. See \"Patient Info\" tab in inbasket, or \"Choose Columns\" in Meds & Orders section of the refill encounter.              Passed - Medication is active on med list           Last Written Prescription Date:  6/24/20  Last Fill Quantity: 60,  # refills: 0   Last office visit: 12/16/2019 with prescribing provider:  Arnaldo   Future Office Visit:   Next 5 appointments (look out 90 days)    Jul 01, 2020  1:40 PM CDT  SHORT with Morena Mcintosh MD  [facility] ([facility]  Arrive at: Clinic [address] [facility] [address]  [phone]         Routing refill request to provider for review/approval because:  Patient requesting 90 day supply.               "

## 2020-06-26 RX ORDER — METOPROLOL TARTRATE 50 MG
TABLET ORAL
Qty: 60 TABLET | Refills: 0 | Status: SHIPPED | OUTPATIENT
Start: 2020-06-26 | End: 2020-07-01

## 2020-06-26 NOTE — TELEPHONE ENCOUNTER
Patient has appointment on 7/1/20 but she is down to one pill.  Metoprolol refilled x 30 days per MD note below.    Lidia De Anda RN

## 2020-07-01 ENCOUNTER — OFFICE VISIT (OUTPATIENT)
Dept: FAMILY MEDICINE | Facility: CLINIC | Age: 80
End: 2020-07-01
Payer: COMMERCIAL

## 2020-07-01 VITALS
SYSTOLIC BLOOD PRESSURE: 118 MMHG | TEMPERATURE: 98.7 F | HEIGHT: 63 IN | RESPIRATION RATE: 16 BRPM | BODY MASS INDEX: 31.89 KG/M2 | HEART RATE: 61 BPM | DIASTOLIC BLOOD PRESSURE: 56 MMHG | WEIGHT: 180 LBS | OXYGEN SATURATION: 95 %

## 2020-07-01 DIAGNOSIS — N18.30 CKD (CHRONIC KIDNEY DISEASE) STAGE 3, GFR 30-59 ML/MIN (H): ICD-10-CM

## 2020-07-01 DIAGNOSIS — L72.0 MILIA: ICD-10-CM

## 2020-07-01 DIAGNOSIS — R60.0 BILATERAL EDEMA OF LOWER EXTREMITY: ICD-10-CM

## 2020-07-01 DIAGNOSIS — I10 ESSENTIAL HYPERTENSION WITH GOAL BLOOD PRESSURE LESS THAN 140/90: ICD-10-CM

## 2020-07-01 DIAGNOSIS — E78.5 HYPERLIPIDEMIA LDL GOAL <100: Primary | ICD-10-CM

## 2020-07-01 DIAGNOSIS — E11.22 TYPE 2 DIABETES MELLITUS WITH STAGE 3 CHRONIC KIDNEY DISEASE, WITHOUT LONG-TERM CURRENT USE OF INSULIN (H): ICD-10-CM

## 2020-07-01 DIAGNOSIS — E53.8 VITAMIN B12 DEFICIENCY (NON ANEMIC): ICD-10-CM

## 2020-07-01 DIAGNOSIS — N18.30 TYPE 2 DIABETES MELLITUS WITH STAGE 3 CHRONIC KIDNEY DISEASE, WITHOUT LONG-TERM CURRENT USE OF INSULIN (H): ICD-10-CM

## 2020-07-01 LAB
ALBUMIN SERPL-MCNC: 3.4 G/DL (ref 3.4–5)
ALP SERPL-CCNC: 96 U/L (ref 40–150)
ALT SERPL W P-5'-P-CCNC: 22 U/L (ref 0–50)
ANION GAP SERPL CALCULATED.3IONS-SCNC: 3 MMOL/L (ref 3–14)
AST SERPL W P-5'-P-CCNC: 27 U/L (ref 0–45)
BILIRUB SERPL-MCNC: 0.4 MG/DL (ref 0.2–1.3)
BUN SERPL-MCNC: 24 MG/DL (ref 7–30)
CALCIUM SERPL-MCNC: 9 MG/DL (ref 8.5–10.1)
CHLORIDE SERPL-SCNC: 103 MMOL/L (ref 94–109)
CO2 SERPL-SCNC: 31 MMOL/L (ref 20–32)
CREAT SERPL-MCNC: 1.18 MG/DL (ref 0.52–1.04)
CREAT UR-MCNC: 30 MG/DL
GFR SERPL CREATININE-BSD FRML MDRD: 44 ML/MIN/{1.73_M2}
GLUCOSE SERPL-MCNC: 165 MG/DL (ref 70–99)
HBA1C MFR BLD: 7.6 % (ref 0–5.6)
MICROALBUMIN UR-MCNC: <5 MG/L
MICROALBUMIN/CREAT UR: NORMAL MG/G CR (ref 0–25)
POTASSIUM SERPL-SCNC: 4.2 MMOL/L (ref 3.4–5.3)
PROT SERPL-MCNC: 7.8 G/DL (ref 6.8–8.8)
SODIUM SERPL-SCNC: 137 MMOL/L (ref 133–144)
VIT B12 SERPL-MCNC: 1545 PG/ML (ref 193–986)

## 2020-07-01 PROCEDURE — 82607 VITAMIN B-12: CPT | Performed by: FAMILY MEDICINE

## 2020-07-01 PROCEDURE — 99214 OFFICE O/P EST MOD 30 MIN: CPT | Performed by: FAMILY MEDICINE

## 2020-07-01 PROCEDURE — 82043 UR ALBUMIN QUANTITATIVE: CPT | Performed by: FAMILY MEDICINE

## 2020-07-01 PROCEDURE — 36415 COLL VENOUS BLD VENIPUNCTURE: CPT | Performed by: FAMILY MEDICINE

## 2020-07-01 PROCEDURE — 80053 COMPREHEN METABOLIC PANEL: CPT | Performed by: FAMILY MEDICINE

## 2020-07-01 PROCEDURE — 83036 HEMOGLOBIN GLYCOSYLATED A1C: CPT | Performed by: FAMILY MEDICINE

## 2020-07-01 RX ORDER — METOPROLOL TARTRATE 50 MG
TABLET ORAL
Qty: 180 TABLET | Refills: 3 | Status: SHIPPED | OUTPATIENT
Start: 2020-07-01 | End: 2021-05-05

## 2020-07-01 RX ORDER — FUROSEMIDE 40 MG
40 TABLET ORAL DAILY
Qty: 90 TABLET | Refills: 3 | Status: SHIPPED | OUTPATIENT
Start: 2020-07-01 | End: 2021-05-05

## 2020-07-01 ASSESSMENT — PAIN SCALES - GENERAL: PAINLEVEL: NO PAIN (0)

## 2020-07-01 ASSESSMENT — MIFFLIN-ST. JEOR: SCORE: 1252.66

## 2020-07-01 NOTE — LETTER
July 2, 2020      Milagro ALFREDO Rodrigo  [address] [address] [facility]   [place] [address]        Dear ,    We are writing to inform you of your test results.    HgbA1c up a bit at 7.6%, continue to work on lower carbohydrate diet.     Kidney function stable.     Vitamin B12 is now good.  Continue B12 1000 mcg daily.     No significant protein in the urine.     Resulted Orders   Albumin Random Urine Quantitative with Creat Ratio   Result Value Ref Range    Creatinine Urine 30 mg/dL    Albumin Urine mg/L <5 mg/L    Albumin Urine mg/g Cr Unable to calculate due to low value 0 - 25 mg/g Cr   Hemoglobin A1c   Result Value Ref Range    Hemoglobin A1C 7.6 (H) 0 - 5.6 %      Comment:      Normal <5.7% Prediabetes 5.7-6.4%  Diabetes 6.5% or higher - adopted from ADA   consensus guidelines.     Comprehensive metabolic panel   Result Value Ref Range    Sodium 137 133 - 144 mmol/L    Potassium 4.2 3.4 - 5.3 mmol/L    Chloride 103 94 - 109 mmol/L    Carbon Dioxide 31 20 - 32 mmol/L    Anion Gap 3 3 - 14 mmol/L    Glucose 165 (H) 70 - 99 mg/dL    Urea Nitrogen 24 7 - 30 mg/dL    Creatinine 1.18 (H) 0.52 - 1.04 mg/dL    GFR Estimate 44 (L) >60 mL/min/[1.73_m2]      Comment:      Non  GFR Calc  Starting 12/18/2018, serum creatinine based estimated GFR (eGFR) will be   calculated using the Chronic Kidney Disease Epidemiology Collaboration   (CKD-EPI) equation.      GFR Estimate If Black 51 (L) >60 mL/min/[1.73_m2]      Comment:       GFR Calc  Starting 12/18/2018, serum creatinine based estimated GFR (eGFR) will be   calculated using the Chronic Kidney Disease Epidemiology Collaboration   (CKD-EPI) equation.      Calcium 9.0 8.5 - 10.1 mg/dL    Bilirubin Total 0.4 0.2 - 1.3 mg/dL    Albumin 3.4 3.4 - 5.0 g/dL    Protein Total 7.8 6.8 - 8.8 g/dL    Alkaline Phosphatase 96 40 - 150 U/L    ALT 22 0 - 50 U/L    AST 27 0 - 45 U/L   Vitamin B12   Result Value Ref Range    Vitamin B12 1,545 (H) 193 - 986  pg/mL       If you have any questions or concerns, please call the clinic at the number listed above.       Sincerely,        Morena Mcintosh MD

## 2020-07-01 NOTE — PATIENT INSTRUCTIONS
Our Clinic hours are:  Mondays    7:20 am - 7 pm  Tues -  Fri  7:20 am - 5 pm    Clinic Phone: [phone]    The clinic lab opens at 7:30 am Mon - Fri and appointments are required.    [facility]. [phone]  Monday  8 am - 7pm  Tues - Fri 8 am - 5:30 pm

## 2020-07-01 NOTE — PROGRESS NOTES
Subjective     Milagro Wallace is a 80 year old female who presents to clinic today for the following health issues:    HPI   Diabetes Follow-up    How often are you checking your blood sugar? A few times a month  What time of day are you checking your blood sugars (select all that apply)?  Before meals  Have you had any blood sugars above 200?  No  Have you had any blood sugars below 70?  No    What symptoms do you notice when your blood sugar is low?  None    What concerns do you have today about your diabetes? None     Do you have any of these symptoms? (Select all that apply)  Numbness in feet bilateral big toes occ    Have you had a diabetic eye exam in the last 12 months? No      Hyperlipidemia Follow-Up      Are you regularly taking any medication or supplement to lower your cholesterol?   Yes- simvastatin    Are you having muscle aches or other side effects that you think could be caused by your cholesterol lowering medication?  No    Hypertension Follow-up      Do you check your blood pressure regularly outside of the clinic? No     Are you following a low salt diet? Yes    Are your blood pressures ever more than 140 on the top number (systolic) OR more   than 90 on the bottom number (diastolic), for example 140/90? No    BP Readings from Last 2 Encounters:   07/01/20 118/56   12/16/19 118/68     Hemoglobin A1C (%)   Date Value   12/16/2019 7.0 (H)   06/17/2019 7.2 (H)     LDL Cholesterol Calculated (mg/dL)   Date Value   12/16/2019 45   10/30/2018 49         How many servings of fruits and vegetables do you eat daily?  2-3    On average, how many sweetened beverages do you drink each day (Examples: soda, juice, sweet tea, etc.  Do NOT count diet or artificially sweetened beverages)?   0    How many days per week do you exercise enough to make your heart beat faster? 7    How many minutes a day do you exercise enough to make your heart beat faster? 10 - 19  Doing PT exercises    How many days per week do  "you miss taking your medication? 0      Reviewed and updated as needed this visit by Provider         Review of Systems   Constitutional, HEENT, cardiovascular, pulmonary, gi and gu systems are negative, except as otherwise noted.      Objective    /56   Pulse 61   Temp 98.7  F (37.1  C) (Tympanic)   Resp 16   Ht 1.588 m (5' 2.5\")   Wt 81.6 kg (180 lb)   LMP 09/25/1979 (Approximate)   SpO2 95%   BMI 32.40 kg/m    Body mass index is 32.4 kg/m .  Physical Exam   GENERAL: healthy, alert and no distress  NECK: no adenopathy, no asymmetry, masses, or scars and thyroid normal to palpation  RESP: lungs clear to auscultation - no rales, rhonchi or wheezes  CV: regular rate and rhythm, normal S1 S2, no S3 or S4, no murmur, click or rub, no peripheral edema and peripheral pulses strong  ABDOMEN: soft, nontender, no hepatosplenomegaly, no masses and bowel sounds normal  MS: no gross musculoskeletal defects noted, no edema  Skin: milia right crease.  Left temple.  Both very small.     Diagnostic Test Results:  Labs reviewed in Epic        Assessment & Plan     1. Type 2 diabetes mellitus with stage 3 chronic kidney disease, without long-term current use of insulin (H)     - metFORMIN (GLUCOPHAGE) 500 MG tablet; Take 1 tablet (500 mg) by mouth 2 times daily (with meals)  Dispense: 180 tablet; Refill: 1  - Albumin Random Urine Quantitative with Creat Ratio  - Hemoglobin A1c    2. Bilateral edema of lower extremity   stable/improved  - furosemide (LASIX) 40 MG tablet; Take 1 tablet (40 mg) by mouth daily  Dispense: 90 tablet; Refill: 3    3. Essential hypertension with goal blood pressure less than 140/90  Well controlled  - metoprolol tartrate (LOPRESSOR) 50 MG tablet; TAKE 1 TABLET(50 MG) BY MOUTH TWICE DAILY  Dispense: 180 tablet; Refill: 3  - Comprehensive metabolic panel    4. Hyperlipidemia LDL goal <100     - Comprehensive metabolic panel    5. CKD (chronic kidney disease) stage 3, GFR 30-59 ml/min (H)     - " Comprehensive metabolic panel    6. Vitamin B12 deficiency (non anemic)     - Vitamin B12    7. Milia     - DERMATOLOGY REFERRAL       Return in about 6 months (around 1/1/2021) for diabetes recheck.    Morena Mcintosh MD  [facility]

## 2020-07-02 NOTE — RESULT ENCOUNTER NOTE
HgbA1c up a bit at 7.6%, continue to work on lower carbohydrate diet.     Kidney function stable.     Vitamin B12 is now good.  Continue B12 1000 mcg daily.    No significant protein in the urine.    Morena Mcintosh M.D.

## 2020-07-23 DIAGNOSIS — I10 ESSENTIAL HYPERTENSION WITH GOAL BLOOD PRESSURE LESS THAN 140/90: ICD-10-CM

## 2020-07-24 RX ORDER — LISINOPRIL 40 MG/1
TABLET ORAL
Qty: 90 TABLET | Refills: 3 | Status: SHIPPED | OUTPATIENT
Start: 2020-07-24 | End: 2021-05-05

## 2020-07-24 NOTE — TELEPHONE ENCOUNTER
Routing refill request to provider for review/approval because:  Labs not current:  NA    Thank you    Amanda MCCOY RN

## 2020-07-29 ENCOUNTER — OFFICE VISIT (OUTPATIENT)
Dept: DERMATOLOGY | Facility: CLINIC | Age: 80
End: 2020-07-29
Attending: FAMILY MEDICINE
Payer: COMMERCIAL

## 2020-07-29 VITALS — OXYGEN SATURATION: 95 % | HEART RATE: 78 BPM | BODY MASS INDEX: 32.4 KG/M2 | HEIGHT: 63 IN

## 2020-07-29 DIAGNOSIS — L72.0 MILIA: ICD-10-CM

## 2020-07-29 DIAGNOSIS — L82.0 INFLAMED SEBORRHEIC KERATOSIS: Primary | ICD-10-CM

## 2020-07-29 PROCEDURE — 99213 OFFICE O/P EST LOW 20 MIN: CPT | Mod: 25 | Performed by: PHYSICIAN ASSISTANT

## 2020-07-29 PROCEDURE — 17110 DESTRUCTION B9 LES UP TO 14: CPT | Performed by: PHYSICIAN ASSISTANT

## 2020-07-29 NOTE — NURSING NOTE
"Chief Complaint   Patient presents with     Derm Problem       Vitals:    07/29/20 1417   Pulse: 78   SpO2: 95%   Height: 1.588 m (5' 2.5\")     Wt Readings from Last 1 Encounters:   07/01/20 81.6 kg (180 lb)       Candice Alston LPN.................7/29/2020    "

## 2020-07-29 NOTE — LETTER
2020         RE: Milagro Wallace  [address] [place] [address]        Dear Colleague,    Thank you for referring your patient, Milagro Wallace, to the [facility]. Please see a copy of my visit note below.    Milagro Wallace is a 80 year old year old female patient here today for spots on chest. She notes that it gets irritated, will rub on bra. She also notes white bumps on face. Patient has no other skin complaints today.  Remainder of the HPI, Meds, PMH, Allergies, FH, and SH was reviewed in chart  Pertinent Hx:  No personal history of skin cancer.   Past Medical History:   Diagnosis Date     Difficult intubation      Hypertension      Malignant hyperthermia      PONV (postoperative nausea and vomiting)      Tobacco use disorder      Type 2 diabetes mellitus without complications (H)        Past Surgical History:   Procedure Laterality Date     COLONOSCOPY       COLONOSCOPY N/A 10/29/2015    Procedure: COLONOSCOPY;  Surgeon: Adan De Santiago MD;  Location: [facility]     EYE SURGERY       PHACOEMULSIFICATION WITH STANDARD INTRAOCULAR LENS IMPLANT  2014    Procedure: PHACOEMULSIFICATION WITH STANDARD INTRAOCULAR LENS IMPLANT;  Surgeon: Jj Payne MD;  Location: [facility]     SURGICAL HISTORY OF -            SURGICAL HISTORY OF -       hysterectomy after C section        Family History   Problem Relation Age of Onset     Alzheimer Disease Mother      C.A.D. Mother           age 75     Cerebrovascular Disease Father        Social History     Socioeconomic History     Marital status:      Spouse name: Not on file     Number of children: Not on file     Years of education: Not on file     Highest education level: Not on file   Occupational History     Not on file   Social Needs     Financial resource strain: Not on file     Food insecurity     Worry: Not on file     Inability: Not on file     Transportation needs     Medical: Not on file      Non-medical: Not on file   Tobacco Use     Smoking status: Former Smoker     Packs/day: 0.70     Years: 26.00     Pack years: 18.20     Types: Cigarettes     Last attempt to quit: 2009     Years since quittin.5     Smokeless tobacco: Never Used     Tobacco comment:     Substance and Sexual Activity     Alcohol use: Yes     Comment: occ     Drug use: No     Sexual activity: Never     Partners: Male   Lifestyle     Physical activity     Days per week: Not on file     Minutes per session: Not on file     Stress: Not on file   Relationships     Social connections     Talks on phone: Not on file     Gets together: Not on file     Attends Jehovah's witness service: Not on file     Active member of club or organization: Not on file     Attends meetings of clubs or organizations: Not on file     Relationship status: Not on file     Intimate partner violence     Fear of current or ex partner: Not on file     Emotionally abused: Not on file     Physically abused: Not on file     Forced sexual activity: Not on file   Other Topics Concern     Parent/sibling w/ CABG, MI or angioplasty before 65F 55M? No   Social History Narrative     Not on file       Outpatient Encounter Medications as of 2020   Medication Sig Dispense Refill     ASPIRIN 81 MG OR TABS 1 TABLET DAILY       blood glucose monitoring (NO BRAND SPECIFIED) meter device kit Use to test blood sugar 1 time daily or as directed. One Touch Ultra Mini Glucometer. 1 kit 0     BLOOD PRESSURE KIT use as directed 1 kit 0     Cyanocobalamin (VITAMIN B-12 PO)        fluticasone (FLONASE) 50 MCG/ACT nasal spray SHAKE LIQUID AND USE 1 TO 2 SPRAYS IN EACH NOSTRIL DAILY 16 g 3     furosemide (LASIX) 40 MG tablet Take 1 tablet (40 mg) by mouth daily 90 tablet 3     LANCETS MISC. KIT use 1 daily 1 box 12     lisinopril (ZESTRIL) 40 MG tablet TAKE 1 TABLET BY MOUTH EVERY DAY 90 tablet 3     metFORMIN (GLUCOPHAGE) 500 MG tablet Take 1 tablet (500 mg) by mouth 2 times daily (with  "meals) 180 tablet 1     metoprolol tartrate (LOPRESSOR) 50 MG tablet TAKE 1 TABLET(50 MG) BY MOUTH TWICE DAILY 180 tablet 3     ONETOUCH DELICA LANCETS 33G MISC 1 lancet daily Use to test blood sugars 1 times daily or as directed. 100 each 6     order for DME Glucometer, brand as covered by insurance. 1 each 0     order for DME Test strips for pt's glucometer, brand as covered by insurance. Test daily and prn. 100 each 4     order for DME Glucometer, brand as covered by insurance. 1 each 0     simvastatin (ZOCOR) 20 MG tablet Take 1 tablet (20 mg) by mouth At Bedtime 90 tablet 3     No facility-administered encounter medications on file as of 7/29/2020.              Review Of Systems  Skin: As above  Eyes: negative  Ears/Nose/Throat: negative  Respiratory: No shortness of breath, dyspnea on exertion, cough, or hemoptysis  Cardiovascular: negative  Gastrointestinal: negative  Genitourinary: negative  Musculoskeletal: negative  Neurologic: negative  Psychiatric: negative  Hematologic/Lymphatic/Immunologic: negative  Endocrine: negative      O:   NAD, WDWN, Alert & Oriented, Mood & Affect wnl, Vitals stable   Here today alone   Pulse 78   Ht 1.588 m (5' 2.5\")   LMP 09/25/1979 (Approximate)   SpO2 95%   BMI 32.40 kg/m     General appearance normal   Vitals stable   Alert, oriented and in no acute distress     Brown stuck on papules on chest   White papule on right side of nose and left forehead     Eyes: Conjunctivae/lids:Normal     ENT: Lips: normal    MSK:Normal    Pulm: Breathing Normal    Neuro/Psych: Orientation:Alert and Orientedx3 ; Mood/Affect:normal   A/P:  1. Inflamed seborrheic keratoses on chest x 14  LN2:  Treated with LN2 for 5s for 1-2 cycles. Warned risks of blistering, pain, pigment change, scarring, and incomplete resolution.  Advised patient to return if lesions do not completely resolve.  Wound care sheet given.  2. Milia on right nose and left forehead  With patient consent, unroofed and " expressed. Routine wound care.       Again, thank you for allowing me to participate in the care of your patient.        Sincerely,        Nicole Farias PA-C

## 2020-07-29 NOTE — PROGRESS NOTES
Milagro Wallace is a 80 year old year old female patient here today for spots on chest. She notes that it gets irritated, will rub on bra. She also notes white bumps on face. Patient has no other skin complaints today.  Remainder of the HPI, Meds, PMH, Allergies, FH, and SH was reviewed in chart  Pertinent Hx:  No personal history of skin cancer.   Past Medical History:   Diagnosis Date     Difficult intubation      Hypertension      Malignant hyperthermia      PONV (postoperative nausea and vomiting)      Tobacco use disorder      Type 2 diabetes mellitus without complications (H)        Past Surgical History:   Procedure Laterality Date     COLONOSCOPY       COLONOSCOPY N/A 10/29/2015    Procedure: COLONOSCOPY;  Surgeon: Adan De Santiago MD;  Location: [facility]     EYE SURGERY       PHACOEMULSIFICATION WITH STANDARD INTRAOCULAR LENS IMPLANT  2014    Procedure: PHACOEMULSIFICATION WITH STANDARD INTRAOCULAR LENS IMPLANT;  Surgeon: Jj Payne MD;  Location: [facility]     SURGICAL HISTORY OF -            SURGICAL HISTORY OF 1979    hysterectomy after C section        Family History   Problem Relation Age of Onset     Alzheimer Disease Mother      C.A.D. Mother           age 75     Cerebrovascular Disease Father        Social History     Socioeconomic History     Marital status:      Spouse name: Not on file     Number of children: Not on file     Years of education: Not on file     Highest education level: Not on file   Occupational History     Not on file   Social Needs     Financial resource strain: Not on file     Food insecurity     Worry: Not on file     Inability: Not on file     Transportation needs     Medical: Not on file     Non-medical: Not on file   Tobacco Use     Smoking status: Former Smoker     Packs/day: 0.70     Years: 26.00     Pack years: 18.20     Types: Cigarettes     Last attempt to quit: 2009     Years since quittin.5     Smokeless tobacco: Never Used      Tobacco comment:     Substance and Sexual Activity     Alcohol use: Yes     Comment: occ     Drug use: No     Sexual activity: Never     Partners: Male   Lifestyle     Physical activity     Days per week: Not on file     Minutes per session: Not on file     Stress: Not on file   Relationships     Social connections     Talks on phone: Not on file     Gets together: Not on file     Attends Shinto service: Not on file     Active member of club or organization: Not on file     Attends meetings of clubs or organizations: Not on file     Relationship status: Not on file     Intimate partner violence     Fear of current or ex partner: Not on file     Emotionally abused: Not on file     Physically abused: Not on file     Forced sexual activity: Not on file   Other Topics Concern     Parent/sibling w/ CABG, MI or angioplasty before 65F 55M? No   Social History Narrative     Not on file       Outpatient Encounter Medications as of 7/29/2020   Medication Sig Dispense Refill     ASPIRIN 81 MG OR TABS 1 TABLET DAILY       blood glucose monitoring (NO BRAND SPECIFIED) meter device kit Use to test blood sugar 1 time daily or as directed. One Touch Ultra Mini Glucometer. 1 kit 0     BLOOD PRESSURE KIT use as directed 1 kit 0     Cyanocobalamin (VITAMIN B-12 PO)        fluticasone (FLONASE) 50 MCG/ACT nasal spray SHAKE LIQUID AND USE 1 TO 2 SPRAYS IN EACH NOSTRIL DAILY 16 g 3     furosemide (LASIX) 40 MG tablet Take 1 tablet (40 mg) by mouth daily 90 tablet 3     LANCETS MISC. KIT use 1 daily 1 box 12     lisinopril (ZESTRIL) 40 MG tablet TAKE 1 TABLET BY MOUTH EVERY DAY 90 tablet 3     metFORMIN (GLUCOPHAGE) 500 MG tablet Take 1 tablet (500 mg) by mouth 2 times daily (with meals) 180 tablet 1     metoprolol tartrate (LOPRESSOR) 50 MG tablet TAKE 1 TABLET(50 MG) BY MOUTH TWICE DAILY 180 tablet 3     ONETOUCH DELICA LANCETS 33G MISC 1 lancet daily Use to test blood sugars 1 times daily or as directed. 100 each 6     order for DME  "Glucometer, brand as covered by insurance. 1 each 0     order for DME Test strips for pt's glucometer, brand as covered by insurance. Test daily and prn. 100 each 4     order for DME Glucometer, brand as covered by insurance. 1 each 0     simvastatin (ZOCOR) 20 MG tablet Take 1 tablet (20 mg) by mouth At Bedtime 90 tablet 3     No facility-administered encounter medications on file as of 7/29/2020.              Review Of Systems  Skin: As above  Eyes: negative  Ears/Nose/Throat: negative  Respiratory: No shortness of breath, dyspnea on exertion, cough, or hemoptysis  Cardiovascular: negative  Gastrointestinal: negative  Genitourinary: negative  Musculoskeletal: negative  Neurologic: negative  Psychiatric: negative  Hematologic/Lymphatic/Immunologic: negative  Endocrine: negative      O:   NAD, WDWN, Alert & Oriented, Mood & Affect wnl, Vitals stable   Here today alone   Pulse 78   Ht 1.588 m (5' 2.5\")   LMP 09/25/1979 (Approximate)   SpO2 95%   BMI 32.40 kg/m     General appearance normal   Vitals stable   Alert, oriented and in no acute distress     Brown stuck on papules on chest   White papule on right side of nose and left forehead     Eyes: Conjunctivae/lids:Normal     ENT: Lips: normal    MSK:Normal    Pulm: Breathing Normal    Neuro/Psych: Orientation:Alert and Orientedx3 ; Mood/Affect:normal   A/P:  1. Inflamed seborrheic keratoses on chest x 14  LN2:  Treated with LN2 for 5s for 1-2 cycles. Warned risks of blistering, pain, pigment change, scarring, and incomplete resolution.  Advised patient to return if lesions do not completely resolve.  Wound care sheet given.  2. Milia on right nose and left forehead  With patient consent, unroofed and expressed. Routine wound care.     "

## 2020-09-05 DIAGNOSIS — E11.22 TYPE 2 DIABETES MELLITUS WITH STAGE 3 CHRONIC KIDNEY DISEASE, WITHOUT LONG-TERM CURRENT USE OF INSULIN (H): ICD-10-CM

## 2020-09-05 DIAGNOSIS — N18.30 TYPE 2 DIABETES MELLITUS WITH STAGE 3 CHRONIC KIDNEY DISEASE, WITHOUT LONG-TERM CURRENT USE OF INSULIN (H): ICD-10-CM

## 2020-09-21 DIAGNOSIS — H69.92 DYSFUNCTION OF LEFT EUSTACHIAN TUBE: ICD-10-CM

## 2020-09-21 NOTE — TELEPHONE ENCOUNTER
"Requested Prescriptions   Pending Prescriptions Disp Refills     fluticasone (FLONASE) 50 MCG/ACT nasal spray 16 g 3     Sig: Spray 1-2 sprays into both nostrils daily       Nasal Allergy Protocol Passed - 9/21/2020  1:51 PM        Passed - Patient is age 12 or older        Passed - Recent (12 mo) or future (30 days) visit within the authorizing provider's specialty     Patient has had an office visit with the authorizing provider or a provider within the authorizing providers department within the previous 12 mos or has a future within next 30 days. See \"Patient Info\" tab in inbasket, or \"Choose Columns\" in Meds & Orders section of the refill encounter.              Passed - Medication is active on med list             "

## 2020-09-22 RX ORDER — FLUTICASONE PROPIONATE 50 MCG
1-2 SPRAY, SUSPENSION (ML) NASAL DAILY
Qty: 16 G | Refills: 0 | Status: SHIPPED | OUTPATIENT
Start: 2020-09-22 | End: 2020-11-11

## 2020-10-09 ENCOUNTER — TELEPHONE (OUTPATIENT)
Dept: FAMILY MEDICINE | Facility: CLINIC | Age: 80
End: 2020-10-09

## 2020-10-09 NOTE — TELEPHONE ENCOUNTER
Reason for Call:chest pain for 2 weeks history of pneumonia      Detailed comments: patient has had chest pain like symptoms for the last 2 weeks. She states she has had pneumonia before, and it feels like that. She would like to be seen. Please advise. No fever, slight cough.    Phone Number Patient can be reached at:   [phone]    Best Time:     Can we leave a detailed message on this number? YES   Haven Arreola  [facility]        Call taken on 10/9/2020 at 11:45 AM by Haven Reed

## 2020-10-09 NOTE — TELEPHONE ENCOUNTER
"Patient reports chest pain that started about 1.5 weeks ago.  \"feels like a weight sitting on my left chest, like someone hung a bag of water around my neck.\"  Pain comes and goes, worse with activity.  Also reports back pain left side.  \"it's either pneumonia or pluerisy which I have had both in the past.\"  Denies fever, cough, upper respiratory symptoms, n/v, sore throat, cough, SOB, body aches or any other symptoms at this time.  RN advised she needs to be evaluated in ER as there are no clinic openings.  She is adamant that she see a doctor in clinic, \"I am not going to the ER\".  PCP does not have availability next week - only same day or virtual.  RN recommended again she go to ER.    \"I do not want to go to ER, I want an appointment.\"  Appt scheduled with a different provider for 10-12-20.    Jadyn CASTILLO RN BSN    "

## 2020-10-12 ENCOUNTER — ANCILLARY PROCEDURE (OUTPATIENT)
Dept: GENERAL RADIOLOGY | Facility: CLINIC | Age: 80
End: 2020-10-12
Attending: NURSE PRACTITIONER
Payer: COMMERCIAL

## 2020-10-12 ENCOUNTER — OFFICE VISIT (OUTPATIENT)
Dept: FAMILY MEDICINE | Facility: CLINIC | Age: 80
End: 2020-10-12
Payer: COMMERCIAL

## 2020-10-12 VITALS
TEMPERATURE: 97.9 F | BODY MASS INDEX: 32.32 KG/M2 | SYSTOLIC BLOOD PRESSURE: 138 MMHG | RESPIRATION RATE: 18 BRPM | WEIGHT: 182.4 LBS | DIASTOLIC BLOOD PRESSURE: 58 MMHG | HEART RATE: 67 BPM | HEIGHT: 63 IN | OXYGEN SATURATION: 94 %

## 2020-10-12 DIAGNOSIS — Z23 NEED FOR PROPHYLACTIC VACCINATION AND INOCULATION AGAINST INFLUENZA: ICD-10-CM

## 2020-10-12 DIAGNOSIS — R07.89 LEFT-SIDED CHEST WALL PAIN: Primary | ICD-10-CM

## 2020-10-12 DIAGNOSIS — E66.01 MORBID OBESITY (H): ICD-10-CM

## 2020-10-12 LAB
BASOPHILS # BLD AUTO: 0 10E9/L (ref 0–0.2)
BASOPHILS NFR BLD AUTO: 0.4 %
CRP SERPL-MCNC: <2.9 MG/L (ref 0–8)
D DIMER PPP FEU-MCNC: 3.7 UG/ML FEU (ref 0–0.5)
DIFFERENTIAL METHOD BLD: ABNORMAL
EOSINOPHIL # BLD AUTO: 0.1 10E9/L (ref 0–0.7)
EOSINOPHIL NFR BLD AUTO: 1.4 %
ERYTHROCYTE [DISTWIDTH] IN BLOOD BY AUTOMATED COUNT: 12.6 % (ref 10–15)
HCT VFR BLD AUTO: 35.3 % (ref 35–47)
HGB BLD-MCNC: 11.5 G/DL (ref 11.7–15.7)
LYMPHOCYTES # BLD AUTO: 3 10E9/L (ref 0.8–5.3)
LYMPHOCYTES NFR BLD AUTO: 33.4 %
MCH RBC QN AUTO: 30.9 PG (ref 26.5–33)
MCHC RBC AUTO-ENTMCNC: 32.6 G/DL (ref 31.5–36.5)
MCV RBC AUTO: 95 FL (ref 78–100)
MONOCYTES # BLD AUTO: 0.8 10E9/L (ref 0–1.3)
MONOCYTES NFR BLD AUTO: 8.7 %
NEUTROPHILS # BLD AUTO: 5.1 10E9/L (ref 1.6–8.3)
NEUTROPHILS NFR BLD AUTO: 56.1 %
PLATELET # BLD AUTO: 309 10E9/L (ref 150–450)
RBC # BLD AUTO: 3.72 10E12/L (ref 3.8–5.2)
WBC # BLD AUTO: 9.1 10E9/L (ref 4–11)

## 2020-10-12 PROCEDURE — 86140 C-REACTIVE PROTEIN: CPT | Performed by: NURSE PRACTITIONER

## 2020-10-12 PROCEDURE — 85379 FIBRIN DEGRADATION QUANT: CPT | Performed by: NURSE PRACTITIONER

## 2020-10-12 PROCEDURE — 71046 X-RAY EXAM CHEST 2 VIEWS: CPT | Mod: FY | Performed by: RADIOLOGY

## 2020-10-12 PROCEDURE — G0008 ADMIN INFLUENZA VIRUS VAC: HCPCS | Performed by: NURSE PRACTITIONER

## 2020-10-12 PROCEDURE — 36415 COLL VENOUS BLD VENIPUNCTURE: CPT | Performed by: NURSE PRACTITIONER

## 2020-10-12 PROCEDURE — 90662 IIV NO PRSV INCREASED AG IM: CPT | Performed by: NURSE PRACTITIONER

## 2020-10-12 PROCEDURE — 99214 OFFICE O/P EST MOD 30 MIN: CPT | Mod: 25 | Performed by: NURSE PRACTITIONER

## 2020-10-12 PROCEDURE — 85025 COMPLETE CBC W/AUTO DIFF WBC: CPT | Performed by: NURSE PRACTITIONER

## 2020-10-12 PROCEDURE — 93000 ELECTROCARDIOGRAM COMPLETE: CPT | Performed by: NURSE PRACTITIONER

## 2020-10-12 RX ORDER — NAPROXEN 500 MG/1
500 TABLET ORAL 2 TIMES DAILY WITH MEALS
Qty: 60 TABLET | Refills: 0 | Status: SHIPPED | OUTPATIENT
Start: 2020-10-12 | End: 2020-11-11

## 2020-10-12 ASSESSMENT — MIFFLIN-ST. JEOR: SCORE: 1263.55

## 2020-10-12 NOTE — PATIENT INSTRUCTIONS
Take 1 tab of Naproxen twice daily with food for 2 weeks and then once daily for 2 weeks and then stop.     Patient Education     Chest Wall Pain: Costochondritis    The chest pain that you have had today is caused by costochondritis. This condition is caused by an inflammation of the cartilage joining your ribs to your breastbone. It is not caused by heart or lung problems. Your healthcare team has made sure that the chest pain you feel is not from a life threatening cause of chest pain such as heart attack, collapsed lung, blood clot in the lung, tear in the aorta, or esophageal rupture. The inflammation may have been brought on by a blow to the chest, lifting heavy objects, intense exercise, or an illness that made you cough and sneeze a lot. It often occurs during times of emotional stress. It can be painful, but it is not dangerous. It usually goes away in 1 to 2 weeks. But it may happen again. Rarely, a more serious condition may cause symptoms similar to costochondritis. That s why it s important to watch for the warning signs listed below.  Home care  Follow these guidelines when caring for yourself at home:    If you feel that emotional stress is a cause of your condition, try to figure out the sources of that stress. It may not be obvious. Learn ways to deal with the stress in your life. This can include regular exercise, muscle relaxation, meditation, or simply taking time out for yourself.    You may use acetaminophen, ibuprofen, or naproxen to control pain, unless another pain medicine was prescribed. If you have liver or kidney disease or ever had a stomach ulcer, talk with your healthcare provider before using these medicines.    You can also help ease pain by using a hot, wet compress or heating pad. Use this with or without a medicated skin cream that helps relieves pain.    Do stretching exercise as advised by your provider.    Take any prescribed medicines as directed.  Follow-up care  Follow  up with your healthcare provider, or as advised, if you do not start to get better in the next 2 days.  When to seek medical advice  Call your healthcare provider right away if any of these occur:    A change in the type of pain. Call if it feels different, becomes more serious, lasts longer, or spreads into your shoulder, arm, neck, jaw, or back.    Shortness of breath or pain gets worse when you breathe    Weakness, dizziness, or fainting    Cough with dark-colored sputum (phlegm) or blood    Abdominal pain    Dark red or black stools    Fever of 100.4 F (38 C) or higher, or as directed by your healthcare provider  Date Last Reviewed: 12/1/2016 2000-2019 [employer]. [address] [place] [address], [place] [address]. All rights reserved. This information is not intended as a substitute for professional medical care. Always follow your healthcare professional's instructions.

## 2020-10-12 NOTE — PROGRESS NOTES
"Subjective     Milagroscar Wallace is a 80 year old female who presents to clinic today for the following health issues:    HPI       Hx of uncontrolled Type 2 DM, with CKD, hyperlipidemia, and controlled HTN. Denies hx of CAD but does have + family hx.      CHEST PAIN  Onset/Duration: 2 weeks hurts to walk, sit, lay down   Description:   Location: left side  Character: sharp-hurts to change positions  Radiation: back  Duration: seconds    Intensity: moderate  Progression of Symptoms: same and intermittent  Accompanying Signs & Symptoms:  Shortness of breath: no  Sweating: no  Nausea/vomiting: no  Lightheadedness: no  Palpitations: no  Fever/Chills: no  Cough: no           Heartburn: no  History:   Family history of heart disease: YES  Tobacco use: no  Previous similar symptoms: YES- pleurisy and pneumonia  Did some \"major cleaning\" prior to company coming 2 weeks ago  April- [place] then [place] (switched planes), No hx of DVT/PE, no recent surgery or prolonged immobilization. Denies recent swelling in legs or calf pains.  Precipitating factors:   Worse with exertion: doesn't do much  Worse with deep breaths: YES           Related to eating: no           Better with burping: no  Worse with changing positions, sneezing, deep breaths somewhat  Alleviating factors: aspirin-takes 2 at night to sleep and heat pad  Therapies tried and outcome: listed above       Review of Systems   Constitutional, HEENT, cardiovascular, pulmonary, gi and gu systems are negative, except as otherwise noted.      Objective    /58   Pulse 67   Temp 97.9  F (36.6  C) (Tympanic)   Resp 18   Ht 1.588 m (5' 2.5\")   Wt 82.7 kg (182 lb 6.4 oz)   LMP 09/25/1979 (Approximate)   SpO2 94%   BMI 32.83 kg/m    Body mass index is 32.83 kg/m .  Physical Exam   GENERAL: healthy, alert and no distress  RESP: lungs clear to auscultation - no rales, rhonchi or wheezes  CV: regular rate and rhythm, normal S1 S2, no S3 or S4, no murmur, " "click or rub, no peripheral edema and peripheral pulses strong  MS: no gross musculoskeletal defects noted, no edema, + tenderness to palpation over posterior left ribs, mild tenderness to left sided anterior upper ribs, no lower extremity edema, no calf tenderness  NEURO: Normal strength and tone, mentation intact and speech normal  PSYCH: mentation appears normal, affect normal/bright    CXR - awaiting formal interpretation from Radiologist at this time  EKG - Normal Sinus Rhythm, normal axis, normal intervals, no acute ST/T changes c/w ischemia        Assessment & Plan     Left-sided chest wall pain  Likely costochondritis with tenderness to palpation. EKG and CXR unremarkable. Well's score 0, low probability for PE.    - XR Chest 2 Views  - EKG 12-lead complete w/read - Clinics  - CBC with platelets and differential  - D dimer, quantitative  - CRP, inflammation  - naproxen (NAPROSYN) 500 MG tablet; Take 1 tablet (500 mg) by mouth 2 times daily (with meals)    Morbid obesity (H)      Need for prophylactic vaccination and inoculation against influenza    - FLUZONE HIGH DOSE 65+  [16433]  - ADMIN INFLUENZA (For MEDICARE Patients ONLY) []     BMI:   Estimated body mass index is 32.83 kg/m  as calculated from the following:    Height as of this encounter: 1.588 m (5' 2.5\").    Weight as of this encounter: 82.7 kg (182 lb 6.4 oz).   Weight management plan: Patient was referred to their PCP to discuss a diet and exercise plan.         FUTURE APPOINTMENTS:       - Follow up in 1-2 weeks for persistent pain, sooner PRN new or worsening symptoms. Discussed red flag symptoms that warrant an emergent evaluation.    ADDENDUM: D-dimer elevated, when adjusted for age she is well below the cut off, with the Wells score of 0 she is at low probability of PE> continue to monitor symptoms and trial anti-inflammatory.    Patient Instructions        Take 1 tab of Naproxen twice daily with food for 2 weeks and then once daily for " 2 weeks and then stop.     Patient Education     Chest Wall Pain: Costochondritis    The chest pain that you have had today is caused by costochondritis. This condition is caused by an inflammation of the cartilage joining your ribs to your breastbone. It is not caused by heart or lung problems. Your healthcare team has made sure that the chest pain you feel is not from a life threatening cause of chest pain such as heart attack, collapsed lung, blood clot in the lung, tear in the aorta, or esophageal rupture. The inflammation may have been brought on by a blow to the chest, lifting heavy objects, intense exercise, or an illness that made you cough and sneeze a lot. It often occurs during times of emotional stress. It can be painful, but it is not dangerous. It usually goes away in 1 to 2 weeks. But it may happen again. Rarely, a more serious condition may cause symptoms similar to costochondritis. That s why it s important to watch for the warning signs listed below.  Home care  Follow these guidelines when caring for yourself at home:    If you feel that emotional stress is a cause of your condition, try to figure out the sources of that stress. It may not be obvious. Learn ways to deal with the stress in your life. This can include regular exercise, muscle relaxation, meditation, or simply taking time out for yourself.    You may use acetaminophen, ibuprofen, or naproxen to control pain, unless another pain medicine was prescribed. If you have liver or kidney disease or ever had a stomach ulcer, talk with your healthcare provider before using these medicines.    You can also help ease pain by using a hot, wet compress or heating pad. Use this with or without a medicated skin cream that helps relieves pain.    Do stretching exercise as advised by your provider.    Take any prescribed medicines as directed.  Follow-up care  Follow up with your healthcare provider, or as advised, if you do not start to get better in  the next 2 days.  When to seek medical advice  Call your healthcare provider right away if any of these occur:    A change in the type of pain. Call if it feels different, becomes more serious, lasts longer, or spreads into your shoulder, arm, neck, jaw, or back.    Shortness of breath or pain gets worse when you breathe    Weakness, dizziness, or fainting    Cough with dark-colored sputum (phlegm) or blood    Abdominal pain    Dark red or black stools    Fever of 100.4 F (38 C) or higher, or as directed by your healthcare provider  Date Last Reviewed: 12/1/2016 2000-2019 [employer]. [address] [place] [address]. All rights reserved. This information is not intended as a substitute for professional medical care. Always follow your healthcare professional's instructions.               No follow-ups on file.    SUE Soares CNP  [facility]

## 2020-11-09 ENCOUNTER — TELEPHONE (OUTPATIENT)
Dept: FAMILY MEDICINE | Facility: CLINIC | Age: 80
End: 2020-11-09

## 2020-11-09 DIAGNOSIS — H69.92 DYSFUNCTION OF LEFT EUSTACHIAN TUBE: ICD-10-CM

## 2020-11-09 DIAGNOSIS — R07.89 LEFT-SIDED CHEST WALL PAIN: ICD-10-CM

## 2020-11-09 NOTE — TELEPHONE ENCOUNTER
Pt had decreased Naproxen to 1 tab per day.  The order is written to take 1 tab 2x/day.  Pt will take 1 tab 2x/day for awhile and then decrease to 1 tab per day when feeling better.  [email or web address]

## 2020-11-09 NOTE — TELEPHONE ENCOUNTER
Reason for Call:  Other prescription    Detailed comments: pt would like to increase naproxan although she was ordered to stop taking them 11/10/20  Pt states her pleurisy is back  Phone Number Patient can be reached at: Home number on file [phone] (home)    Best Time: any    Can we leave a detailed message on this number? YES    Call taken on 11/9/2020 at 4:22 PM by Mirtha Del Toro

## 2020-11-09 NOTE — TELEPHONE ENCOUNTER
"Requested Prescriptions   Pending Prescriptions Disp Refills     naproxen (NAPROSYN) 500 MG tablet [Pharmacy Med Name: NAPROXEN 500MG TABLETS] 60 tablet 0     Sig: TAKE 1 TABLET(500 MG) BY MOUTH TWICE DAILY WITH MEALS       NSAID Medications Failed - 11/9/2020  7:55 AM        Failed - Patient is age 6-64 years        Failed - Normal CBC on file in past 12 months     Recent Labs   Lab Test 10/12/20  1622   WBC 9.1   RBC 3.72*   HGB 11.5*   HCT 35.3              Failed - Normal serum creatinine on file in past 12 months     Recent Labs   Lab Test 07/01/20  1425   CR 1.18*       Ok to refill medication if creatinine is low          Passed - Blood pressure under 140/90 in past 12 months     BP Readings from Last 3 Encounters:   10/12/20 138/58   07/01/20 118/56   12/16/19 118/68           Passed - Normal ALT on file in past 12 months     Recent Labs   Lab Test 07/01/20  1425   ALT 22             Passed - Normal AST on file in past 12 months     Recent Labs   Lab Test 07/01/20  1425   AST 27           Passed - Recent (12 mo) or future (30 days) visit within the authorizing provider's specialty     Patient has had an office visit with the authorizing provider or a provider within the authorizing providers department within the previous 12 mos or has a future within next 30 days. See \"Patient Info\" tab in inbasket, or \"Choose Columns\" in Meds & Orders section of the refill encounter.              Passed - Medication is active on med list        Passed - No active pregnancy on record        Passed - No positive pregnancy test in past 12 months           fluticasone (FLONASE) 50 MCG/ACT nasal spray 16 g 0     Sig: Spray 1-2 sprays into both nostrils daily       Nasal Allergy Protocol Passed - 11/9/2020  7:55 AM        Passed - Patient is age 12 or older        Passed - Recent (12 mo) or future (30 days) visit within the authorizing provider's specialty     Patient has had an office visit with the authorizing provider " "or a provider within the authorizing providers department within the previous 12 mos or has a future within next 30 days. See \"Patient Info\" tab in inbasket, or \"Choose Columns\" in Meds & Orders section of the refill encounter.              Passed - Medication is active on med list             "

## 2020-11-11 RX ORDER — NAPROXEN 500 MG/1
TABLET ORAL
Qty: 60 TABLET | Refills: 0 | Status: SHIPPED | OUTPATIENT
Start: 2020-11-11 | End: 2021-04-28

## 2020-11-11 RX ORDER — FLUTICASONE PROPIONATE 50 MCG
1-2 SPRAY, SUSPENSION (ML) NASAL DAILY
Qty: 16 G | Refills: 0 | Status: SHIPPED | OUTPATIENT
Start: 2020-11-11 | End: 2020-12-29

## 2020-12-10 ENCOUNTER — TELEPHONE (OUTPATIENT)
Dept: FAMILY MEDICINE | Facility: CLINIC | Age: 80
End: 2020-12-10

## 2020-12-10 DIAGNOSIS — R07.89 LEFT-SIDED CHEST WALL PAIN: ICD-10-CM

## 2020-12-10 NOTE — TELEPHONE ENCOUNTER
"Requested Prescriptions   Pending Prescriptions Disp Refills     naproxen (NAPROSYN) 500 MG tablet [Pharmacy Med Name: NAPROXEN 500MG TABLETS] 60 tablet 0     Sig: TAKE 1 TABLET(500 MG) BY MOUTH TWICE DAILY WITH MEALS       NSAID Medications Failed - 12/10/2020  3:40 AM        Failed - Patient is age 6-64 years        Failed - Normal CBC on file in past 12 months     Recent Labs   Lab Test 10/12/20  1622   WBC 9.1   RBC 3.72*   HGB 11.5*   HCT 35.3                    Failed - Normal serum creatinine on file in past 12 months     Recent Labs   Lab Test 07/01/20  1425   CR 1.18*       Ok to refill medication if creatinine is low          Passed - Blood pressure under 140/90 in past 12 months     BP Readings from Last 3 Encounters:   10/12/20 138/58   07/01/20 118/56   12/16/19 118/68                 Passed - Normal ALT on file in past 12 months     Recent Labs   Lab Test 07/01/20  1425   ALT 22             Passed - Normal AST on file in past 12 months     Recent Labs   Lab Test 07/01/20  1425   AST 27             Passed - Recent (12 mo) or future (30 days) visit within the authorizing provider's specialty     Patient has had an office visit with the authorizing provider or a provider within the authorizing providers department within the previous 12 mos or has a future within next 30 days. See \"Patient Info\" tab in inbasket, or \"Choose Columns\" in Meds & Orders section of the refill encounter.              Passed - Medication is active on med list        Passed - No active pregnancy on record        Passed - No positive pregnancy test in past 12 months             "

## 2020-12-14 RX ORDER — NAPROXEN 500 MG/1
TABLET ORAL
Qty: 60 TABLET | Refills: 0 | OUTPATIENT
Start: 2020-12-14

## 2020-12-14 NOTE — TELEPHONE ENCOUNTER
This drug was meant to be temporary.  With her kidney disease I would not recommend this long term.  If there are ongoing symptoms, should follow up.     Morena Mcintosh M.D.

## 2020-12-24 DIAGNOSIS — H69.92 DYSFUNCTION OF LEFT EUSTACHIAN TUBE: ICD-10-CM

## 2020-12-24 NOTE — TELEPHONE ENCOUNTER
"Requested Prescriptions   Pending Prescriptions Disp Refills     fluticasone (FLONASE) 50 MCG/ACT nasal spray [Pharmacy Med Name: FLUTICASONE 50MCG NASAL SP (120) RX] 48 g      Sig: SHAKE LIQUID AND USE 1 TO 2 SPRAYS IN EACH NOSTRIL DAILY       Nasal Allergy Protocol Passed - 12/24/2020  3:39 AM        Passed - Patient is age 12 or older        Passed - Recent (12 mo) or future (30 days) visit within the authorizing provider's specialty     Patient has had an office visit with the authorizing provider or a provider within the authorizing providers department within the previous 12 mos or has a future within next 30 days. See \"Patient Info\" tab in inbasket, or \"Choose Columns\" in Meds & Orders section of the refill encounter.              Passed - Medication is active on med list             "

## 2020-12-29 RX ORDER — FLUTICASONE PROPIONATE 50 MCG
SPRAY, SUSPENSION (ML) NASAL
Qty: 48 G | Refills: 0 | Status: SHIPPED | OUTPATIENT
Start: 2020-12-29 | End: 2021-03-30

## 2020-12-29 NOTE — TELEPHONE ENCOUNTER
Prescription approved per [facility] Refill Protocol.    Valerie LUIS RN, BSN

## 2021-01-19 ENCOUNTER — TELEPHONE (OUTPATIENT)
Dept: FAMILY MEDICINE | Facility: CLINIC | Age: 81
End: 2021-01-19

## 2021-01-19 NOTE — TELEPHONE ENCOUNTER
Patient notified of recommendations. She agrees with the plan. Patient will call clinic back on Thursday or Friday to update on BPs.  Heidi Lawrence RN

## 2021-01-19 NOTE — TELEPHONE ENCOUNTER
Patient is in [place]. For the past 2 weeks, she has been dizzy on/off. She sometimes gets so dizzy that she has to hold on to something as the room keeps spinning. She has not been able to check her blood pressure. She is only drinking about 16 ounces of water per day. Her blood sugars have been around 131-151. She denies any congestion. Patient is currently on:  Furosemide 40 mg daily  Lisinopril  40 mg daily  Metformin 500 mg BID  Metoprolol 50 mg BID  Simvastatin 20 mg at bedtime  Patient did not take any of her medications today as she thought that she would be taken off of one of them due to her being dizzy.     Advised patient to continue with her medication. Increase fluids as she may not be getting enough fluids, should be seen in a urgent care in [place] as this has been going on for 2 weeks. Patient did not want to be seen down in [place]. She would like provider and nurse practitioner that she saw in October to review her medications and give recommendations. Routed to providers.  Heidi Lawrence RN

## 2021-01-19 NOTE — TELEPHONE ENCOUNTER
Reason for call:  Patient reporting a symptom    Symptom or request: Pt states that she has been dizzy again on and off x 2 weeks.  No other symptoms.  Please call patient and advise.      Duration (how long have symptoms been present): onoging    Have you been treated for this before? Yes    Additional comments:     Phone Number patient can be reached at:  Cell number on file:    Telephone Information:   Mobile [phone]     Best Time:  any    Can we leave a detailed message on this number:  YES    Call taken on 1/19/2021 at 11:14 AM by Tawanna Frye

## 2021-01-19 NOTE — TELEPHONE ENCOUNTER
I would recommend increased fluid intake to at LEAST double what she is taking in currently and that she  a blood pressure cuff at a local pharmacy, [facility], etc.  It would be important here to know what her blood pressure is running.     BP Readings from Last 6 Encounters:   10/12/20 138/58   07/01/20 118/56   12/16/19 118/68   08/12/19 132/58   07/18/19 98/46   06/17/19 115/54     The last blood pressure we have from October wasn't low, but she has had low values in the past.    She can call us in a few days to let us know what her blood pressure is running.  In the meantime, maybe just hold the furosemide a few days.  I would NOT hold the lisinopril, metformin, metoprolol or simvastatin.    ,Morena Mcintosh M.D.

## 2021-01-29 ENCOUNTER — TELEPHONE (OUTPATIENT)
Dept: FAMILY MEDICINE | Facility: CLINIC | Age: 81
End: 2021-01-29

## 2021-01-29 NOTE — TELEPHONE ENCOUNTER
Patient is still in [place] with her daughter. Per recommendations from 1/19/21 telephone encounter, patient has been drinking more water and had stopped the furosemide. The dizziness has stopped and she is feeling better. Daughter dies report that her ankles are slightly swollen. She does not wear compression stockings. Her breathing is good and she has not been short of air. Patient is having a hard time finding a blood pressure cuff that fits her well. She did have a reading of 144/94 with a good fitting cuff. She is taking all of her blood pressure medication as prescribed. Daughter Julia is wondering when she should start taking the furosemide and if she should start at a 1/2 tablet or a full tablet.    Also, she is scheduled to have her COVID vaccine today at 1:30 PM. Since the dizziness has resolved, she is wondering if it is still ok to get it at this time. Routed to provider.  Heidi Lawrence RN

## 2021-01-29 NOTE — TELEPHONE ENCOUNTER
Reason for Call:  Other f/u from 1/19 encounter    Detailed comments: pt has not restarted the furosemide since the 1/19 encounter but has been drinking more water. Daughter (john) is calling asking if pt can start taking half tablet or if pt should go back to full dose of furosemide.   Daughter also asking if it is ok for pt to get covid vaccine today at 1330. Pt told daughter PCP told her to hold off on getting this vaccine until dizziness is better. Pt reports no more dizzy spells at this time    Phone Number Patient can be reached at: Home number on file [phone] John    Best Time: any    Can we leave a detailed message on this number? YES    Call taken on 1/29/2021 at 7:58 AM by Mirtha Del Toro

## 2021-01-29 NOTE — TELEPHONE ENCOUNTER
Okay to get the COVID injection.    Start back at 1/2 dose of the furosemide and keep an eye on symptoms and blood pressure.    Morena Mcintosh M.D.

## 2021-01-29 NOTE — TELEPHONE ENCOUNTER
Relayed information to daughter Julia. She repeated recommendations.  Advised to get a BP cuff and get some readings and to check in on Tuesday. Julia agreed and will call back to give reading and report any symptoms.  Heidi Lawrence RN.

## 2021-02-02 NOTE — TELEPHONE ENCOUNTER
Patient readings are: 1/31  1130 131/77, 2/1 1130 am 129/77, 2/2 1130am 104/61. Patient is taking 1/2 pill of furosemide. Patient is doing well on 1/2 pill of furosemide and double the water (32 oz total). Blood sugar for 2/2 137. [place] DOS SANTOS Daniel on 2/2/2021 at 5:03 PM

## 2021-02-03 NOTE — TELEPHONE ENCOUNTER
Blood pressure looks good on the 1/2 dose of furosemide. Continue that at this time.    Morena Mcintosh M.D.

## 2021-02-03 NOTE — TELEPHONE ENCOUNTER
LM that B/P looks good and to continue on the 1/2 dose of Furosemide.  Pt is in [place].  Pt to call clinic with any questions.  Vin

## 2021-02-18 ENCOUNTER — TELEPHONE (OUTPATIENT)
Dept: FAMILY MEDICINE | Facility: CLINIC | Age: 81
End: 2021-02-18

## 2021-02-18 DIAGNOSIS — E78.5 HYPERLIPIDEMIA LDL GOAL <100: ICD-10-CM

## 2021-02-18 NOTE — TELEPHONE ENCOUNTER
SIMVASTATIN 20MG TABLETS  Last Written Prescription Date:  12/16/2019  Last Fill Quantity: 90,  # refills: 3   Last office visit: 10/12/2020 with prescribing provider:  armani   Future Office Visit:

## 2021-02-22 NOTE — TELEPHONE ENCOUNTER
Patient only has 2 days left.      Pending Prescriptions:                       Disp   Refills    simvastatin (ZOCOR) 20 MG tablet [Pharmac*90 tab*3            Sig: TAKE 1 TABLET BY MOUTH EVERY DAY IN THE EVENING    [id number], [place] - [address] [place] [id number]  Phone: [phone] Fax: [phone]

## 2021-02-23 RX ORDER — SIMVASTATIN 20 MG
20 TABLET ORAL AT BEDTIME
Qty: 90 TABLET | Refills: 0 | Status: SHIPPED | OUTPATIENT
Start: 2021-02-23 | End: 2021-02-24

## 2021-02-23 NOTE — TELEPHONE ENCOUNTER
Medication is being filled for 1 time refill only due to:  Patient needs labs Fasting lipids before next refill.  Heidi Lawrence RN

## 2021-02-24 RX ORDER — SIMVASTATIN 20 MG
20 TABLET ORAL AT BEDTIME
Qty: 90 TABLET | Refills: 0 | Status: SHIPPED | OUTPATIENT
Start: 2021-02-24 | End: 2021-05-05

## 2021-02-24 NOTE — TELEPHONE ENCOUNTER
Pt states that her Simvastatin Rx was sent to the wrong pharmacy.  Please resend Rx to [facility] in [place], [place] [facility].  Pt wants to  in 1 hour

## 2021-03-01 DIAGNOSIS — N18.31 TYPE 2 DIABETES MELLITUS WITH STAGE 3A CHRONIC KIDNEY DISEASE, WITHOUT LONG-TERM CURRENT USE OF INSULIN (H): Primary | ICD-10-CM

## 2021-03-01 DIAGNOSIS — E11.22 TYPE 2 DIABETES MELLITUS WITH STAGE 3 CHRONIC KIDNEY DISEASE, WITHOUT LONG-TERM CURRENT USE OF INSULIN (H): ICD-10-CM

## 2021-03-01 DIAGNOSIS — E11.22 TYPE 2 DIABETES MELLITUS WITH STAGE 3A CHRONIC KIDNEY DISEASE, WITHOUT LONG-TERM CURRENT USE OF INSULIN (H): Primary | ICD-10-CM

## 2021-03-01 DIAGNOSIS — N18.30 TYPE 2 DIABETES MELLITUS WITH STAGE 3 CHRONIC KIDNEY DISEASE, WITHOUT LONG-TERM CURRENT USE OF INSULIN (H): ICD-10-CM

## 2021-03-01 NOTE — TELEPHONE ENCOUNTER
"Requested Prescriptions   Pending Prescriptions Disp Refills     metFORMIN (GLUCOPHAGE) 500 MG tablet [Pharmacy Med Name: METFORMIN 500MG TABLETS] 180 tablet 1     Sig: TAKE 1 TABLET(500 MG) BY MOUTH TWICE DAILY WITH MEALS       Biguanide Agents Failed - 3/1/2021  3:39 AM        Failed - Patient has documented A1c within the specified period of time.     If HgbA1C is 8 or greater, it needs to be on file within the past 3 months.  If less than 8, must be on file within the past 6 months.     Recent Labs   Lab Test 07/01/20  1425   A1C 7.6*             Passed - Patient is age 10 or older        Passed - Patient's CR is NOT>1.4 OR Patient's EGFR is NOT<45 within past 12 mos.     Recent Labs   Lab Test 07/01/20  1425   GFRESTIMATED 44*   GFRESTBLACK 51*       Recent Labs   Lab Test 07/01/20  1425   CR 1.18*             Passed - Patient does NOT have a diagnosis of CHF.        Passed - Medication is active on med list        Passed - Patient is not pregnant        Passed - Patient has not had a positive pregnancy test within the past 12 mos.         Passed - Recent (6 mo) or future (30 days) visit within the authorizing provider's specialty     Patient had office visit in the last 6 months or has a visit in the next 30 days with authorizing provider or within the authorizing provider's specialty.  See \"Patient Info\" tab in inbasket, or \"Choose Columns\" in Meds & Orders section of the refill encounter.                 "

## 2021-03-02 NOTE — TELEPHONE ENCOUNTER
Routing refill request to provider for review/approval because:  Labs out of range:  A1C - 7.6    Heidi Lawrence RN

## 2021-03-04 ENCOUNTER — TELEPHONE (OUTPATIENT)
Dept: FAMILY MEDICINE | Facility: CLINIC | Age: 81
End: 2021-03-04

## 2021-03-04 DIAGNOSIS — N18.31 TYPE 2 DIABETES MELLITUS WITH STAGE 3A CHRONIC KIDNEY DISEASE, WITHOUT LONG-TERM CURRENT USE OF INSULIN (H): ICD-10-CM

## 2021-03-04 DIAGNOSIS — E11.22 TYPE 2 DIABETES MELLITUS WITH STAGE 3A CHRONIC KIDNEY DISEASE, WITHOUT LONG-TERM CURRENT USE OF INSULIN (H): ICD-10-CM

## 2021-03-04 NOTE — TELEPHONE ENCOUNTER
Received call from patient. She is in [place] and needing Metformin refilled. She is aware she is needing a annual visit and will schedule prior to further refill. She will be returning early May. Request forwarded to Dr Mcintosh.  Jade CHI RN

## 2021-03-04 NOTE — TELEPHONE ENCOUNTER
Pharmacy transfer request from [facility] to [facility] in [place].    Valerie LUIS RN, BSN

## 2021-03-06 ENCOUNTER — TELEPHONE (OUTPATIENT)
Dept: FAMILY MEDICINE | Facility: CLINIC | Age: 81
End: 2021-03-06

## 2021-03-06 ENCOUNTER — NURSE TRIAGE (OUTPATIENT)
Dept: NURSING | Facility: CLINIC | Age: 81
End: 2021-03-06

## 2021-03-06 DIAGNOSIS — E11.22 TYPE 2 DIABETES MELLITUS WITH STAGE 3A CHRONIC KIDNEY DISEASE, WITHOUT LONG-TERM CURRENT USE OF INSULIN (H): ICD-10-CM

## 2021-03-06 DIAGNOSIS — N18.31 TYPE 2 DIABETES MELLITUS WITH STAGE 3A CHRONIC KIDNEY DISEASE, WITHOUT LONG-TERM CURRENT USE OF INSULIN (H): ICD-10-CM

## 2021-03-06 NOTE — TELEPHONE ENCOUNTER
Milagro calls to inform that she is currently in [place] and needs a refill of her metformin. She was given 5 tablets by [facility] at [facility] pharmacy as an emergency supply.    She will return to [place] on May 1.    Recently refilled on 3/3, sent to [facility]. [facility]NA transferred Rx to [facility] per her request.    FNA informed patient that Rx was transferred and she verbalized understanding.    Cady Bowles RN/[place] Nurse Advisor        Additional Information    Caller requesting a refill, no triage required, and triager able to refill per unit policy    Protocols used: MEDICATION QUESTION CALL-A-

## 2021-03-06 NOTE — TELEPHONE ENCOUNTER
Milagro calls to inform that she is currently in [place] and needs a refill of her metformin. She was given 5 tablets by [facility] pharmacy as an emergency supply.     She will return to [place] on May 1.     Recently refilled on 3/3, sent to [facility]. FNA transferred Rx to [facility] per her request.     FNA informed patient that Rx was transferred and she verbalized understanding.     Cady Bowles RN/[place] Nurse Advisor

## 2021-03-26 DIAGNOSIS — H69.92 DYSFUNCTION OF LEFT EUSTACHIAN TUBE: ICD-10-CM

## 2021-03-26 NOTE — TELEPHONE ENCOUNTER
"Requested Prescriptions   Pending Prescriptions Disp Refills     fluticasone (FLONASE) 50 MCG/ACT nasal spray [Pharmacy Med Name: FLUTICASONE 50MCG NASAL SP (120) RX] 48 g 0     Sig: SHAKE LIQUID AND USE 1 TO 2 SPRAYS IN EACH NOSTRIL DAILY       Nasal Allergy Protocol Passed - 3/26/2021  3:39 AM        Passed - Patient is age 12 or older        Passed - Recent (12 mo) or future (30 days) visit within the authorizing provider's specialty     Patient has had an office visit with the authorizing provider or a provider within the authorizing providers department within the previous 12 mos or has a future within next 30 days. See \"Patient Info\" tab in inbasket, or \"Choose Columns\" in Meds & Orders section of the refill encounter.              Passed - Medication is active on med list             "

## 2021-03-30 RX ORDER — FLUTICASONE PROPIONATE 50 MCG
SPRAY, SUSPENSION (ML) NASAL
Qty: 48 G | Refills: 0 | Status: SHIPPED | OUTPATIENT
Start: 2021-03-30 | End: 2021-04-28

## 2021-04-27 ENCOUNTER — TELEPHONE (OUTPATIENT)
Dept: FAMILY MEDICINE | Facility: CLINIC | Age: 81
End: 2021-04-27

## 2021-04-27 NOTE — TELEPHONE ENCOUNTER
"Patient reports that she was in [place] when \"gout flare\" started. It began in her right great toe 2 weeks ago. Now both of her feet are swollen and very painful. She is not able to put on any shoes. She can only wear slippers. Patient has not had a uric acid level drawn since 5/11/09.  Advised patient to be seen sooner. Appointment changed.  Heidi Lawrence RN    "

## 2021-04-28 ENCOUNTER — OFFICE VISIT (OUTPATIENT)
Dept: FAMILY MEDICINE | Facility: CLINIC | Age: 81
End: 2021-04-28
Payer: COMMERCIAL

## 2021-04-28 VITALS
DIASTOLIC BLOOD PRESSURE: 62 MMHG | WEIGHT: 179 LBS | HEART RATE: 60 BPM | BODY MASS INDEX: 31.71 KG/M2 | SYSTOLIC BLOOD PRESSURE: 128 MMHG | HEIGHT: 63 IN | OXYGEN SATURATION: 94 % | RESPIRATION RATE: 18 BRPM | TEMPERATURE: 97.6 F

## 2021-04-28 DIAGNOSIS — G62.9 PERIPHERAL POLYNEUROPATHY: ICD-10-CM

## 2021-04-28 DIAGNOSIS — M10.9 ACUTE GOUTY ARTHROPATHY: Primary | ICD-10-CM

## 2021-04-28 DIAGNOSIS — N18.32 TYPE 2 DIABETES MELLITUS WITH STAGE 3B CHRONIC KIDNEY DISEASE, WITHOUT LONG-TERM CURRENT USE OF INSULIN (H): ICD-10-CM

## 2021-04-28 DIAGNOSIS — E78.5 HYPERLIPIDEMIA LDL GOAL <100: ICD-10-CM

## 2021-04-28 DIAGNOSIS — N18.32 STAGE 3B CHRONIC KIDNEY DISEASE (H): ICD-10-CM

## 2021-04-28 DIAGNOSIS — H69.92 DYSFUNCTION OF LEFT EUSTACHIAN TUBE: ICD-10-CM

## 2021-04-28 DIAGNOSIS — I10 HTN, GOAL BELOW 140/90: ICD-10-CM

## 2021-04-28 DIAGNOSIS — E11.22 TYPE 2 DIABETES MELLITUS WITH STAGE 3B CHRONIC KIDNEY DISEASE, WITHOUT LONG-TERM CURRENT USE OF INSULIN (H): ICD-10-CM

## 2021-04-28 LAB
ALBUMIN SERPL-MCNC: 3.4 G/DL (ref 3.4–5)
ALP SERPL-CCNC: 84 U/L (ref 40–150)
ALT SERPL W P-5'-P-CCNC: 24 U/L (ref 0–50)
ANION GAP SERPL CALCULATED.3IONS-SCNC: 6 MMOL/L (ref 3–14)
AST SERPL W P-5'-P-CCNC: 26 U/L (ref 0–45)
BILIRUB SERPL-MCNC: 0.4 MG/DL (ref 0.2–1.3)
BUN SERPL-MCNC: 28 MG/DL (ref 7–30)
CALCIUM SERPL-MCNC: 9 MG/DL (ref 8.5–10.1)
CHLORIDE SERPL-SCNC: 102 MMOL/L (ref 94–109)
CHOLEST SERPL-MCNC: 115 MG/DL
CO2 SERPL-SCNC: 28 MMOL/L (ref 20–32)
CREAT SERPL-MCNC: 1.25 MG/DL (ref 0.52–1.04)
CREAT UR-MCNC: 28 MG/DL
GFR SERPL CREATININE-BSD FRML MDRD: 41 ML/MIN/{1.73_M2}
GLUCOSE SERPL-MCNC: 168 MG/DL (ref 70–99)
HBA1C MFR BLD: 7.2 % (ref 0–5.6)
HDLC SERPL-MCNC: 58 MG/DL
LDLC SERPL CALC-MCNC: 32 MG/DL
MICROALBUMIN UR-MCNC: 7 MG/L
MICROALBUMIN/CREAT UR: 24.72 MG/G CR (ref 0–25)
NONHDLC SERPL-MCNC: 57 MG/DL
POTASSIUM SERPL-SCNC: 4.4 MMOL/L (ref 3.4–5.3)
PROT SERPL-MCNC: 7.6 G/DL (ref 6.8–8.8)
SODIUM SERPL-SCNC: 136 MMOL/L (ref 133–144)
TRIGL SERPL-MCNC: 125 MG/DL
URATE SERPL-MCNC: 8.1 MG/DL (ref 2.6–6)
VIT B12 SERPL-MCNC: 1686 PG/ML (ref 193–986)

## 2021-04-28 PROCEDURE — 83036 HEMOGLOBIN GLYCOSYLATED A1C: CPT | Performed by: FAMILY MEDICINE

## 2021-04-28 PROCEDURE — 80061 LIPID PANEL: CPT | Performed by: FAMILY MEDICINE

## 2021-04-28 PROCEDURE — 99214 OFFICE O/P EST MOD 30 MIN: CPT | Performed by: FAMILY MEDICINE

## 2021-04-28 PROCEDURE — 36415 COLL VENOUS BLD VENIPUNCTURE: CPT | Performed by: FAMILY MEDICINE

## 2021-04-28 PROCEDURE — 82043 UR ALBUMIN QUANTITATIVE: CPT | Performed by: FAMILY MEDICINE

## 2021-04-28 PROCEDURE — 84550 ASSAY OF BLOOD/URIC ACID: CPT | Performed by: FAMILY MEDICINE

## 2021-04-28 PROCEDURE — 80053 COMPREHEN METABOLIC PANEL: CPT | Performed by: FAMILY MEDICINE

## 2021-04-28 PROCEDURE — 82607 VITAMIN B-12: CPT | Performed by: FAMILY MEDICINE

## 2021-04-28 PROCEDURE — 99207 PR FOOT EXAM NO CHARGE: CPT | Performed by: FAMILY MEDICINE

## 2021-04-28 RX ORDER — SIMVASTATIN 20 MG
20 TABLET ORAL AT BEDTIME
Qty: 90 TABLET | Refills: 3 | Status: CANCELLED | OUTPATIENT
Start: 2021-04-28

## 2021-04-28 RX ORDER — FLUTICASONE PROPIONATE 50 MCG
1-2 SPRAY, SUSPENSION (ML) NASAL DAILY
Qty: 48 G | Refills: 3 | Status: CANCELLED | OUTPATIENT
Start: 2021-04-28

## 2021-04-28 RX ORDER — FLUTICASONE PROPIONATE 50 MCG
1-2 SPRAY, SUSPENSION (ML) NASAL DAILY
Qty: 48 G | Refills: 3 | Status: SHIPPED | OUTPATIENT
Start: 2021-04-28 | End: 2021-11-08

## 2021-04-28 RX ORDER — GABAPENTIN 100 MG/1
100 CAPSULE ORAL AT BEDTIME
Qty: 30 CAPSULE | Refills: 0 | Status: SHIPPED | OUTPATIENT
Start: 2021-04-28 | End: 2021-05-05

## 2021-04-28 ASSESSMENT — PAIN SCALES - GENERAL: PAINLEVEL: SEVERE PAIN (6)

## 2021-04-28 ASSESSMENT — MIFFLIN-ST. JEOR: SCORE: 1243.13

## 2021-04-28 NOTE — PATIENT INSTRUCTIONS
Gabapentin (Neurontin) 100 mg at bedtime    See me next week    Our Clinic hours are:  Mondays    7:20 am - 7 pm  Tues -  Fri  7:20 am - 5 pm    Clinic Phone: [phone]    The clinic lab opens at 7:30 am Mon - Fri and appointments are required.    [facility] [place]  Ph. [phone]  Monday  8 am - 7pm  Tues - Fri 8 am - 5:30 pm

## 2021-04-28 NOTE — PROGRESS NOTES
"    Assessment & Plan   Peripheral polyneuropathy  Unlikely to be gout, but will get uric acid  Likely just progression of neuropathy due to diabetes  Start gabapentin 100 mg at HS, may slowly adjust this if tolerated  - gabapentin (NEURONTIN) 100 MG capsule; Take 1 capsule (100 mg) by mouth At Bedtime  - FOOT EXAM    Type 2 diabetes mellitus with stage 3b chronic kidney disease, without long-term current use of insulin (H)  HgbA1c is 7.2%  Patient has follow up scheduled for diabetes next week  - Albumin Random Urine Quantitative with Creat Ratio  - Comprehensive metabolic panel  - Lipid panel reflex to direct LDL Non-fasting  - Hemoglobin A1c  - Vitamin B12  - FOOT EXAM    Stage 3b chronic kidney disease     - Albumin Random Urine Quantitative with Creat Ratio    HTN, goal below 140/90  Well controlled  - Comprehensive metabolic panel    Hyperlipidemia LDL goal <100     - Lipid panel reflex to direct LDL Non-fasting    Dysfunction of left eustachian tube     - fluticasone (FLONASE) 50 MCG/ACT nasal spray; Spray 1-2 sprays into both nostrils daily                 BMI:   Estimated body mass index is 32.22 kg/m  as calculated from the following:    Height as of this encounter: 1.588 m (5' 2.5\").    Weight as of this encounter: 81.2 kg (179 lb).           No follow-ups on file.    Morena Mcintosh MD  [facility]   Milagro is a 81 year old who presents for the following health issues  accompanied by her self:    HPI   Chief Complaint   Patient presents with     Arthritis     Patient is having a gout flare up in bilateral feet. Patient feels like right foot is worse and is not able to have anything touch it. Patient rates pain currently at a 6/10. Patient has taken 2 tylenol with some relief.      Patient has diabetic neuropathy/numbness in her feet and has for some time.  1.5 weeks ago she started having increased burning to the point that it hurts to even have the sheet touch her " "feet at night. Tylenol did help last night.    No real pain during the day.    Review of Systems   Constitutional, HEENT, cardiovascular, pulmonary, gi and gu systems are negative, except as otherwise noted.      Objective    /62   Pulse 60   Temp 97.6  F (36.4  C) (Tympanic)   Resp 18   Ht 1.588 m (5' 2.5\")   Wt 81.2 kg (179 lb)   LMP 09/25/1979 (Approximate)   SpO2 94%   Breastfeeding No   BMI 32.22 kg/m    Body mass index is 32.22 kg/m .  Physical Exam   GENERAL APPEARANCE: healthy, alert and no distress  MS: edema is 1-2+ bilaterally   No redness/warmth of her feet/toes  No calf pain.     Diabetic Foot Screen:  Any complaints of increased pain or numbness ?  YES see above  Is there a foot ulcer now or a history of foot ulcer? No  Does the foot have an abnormal shape? No  Are the nails thick, too long or ingrown? No  Are there any redness or open areas? No         Sensation Testing done at all points on the diagram with monofilament     Right Foot: Sensation Absent at the following points , 1, 2, 3, 4 and 10  Left Foot: Sensation Absent at the following points , 1, 2, 3, 4 and 10     Risk Category: 1- Loss of protective sensation with normal appearing foot (no weakness, deformity, callous, pre-ulcer or h/o ulceration)  Performed by Morena Mcintosh MD          Results for orders placed or performed in visit on 04/28/21   Hemoglobin A1c     Status: Abnormal   Result Value Ref Range    Hemoglobin A1C 7.2 (H) 0 - 5.6 %                         "

## 2021-04-29 RX ORDER — PREDNISONE 20 MG/1
40 TABLET ORAL DAILY
Qty: 10 TABLET | Refills: 0 | Status: SHIPPED | OUTPATIENT
Start: 2021-04-29 | End: 2021-05-05

## 2021-05-05 ENCOUNTER — OFFICE VISIT (OUTPATIENT)
Dept: FAMILY MEDICINE | Facility: CLINIC | Age: 81
End: 2021-05-05
Payer: COMMERCIAL

## 2021-05-05 VITALS
TEMPERATURE: 97.3 F | HEART RATE: 56 BPM | DIASTOLIC BLOOD PRESSURE: 62 MMHG | WEIGHT: 179 LBS | BODY MASS INDEX: 31.71 KG/M2 | HEIGHT: 63 IN | SYSTOLIC BLOOD PRESSURE: 128 MMHG | OXYGEN SATURATION: 94 % | RESPIRATION RATE: 16 BRPM

## 2021-05-05 DIAGNOSIS — G62.9 PERIPHERAL POLYNEUROPATHY: ICD-10-CM

## 2021-05-05 DIAGNOSIS — I10 ESSENTIAL HYPERTENSION WITH GOAL BLOOD PRESSURE LESS THAN 140/90: ICD-10-CM

## 2021-05-05 DIAGNOSIS — R60.0 BILATERAL EDEMA OF LOWER EXTREMITY: ICD-10-CM

## 2021-05-05 DIAGNOSIS — N18.31 TYPE 2 DIABETES MELLITUS WITH STAGE 3A CHRONIC KIDNEY DISEASE, WITHOUT LONG-TERM CURRENT USE OF INSULIN (H): ICD-10-CM

## 2021-05-05 DIAGNOSIS — E66.01 MORBID OBESITY (H): ICD-10-CM

## 2021-05-05 DIAGNOSIS — E78.5 HYPERLIPIDEMIA LDL GOAL <100: ICD-10-CM

## 2021-05-05 DIAGNOSIS — Z00.00 ENCOUNTER FOR MEDICARE ANNUAL WELLNESS EXAM: Primary | ICD-10-CM

## 2021-05-05 DIAGNOSIS — E11.22 TYPE 2 DIABETES MELLITUS WITH STAGE 3A CHRONIC KIDNEY DISEASE, WITHOUT LONG-TERM CURRENT USE OF INSULIN (H): ICD-10-CM

## 2021-05-05 PROCEDURE — 99397 PER PM REEVAL EST PAT 65+ YR: CPT | Performed by: FAMILY MEDICINE

## 2021-05-05 PROCEDURE — 99213 OFFICE O/P EST LOW 20 MIN: CPT | Mod: 25 | Performed by: FAMILY MEDICINE

## 2021-05-05 RX ORDER — LISINOPRIL 40 MG/1
40 TABLET ORAL DAILY
Qty: 90 TABLET | Refills: 3 | Status: SHIPPED | OUTPATIENT
Start: 2021-05-05 | End: 2022-07-18

## 2021-05-05 RX ORDER — SIMVASTATIN 20 MG
20 TABLET ORAL AT BEDTIME
Qty: 90 TABLET | Refills: 3 | Status: SHIPPED | OUTPATIENT
Start: 2021-05-05 | End: 2022-05-24

## 2021-05-05 RX ORDER — GABAPENTIN 100 MG/1
100 CAPSULE ORAL AT BEDTIME
Qty: 90 CAPSULE | Refills: 3 | Status: SHIPPED | OUTPATIENT
Start: 2021-05-05 | End: 2021-07-28

## 2021-05-05 RX ORDER — FUROSEMIDE 40 MG
40 TABLET ORAL DAILY
Qty: 90 TABLET | Refills: 3 | Status: SHIPPED | OUTPATIENT
Start: 2021-05-05 | End: 2022-07-18

## 2021-05-05 RX ORDER — METOPROLOL TARTRATE 50 MG
TABLET ORAL
Qty: 180 TABLET | Refills: 3 | Status: SHIPPED | OUTPATIENT
Start: 2021-05-05 | End: 2021-11-23

## 2021-05-05 ASSESSMENT — MIFFLIN-ST. JEOR: SCORE: 1243.13

## 2021-05-05 ASSESSMENT — PAIN SCALES - GENERAL: PAINLEVEL: NO PAIN (0)

## 2021-05-05 ASSESSMENT — ACTIVITIES OF DAILY LIVING (ADL): CURRENT_FUNCTION: NO ASSISTANCE NEEDED

## 2021-05-05 NOTE — PROGRESS NOTES
"SUBJECTIVE:   Milagro Wallace is a 81 year old female who presents for Preventive Visit.      Patient has been advised of split billing requirements and indicates understanding: Yes   Are you in the first 12 months of your Medicare coverage?  No    Healthy Habits:    In general, how would you rate your overall health?  Good    Frequency of exercise:  None    Duration of exercise:  Less than 15 minutes    Do you usually eat at least 4 servings of fruit and vegetables a day, include whole grains    & fiber and avoid regularly eating high fat or \"junk\" foods?  Yes (fruit)    Taking medications regularly:  No    Barriers to taking medications:  None    Medication side effects:  Other (blurred vision)    Ability to successfully perform activities of daily living:  No assistance needed    Home Safety:  No safety concerns identified    Hearing Impairment:  No hearing concerns    In the past 6 months, have you been bothered by leaking of urine?  No    In general, how would you rate your overall mental or emotional health?  Good      PHQ-2 Total Score:    Additional concerns today:  Yes (would like to discuss if it is prednisone or gabapentin caused her foot pain to go away. Patient also noted that the last couple days she developed blurred vision and unsure which one of these medication is causing it. )    Do you feel safe in your environment? Yes    Have you ever done Advance Care Planning? (For example, a Health Directive, POLST, or a discussion with a medical provider or your loved ones about your wishes): Yes, advance care planning is on file.       Fall risk  Fallen 2 or more times in the past year?: No  Any fall with injury in the past year?: No    Cognitive Screening   1) Repeat 3 items (Leader, Season, Table)    2) Clock draw: unable to draw due to having double vision concerns  3) 3 item recall: Recalls 2 objects   Results: NORMAL clock, 1-2 items recalled: COGNITIVE IMPAIRMENT LESS LIKELY    Mini-CogTM Copyright " DILEEP [employer]. Licensed by the author for use in [facility]; reprinted with permission ([email or web address]). All rights reserved.      Do you have sleep apnea, excessive snoring or daytime drowsiness?: no    Reviewed and updated as needed this visit by clinical staff  Tobacco  Allergies  Meds  Problems             Reviewed and updated as needed this visit by Provider                Social History     Tobacco Use     Smoking status: Former Smoker     Packs/day: 0.70     Years: 26.00     Pack years: 18.20     Types: Cigarettes     Quit date: 2009     Years since quittin.3     Smokeless tobacco: Never Used     Tobacco comment:     Substance Use Topics     Alcohol use: Yes     Comment: occ     If you drink alcohol do you typically have >3 drinks per day or >7 drinks per week? No    Alcohol Use 2015   Prescreen: >3 drinks/day or >7 drinks/week? The patient does not drink >3 drinks per day nor >7 drinks per week.   No flowsheet data found.        Diabetes Follow-up    How often are you checking your blood sugar? A few times a month  What time of day are you checking your blood sugars (select all that apply)?  Before meals  Have you had any blood sugars above 200?  No  Have you had any blood sugars below 70?  No    What symptoms do you notice when your blood sugar is low?  None    What concerns do you have today about your diabetes? None     Do you have any of these symptoms? (Select all that apply)  Numbness in feet    Have you had a diabetic eye exam in the last 12 months? No          Hyperlipidemia Follow-Up      Are you regularly taking any medication or supplement to lower your cholesterol?   Yes- simvastatin    Are you having muscle aches or other side effects that you think could be caused by your cholesterol lowering medication?  No    Hypertension Follow-up      Do you check your blood pressure regularly outside of the clinic? Yes- just got a new meter in March    Are you following a low salt  "diet? Yes    Are your blood pressures ever more than 140 on the top number (systolic) OR more   than 90 on the bottom number (diastolic), for example 140/90? No    BP Readings from Last 2 Encounters:   05/05/21 128/62   04/28/21 128/62     Hemoglobin A1C (%)   Date Value   04/28/2021 7.2 (H)   07/01/2020 7.6 (H)     LDL Cholesterol Calculated (mg/dL)   Date Value   04/28/2021 32   12/16/2019 45         Current providers sharing in care for this patient include:   Patient Care Team:  Morena Mcintosh MD as PCP - General  Morena Mcintosh MD as Assigned PCP    The following health maintenance items are reviewed in Epic and correct as of today:  Health Maintenance Due   Topic Date Due     URINE DRUG SCREEN  Never done     EYE EXAM  Never done     ZOSTER IMMUNIZATION (1 of 2) Never done     Pneumococcal Vaccine: Pediatrics (0 to 5 Years) and At-Risk Patients (6 to 64 Years) (2 of 2 - PCV13) 08/19/2010     MEDICARE ANNUAL WELLNESS VISIT  09/16/2016     FALL RISK ASSESSMENT  12/16/2020     Labs reviewed in EPIC      Mammogram Screening - Patient over age 75, has elected to discontinue screenings.  Pertinent mammograms are reviewed under the imaging tab.    Review of Systems  Constitutional, HEENT, cardiovascular, pulmonary, gi and gu systems are negative, except as otherwise noted.    Some blurred vision on the prednisone, that has improved.  Was not checking blood sugar while on the prednisone.     OBJECTIVE:   /62   Pulse 56   Temp 97.3  F (36.3  C) (Tympanic)   Resp 16   Ht 1.588 m (5' 2.5\")   Wt 81.2 kg (179 lb)   LMP 09/25/1979 (Approximate)   SpO2 94%   Breastfeeding No   BMI 32.22 kg/m   Estimated body mass index is 32.22 kg/m  as calculated from the following:    Height as of this encounter: 1.588 m (5' 2.5\").    Weight as of this encounter: 81.2 kg (179 lb).  Physical Exam  GENERAL: healthy, alert and no distress  NECK: no adenopathy, no asymmetry, masses, or scars and thyroid normal to " palpation  RESP: lungs clear to auscultation - no rales, rhonchi or wheezes  CV: regular rate and rhythm, normal S1 S2, no S3 or S4, no murmur, click or rub, no peripheral edema and peripheral pulses strong  ABDOMEN: soft, nontender, no hepatosplenomegaly, no masses and bowel sounds normal  MS: no gross musculoskeletal defects noted, no edema    Diagnostic Test Results:  Labs reviewed in Epic    ASSESSMENT / PLAN:   1. Encounter for Medicare annual wellness exam       2. Bilateral edema of lower extremity     - furosemide (LASIX) 40 MG tablet; Take 1 tablet (40 mg) by mouth daily  Dispense: 90 tablet; Refill: 3  - OFFICE/OUTPT VISIT,EST,LEVL IV    3. Peripheral polyneuropathy  Improved  ?neuropathy vs gout.      - gabapentin (NEURONTIN) 100 MG capsule; Take 1 capsule (100 mg) by mouth At Bedtime  Dispense: 90 capsule; Refill: 3  - OFFICE/OUTPT VISIT,EST,LEVL IV    4. Essential hypertension with goal blood pressure less than 140/90  Well controlled  - lisinopril (ZESTRIL) 40 MG tablet; Take 1 tablet (40 mg) by mouth daily  Dispense: 90 tablet; Refill: 3  - metoprolol tartrate (LOPRESSOR) 50 MG tablet; TAKE 1 TABLET(50 MG) BY MOUTH TWICE DAILY  Dispense: 180 tablet; Refill: 3  - OFFICE/OUTPT VISIT,EST,LEVL IV    5. Type 2 diabetes mellitus with stage 3a chronic kidney disease, without long-term current use of insulin (H)     - metFORMIN (GLUCOPHAGE) 500 MG tablet; Take 1 tablet (500 mg) by mouth 2 times daily (with meals)  Dispense: 180 tablet; Refill: 1  - OFFICE/OUTPT VISIT,EST,LEVL IV    6. Hyperlipidemia LDL goal <100     - simvastatin (ZOCOR) 20 MG tablet; Take 1 tablet (20 mg) by mouth At Bedtime  Dispense: 90 tablet; Refill: 3  - OFFICE/OUTPT VISIT,EST,LEVL IV    7. Morbid obesity  Understands how this increases her risk    Patient has been advised of split billing requirements and indicates understanding: Yes  COUNSELING:  Reviewed preventive health counseling, as reflected in patient instructions        "Regular exercise       Healthy diet/nutrition    Estimated body mass index is 32.22 kg/m  as calculated from the following:    Height as of this encounter: 1.588 m (5' 2.5\").    Weight as of this encounter: 81.2 kg (179 lb).        She reports that she quit smoking about 12 years ago. Her smoking use included cigarettes. She has a 18.20 pack-year smoking history. She has never used smokeless tobacco.      Appropriate preventive services were discussed with this patient, including applicable screening as appropriate for cardiovascular disease, diabetes, osteopenia/osteoporosis, and glaucoma.  As appropriate for age/gender, discussed screening for colorectal cancer, prostate cancer, breast cancer, and cervical cancer. Checklist reviewing preventive services available has been given to the patient.    Reviewed patients plan of care and provided an AVS. The Basic Care Plan (routine screening as documented in Health Maintenance) for Milagro meets the Care Plan requirement. This Care Plan has been established and reviewed with the Patient.    Counseling Resources:  ATP IV Guidelines  Pooled Cohorts Equation Calculator  Breast Cancer Risk Calculator  Breast Cancer: Medication to Reduce Risk  FRAX Risk Assessment  ICSI Preventive Guidelines  Dietary Guidelines for Americans, 2010  USDA's MyPlate  ASA Prophylaxis  Lung CA Screening    Morena Mcintosh MD  [facility]    Identified Health Risks:  "

## 2021-05-05 NOTE — PATIENT INSTRUCTIONS
Schedule a visit with the eye doctor        Our Clinic hours are:  Mondays    7:20 am - 7 pm  Tues -  Fri  7:20 am - 5 pm    Clinic Phone: [phone]    The clinic lab opens at 7:30 am Mon - Fri and appointments are required.    [facility]. [phone]  Monday  8 am - 7pm  Tues - Fri 8 am - 5:30 pm         Patient Education     Gout Diet  Gout is a painful condition caused by an excess of uric acid, a waste product made by the body. Uric acid forms crystals that collect in the joints. The immune response to these crystals brings on symptoms of joint pain and swelling. This is called a gout attack. Often, medications and diet changes are combined to manage gout. Below are some guidelines for changing your diet to help you manage gout and prevent attacks. Your healthcare provider will help you determine the best eating plan for you.  Eating to manage gout  Weight loss for those who are overweight may help reduce gout attacks.  Eat less of these foods  Eating too many foods containing purines may raise the levels of uric acid in your body. This raises your risk for a gout attack. Try to limit these foods and drinks:    Alcohol, such as beer and red wine. You may be told to avoid alcohol completely.    Soft drinks that contain sugar or high fructose corn syrup    Certain fish, including anchovies, sardines, fish eggs, and herring    Shellfish    Certain meats, such as red meat, hot dogs, luncheon meats, and turkey    Organ meats, such as liver, kidneys, and sweetbreads    Legumes, such as dried beans and peas    Other high fat foods such as gravy, whole milk, and high fat cheeses    Vegetables such as asparagus, cauliflower, spinach, and mushrooms used to be thought to contribute to an increased risk for a gout attack, but recent studies show that high purine vegetables don't increase the risk for a gout attack.  Eat more of these foods  Other foods may be helpful for people with gout. Add  some of these foods to your diet:    Cherries contain chemicals that may lower uric acid.    Omega fatty acids. These are found in some fatty fish such as salmon, certain oils (flax, olive, or nut), and nuts themselves. Omega fatty acids may help prevent inflammation due to gout.    Dairy products that are low-fat or fat-free, such as cheese and yogurt    Complex carbohydrate foods, including whole grains, brown rice, oats, and beans    Coffee, in moderation    Water, approximately 64 ounces per day  Follow-up care  Follow up with your healthcare provider as advised.  When to seek medical advice  Call your healthcare provider right away if any of these occur:    Return of gout symptoms, usually at night:    Severe pain, swelling, and heat in a joint, especially the base of the big toe    Affected joint is hard to move    Skin of the affected joint is purple or red    Fever of 100.4 F (38 C) or higher    Pain that doesn't get better even with prescribed medicine   Benji last reviewed this educational content on 6/1/2018 2000-2021 [employer]. All rights reserved. This information is not intended as a substitute for professional medical care. Always follow your healthcare professional's instructions.

## 2021-07-27 ENCOUNTER — NURSE TRIAGE (OUTPATIENT)
Dept: FAMILY MEDICINE | Facility: CLINIC | Age: 81
End: 2021-07-27

## 2021-07-27 NOTE — TELEPHONE ENCOUNTER
Reason for call:  Patient reporting a symptom    Symptom or request: Pt reporting left foot swelling and soreness that started yesterday, it is not red or hot to the touch, pt takes gabapentin for neuropathy and is wondering if she can double the dose for this to 200mg    Duration (how long have symptoms been present): yesterday    Phone Number patient can be reached at:  Home number on file [phone] (home)    Best Time:  any    Can we leave a detailed message on this number:  YES    Call taken on 7/27/2021 at 3:53 PM by Nancy Mcarthur

## 2021-07-27 NOTE — TELEPHONE ENCOUNTER
"S-(situation): Swollen L ankle/foot     B-(background): Started yesterday     A-(assessment):     Additional Information    Negative: Sounds like a life-threatening emergency to the triager    Negative: Chest pain    Negative: Small area of swelling and followed an insect bite to the area    Negative: Followed a knee injury    Negative: Ankle or foot injury    Negative: Pregnant with leg swelling or edema    Negative: Difficulty breathing at rest    Negative: Entire foot is cool or blue in comparison to other side    Negative: SEVERE swelling (e.g., swelling extends above knee, entire leg is swollen, weeping fluid)    Negative: Thigh or calf pain and only 1 side and present > 1 hour    Negative: Thigh, calf, or ankle swelling in only one leg    Negative: Thigh, calf, or ankle swelling in both legs, but one side is definitely more swollen (Exception: longstanding difference between legs)    Negative: Cast on leg or ankle and has increasing pain    Negative: Can't walk or can barely stand (new onset)    Negative: Fever and red area (or area very tender to touch)    Negative: Patient sounds very sick or weak to the triager    Negative: Swelling of face, arm or hands (Exception: slight puffiness of fingers during hot weather)    Negative: Pregnant > 20 weeks and sudden weight gain (i.e., > 2 lbs, 1 kg in one week)    Negative: MODERATE swelling of both ankles (e.g., swelling extends up to the knees) AND new onset or worsening    Negative: Difficulty breathing with exertion AND worsening or new onset    Negative: Looks like a boil, infected sore, deep ulcer, or other infected rash (spreading redness, pus)    Negative: Patient wants to be seen    Answer Assessment - Initial Assessment Questions  1. ONSET: \"When did the swelling start?\" (e.g., minutes, hours, days)      Yesterday morning   2. LOCATION: \"What part of the leg is swollen?\"  \"Are both legs swollen or just one leg?\"      It is just the left foot  3. SEVERITY: " "\"How bad is the swelling?\" (e.g., localized; mild, moderate, severe)   - Localized - small area of swelling localized to one leg   - MILD pedal edema - swelling limited to foot and ankle, pitting edema < 1/4 inch (6 mm) deep, rest and elevation eliminate most or all swelling   - MODERATE edema - swelling of lower leg to knee, pitting edema > 1/4 inch (6 mm) deep, rest and elevation only partially reduce swelling   - SEVERE edema - swelling extends above knee, facial or hand swelling present       Yesterday could not get a shoe or slipper on yesterday because it was \"so puffy.\"  She says today it is still the same. It was not better this morning (the swelling was the same. She does not wear any compression stockings    4. REDNESS: \"Does the swelling look red or infected?\"      Not red   5. PAIN: \"Is the swelling painful to touch?\" If so, ask: \"How painful is it?\"   (Scale 1-10; mild, moderate or severe)      \"It's painful to have a sheet on it.\" She says this is what happened \"the last time.\"   \"feels like a truck ran over my foot.\"  She has been walking with her cane \"It hurts to walk.\"  6. FEVER: \"Do you have a fever?\" If so, ask: \"What is it, how was it measured, and when did it start?\"      \"Not that I know of.\"   7. CAUSE: \"What do you think is causing the leg swelling?\"      -  8. MEDICAL HISTORY: \"Do you have a history of heart failure, kidney disease, liver failure, or cancer?\"        9. RECURRENT SYMPTOM: \"Have you had leg swelling before?\" If so, ask: \"When was the last time?\" \"What happened that time?\"      Yes, it was suspected to that it was gout, then she was told it was neuropathy, and was started   10. OTHER SYMPTOMS: \"Do you have any other symptoms?\" (e.g., chest pain, difficulty breathing)        Denies  11. PREGNANCY: \"Is there any chance you are pregnant?\" \"When was your last menstrual period?\"        82 y/o    Protocols used: LEG SWELLING AND EDEMA-A-OH    Routining to provider to advise. Triage " protocol not entirely helpful as this pt's situation is unilateral swelling of just the foot/ankle and does not include her leg per her report.    Valerie LUIS RN, BSN

## 2021-07-27 NOTE — TELEPHONE ENCOUNTER
Pt was called and given recommendation, transferred her to Priority Scheduling Line to arrange an OV.    Valerie LUIS RN, BSN

## 2021-07-27 NOTE — TELEPHONE ENCOUNTER
In general then I would recommend that she follow up with an appointment to be seen. SUE Gomez CNP

## 2021-07-27 NOTE — TELEPHONE ENCOUNTER
I would not recommend doubling the dose of the gabapentin based on reported symptoms. Please call patient and clarify concern and symptoms.   SUE Gomez CNP

## 2021-07-28 ENCOUNTER — OFFICE VISIT (OUTPATIENT)
Dept: FAMILY MEDICINE | Facility: CLINIC | Age: 81
End: 2021-07-28
Payer: COMMERCIAL

## 2021-07-28 VITALS
RESPIRATION RATE: 18 BRPM | HEART RATE: 63 BPM | SYSTOLIC BLOOD PRESSURE: 124 MMHG | DIASTOLIC BLOOD PRESSURE: 64 MMHG | HEIGHT: 63 IN | WEIGHT: 182.5 LBS | TEMPERATURE: 97.9 F | BODY MASS INDEX: 32.34 KG/M2 | OXYGEN SATURATION: 93 %

## 2021-07-28 DIAGNOSIS — I89.0 SECONDARY LYMPHEDEMA: Primary | ICD-10-CM

## 2021-07-28 DIAGNOSIS — G62.9 PERIPHERAL POLYNEUROPATHY: ICD-10-CM

## 2021-07-28 PROCEDURE — 99214 OFFICE O/P EST MOD 30 MIN: CPT | Performed by: FAMILY MEDICINE

## 2021-07-28 RX ORDER — ALLOPURINOL 100 MG/1
100 TABLET ORAL DAILY
Qty: 90 TABLET | Refills: 3 | Status: CANCELLED | OUTPATIENT
Start: 2021-07-28

## 2021-07-28 RX ORDER — GABAPENTIN 100 MG/1
200 CAPSULE ORAL AT BEDTIME
Qty: 180 CAPSULE | Refills: 3 | Status: SHIPPED | OUTPATIENT
Start: 2021-07-28 | End: 2022-08-10

## 2021-07-28 ASSESSMENT — PAIN SCALES - GENERAL: PAINLEVEL: EXTREME PAIN (8)

## 2021-07-28 ASSESSMENT — MIFFLIN-ST. JEOR: SCORE: 1259

## 2021-07-28 NOTE — PATIENT INSTRUCTIONS
Increase Gabapentin to 200 mg at bedtime    Elevate foot as much as possible    See lymphedema clinic to help with the swelling.     Morena Mcintosh M.D.        Our Clinic hours are:  Mondays    7:20 am - 7 pm  Tues -  Fri  7:20 am - 5 pm    Clinic Phone: [phone]    The clinic lab opens at 7:30 am Mon - Fri and appointments are required.    [facility]. [phone]  Monday  8 am - 7pm  Tues - Fri 8 am - 5:30 pm

## 2021-07-28 NOTE — PROGRESS NOTES
"    Assessment & Plan     Secondary lymphedema  Don't think increase of furosemide is necessary at this time and I worry about worsening renal function with increase diuretic.   Recommend compression  - Physical Therapy Referral; Future    Peripheral polyneuropathy  Some pain with light touch of even sheets, will increase her gabapentin slightly to 200 mg daily  - gabapentin (NEURONTIN) 100 MG capsule; Take 2 capsules (200 mg) by mouth At Bedtime             BMI:   Estimated body mass index is 32.85 kg/m  as calculated from the following:    Height as of this encounter: 1.588 m (5' 2.5\").    Weight as of this encounter: 82.8 kg (182 lb 8 oz).           No follow-ups on file.    Morena Mcintosh MD  [facility]   Milagro is a 81 year old who presents for the following health issues     HPI     Musculoskeletal problem/pain  Onset/Duration: 3 days ago  Description  Location: foot - left  Joint Swelling: YES  Redness: no  Pain: YES  Warmth: no  Intensity:  8/10  Progression of Symptoms:  same  Accompanying signs and symptoms:   Fevers: no  Numbness/tingling/weakness: YES- numbness  History  Trauma to the area: no  Recent illness:  no  Previous similar problem: YES- high uric acid in April  Previous evaluation:  no  Precipitating or alleviating factors:  Aggravating factors include: walking, overuse and when anything touches it  Therapies tried and outcome: rest/inactivity and gabapentin        Review of Systems   Constitutional, HEENT, cardiovascular, pulmonary, gi and gu systems are negative, except as otherwise noted.      Objective    /64   Pulse 63   Temp 97.9  F (36.6  C) (Tympanic)   Resp 18   Ht 1.588 m (5' 2.5\")   Wt 82.8 kg (182 lb 8 oz)   LMP 09/25/1979 (Approximate)   SpO2 93%   Breastfeeding No   BMI 32.85 kg/m    Body mass index is 32.85 kg/m .  Physical Exam   GENERAL: healthy, alert and no distress  NECK: no adenopathy, no asymmetry, masses, or scars " and thyroid normal to palpation  RESP: lungs clear to auscultation - no rales, rhonchi or wheezes  CV: regular rate and rhythm, normal S1 S2, no S3 or S4, no murmur, click or rub, no peripheral edema and peripheral pulses strong  ABDOMEN: soft, nontender, no hepatosplenomegaly, no masses and bowel sounds normal  MS: left foot edema of the dorsum from toes to ankle.

## 2021-08-05 ENCOUNTER — TRANSFERRED RECORDS (OUTPATIENT)
Dept: HEALTH INFORMATION MANAGEMENT | Facility: CLINIC | Age: 81
End: 2021-08-05

## 2021-08-05 LAB — RETINOPATHY: NEGATIVE

## 2021-09-02 ENCOUNTER — TRANSFERRED RECORDS (OUTPATIENT)
Dept: HEALTH INFORMATION MANAGEMENT | Facility: CLINIC | Age: 81
End: 2021-09-02

## 2021-09-02 LAB — RETINOPATHY: NEGATIVE

## 2021-11-21 ENCOUNTER — TELEPHONE (OUTPATIENT)
Dept: FAMILY MEDICINE | Facility: CLINIC | Age: 81
End: 2021-11-21
Payer: COMMERCIAL

## 2021-11-21 DIAGNOSIS — N18.31 TYPE 2 DIABETES MELLITUS WITH STAGE 3A CHRONIC KIDNEY DISEASE, WITHOUT LONG-TERM CURRENT USE OF INSULIN (H): ICD-10-CM

## 2021-11-21 DIAGNOSIS — I10 ESSENTIAL HYPERTENSION WITH GOAL BLOOD PRESSURE LESS THAN 140/90: ICD-10-CM

## 2021-11-21 DIAGNOSIS — E11.22 TYPE 2 DIABETES MELLITUS WITH STAGE 3A CHRONIC KIDNEY DISEASE, WITHOUT LONG-TERM CURRENT USE OF INSULIN (H): ICD-10-CM

## 2021-11-23 RX ORDER — METOPROLOL TARTRATE 50 MG
TABLET ORAL
Qty: 180 TABLET | Refills: 1 | Status: SHIPPED | OUTPATIENT
Start: 2021-11-23 | End: 2021-12-07

## 2021-11-23 NOTE — TELEPHONE ENCOUNTER
Pending Prescriptions:                       Disp   Refills    metFORMIN (GLUCOPHAGE) 500 MG tablet [Pha*180 ta*0            Sig: TAKE ONE TABLET BY MOUTH TWICE A DAY *NEEDS APPT*    metoprolol tartrate (LOPRESSOR) 50 MG tab*180 ta*1            Sig: TAKE ONE TABLET BY MOUTH TWICE A DAY    Routing refill request to provider for review/approval because:  Labs not current:    Lab Results   Component Value Date    A1C 7.2 04/28/2021    A1C 7.6 07/01/2020    A1C 7.0 12/16/2019    A1C 7.2 06/17/2019    A1C 7.7 11/16/2018     Kody Velasco RN

## 2021-12-02 ENCOUNTER — HOSPITAL ENCOUNTER (OUTPATIENT)
Dept: OUTPATIENT PROCEDURES | Facility: CLINIC | Age: 81
Discharge: HOME | End: 2021-12-02
Attending: OPHTHALMOLOGY | Admitting: OPHTHALMOLOGY
Payer: COMMERCIAL

## 2021-12-02 PROCEDURE — 66821 AFTER CATARACT LASER SURGERY: CPT | Mod: RT | Performed by: OPHTHALMOLOGY

## 2021-12-03 DIAGNOSIS — E11.22 TYPE 2 DIABETES MELLITUS WITH STAGE 3A CHRONIC KIDNEY DISEASE, WITHOUT LONG-TERM CURRENT USE OF INSULIN (H): ICD-10-CM

## 2021-12-03 DIAGNOSIS — N18.31 TYPE 2 DIABETES MELLITUS WITH STAGE 3A CHRONIC KIDNEY DISEASE, WITHOUT LONG-TERM CURRENT USE OF INSULIN (H): ICD-10-CM

## 2021-12-07 RX ORDER — METOPROLOL TARTRATE 50 MG
TABLET ORAL
Qty: 180 TABLET | Refills: 1 | Status: SHIPPED | OUTPATIENT
Start: 2021-12-07 | End: 2022-07-18

## 2021-12-07 NOTE — TELEPHONE ENCOUNTER
Patient is no longer going to be going to [place] and needs her prescriptions sent [facility] instead. Scripts resent and other pharmacy notified.  Heidi Lawrence RN

## 2021-12-07 NOTE — TELEPHONE ENCOUNTER
Pt says she has been waiting for her Metformin. I told her this was sent to the [facility] pharmacy in [place]. She says she doesn't want it there. She wants it sent to the [facility].  She only has one pill left and takes 2 daily so won't have enough for tomorrow.     She would like this sent today please.

## 2021-12-15 ENCOUNTER — OFFICE VISIT (OUTPATIENT)
Dept: FAMILY MEDICINE | Facility: CLINIC | Age: 81
End: 2021-12-15
Payer: COMMERCIAL

## 2021-12-15 VITALS
WEIGHT: 188.6 LBS | OXYGEN SATURATION: 93 % | HEART RATE: 60 BPM | HEIGHT: 63 IN | SYSTOLIC BLOOD PRESSURE: 116 MMHG | DIASTOLIC BLOOD PRESSURE: 60 MMHG | BODY MASS INDEX: 33.42 KG/M2 | TEMPERATURE: 98 F | RESPIRATION RATE: 14 BRPM

## 2021-12-15 DIAGNOSIS — I10 HTN, GOAL BELOW 140/90: ICD-10-CM

## 2021-12-15 DIAGNOSIS — Z23 NEED FOR PROPHYLACTIC VACCINATION AND INOCULATION AGAINST INFLUENZA: ICD-10-CM

## 2021-12-15 DIAGNOSIS — N18.32 STAGE 3B CHRONIC KIDNEY DISEASE (H): ICD-10-CM

## 2021-12-15 DIAGNOSIS — N18.32 TYPE 2 DIABETES MELLITUS WITH STAGE 3B CHRONIC KIDNEY DISEASE, WITHOUT LONG-TERM CURRENT USE OF INSULIN (H): Primary | ICD-10-CM

## 2021-12-15 DIAGNOSIS — E11.22 TYPE 2 DIABETES MELLITUS WITH STAGE 3B CHRONIC KIDNEY DISEASE, WITHOUT LONG-TERM CURRENT USE OF INSULIN (H): Primary | ICD-10-CM

## 2021-12-15 DIAGNOSIS — E78.5 HYPERLIPIDEMIA LDL GOAL <100: ICD-10-CM

## 2021-12-15 LAB
ANION GAP SERPL CALCULATED.3IONS-SCNC: 4 MMOL/L (ref 3–14)
BUN SERPL-MCNC: 34 MG/DL (ref 7–30)
CALCIUM SERPL-MCNC: 9.1 MG/DL (ref 8.5–10.1)
CHLORIDE BLD-SCNC: 105 MMOL/L (ref 94–109)
CO2 SERPL-SCNC: 30 MMOL/L (ref 20–32)
CREAT SERPL-MCNC: 1.39 MG/DL (ref 0.52–1.04)
GFR SERPL CREATININE-BSD FRML MDRD: 36 ML/MIN/1.73M2
GLUCOSE BLD-MCNC: 218 MG/DL (ref 70–99)
HBA1C MFR BLD: 7.5 % (ref 0–5.6)
HOLD SPECIMEN: NORMAL
POTASSIUM BLD-SCNC: 4.8 MMOL/L (ref 3.4–5.3)
SODIUM SERPL-SCNC: 139 MMOL/L (ref 133–144)

## 2021-12-15 PROCEDURE — 36415 COLL VENOUS BLD VENIPUNCTURE: CPT | Performed by: FAMILY MEDICINE

## 2021-12-15 PROCEDURE — G0008 ADMIN INFLUENZA VIRUS VAC: HCPCS | Performed by: FAMILY MEDICINE

## 2021-12-15 PROCEDURE — 80048 BASIC METABOLIC PNL TOTAL CA: CPT | Performed by: FAMILY MEDICINE

## 2021-12-15 PROCEDURE — 90662 IIV NO PRSV INCREASED AG IM: CPT | Performed by: FAMILY MEDICINE

## 2021-12-15 PROCEDURE — 83036 HEMOGLOBIN GLYCOSYLATED A1C: CPT | Performed by: FAMILY MEDICINE

## 2021-12-15 PROCEDURE — 99214 OFFICE O/P EST MOD 30 MIN: CPT | Mod: 25 | Performed by: FAMILY MEDICINE

## 2021-12-15 ASSESSMENT — PAIN SCALES - GENERAL: PAINLEVEL: SEVERE PAIN (7)

## 2021-12-15 ASSESSMENT — MIFFLIN-ST. JEOR: SCORE: 1286.67

## 2021-12-15 NOTE — PROGRESS NOTES
"  Assessment & Plan     Type 2 diabetes mellitus with stage 3b chronic kidney disease, without long-term current use of insulin (H)  Fair control for age, no change in therapy at this time.   - Hemoglobin A1c; Future  - Hemoglobin A1c    Hyperlipidemia LDL goal <100   well controlled    HTN, goal below 140/90  Well controlled    Stage 3b chronic kidney disease (H)  Check for stability  - Basic metabolic panel; Future  - Basic metabolic panel       Need for prophylactic vaccination and inoculation against influenza     - INFLUENZA, QUAD, HIGH DOSE, PF, 65YR + (FLUZONE HD)             BMI:   Estimated body mass index is 33.95 kg/m  as calculated from the following:    Height as of this encounter: 1.588 m (5' 2.5\").    Weight as of this encounter: 85.5 kg (188 lb 9.6 oz).           No follow-ups on file.    Morena Mcintosh MD  [facility]    Victor Manuel Humphrey is a 81 year old who presents for the following health issues     HPI     Will be moving Jan 6th to [place] - discussed transfer of care to [facility]    Diabetes Follow-up    How often are you checking your blood sugar? A few times a month-   What time of day are you checking your blood sugars (select all that apply)?  Before meals  Have you had any blood sugars above 200?  Unsure  Have you had any blood sugars below 70?  Unsure    What symptoms do you notice when your blood sugar is low?  None    What concerns do you have today about your diabetes? None     Do you have any of these symptoms? (Select all that apply)  Numbness in feet and Burning in feet sometimes at night        Hyperlipidemia Follow-Up      Are you regularly taking any medication or supplement to lower your cholesterol?   Yes- Simvastatin    Are you having muscle aches or other side effects that you think could be caused by your cholesterol lowering medication?  No    Hypertension Follow-up      Do you check your blood pressure regularly outside " "of the clinic? No     Are you following a low salt diet? Yes    Are your blood pressures ever more than 140 on the top number (systolic) OR more   than 90 on the bottom number (diastolic), for example 140/90? Unknown due to not checking    BP Readings from Last 2 Encounters:   12/15/21 116/60   07/28/21 124/64     Hemoglobin A1C POCT (%)   Date Value   04/28/2021 7.2 (H)   07/01/2020 7.6 (H)     Hemoglobin A1C (%)   Date Value   12/15/2021 7.5 (H)     LDL Cholesterol Calculated (mg/dL)   Date Value   04/28/2021 32   12/16/2019 45         How many servings of fruits and vegetables do you eat daily?  0-1    On average, how many sweetened beverages do you drink each day (Examples: soda, juice, sweet tea, etc.  Do NOT count diet or artificially sweetened beverages)?   0    How many days per week do you exercise enough to make your heart beat faster? 3 or less    How many minutes a day do you exercise enough to make your heart beat faster? 9 or less    How many days per week do you miss taking your medication? 0    * wants to be tested for covid, is having body aches but has also packed over 40 boxes for moving.  Body aches only with bending over.    No other s/sx of covid (runny nose, fever, st, etc).  No covid test necessary. Has been boosted.           Review of Systems   Constitutional, HEENT, cardiovascular, pulmonary, gi and gu systems are negative, except as otherwise noted.      Objective    /60 (BP Location: Right arm, Patient Position: Chair, Cuff Size: Adult Large)   Pulse 60   Temp 98  F (36.7  C) (Tympanic)   Resp 14   Ht 1.588 m (5' 2.5\")   Wt 85.5 kg (188 lb 9.6 oz)   LMP 09/25/1979 (Approximate)   SpO2 93%   BMI 33.95 kg/m    Body mass index is 33.95 kg/m .  Physical Exam   GENERAL: healthy, alert and no distress  NECK: no adenopathy, no asymmetry, masses, or scars and thyroid normal to palpation  RESP: lungs clear to auscultation - no rales, rhonchi or wheezes  CV: regular rate and rhythm, " normal S1 S2, no S3 or S4, no murmur, click or rub, no peripheral edema and peripheral pulses strong  ABDOMEN: soft, nontender, no hepatosplenomegaly, no masses and bowel sounds normal  MS: mild 1+ edema bilaterally    Results for orders placed or performed in visit on 12/15/21 (from the past 24 hour(s))   Hemoglobin A1c   Result Value Ref Range    Hemoglobin A1C 7.5 (H) 0.0 - 5.6 %   Extra Tube    Narrative    The following orders were created for panel order Extra Tube.  Procedure                               Abnormality         Status                     ---------                               -----------         ------                     Extra Serum Separator Tu...[[id number]]                      In process                   Please view results for these tests on the individual orders.

## 2021-12-15 NOTE — PATIENT INSTRUCTIONS
Dr. Edwina Machado - [facility]  [phone]  [address] [place] [address]

## 2022-05-23 DIAGNOSIS — E78.5 HYPERLIPIDEMIA LDL GOAL <100: ICD-10-CM

## 2022-05-23 NOTE — TELEPHONE ENCOUNTER
Reason for Call:  Med refill.      Detailed comments: Patient would like refill of her simvastatin.  She recently moved and is using a new pharmacy, [facility] in [place], phone [phone].    Phone Number Patient can be reached at:  [phone]    Can we leave a detailed message on this number?  Yes    Call taken on 5/23/2022 at 3:02 PM by Julia Garner

## 2022-05-24 RX ORDER — SIMVASTATIN 20 MG
20 TABLET ORAL AT BEDTIME
Qty: 90 TABLET | Refills: 0 | Status: SHIPPED | OUTPATIENT
Start: 2022-05-24 | End: 2022-08-22

## 2022-05-24 NOTE — TELEPHONE ENCOUNTER
Patient is calling again to check on this prescription.  She has only one pill left and said she originally called 2 weeks ago.  [facility] in [place].      Julia Garner on 5/24/2022 at 1:53 PM

## 2022-07-17 DIAGNOSIS — I10 ESSENTIAL HYPERTENSION WITH GOAL BLOOD PRESSURE LESS THAN 140/90: ICD-10-CM

## 2022-07-18 DIAGNOSIS — R60.0 BILATERAL EDEMA OF LOWER EXTREMITY: ICD-10-CM

## 2022-07-18 RX ORDER — FUROSEMIDE 40 MG
TABLET ORAL
Qty: 90 TABLET | Refills: 3 | Status: SHIPPED | OUTPATIENT
Start: 2022-07-18 | End: 2023-04-14

## 2022-07-18 RX ORDER — METOPROLOL TARTRATE 50 MG
TABLET ORAL
Qty: 180 TABLET | Refills: 1 | Status: SHIPPED | OUTPATIENT
Start: 2022-07-18 | End: 2022-10-14

## 2022-07-18 RX ORDER — LISINOPRIL 40 MG/1
TABLET ORAL
Qty: 90 TABLET | Refills: 3 | Status: SHIPPED | OUTPATIENT
Start: 2022-07-18 | End: 2022-10-14

## 2022-08-10 ENCOUNTER — TELEPHONE (OUTPATIENT)
Dept: FAMILY MEDICINE | Facility: CLINIC | Age: 82
End: 2022-08-10

## 2022-08-10 DIAGNOSIS — G62.9 PERIPHERAL POLYNEUROPATHY: ICD-10-CM

## 2022-08-10 RX ORDER — GABAPENTIN 100 MG/1
200 CAPSULE ORAL AT BEDTIME
Qty: 180 CAPSULE | Refills: 0 | Status: SHIPPED | OUTPATIENT
Start: 2022-08-10 | End: 2022-10-14

## 2022-08-10 NOTE — TELEPHONE ENCOUNTER
Daughter called said that this has to be done today by 4 pm because that is the only time patient has a ride to get the Rx.    Milagro Vu PSC on 8/10/2022 at 12:16 PM

## 2022-08-10 NOTE — TELEPHONE ENCOUNTER
Gilma, [facility], [place], called.    Prescription for gabapentin was sent by provider today at 12:55 but it failed to transmit.    Verbal order given to pharmacist, Gilma.  Gilma will remind pt that she is due for provider appt and that this is her last refill until appt.    Farida Dias RN

## 2022-08-10 NOTE — TELEPHONE ENCOUNTER
Patient informed and transferred to  for appointment.     Sandra Faria RN on 8/10/2022 at 1:08 PM

## 2022-08-10 NOTE — TELEPHONE ENCOUNTER
Refill done, visit DUE, she missed her recent appointment with me on 8/3.    Morena Mcintosh M.D.

## 2022-09-18 ENCOUNTER — NURSE TRIAGE (OUTPATIENT)
Dept: FAMILY MEDICINE | Facility: CLINIC | Age: 82
End: 2022-09-18

## 2022-09-19 NOTE — TELEPHONE ENCOUNTER
Patient with intermittent left sided abdominal pain, 5/10. No associated symptoms (N/V/D, fever, chest pain, etc). Passing gas and bowel movements regularly.   Unable to be seen this week,  in hospital and going to hospice. Looking for appointment next week (disposition is to be seen today). Please advise as covering.       Reason for Disposition    Age > 60 years    Additional Information    Negative: Passed out (i.e., fainted, collapsed and was not responding)    Negative: Shock suspected (e.g., cold/pale/clammy skin, too weak to stand, low BP, rapid pulse)    Negative: Sounds like a life-threatening emergency to the triager    Negative: Chest pain    Negative: Pain is mainly in upper abdomen (if needed ask: 'is it mainly above the belly button?')    Negative: Abdominal pain and pregnant < 20 weeks    Negative: Abdominal pain and pregnant 20 or more weeks    Negative: Constant abdominal pain lasting > 2 hours    Negative: Vomiting bile (green color)    Negative: Patient sounds very sick or weak to the triager    Negative: SEVERE abdominal pain (e.g., excruciating)    Negative: Vomiting red blood or black (coffee ground) material    Negative: Bloody, black, or tarry bowel movements  (Exception: Chronic-unchanged black-grey bowel movements and is taking iron pills or Pepto-Bismol.)    Negative: Vomiting and abdomen looks much more swollen than usual    Negative: White of the eyes have turned yellow (i.e., jaundice)    Negative: Blood in urine (red, pink, or tea-colored)    Negative: Fever > 103 F (39.4 C)    Negative: Fever > 101 F (38.3 C) and over 60 years of age    Negative: Fever > 100.0 F (37.8 C) and has diabetes mellitus or a weak immune system (e.g., HIV positive, cancer chemotherapy, organ transplant, splenectomy, chronic steroids)    Negative: Fever > 100.0 F (37.8 C) and bedridden (e.g., nursing home patient, stroke, chronic illness, recovering from surgery)    Negative: Pregnant or could be  "pregnant (i.e., missed last menstrual period)    Negative: MODERATE pain (e.g., interferes with normal activities that comes and goes (cramps) lasts > 24 hours  (Exception: Pain with Vomiting or Diarrhea - see that Protocol.)    Negative: Unusual vaginal discharge    Answer Assessment - Initial Assessment Questions  1. LOCATION: \"Where does it hurt?\"       L abdominal pain, ribcage area  2. RADIATION: \"Does the pain shoot anywhere else?\" (e.g., chest, back)      No   3. ONSET: \"When did the pain begin?\" (e.g., minutes, hours or days ago)       A couple weeks ago  4. SUDDEN: \"Gradual or sudden onset?\"      sudden  5. PATTERN \"Does the pain come and go, or is it constant?\"     - If constant: \"Is it getting better, staying the same, or worsening?\"       (Note: Constant means the pain never goes away completely; most serious pain is constant and it progresses)      - If intermittent: \"How long does it last?\" \"Do you have pain now?\"      (Note: Intermittent means the pain goes away completely between bouts)      Intermittent, does currently have pain 5/10.   6. SEVERITY: \"How bad is the pain?\"  (e.g., Scale 1-10; mild, moderate, or severe)    - MILD (1-3): doesn't interfere with normal activities, abdomen soft and not tender to touch     - MODERATE (4-7): interferes with normal activities or awakens from sleep, abdomen tender to touch     - SEVERE (8-10): excruciating pain, doubled over, unable to do any normal activities       5/10  7. RECURRENT SYMPTOM: \"Have you ever had this type of stomach pain before?\" If Yes, ask: \"When was the last time?\" and \"What happened that time?\"       No   8. CAUSE: \"What do you think is causing the stomach pain?\"      unsure  9. RELIEVING/AGGRAVATING FACTORS: \"What makes it better or worse?\" (e.g., movement, antacids, bowel movement)      No   10. OTHER SYMPTOMS: \"Do you have any other symptoms?\" (e.g., back pain, diarrhea, fever, urination pain, vomiting)        Passing gas and bowel " "movements regularly.   11. PREGNANCY: \"Is there any chance you are pregnant?\" \"When was your last menstrual period?\"        No    Protocols used: ABDOMINAL PAIN - FEMALE-A-OH    "

## 2022-09-19 NOTE — TELEPHONE ENCOUNTER
Reason for Call:  Other appointment    Detailed comments: Patient recently moved into the area from [facility]. Patient is looking to establish care. Scheduled an appointment for her Annual Wellness with [facility] 10/14 however is also experiencing increased left sided abdominal pain (declined triage) and would like to see if she can be seen in clinic prior to her AW appointment for that pain.     Phone Number Patient can be reached at: Home number on file [phone] (home)    Best Time: any time    Can we leave a detailed message on this number? YES    Call taken on 9/18/2022 at 7:25 PM by Neptali Del Toro

## 2022-09-19 NOTE — TELEPHONE ENCOUNTER
Prefer she be seen within the week either in clinic or urgent care. However, can also understand her current position. Please set appointment for next week. If she develops any blood in stools, fever, nausea, vomiting then will need to go to ER immediately.

## 2022-09-20 NOTE — TELEPHONE ENCOUNTER
Called patient to relay covering provider's message. Offered her two appointment options with Dr. Childs on 9/28 at 1:40pm or 9/29 at 11:00am. She was unable to give a response on the phone as to which will work , as she needs to speak with her daughter and discuss if she can give her a ride. Told her that we need a response by tomorrow so the spots don't get taken, and she verbalized understanding and said she would call back tomorrow.

## 2022-09-21 NOTE — TELEPHONE ENCOUNTER
Called the patient and left a message that the only available appointment now is on 9/28 at 1:40pm and that she will need to give a call back as soon as she can today or that spot will be taken.

## 2022-09-28 ENCOUNTER — OFFICE VISIT (OUTPATIENT)
Dept: FAMILY MEDICINE | Facility: CLINIC | Age: 82
End: 2022-09-28
Payer: COMMERCIAL

## 2022-09-28 ENCOUNTER — HOSPITAL ENCOUNTER (OUTPATIENT)
Dept: CT IMAGING | Facility: HOSPITAL | Age: 82
Discharge: HOME | End: 2022-09-28
Attending: FAMILY MEDICINE | Admitting: FAMILY MEDICINE
Payer: COMMERCIAL

## 2022-09-28 VITALS
WEIGHT: 188.8 LBS | OXYGEN SATURATION: 91 % | BODY MASS INDEX: 33.45 KG/M2 | HEART RATE: 69 BPM | HEIGHT: 63 IN | SYSTOLIC BLOOD PRESSURE: 130 MMHG | DIASTOLIC BLOOD PRESSURE: 62 MMHG

## 2022-09-28 DIAGNOSIS — E66.09 CLASS 1 OBESITY DUE TO EXCESS CALORIES WITH SERIOUS COMORBIDITY AND BODY MASS INDEX (BMI) OF 33.0 TO 33.9 IN ADULT: ICD-10-CM

## 2022-09-28 DIAGNOSIS — E66.811 CLASS 1 OBESITY DUE TO EXCESS CALORIES WITH SERIOUS COMORBIDITY AND BODY MASS INDEX (BMI) OF 33.0 TO 33.9 IN ADULT: ICD-10-CM

## 2022-09-28 DIAGNOSIS — N18.32 TYPE 2 DIABETES MELLITUS WITH STAGE 3B CHRONIC KIDNEY DISEASE, WITHOUT LONG-TERM CURRENT USE OF INSULIN (H): ICD-10-CM

## 2022-09-28 DIAGNOSIS — R10.9 LEFT SIDED ABDOMINAL PAIN: ICD-10-CM

## 2022-09-28 DIAGNOSIS — N18.32 STAGE 3B CHRONIC KIDNEY DISEASE (H): ICD-10-CM

## 2022-09-28 DIAGNOSIS — R10.9 LEFT SIDED ABDOMINAL PAIN: Primary | ICD-10-CM

## 2022-09-28 DIAGNOSIS — E11.22 TYPE 2 DIABETES MELLITUS WITH STAGE 3B CHRONIC KIDNEY DISEASE, WITHOUT LONG-TERM CURRENT USE OF INSULIN (H): ICD-10-CM

## 2022-09-28 LAB
BASOPHILS # BLD AUTO: 0.1 10E3/UL (ref 0–0.2)
BASOPHILS NFR BLD AUTO: 1 %
CREAT BLD-MCNC: 1.4 MG/DL (ref 0.6–1.1)
CREAT BLD-MCNC: 1.4 MG/DL (ref 0.6–1.1)
EOSINOPHIL # BLD AUTO: 0.3 10E3/UL (ref 0–0.7)
EOSINOPHIL NFR BLD AUTO: 3 %
ERYTHROCYTE [DISTWIDTH] IN BLOOD BY AUTOMATED COUNT: 12.6 % (ref 10–15)
GFR SERPL CREATININE-BSD FRML MDRD: 38 ML/MIN/1.73M2
GFR SERPL CREATININE-BSD FRML MDRD: 38 ML/MIN/1.73M2
HBA1C MFR BLD: 7.5 % (ref 0–5.6)
HCT VFR BLD AUTO: 37.4 % (ref 35–47)
HGB BLD-MCNC: 11.8 G/DL (ref 11.7–15.7)
IMM GRANULOCYTES # BLD: 0 10E3/UL
IMM GRANULOCYTES NFR BLD: 0 %
LYMPHOCYTES # BLD AUTO: 2.4 10E3/UL (ref 0.8–5.3)
LYMPHOCYTES NFR BLD AUTO: 26 %
MCH RBC QN AUTO: 30.2 PG (ref 26.5–33)
MCHC RBC AUTO-ENTMCNC: 31.6 G/DL (ref 31.5–36.5)
MCV RBC AUTO: 96 FL (ref 78–100)
MONOCYTES # BLD AUTO: 0.8 10E3/UL (ref 0–1.3)
MONOCYTES NFR BLD AUTO: 9 %
NEUTROPHILS # BLD AUTO: 5.8 10E3/UL (ref 1.6–8.3)
NEUTROPHILS NFR BLD AUTO: 62 %
PLATELET # BLD AUTO: 345 10E3/UL (ref 150–450)
RBC # BLD AUTO: 3.91 10E6/UL (ref 3.8–5.2)
WBC # BLD AUTO: 9.3 10E3/UL (ref 4–11)

## 2022-09-28 PROCEDURE — 83036 HEMOGLOBIN GLYCOSYLATED A1C: CPT | Performed by: FAMILY MEDICINE

## 2022-09-28 PROCEDURE — 85025 COMPLETE CBC W/AUTO DIFF WBC: CPT | Performed by: FAMILY MEDICINE

## 2022-09-28 PROCEDURE — 99215 OFFICE O/P EST HI 40 MIN: CPT | Performed by: FAMILY MEDICINE

## 2022-09-28 PROCEDURE — 36415 COLL VENOUS BLD VENIPUNCTURE: CPT | Performed by: FAMILY MEDICINE

## 2022-09-28 PROCEDURE — 82565 ASSAY OF CREATININE: CPT

## 2022-09-28 PROCEDURE — 74177 CT ABD & PELVIS W/CONTRAST: CPT

## 2022-09-28 PROCEDURE — 255N000002 HC RX 255 OP 636: Performed by: FAMILY MEDICINE

## 2022-09-28 PROCEDURE — 82565 ASSAY OF CREATININE: CPT | Performed by: FAMILY MEDICINE

## 2022-09-28 RX ADMIN — IOHEXOL 75 ML: 350 INJECTION, SOLUTION INTRAVENOUS at 18:09

## 2022-09-28 ASSESSMENT — ENCOUNTER SYMPTOMS: SHORTNESS OF BREATH: 1

## 2022-09-28 NOTE — PROGRESS NOTES
[phone] - Mary      Problem List Items Addressed This Visit     Type 2 diabetes mellitus with stage 3 chronic kidney disease (H)    Relevant Orders    Hemoglobin A1c (Completed)    CKD (chronic kidney disease) stage 3, GFR 30-59 ml/min (H)    Relevant Orders    Comprehensive metabolic panel (BMP + Alb, Alk Phos, ALT, AST, Total. Bili, TP)    Class 1 obesity due to excess calories with serious comorbidity and body mass index (BMI) of 33.0 to 33.9 in adult     Weight stable.  Metabolic syndrome profile.           Left sided abdominal pain - Primary     This patient presents with her daughter for discussion of 2 weeks of left-sided abdominal pain.  The pain localizes to the left upper quadrant.  She is a history of diverticuli on colonoscopy but no history of diverticulitis.  No changes in bowel movements.  No fever.  Symptoms are persistent and tend to be noticeable throughout the day but not particularly related to movement.  No recent changes in medications.  White blood cell count was normal.  She is been afebrile.  She also describes some symptoms of shortness of breath in the morning and they wonder if she may benefit from an inhaler.  She does not have a diagnosis of asthma nor does she have a diagnosis of COPD although she does have a history of smoking.  - CBC showing white blood cell count is normal.  - Other labs are pending at this time.  - Evaluate for mild diverticulitis with CT scan.  This was ordered and will be completed later this afternoon.  If positive, we will treat with antibiotics.  If negative, will discuss other options and wait for lab work to be back.           Relevant Orders    Comprehensive metabolic panel (BMP + Alb, Alk Phos, ALT, AST, Total. Bili, TP)    CBC with platelets and differential (Completed)    CT Chest Abdomen w/o Contrast             Subjective   Milagro is a 82 year old who presents for the following health issues  accompanied by her daughter  Chief Complaint  [facility]  Department of Otorhinolaryngology and Communication Sciences    Subjective:      Denise Fry is a 53 y.o. female who presents for follow-up. She has idiopathic laryngotracheal stenosis.    TREATMENT HISTORY:  11/13/2018 - SML/TB; CO2 laser; dilation 12 mm; kenalog  4/2019-7/2019 - office steroid injections  12/3/2019 - SML/TB; CO2 laser; dilation 14 mm; kenalog  10/1/2020 - SML/TB; CO2 laser; dilation 16 mm; kenalog    She is doing great since surgery. She is pleased with her progress. No dyspnea at rest or on exertion. Voice is normal.    The patient's medications, allergies, past medical, surgical, social and family histories were reviewed and updated as appropriate.    A detailed review of systems was obtained with pertinent positives as per the above HPI, and otherwise negative.      Objective:     BP (!) 153/82 (Patient Position: Sitting)   Pulse 74   Wt 87.6 kg (193 lb 2 oz)   LMP 10/03/2019   BMI 35.32 kg/m²      Constitutional: comfortable, well dressed  Psychiatric: appropriate affect  Respiratory: comfortably breathing, symmetric chest rise, no stridor  Voice: normal for age and gender  Head: normocephalic  Eyes: conjunctivae and sclerae clear  Indirect laryngoscopy is limited due to gag    Procedure  Flexible Laryngoscopy (03098): Laryngoscopy is indicated for assessment of upper aerodigestive structure and function. This was carried out transnasally with a distal chip videoendoscope. After verbal consent was obtained, the patient was positioned and the nose was topically decongested with 1% phenylephrine and topically anesthetized with 4% lidocaine. The endoscope was passed through the most patent nasal cavity and positioned to image the nasopharynx, larynx, and hypopharynx in detail. The following features were examined: nasopharyngeal, laryngeal, hypopharyngeal masses; velopharyngeal strength, closure, and symmetry of motion; vocal fold range and symmetry of motion;  "  Patient presents with     Abdominal Pain     Left side pain for 2 weeks     Shortness of Breath     Discuss SOB in the morning only, need inhaler      SOB in the morning:   - she relates this to humidity.  Worse in the morning.  \"Not my main problem.\"    Left side abdominal pain:   - onset: 2 weeks ago.  \"Comes and goes.\"   - variable BM schedule.     - daughter: \"Minimizes stuff.\"    - it felt warm and bulging.   - today, not better and not worse.   -  is in hospice for the past week.   - past episodes: none   - no fevers.  No chills.   - no new physical activies.   - CKD: stable   - history of DM: \"I limit the sweets.\"  Does not check BG levels.     Shortness of Breath    History of Present Illness       Reason for visit:  Left abdominal pain and SOB  Symptom onset:  1-2 weeks ago    She eats 0-1 servings of fruits and vegetables daily.She consumes 1 sweetened beverage(s) daily.She exercises with enough effort to increase her heart rate 9 or less minutes per day.  She exercises with enough effort to increase her heart rate 3 or less days per week.   She is taking medications regularly.      Review of Systems   Respiratory: Positive for shortness of breath.              Objective    /62 (BP Location: Left arm, Patient Position: Sitting, Cuff Size: Adult Large)   Pulse 69   Ht 1.588 m (5' 2.5\")   Wt 85.6 kg (188 lb 12.8 oz)   LMP 09/25/1979 (Approximate)   SpO2 91%   BMI 33.98 kg/m    Body mass index is 33.98 kg/m .  Physical Exam  Nursing note reviewed.   Constitutional:       General: She is not in acute distress.     Appearance: Normal appearance. She is obese. She is not ill-appearing.   HENT:      Head: Normocephalic and atraumatic.      Right Ear: External ear normal.      Left Ear: External ear normal.   Eyes:      General: No scleral icterus.     Extraocular Movements: Extraocular movements intact.      Conjunctiva/sclera: Conjunctivae normal.   Cardiovascular:      Rate and Rhythm: " laryngeal mucosal edema, erythema, inflammation, and hydration; salivary pooling; and gross laryngeal sensation. The equipment was removed. The patient tolerated the procedure well without complication. All findings were normal except:  - circumferential stenosis beginning at posterior glottis, tracking through subglottis into proximal trachea; persistent posterior glottic edema  - right vocal fold with slightly limited lateral excursion and slightly elongated      Assessment:     Denise Fry is a 53 y.o. female with idiopathic laryngotracheal stenosis. She is doing well following recent airway surgery.     Plan:     Reassurance was provided. Further treatment to consider at this time would be office-based steroid injections. She will consider this adjunct treatment option. She will follow up with me in 4-6 weeks, or sooner if needed.    August Mendoza M.D.  [facility]  Department of Otorhinolaryngology and Communication Sciences     Normal rate and regular rhythm.      Heart sounds: Normal heart sounds. No murmur heard.    No friction rub. No gallop.   Pulmonary:      Effort: Pulmonary effort is normal. No respiratory distress.      Breath sounds: Normal breath sounds. No wheezing or rales.   Abdominal:      General: There is no distension.      Palpations: Abdomen is soft. There is no mass.      Tenderness: There is no abdominal tenderness. There is no right CVA tenderness, left CVA tenderness or rebound.      Hernia: No hernia is present.      Comments: Tenderness with palpation in the left upper quadrant.   Musculoskeletal:         General: No swelling. Normal range of motion.   Skin:     General: Skin is warm.      Coloration: Skin is not jaundiced.      Findings: No rash.   Neurological:      General: No focal deficit present.      Mental Status: She is alert and oriented to person, place, and time. Mental status is at baseline.   Psychiatric:         Attention and Perception: Attention normal.         Mood and Affect: Mood normal.         Speech: Speech normal.         Thought Content: Thought content normal.          Results for orders placed or performed in visit on 09/28/22 (from the past 24 hour(s))   Hemoglobin A1c   Result Value Ref Range    Hemoglobin A1C 7.5 (H) 0.0 - 5.6 %   CBC with platelets and differential    Narrative    The following orders were created for panel order CBC with platelets and differential.  Procedure                               Abnormality         Status                     ---------                               -----------         ------                     CBC with platelets and d...[[id number]]                      Final result                 Please view results for these tests on the individual orders.   CBC with platelets and differential   Result Value Ref Range    WBC Count 9.3 4.0 - 11.0 10e3/uL    RBC Count 3.91 3.80 - 5.20 10e6/uL    Hemoglobin 11.8 11.7 - 15.7 g/dL    Hematocrit 37.4 35.0 -  47.0 %    MCV 96 78 - 100 fL    MCH 30.2 26.5 - 33.0 pg    MCHC 31.6 31.5 - 36.5 g/dL    RDW 12.6 10.0 - 15.0 %    Platelet Count 345 150 - 450 10e3/uL    % Neutrophils 62 %    % Lymphocytes 26 %    % Monocytes 9 %    % Eosinophils 3 %    % Basophils 1 %    % Immature Granulocytes 0 %    Absolute Neutrophils 5.8 1.6 - 8.3 10e3/uL    Absolute Lymphocytes 2.4 0.8 - 5.3 10e3/uL    Absolute Monocytes 0.8 0.0 - 1.3 10e3/uL    Absolute Eosinophils 0.3 0.0 - 0.7 10e3/uL    Absolute Basophils 0.1 0.0 - 0.2 10e3/uL    Absolute Immature Granulocytes 0.0 <=0.4 10e3/uL         45 minutes spent on the date of the encounter doing chart review, history and exam, documentation and further activities per the note    This note has been dictated using voice recognition software. Any grammatical or context distortions are unintentional and inherent to the software

## 2022-09-28 NOTE — ASSESSMENT & PLAN NOTE
This patient presents with her daughter for discussion of 2 weeks of left-sided abdominal pain.  The pain localizes to the left upper quadrant.  She is a history of diverticuli on colonoscopy but no history of diverticulitis.  No changes in bowel movements.  No fever.  Symptoms are persistent and tend to be noticeable throughout the day but not particularly related to movement.  No recent changes in medications.  White blood cell count was normal.  She is been afebrile.  She also describes some symptoms of shortness of breath in the morning and they wonder if she may benefit from an inhaler.  She does not have a diagnosis of asthma nor does she have a diagnosis of COPD although she does have a history of smoking.  - CBC showing white blood cell count is normal.  - Other labs are pending at this time.  - Evaluate for mild diverticulitis with CT scan.  This was ordered and will be completed later this afternoon.  If positive, we will treat with antibiotics.  If negative, will discuss other options and wait for lab work to be back.

## 2022-09-29 LAB
ALBUMIN SERPL BCG-MCNC: 3.9 G/DL (ref 3.5–5.2)
ALP SERPL-CCNC: 93 U/L (ref 35–104)
ALT SERPL W P-5'-P-CCNC: 12 U/L (ref 10–35)
ANION GAP SERPL CALCULATED.3IONS-SCNC: 17 MMOL/L (ref 7–15)
AST SERPL W P-5'-P-CCNC: 30 U/L (ref 10–35)
BILIRUB SERPL-MCNC: 0.2 MG/DL
BUN SERPL-MCNC: 28.7 MG/DL (ref 8–23)
CALCIUM SERPL-MCNC: 9.6 MG/DL (ref 8.8–10.2)
CHLORIDE SERPL-SCNC: 99 MMOL/L (ref 98–107)
CREAT SERPL-MCNC: 1.22 MG/DL (ref 0.51–0.95)
DEPRECATED HCO3 PLAS-SCNC: 25 MMOL/L (ref 22–29)
GFR SERPL CREATININE-BSD FRML MDRD: 44 ML/MIN/1.73M2
GLUCOSE SERPL-MCNC: 162 MG/DL (ref 70–99)
POTASSIUM SERPL-SCNC: 5 MMOL/L (ref 3.4–5.3)
PROT SERPL-MCNC: 7.3 G/DL (ref 6.4–8.3)
SODIUM SERPL-SCNC: 141 MMOL/L (ref 136–145)

## 2022-10-03 ENCOUNTER — TELEPHONE (OUTPATIENT)
Dept: FAMILY MEDICINE | Facility: CLINIC | Age: 82
End: 2022-10-03

## 2022-10-03 NOTE — TELEPHONE ENCOUNTER
----- Message from Christian López MD sent at 9/30/2022  1:00 PM CDT -----  Please call patient/family.  These results do not explain Milagro's symptoms.  Kidney function is stable.  Please inquire whether or not her symptoms have continued.

## 2022-10-03 NOTE — TELEPHONE ENCOUNTER
I spoke with Milagro and her sx continue to be the same.  What is next steps?    C/o arms black and blue after her ct scan the person could not find her veins. Patient wanted this noted.

## 2022-10-04 NOTE — TELEPHONE ENCOUNTER
Per LENNIE López to use same day on 10/7 to address abdominal pain sooner than 10/14 annual wellness appointment.       Hold put on 10/7 10am same day slot, patient will call to confirm she can make this appointment. Please schedule here if works for patient.

## 2022-10-06 ENCOUNTER — NURSE TRIAGE (OUTPATIENT)
Dept: NURSING | Facility: CLINIC | Age: 82
End: 2022-10-06

## 2022-10-06 NOTE — TELEPHONE ENCOUNTER
Nurse Triage SBAR    Situation: Appt question    Background: Patient calling. 2 Appointments coming up.     Assessment: Patient would like to combined her appointments. She has an appointment tomorrow for abdominal pain and one next week for establishing care.     Recommendation: Connected patient with Clinic. They explained that they cannot add the Establishing care for tomorrow but she can add it to her appointment for next week. Patient stated understanding.     Whitney Murphy RN Nursing Advisor 10/6/2022 3:49 PM     Reason for Disposition    Requesting regular office appointment    Additional Information    Negative: [1] Caller is not with the adult (patient) AND [2] reporting urgent symptoms    Negative: Lab result questions    Negative: Medication questions    Negative: Caller can't be reached by phone    Negative: Caller has already spoken to PCP or another triager    Negative: RN needs further essential information from caller in order to complete triage    Protocols used: INFORMATION ONLY CALL - NO TRIAGE-A-

## 2022-10-07 DIAGNOSIS — N18.31 TYPE 2 DIABETES MELLITUS WITH STAGE 3A CHRONIC KIDNEY DISEASE, WITHOUT LONG-TERM CURRENT USE OF INSULIN (H): ICD-10-CM

## 2022-10-07 DIAGNOSIS — E11.22 TYPE 2 DIABETES MELLITUS WITH STAGE 3A CHRONIC KIDNEY DISEASE, WITHOUT LONG-TERM CURRENT USE OF INSULIN (H): ICD-10-CM

## 2022-10-07 NOTE — TELEPHONE ENCOUNTER
"Requested Prescriptions   Pending Prescriptions Disp Refills    metFORMIN (GLUCOPHAGE) 500 MG tablet [Pharmacy Med Name: METFORMIN  MG TABLET] 60 tablet 0     Sig: TAKE 1 TABLET BY MOUTH TWICE A DAY WITH MEALS        Biguanide Agents Failed - 10/7/2022  2:26 AM        Failed - Patient's CR is NOT>1.4 OR Patient's EGFR is NOT<45 within past 12 mos.       Recent Labs   Lab Test 09/28/22  1802 12/15/21  1425 04/28/21  1157   GFRESTIMATED 38*  38*   < > 41*   GFRESTBLACK  --   --  47*    < > = values in this interval not displayed.       Recent Labs   Lab Test 09/28/22  1802   CR 1.4*  1.4*             Failed - Recent (6 mo) or future (30 days) visit within the authorizing provider's specialty     Patient had office visit in the last 6 months or has a visit in the next 30 days with authorizing provider or within the authorizing provider's specialty.  See \"Patient Info\" tab in inbasket, or \"Choose Columns\" in Meds & Orders section of the refill encounter.            Passed - Patient is age 10 or older        Passed - Patient has documented A1c within the specified period of time.     If HgbA1C is 8 or greater, it needs to be on file within the past 3 months.  If less than 8, must be on file within the past 6 months.     Recent Labs   Lab Test 09/28/22  1445   A1C 7.5*             Passed - Patient does NOT have a diagnosis of CHF.        Passed - Medication is active on med list        Passed - Patient is not pregnant        Passed - Patient has not had a positive pregnancy test within the past 12 mos.               "

## 2022-10-14 ENCOUNTER — TELEPHONE (OUTPATIENT)
Dept: FAMILY MEDICINE | Facility: CLINIC | Age: 82
End: 2022-10-14

## 2022-10-14 ENCOUNTER — OFFICE VISIT (OUTPATIENT)
Dept: FAMILY MEDICINE | Facility: CLINIC | Age: 82
End: 2022-10-14
Payer: COMMERCIAL

## 2022-10-14 VITALS
HEART RATE: 60 BPM | BODY MASS INDEX: 35.22 KG/M2 | WEIGHT: 191.38 LBS | HEIGHT: 62 IN | RESPIRATION RATE: 24 BRPM | SYSTOLIC BLOOD PRESSURE: 136 MMHG | DIASTOLIC BLOOD PRESSURE: 70 MMHG | TEMPERATURE: 97.8 F

## 2022-10-14 DIAGNOSIS — E78.5 HYPERLIPIDEMIA LDL GOAL <100: ICD-10-CM

## 2022-10-14 DIAGNOSIS — N18.31 STAGE 3A CHRONIC KIDNEY DISEASE (H): ICD-10-CM

## 2022-10-14 DIAGNOSIS — Z00.00 ENCOUNTER FOR MEDICARE ANNUAL WELLNESS EXAM: Primary | ICD-10-CM

## 2022-10-14 DIAGNOSIS — I10 ESSENTIAL HYPERTENSION WITH GOAL BLOOD PRESSURE LESS THAN 140/90: ICD-10-CM

## 2022-10-14 DIAGNOSIS — E11.22 TYPE 2 DIABETES MELLITUS WITH STAGE 3A CHRONIC KIDNEY DISEASE, WITHOUT LONG-TERM CURRENT USE OF INSULIN (H): ICD-10-CM

## 2022-10-14 DIAGNOSIS — Z79.4 TYPE 2 DIABETES MELLITUS WITH STAGE 3A CHRONIC KIDNEY DISEASE, WITH LONG-TERM CURRENT USE OF INSULIN (H): ICD-10-CM

## 2022-10-14 DIAGNOSIS — G62.9 PERIPHERAL POLYNEUROPATHY: ICD-10-CM

## 2022-10-14 DIAGNOSIS — E11.22 TYPE 2 DIABETES MELLITUS WITH STAGE 3A CHRONIC KIDNEY DISEASE, WITH LONG-TERM CURRENT USE OF INSULIN (H): ICD-10-CM

## 2022-10-14 DIAGNOSIS — H69.92 DYSFUNCTION OF LEFT EUSTACHIAN TUBE: ICD-10-CM

## 2022-10-14 DIAGNOSIS — R10.9 ABDOMINAL WALL PAIN: ICD-10-CM

## 2022-10-14 DIAGNOSIS — N18.31 TYPE 2 DIABETES MELLITUS WITH STAGE 3A CHRONIC KIDNEY DISEASE, WITHOUT LONG-TERM CURRENT USE OF INSULIN (H): ICD-10-CM

## 2022-10-14 DIAGNOSIS — N18.31 TYPE 2 DIABETES MELLITUS WITH STAGE 3A CHRONIC KIDNEY DISEASE, WITH LONG-TERM CURRENT USE OF INSULIN (H): ICD-10-CM

## 2022-10-14 DIAGNOSIS — I10 HTN, GOAL BELOW 140/90: ICD-10-CM

## 2022-10-14 PROCEDURE — 99214 OFFICE O/P EST MOD 30 MIN: CPT | Mod: 25 | Performed by: FAMILY MEDICINE

## 2022-10-14 PROCEDURE — G0439 PPPS, SUBSEQ VISIT: HCPCS | Performed by: FAMILY MEDICINE

## 2022-10-14 PROCEDURE — 99207 PR FOOT EXAM NO CHARGE: CPT | Mod: 25 | Performed by: FAMILY MEDICINE

## 2022-10-14 RX ORDER — LISINOPRIL 40 MG/1
40 TABLET ORAL DAILY
Qty: 90 TABLET | Refills: 3 | Status: SHIPPED | OUTPATIENT
Start: 2022-10-14 | End: 2023-10-24

## 2022-10-14 RX ORDER — SIMVASTATIN 20 MG
20 TABLET ORAL AT BEDTIME
Qty: 90 TABLET | Refills: 3 | Status: SHIPPED | OUTPATIENT
Start: 2022-10-14 | End: 2023-11-10

## 2022-10-14 RX ORDER — GABAPENTIN 100 MG/1
200 CAPSULE ORAL AT BEDTIME
Qty: 180 CAPSULE | Refills: 1 | Status: SHIPPED | OUTPATIENT
Start: 2022-10-14 | End: 2023-05-09

## 2022-10-14 RX ORDER — FLUTICASONE PROPIONATE 50 MCG
SPRAY, SUSPENSION (ML) NASAL
Qty: 48 ML | Refills: 3 | Status: SHIPPED | OUTPATIENT
Start: 2022-10-14 | End: 2023-11-13

## 2022-10-14 RX ORDER — METOPROLOL TARTRATE 50 MG
TABLET ORAL
Qty: 180 TABLET | Refills: 1 | Status: SHIPPED | OUTPATIENT
Start: 2022-10-14 | End: 2023-04-14

## 2022-10-14 ASSESSMENT — ENCOUNTER SYMPTOMS: ABDOMINAL PAIN: 1

## 2022-10-14 ASSESSMENT — ACTIVITIES OF DAILY LIVING (ADL): CURRENT_FUNCTION: MONEY MANAGEMENT REQUIRES ASSISTANCE

## 2022-10-14 NOTE — PROGRESS NOTES
"SUBJECTIVE:   Milagro is a 82 year old who presents for Preventive Visit.    Chief Complaint   Patient presents with     Wellness Visit     Pt. Nonfasting.     Abdominal Pain     Continues.     Abdominal pain:   - better. Improves following BM.  Not related to food.   - she wonders about muscle strain.  The patient notes that she has been moving a heavy potted plant every day.  Her daughter-in-law wonders if it might be a strain as result of this movement.  Appetite intact.  Weight stable.  No nausea.      in hospice.      Patient has been advised of split billing requirements and indicates understanding: Yes  Are you in the first 12 months of your Medicare coverage?  No    Abdominal Pain     Healthy Habits:     In general, how would you rate your overall health?  Good    Frequency of exercise:  None    Do you usually eat at least 4 servings of fruit and vegetables a day, include whole grains    & fiber and avoid regularly eating high fat or \"junk\" foods?  Yes    Taking medications regularly:  Yes    Medication side effects:  None    Ability to successfully perform activities of daily living:  Money management requires assistance    Home Safety:  No safety concerns identified    Hearing Impairment:  Feel that people are mumbling or not speaking clearly and need to ask people to speak up or repeat themselves    In the past 6 months, have you been bothered by leaking of urine? Yes    In general, how would you rate your overall mental or emotional health?  Excellent      PHQ-2 Total Score: 0    Additional concerns today:  Yes    Do you feel safe in your environment? Yes    Have you ever done Advance Care Planning? (For example, a Health Directive, POLST, or a discussion with a medical provider or your loved ones about your wishes): Yes, advance care planning is on file.       Fall risk  Fallen 2 or more times in the past year?: No  Any fall with injury in the past year?: Yes    Cognitive Screening   1) Repeat 3 " items (Leader, Season, Table)  PASS  2) Clock draw: NORMAL  3) 3 item recall: Recalls 3 objects  Results: 3 items recalled: COGNITIVE IMPAIRMENT LESS LIKELY    Mini-CogTM Copyright DILEEP [employer]. Licensed by the author for use in [facility]; reprinted with permission ([email or web address]). All rights reserved.      Do you have sleep apnea, excessive snoring or daytime drowsiness?: no    Reviewed and updated as needed this visit by clinical staff   Tobacco  Allergies  Meds              Reviewed and updated as needed this visit by Provider                 Social History     Tobacco Use     Smoking status: Former     Packs/day: 0.70     Years: 26.00     Pack years: 18.20     Types: Cigarettes     Quit date: 2009     Years since quittin.7     Smokeless tobacco: Never     Tobacco comments:         Substance Use Topics     Alcohol use: Yes     Comment: occ         Alcohol Use 10/14/2022   Prescreen: >3 drinks/day or >7 drinks/week? No   Prescreen: >3 drinks/day or >7 drinks/week? -               Current providers sharing in care for this patient include:   Patient Care Team:  Christian López MD as PCP - General (Family Medicine)  Morena Mcintosh MD as Assigned PCP    The following health maintenance items are reviewed in Epic and correct as of today:  Health Maintenance   Topic Date Due     URINE DRUG SCREEN  Never done     ZOSTER IMMUNIZATION (1 of 2) Never done     Pneumococcal Vaccine: 65+ Years (2 - PCV) 2010     DEXA  2021     LIPID  2022     MICROALBUMIN  2022     COVID-19 Vaccine (5 - Booster for Moderna series) 2022     INFLUENZA VACCINE (1) 2022     EYE EXAM  2022     ANNUAL REVIEW OF HM ORDERS  12/15/2022     A1C  2023     BMP  2023     HEMOGLOBIN  2023     MEDICARE ANNUAL WELLNESS VISIT  10/14/2023     DIABETIC FOOT EXAM  10/14/2023     FALL RISK ASSESSMENT  10/14/2023     DTAP/TDAP/TD IMMUNIZATION (3 - Td or Tdap) 2026      "ADVANCE CARE PLANNING  10/14/2027     PHQ-2 (once per calendar year)  Completed     URINALYSIS  Completed     IPV IMMUNIZATION  Aged Out     MENINGITIS IMMUNIZATION  Aged Out       Mammogram Screening - Patient over age 75, has elected to discontinue screenings.  Pertinent mammograms are reviewed under the imaging tab.    Review of Systems   Constitutional: Negative.    Respiratory: Negative.    Cardiovascular: Negative.    Gastrointestinal: Positive for abdominal pain.   Genitourinary: Negative.    Musculoskeletal: Negative.    Neurological: Negative.    Psychiatric/Behavioral: Negative.        OBJECTIVE:   /70 (Patient Position: Sitting, Cuff Size: Adult Regular)   Pulse 60   Temp 97.8  F (36.6  C) (Oral)   Resp 24   Ht 1.582 m (5' 2.3\")   Wt 86.8 kg (191 lb 6 oz)   LMP 09/25/1979 (Approximate)   BMI 34.67 kg/m   Estimated body mass index is 34.67 kg/m  as calculated from the following:    Height as of this encounter: 1.582 m (5' 2.3\").    Weight as of this encounter: 86.8 kg (191 lb 6 oz).  Physical Exam  Vitals reviewed.   Constitutional:       General: She is not in acute distress.     Appearance: Normal appearance. She is not ill-appearing.      Comments: Walks with an unsteady antalgic gait.  Has a cane.   HENT:      Head: Normocephalic and atraumatic.      Right Ear: External ear normal.      Left Ear: External ear normal.      Nose: Nose normal.      Mouth/Throat:      Pharynx: Oropharynx is clear. No oropharyngeal exudate or posterior oropharyngeal erythema.   Eyes:      General: No scleral icterus.        Right eye: No discharge.         Left eye: No discharge.      Extraocular Movements: Extraocular movements intact.      Conjunctiva/sclera: Conjunctivae normal.      Pupils: Pupils are equal, round, and reactive to light.   Neck:      Comments: No thyromegaly.  Cardiovascular:      Rate and Rhythm: Normal rate and regular rhythm.      Heart sounds: Normal heart sounds. No murmur heard.    No " friction rub. No gallop.   Pulmonary:      Effort: Pulmonary effort is normal. No respiratory distress.      Breath sounds: Normal breath sounds. No wheezing or rales.   Abdominal:      General: There is no distension.      Palpations: Abdomen is soft. There is no mass.      Tenderness: There is abdominal tenderness. There is no guarding or rebound.      Comments: Continues to have left upper quadrant pain with palpation.  The pain is actually quite a bit worse when asked to engage her abdominal wall muscles.  No palpable abnormality.   Musculoskeletal:         General: No signs of injury. Normal range of motion.      Cervical back: Normal range of motion.      Right lower leg: No edema.      Left lower leg: No edema.   Lymphadenopathy:      Cervical: No cervical adenopathy.   Skin:     General: Skin is warm.      Coloration: Skin is not jaundiced.      Findings: No rash.   Neurological:      General: No focal deficit present.      Mental Status: She is alert and oriented to person, place, and time.      Cranial Nerves: No cranial nerve deficit.      Deep Tendon Reflexes: Reflexes normal.   Psychiatric:         Mood and Affect: Mood normal.           Diagnostic Test Results:  Labs reviewed in Epic    ASSESSMENT / PLAN:     Problem List Items Addressed This Visit     Hyperlipidemia LDL goal <100    Relevant Medications    simvastatin (ZOCOR) 20 MG tablet    Type 2 diabetes mellitus with stage 3 chronic kidney disease (H)     We reviewed diabetes.  No changes to plan.  Continue aspirin, statin.  Non-smoker.  Blood pressure control adequate.         Relevant Medications    metFORMIN (GLUCOPHAGE) 500 MG tablet    Other Relevant Orders    FOOT EXAM (Completed)    Abdominal wall pain     At this point, her exam seems more consistent with abdominal wall pain.  In reviewing the history today and in discussion with her daughter-in-law who accompanies her, she is moving a heavy potted plant every day.  She will try to change  "mechanics of moving this plant (put it on a roller).  I suspect that her ongoing abdominal pain is simply a muscle strain.  The CT scan did not show any abnormalities.  She feels overall a little bit better than she did when I saw her 2 and half weeks ago.         CKD (chronic kidney disease) stage 3, GFR 30-59 ml/min (H)    Encounter for Medicare annual wellness exam - Primary    HTN, goal below 140/90     Continue current therapy.  Reviewed recent electrolyte measurements.         Relevant Medications    lisinopril (ZESTRIL) 40 MG tablet    metoprolol tartrate (LOPRESSOR) 50 MG tablet   Other Visit Diagnoses     Peripheral polyneuropathy        Relevant Medications    gabapentin (NEURONTIN) 100 MG capsule    Essential hypertension with goal blood pressure less than 140/90        Relevant Medications    lisinopril (ZESTRIL) 40 MG tablet    metoprolol tartrate (LOPRESSOR) 50 MG tablet    Dysfunction of left eustachian tube        Relevant Medications    fluticasone (FLONASE) 50 MCG/ACT nasal spray          Patient has been advised of split billing requirements and indicates understanding: Yes    COUNSELING:  Reviewed preventive health counseling, as reflected in patient instructions       Regular exercise       Healthy diet/nutrition    Estimated body mass index is 34.67 kg/m  as calculated from the following:    Height as of this encounter: 1.582 m (5' 2.3\").    Weight as of this encounter: 86.8 kg (191 lb 6 oz).    Weight management plan: Discussed healthy diet and exercise guidelines    She reports that she quit smoking about 13 years ago. Her smoking use included cigarettes. She has a 18.20 pack-year smoking history. She has never used smokeless tobacco.      Appropriate preventive services were discussed with this patient, including applicable screening as appropriate for cardiovascular disease, diabetes, osteopenia/osteoporosis, and glaucoma.  As appropriate for age/gender, discussed screening for colorectal " cancer, prostate cancer, breast cancer, and cervical cancer. Checklist reviewing preventive services available has been given to the patient.    Reviewed patients plan of care and provided an AVS. The Basic Care Plan (routine screening as documented in Health Maintenance) for Milagro meets the Care Plan requirement. This Care Plan has been established and reviewed with the Patient and other:Daughter-in-law.    Counseling Resources:  ATP IV Guidelines  Pooled Cohorts Equation Calculator  Breast Cancer Risk Calculator  Breast Cancer: Medication to Reduce Risk  FRAX Risk Assessment  ICSI Preventive Guidelines  Dietary Guidelines for Americans, 2010  Doormen.'s MyPlate  ASA Prophylaxis  Lung CA Screening    Christian López MD  [facility]    Identified Health Risks:    She is at risk for lack of exercise and has been provided with information to increase physical activity for the benefit of her well-being.  The patient reports that she has difficulty with activities of daily living. I have asked that the patient make a follow up appointment in 26 weeks where this issue will be further evaluated and addressed.  The patient was provided with written information regarding signs of hearing loss.  Information on urinary incontinence and treatment options given to patient.  She is at risk for falling and has been provided with information to reduce the risk of falling at home.

## 2022-10-14 NOTE — TELEPHONE ENCOUNTER
I have had good experiences with [facility].  Located across the parking lot from clinic.

## 2022-10-14 NOTE — PATIENT INSTRUCTIONS
Trial of reduced dose of furosemide.  Please take 20 mg for the next 7-10 days.  If your legs start to swell, we will need to go back up to 40 mg.      Consider getting SHINGRIX - the shingles vaccination.      Patient Education   Personalized Prevention Plan  You are due for the preventive services outlined below.  Your care team is available to assist you in scheduling these services.  If you have already completed any of these items, please share that information with your care team to update in your medical record.  Health Maintenance Due   Topic Date Due    URINE DRUG SCREEN  Never done    Zoster (Shingles) Vaccine (1 of 2) Never done    Pneumococcal Vaccine (2 - PCV) 08/19/2010    Osteoporosis Screening  09/28/2021    Cholesterol Lab  04/28/2022    Kidney Microalbumin Urine Test  04/28/2022    Diabetic Foot Exam  04/28/2022    COVID-19 Vaccine (5 - Booster for Moderna series) 07/06/2022    Flu Vaccine (1) 09/01/2022    Eye Exam  09/02/2022       Exercise for a Healthier Heart  You may wonder how you can improve the health of your heart. If you re thinking about exercise, you re on the right track. You don t need to become an athlete. But you do need a certain amount of brisk exercise to help strengthen your heart. If you have been diagnosed with a heart condition, your healthcare provider may advise exercise to help stabilize your condition. To help make exercise a habit, choose safe, fun activities.      Exercise with a friend. When activity is fun, you're more likely to stick with it.   Before you start  Check with your healthcare provider before starting an exercise program. This is especially important if you have not been active for a while. It's also important if you have a long-term (chronic) health problem such as heart disease, diabetes, or obesity. Or if you are at high risk for having these problems.   Why exercise?  Exercising regularly offers many healthy rewards. It can help you do all of the  following:   Improve your blood cholesterol level to help prevent further heart trouble  Lower your blood pressure to help prevent a stroke or heart attack  Control diabetes, or reduce your risk of getting this disease  Improve your heart and lung function  Reach and stay at a healthy weight  Make your muscles stronger so you can stay active  Prevent falls and fractures by slowing the loss of bone mass (osteoporosis)  Manage stress better  Reduce your blood pressure  Improve your sense of self and your body image  Exercise tips    Ease into your routine. Set small goals. Then build on them. If you are not sure what your activity level should be, talk with your healthcare provider first before starting an exercise routine.  Exercise on most days. Aim for a total of 150 minutes (2 hours and 30 minutes) or more of moderate-intensity aerobic activity each week. Or 75 minutes (1 hour and 15 minutes) or more of vigorous-intensity aerobic activity each week. Or try for a combination of both. Moderate activity means that you breathe heavier and your heart rate increases but you can still talk. Think about doing 40 minutes of moderate exercise, 3 to 4 times a week. For best results, activity should last for about 40 minutes to lower blood pressure and cholesterol. It's OK to work up to the 40-minute period over time. Examples of moderate-intensity activity are walking 1 mile in 15 minutes. Or doing 30 to 45 minutes of yard work.  Step up your daily activity level.  Along with your exercise program, try being more active the whole day. Walk instead of drive. Or park further away so that you take more steps each day. Do more household tasks or yard work. You may not be able to meet the advised mount of physical activity. But doing some moderate- or vigorous-intensity aerobic activity can help reduce your risk for heart disease. Your healthcare provider can help you figure out what is best for you.  Choose 1 or more activities  you enjoy.  Walking is one of the easiest things you can do. You can also try swimming, riding a bike, dancing, or taking an exercise class.    When to call your healthcare provider  Call your healthcare provider if you have any of these:   Chest pain or feel dizzy or lightheaded  Burning, tightness, pressure, or heaviness in your chest, neck, shoulders, back, or arms  Abnormal shortness of breath  More joint or muscle pain  A very fast or irregular heartbeat (palpitations)  [employer] last reviewed this educational content on 7/1/2019 2000-2021 [employer]. All rights reserved. This information is not intended as a substitute for professional medical care. Always follow your healthcare professional's instructions.        Activities of Daily Living    Your Health Risk Assessment indicates you have difficulties with activities of daily living such as housework, bathing, preparing meals, taking medication, etc. Please make a follow up appointment for us to address this issue in more detail.    Signs of Hearing Loss      Hearing much better with one ear can be a sign of hearing loss.   Hearing loss is a problem shared by many people. In fact, it is one of the most common health problems, particularly as people age. Most people age 65 and older have some hearing loss. By age 80, almost everyone does. Hearing loss often occurs slowly over the years. So you may not realize your hearing has gotten worse.  Have your hearing checked  Call your healthcare provider if you:  Have to strain to hear normal conversation  Have to watch other people s faces very carefully to follow what they re saying  Need to ask people to repeat what they ve said  Often misunderstand what people are saying  Turn the volume of the television or radio up so high that others complain  Feel that people are mumbling when they re talking to you  Find that the effort to hear leaves you feeling tired and irritated  Notice, when using the  phone, that you hear better with one ear than the other  [employer] last reviewed this educational content on 1/1/2020 2000-2021 [employer]. All rights reserved. This information is not intended as a substitute for professional medical care. Always follow your healthcare professional's instructions.          Urinary Incontinence, Female (Adult)   Urinary incontinence means loss of bladder control. This problem affects many women, especially as they get older. If you have incontinence, you may be embarrassed to ask for help. But know that this problem can be treated.   Types of Incontinence  There are different types of incontinence. Two of the main types are described here. You can have more than one type.   Stress incontinence. With this type, urine leaks when pressure (stress) is put on the bladder. This may happen when you cough, sneeze, or laugh. Stress incontinence most often occurs because the pelvic floor muscles that support the bladder and urethra are weak. This can happen after pregnancy and vaginal childbirth or a hysterectomy. It can also be due to excess body weight or hormone changes.  Urge incontinence (also called overactive bladder). With this type, a sudden urge to urinate is felt often. This may happen even though there may not be much urine in the bladder. The need to urinate often during the night is common. Urge incontinence most often occurs because of bladder spasms. This may be due to bladder irritation or infection. Damage to bladder nerves or pelvic muscles, constipation, and certain medicines can also lead to urge incontinence.  Treatment depends on the cause. Further evaluation is needed to find the type you have. This will likely include an exam and certain tests. Based on the results, you and your healthcare provider can then plan treatment. Until a diagnosis is made, the home care tips below can help ease symptoms.   Home care  Do pelvic floor muscle exercises, if they  are prescribed. The pelvic floor muscles help support the bladder and urethra. Many women find that their symptoms improve when doing special exercises that strengthen these muscles. To do the exercises, contract the muscles you would use to stop your stream of urine. But do this when you re not urinating. Hold for 10 seconds, then relax. Repeat 10 to 20 times in a row, at least 3 times a day. Your healthcare provider may give you other instructions for how to do the exercises and how often.  Keep a bladder diary. This helps track how often and how much you urinate over a set period of time. Bring this diary with you to your next visit with the provider. The information can help your provider learn more about your bladder problem.  Lose weight, if advised to by your provider. Extra weight puts pressure on the bladder. Your provider can help you create a weight-loss plan that s right for you. This may include exercising more and making certain diet changes.  Don't have foods and drinks that may irritate the bladder. These can include alcohol and caffeinated drinks.  Quit smoking. Smoking and other tobacco use can lead to a long-term (chronic) cough that strains the pelvic floor muscles. Smoking may also damage the bladder and urethra. Talk with your provider about treatments or methods you can use to quit smoking.  If drinking large amounts of fluid makes you have symptoms, you may be advised to limit your fluid intake. You may also be advised to drink most of your fluids during the day and to limit fluids at night.  If you re worried about urine leakage or accidents, you may wear absorbent pads to catch urine. Change the pads often. This helps reduce discomfort. It may also reduce the risk of skin or bladder infections.    Follow-up care  Follow up with your healthcare provider, or as directed. It may take some to find the right treatment for your problem. But healthy lifestyle changes can be made right away. These  include such things as exercising on a regular basis, eating a healthy diet, losing weight (if needed), and quitting smoking. Your treatment plan may include special therapies or medicines. Certain procedures or surgery may also be options. Talk about any questions you have with your provider.   When to seek medical advice  Call the healthcare provider right away if any of these occur:  Fever of 100.4 F (38 C) or higher, or as directed by your provider  Bladder pain or fullness  Belly swelling  Nausea or vomiting  Back pain  Weakness, dizziness, or fainting  [employer] last reviewed this educational content on 1/1/2020 2000-2021 [employer]. All rights reserved. This information is not intended as a substitute for professional medical care. Always follow your healthcare professional's instructions.          Preventing Falls at Home  A person can fall for many reasons. Older adults may fall because reaction time slows down as we age. Your muscles and joints may get stiff, weak, or less flexible because of illness, medicines, or a physical condition.   Other health problems that make falls more likely include:   Arthritis  Dizziness or lightheadedness when you stand up (orthostatic hypotension)  History of a stroke  Dizziness  Anemia  Certain medicines taken for mental illness or to control blood pressure.  Problems with balance or gait  Bladder or urinary problems  History of falling  Changes in vision (vision impairment)  Changes in thinking skills and memory (cognitive impairment)  Injuries from a fall can include serious injuries such as broken bones, dislocated joints, internal bleeding and cuts. Injuries like these can limit your independence.   Prevention tips  To help prevent falls and fall-related injuries, follow the tips below.    Floors  To make floors safer:   Put nonskid pads under area rugs.  Remove small rugs.  Replace worn floor coverings.  Tack carpets firmly to each step on carpeted  stairs. Put nonskid strips on the edges of uncarpeted stairs.  Keep floors and stairs free of clutter and cords.  Arrange furniture so there are clear pathways.  Clean up any spills right away.  Bathrooms    To make bathrooms safer:   Install grab bars in the tub or shower.  Apply nonskid strips or put a nonskid rubber mat in the tub or shower.  Sit on a bath chair to bathe.  Use bathmats with nonskid backing.  Lighting  To improve visibility in your home:    Keep a flashlight in each room. Or put a lamp next to the bed within easy reach.  Put nightlights in the bedrooms, hallways, kitchen, and bathrooms.  Make sure all stairways have good lighting.  Take your time when going up and down stairs.  Put handrails on both sides of stairs and in walkways for more support. To prevent injury to your wrist or arm, don t use handrails to pull yourself up.  Install grab bars to pull yourself up.  Move or rearrange items that you use often. This will make them easier to find or reach.  Look at your home to find any safety hazards. Especially look at doorways, walkways, and the driveway. Remove or repair any safety problems that you find.  Other changes to make  Look around to find any safety hazards. Look closely at doorways, walkways, and the driveway. Remove or repair any safety problems that you find.  Wear shoes that fit well.  Take your time when going up and down stairs.  Put handrails on both sides of stairs and in walkways for more support. To prevent injury to your wrist or arm, don t use handrails to pull yourself up.  Install grab bars wherever needed to pull yourself up.  Arrange items that you use often. This will make them easier to find or reach.    [employer] last reviewed this educational content on 3/1/2020    3401-3172 [employer]. All rights reserved. This information is not intended as a substitute for professional medical care. Always follow your healthcare professional's instructions.

## 2022-10-14 NOTE — TELEPHONE ENCOUNTER
General Call      Reason for Call:  Dentist recomendation    What are your questions or concerns:  Patient said that DR. López said he would give her a recommendation for a dentist in town since they are new to the area.  Never got it from him during the appointment, wondering who he recommends still.    Date of last appointment with provider: 10/14/22    Okay to leave a detailed message?: Yes at Cell number on file:    No relevant phone numbers on file.

## 2022-10-16 ASSESSMENT — ENCOUNTER SYMPTOMS
CONSTITUTIONAL NEGATIVE: 1
CARDIOVASCULAR NEGATIVE: 1
PSYCHIATRIC NEGATIVE: 1
MUSCULOSKELETAL NEGATIVE: 1
NEUROLOGICAL NEGATIVE: 1
RESPIRATORY NEGATIVE: 1

## 2022-10-16 NOTE — ASSESSMENT & PLAN NOTE
We reviewed diabetes.  No changes to plan.  Continue aspirin, statin.  Non-smoker.  Blood pressure control adequate.

## 2022-10-16 NOTE — ASSESSMENT & PLAN NOTE
At this point, her exam seems more consistent with abdominal wall pain.  In reviewing the history today and in discussion with her daughter-in-law who accompanies her, she is moving a heavy potted plant every day.  She will try to change mechanics of moving this plant (put it on a roller).  I suspect that her ongoing abdominal pain is simply a muscle strain.  The CT scan did not show any abnormalities.  She feels overall a little bit better than she did when I saw her 2 and half weeks ago.

## 2022-11-02 ENCOUNTER — IMMUNIZATION (OUTPATIENT)
Dept: FAMILY MEDICINE | Facility: CLINIC | Age: 82
End: 2022-11-02
Payer: COMMERCIAL

## 2022-11-02 PROCEDURE — 0134A COVID-19,PF,MODERNA BIVALENT: CPT

## 2022-11-02 PROCEDURE — 91313 COVID-19,PF,MODERNA BIVALENT: CPT

## 2022-12-09 ENCOUNTER — TELEPHONE (OUTPATIENT)
Dept: FAMILY MEDICINE | Facility: CLINIC | Age: 82
End: 2022-12-09

## 2022-12-09 NOTE — LETTER
December 11, 2022      Milagro Wallace  [address], [place] [address]        To Whom It May Concern:    Milagro Wallace is a patient of our clinic.  She has a history of falls and walks with a cane.  She is chronically sarcopenia as result of other medical conditions (diabetes, chronic kidney disease) which makes walking down a sloped driveway in winter a fall risk.  Please deliver her mail to her door if at all possible in order to minimize the opportunity for fall and injury.        Sincerely,        Christian López MD

## 2022-12-09 NOTE — TELEPHONE ENCOUNTER
Forms/Letter Request    Type of form/letter: USPS letter for mail exception     Patient is attempting to get mail delivery to her door instead of mailbox. The postal service requires a letter from PCP explaining medical reason patient needs door delivery. Patient states she has a sloped driveway to her mailbox and walks with a cane. She fears slipping and falling with snow and ice on driveway.    Have you been seen for this request: Yes last OV  10/14/2022    Do we have the form/letter: No    How would you like the form/letter returned: Mail    Patient Notified form requests are processed in 3-5 business days:Yes    Okay to leave a detailed message?: Yes at Home number on file [phone] (home)

## 2023-02-13 NOTE — PATIENT INSTRUCTIONS
Follow up with spine specialist and start PT.  You may continue with tylenol and use the Tramadol as needed for really bad pain.  Follow up if symptoms persist or worsen and as needed.        Thank you for choosing [facility].  You may be receiving a survey in the mail from [facility] regarding your visit today.  Please take a few minutes to complete and return the survey to let us know how we are doing.      Our Clinic hours are:  Mondays    7:20 am - 7 pm  Tues -  Fri  7:20 am - 5 pm    Clinic Phone: [phone]    The clinic lab opens at 7:30 am Mon - Fri and appointments are required.    [facility]  Ph. [phone]  Monday  8 am - 7pm  Tues - Fri 8 am - 5:30 pm          English

## 2023-03-27 ENCOUNTER — NURSE TRIAGE (OUTPATIENT)
Dept: NURSING | Facility: CLINIC | Age: 83
End: 2023-03-27
Payer: COMMERCIAL

## 2023-03-27 NOTE — TELEPHONE ENCOUNTER
Pt calling with concerns about;     passed 3 months ago    'Unable to  mornings for quite a while due to get so easily dizzy'  Gets somewhat as day goes on  Unable to turn head right/left real fast  States vertigo has run in her family with several members on mothers side.    Pt Denies;  Difficulty breathing/SOB  Chest pain/pressure  Fever  Nausea/vomiting  Syncope  Heart beating slower or fast - harder    According to the protocol, patient should see PCP within 24 hours   Message routed to PCP/Care Team to advise Pt at      [phone]        Care advice given. Patient verbalizes understanding and agrees with plan of care. Transferred to scheduling.     Sharon Carvajal RN, Nurse Advisor 10:50 AM 3/27/2023    Reason for Disposition    [1] MODERATE dizziness (e.g., interferes with normal activities) AND [2] has NOT been evaluated by physician for this  (Exception: dizziness caused by heat exposure, sudden standing, or poor fluid intake)    Additional Information    Negative: SEVERE difficulty breathing (e.g., struggling for each breath, speaks in single words)    Negative: [1] Difficulty breathing or swallowing AND [2] started suddenly after medicine, an allergic food or bee sting    Negative: Shock suspected (e.g., cold/pale/clammy skin, too weak to stand, low BP, rapid pulse)    Negative: Difficult to awaken or acting confused (e.g., disoriented, slurred speech)    Negative: [1] Weakness (i.e., paralysis, loss of muscle strength) of the face, arm or leg on one side of the body AND [2] sudden onset AND [3] present now    Negative: [1] Numbness (i.e., loss of sensation) of the face, arm or leg on one side of the body AND [2] sudden onset AND [3] present now    Negative: [1] Loss of speech or garbled speech AND [2] sudden onset AND [3] present now    Negative: Overdose (accidental or intentional) of medications    Negative: [1] Fainted > 15 minutes ago AND [2] still feels too weak or dizzy to stand     "Negative: Heart beating < 50 beats per minute OR > 140 beats per minute    Negative: Sounds like a life-threatening emergency to the triager    Negative: Chest pain    Negative: Rectal bleeding, bloody stool, or tarry-black stool    Negative: [1] Vomiting AND [2] contains red blood or black (\"coffee ground\") material    Negative: Vomiting is main symptom    Negative: Diarrhea is main symptom    Negative: Headache is main symptom    Negative: Patient states that they are having an anxiety or panic attack    Negative: Dizziness from low blood sugar (i.e., < 60 mg/dl or 3.5 mmol/l)    Negative: Dizziness is described as a spinning sensation (i.e., vertigo)    Negative: Heat exhaustion suspected (i.e., dehydration from heat exposure)    Negative: Difficulty breathing    Negative: SEVERE dizziness (e.g., unable to stand, requires support to walk, feels like passing out now)    Negative: Extra heart beats OR irregular heart beating (i.e., \"palpitations\")    Negative: [1] Drinking very little AND [2] dehydration suspected (e.g., no urine > 12 hours, very dry mouth, very lightheaded)    Negative: [1] Weakness (i.e., paralysis, loss of muscle strength) of the face, arm / hand, or leg / foot on one side of the body AND [2] sudden onset AND [3] brief (now gone)    Negative: [1] Numbness (i.e., loss of sensation) of the face, arm / hand, or leg / foot on one side of the body AND [2] sudden onset AND [3] brief (now gone)    Negative: [1] Loss of speech or garbled speech AND [2] sudden onset AND [3] brief (now gone)    Negative: Loss of vision or double vision (Exception: similar to previous migraines)    Negative: Patient sounds very sick or weak to the triager    Negative: [1] Dizziness caused by heat exposure, sudden standing, or poor fluid intake AND [2] no improvement after 2 hours of rest and fluids    Negative: [1] Fever > 103 F (39.4 C) AND [2] not able to get the fever down using Fever Care Advice    Negative: [1] Fever " > 101 F (38.3 C) AND [2] age > 60 years    Negative: [1] Fever > 100.0 F (37.8 C) AND [2] bedridden (e.g., nursing home patient, CVA, chronic illness, recovering from surgery)    Negative: [1] Fever > 100.0 F (37.8 C) AND [2] diabetes mellitus or weak immune system (e.g., HIV positive, cancer chemo, splenectomy, organ transplant, chronic steroids)    Protocols used: DIZZINESS - [id number]

## 2023-03-28 ENCOUNTER — DOCUMENTATION ONLY (OUTPATIENT)
Dept: OTHER | Facility: CLINIC | Age: 83
End: 2023-03-28

## 2023-03-28 ENCOUNTER — OFFICE VISIT (OUTPATIENT)
Dept: FAMILY MEDICINE | Facility: CLINIC | Age: 83
End: 2023-03-28
Payer: COMMERCIAL

## 2023-03-28 VITALS
BODY MASS INDEX: 34.07 KG/M2 | RESPIRATION RATE: 16 BRPM | OXYGEN SATURATION: 92 % | DIASTOLIC BLOOD PRESSURE: 75 MMHG | HEIGHT: 63 IN | WEIGHT: 192.3 LBS | HEART RATE: 61 BPM | SYSTOLIC BLOOD PRESSURE: 159 MMHG

## 2023-03-28 DIAGNOSIS — R42 DIZZINESS: Primary | ICD-10-CM

## 2023-03-28 LAB
ANION GAP SERPL CALCULATED.3IONS-SCNC: 10 MMOL/L (ref 7–15)
BUN SERPL-MCNC: 23.9 MG/DL (ref 8–23)
CALCIUM SERPL-MCNC: 9.5 MG/DL (ref 8.8–10.2)
CHLORIDE SERPL-SCNC: 102 MMOL/L (ref 98–107)
CREAT SERPL-MCNC: 1.23 MG/DL (ref 0.51–0.95)
DEPRECATED HCO3 PLAS-SCNC: 28 MMOL/L (ref 22–29)
ERYTHROCYTE [DISTWIDTH] IN BLOOD BY AUTOMATED COUNT: 12.1 % (ref 10–15)
GFR SERPL CREATININE-BSD FRML MDRD: 44 ML/MIN/1.73M2
GLUCOSE SERPL-MCNC: 150 MG/DL (ref 70–99)
HCT VFR BLD AUTO: 37.9 % (ref 35–47)
HGB BLD-MCNC: 12.2 G/DL (ref 11.7–15.7)
MCH RBC QN AUTO: 30.8 PG (ref 26.5–33)
MCHC RBC AUTO-ENTMCNC: 32.2 G/DL (ref 31.5–36.5)
MCV RBC AUTO: 96 FL (ref 78–100)
PLATELET # BLD AUTO: 294 10E3/UL (ref 150–450)
POTASSIUM SERPL-SCNC: 4.5 MMOL/L (ref 3.4–5.3)
RBC # BLD AUTO: 3.96 10E6/UL (ref 3.8–5.2)
SODIUM SERPL-SCNC: 140 MMOL/L (ref 136–145)
WBC # BLD AUTO: 9.6 10E3/UL (ref 4–11)

## 2023-03-28 PROCEDURE — 80048 BASIC METABOLIC PNL TOTAL CA: CPT

## 2023-03-28 PROCEDURE — 36415 COLL VENOUS BLD VENIPUNCTURE: CPT

## 2023-03-28 PROCEDURE — 85027 COMPLETE CBC AUTOMATED: CPT

## 2023-03-28 PROCEDURE — 99213 OFFICE O/P EST LOW 20 MIN: CPT

## 2023-03-28 NOTE — PATIENT INSTRUCTIONS
Increase your fluid intake to 32oz daily.  Cut your furosemide in half to 20mg.  Do these two things over the next two weeks until you see your doctor. If you develop increased swelling or your blood pressure increases, resume the 40mg of furosemide.  You could consider cutting your gabapentin down to 1 pill instead of 2, but only if your pain is tolerable with this dose.  You should be checking your blood sugars in the morning when you are feeling dizzy.

## 2023-03-28 NOTE — PROGRESS NOTES
Problem List Items Addressed This Visit        Other    Dizziness - Primary     Discussed today with patient and daughter that I believe her dizziness is likely multifactorial.  Complete blood count and basic metabolic panel obtained today to assess for anemia versus electrolyte abnormalities.  She really does not drink a whole lot of fluids, only 16 ounces a day.  In addition she is on a number of medications that could be contributing to her symptoms.  It is unclear what role hypoglycemia may play, as patient does not check her blood sugar at any point, but it was considered.  Additionally patient is on a beta-blocker, and is borderline bradycardic in clinic today, although her symptoms are primarily before she takes her medication, so less suspicious of this.  Orthostatics were negative in clinic today.  Neuro exam is normal.  Does not seem consistent with BPPV.  She will decrease her furosemide to 20 mg daily and increase her fluids to 32 ounces daily, which is still relatively low, however I would like to avoid any fluid overload.  She will monitor for worsening of blood pressure, peripheral edema and any respiratory symptoms she may have and resume her 40 mg daily dose if this is the case.  She has planned follow up with her PCP in 2 weeks. Her blood pressure is elevated in clinic today, although patient reports it is better controlled at home, so I will not make any adjustments to that today, but should be considered with her primary care provider.  We also discussed that her nightly gabapentin could be contributory, and patient could try to decrease to 1 capsule at night if her pain is well controlled.  Patient and daughter expressed understanding of and comfort with this plan.  All questions were answered.         Relevant Orders    CBC with platelets (Completed)    Basic metabolic panel  (Ca, Cl, CO2, Creat, Gluc, K, Na, BUN)      SUE Borrero CNP on 3/28/2023 at 11:54 AM      Subjective    Milagro is a 83 year old who presents for the following health issues     Chief Complaint   Patient presents with     Dizziness     4 weeks, worse in the am      History is obtained from patient and her daughter  First noticed the dizziness approximately 1 month ago.  Has been staying the same in consistency since then.  Will wake up feeling dizzy.  As she gets up and moves around, it will gradually get better throughout the day.  Will occasionally has some dizziness with sharp movements of her head.  Denies any associated symptoms, including chest pain/palpitations, shortness of breath, paresthesias or vision changes.  Does state that she is due for an eye exam.  Drinks to 16 ounces of water a day, 1 cup of juice and 1 cup of coffee.  Will occasionally drink a glass of red wine at night.  Does endorse a very similar instance of dizziness, a couple of years ago when she was in [place].  At that time, she was recommended to decrease her furosemide dose and increase her fluid intake.  This greatly helped her symptoms.  She has not done either of this today.  History of type 2 diabetes, although patient reports that she does not check her blood pressure.  Just obtained her glucometer from [facility], where it has been for the last 3 years.  Significant stress in the last couple months, as patient has lost her  and relocated to [place] from [facility].  Seems overwhelmed with the idea of coordinating care in her new location.    History of Present Illness       Reason for visit:  Dizziness  Symptom onset:  3-4 weeks ago  Symptoms include:  Dizzy  Symptom intensity:  Mild  Symptom progression:  Staying the same  What makes it worse:  Bending    She eats 2-3 servings of fruits and vegetables daily.She consumes 1 sweetened beverage(s) daily.She exercises with enough effort to increase her heart rate 9 or less minutes per day.  She exercises with enough effort to increase her heart rate 3 or less days per week.   She is  "taking medications regularly.    Review of Systems         Objective    BP (!) 159/75 (BP Location: Left arm, Patient Position: Sitting, Cuff Size: Adult Large)   Pulse 61   Resp 16   Ht 1.588 m (5' 2.5\")   Wt 87.2 kg (192 lb 4.8 oz)   LMP 09/25/1979 (Approximate)   SpO2 92%   BMI 34.61 kg/m    Body mass index is 34.61 kg/m .     Physical Exam  Vitals and nursing note reviewed.   Constitutional:       General: She is not in acute distress.     Appearance: Normal appearance. She is not ill-appearing.   HENT:      Head: Normocephalic and atraumatic.   Eyes:      General: Lids are normal.      Extraocular Movements: Extraocular movements intact.      Right eye: No nystagmus.      Left eye: No nystagmus.      Conjunctiva/sclera: Conjunctivae normal.   Neurological:      Mental Status: She is alert.      Cranial Nerves: Cranial nerves 2-12 are intact. No facial asymmetry.      Sensory: No sensory deficit.      Motor: No weakness or tremor.      Coordination: Romberg sign negative. Coordination normal. Finger-Nose-Finger Test normal.      Gait: Gait abnormal (at baseline; uses cane).      Deep Tendon Reflexes: Reflexes are normal and symmetric.        Results for orders placed or performed in visit on 03/28/23 (from the past 24 hour(s))   CBC with platelets   Result Value Ref Range    WBC Count 9.6 4.0 - 11.0 10e3/uL    RBC Count 3.96 3.80 - 5.20 10e6/uL    Hemoglobin 12.2 11.7 - 15.7 g/dL    Hematocrit 37.9 35.0 - 47.0 %    MCV 96 78 - 100 fL    MCH 30.8 26.5 - 33.0 pg    MCHC 32.2 31.5 - 36.5 g/dL    RDW 12.1 10.0 - 15.0 %    Platelet Count 294 150 - 450 10e3/uL         This note has been dictated using voice recognition software. Any grammatical or context distortions are unintentional and inherent to the software      "

## 2023-03-28 NOTE — ASSESSMENT & PLAN NOTE
Discussed today with patient and daughter that I believe her dizziness is likely multifactorial.  Complete blood count and basic metabolic panel obtained today to assess for anemia versus electrolyte abnormalities.  She really does not drink a whole lot of fluids, only 16 ounces a day.  In addition she is on a number of medications that could be contributing to her symptoms.  It is unclear what role hypoglycemia may play, as patient does not check her blood sugar at any point, but it was considered.  Additionally patient is on a beta-blocker, and is borderline bradycardic in clinic today, although her symptoms are primarily before she takes her medication, so less suspicious of this.  Orthostatics were negative in clinic today.  Neuro exam is normal.  Does not seem consistent with BPPV.  She will decrease her furosemide to 20 mg daily and increase her fluids to 32 ounces daily, which is still relatively low, however I would like to avoid any fluid overload.  She will monitor for worsening of blood pressure, peripheral edema and any respiratory symptoms she may have and resume her 40 mg daily dose if this is the case.  She has planned follow up with her PCP in 2 weeks. Her blood pressure is elevated in clinic today, although patient reports it is better controlled at home, so I will not make any adjustments to that today, but should be considered with her primary care provider.  We also discussed that her nightly gabapentin could be contributory, and patient could try to decrease to 1 capsule at night if her pain is well controlled.  Patient and daughter expressed understanding of and comfort with this plan.  All questions were answered.

## 2023-03-29 ENCOUNTER — TELEPHONE (OUTPATIENT)
Dept: FAMILY MEDICINE | Facility: CLINIC | Age: 83
End: 2023-03-29
Payer: COMMERCIAL

## 2023-03-29 NOTE — TELEPHONE ENCOUNTER
Left message to call back for: Milagro  Information to relay to patient: see results below and relay

## 2023-03-29 NOTE — TELEPHONE ENCOUNTER
----- Message from SUE Borrero CNP sent at 3/29/2023  8:10 AM CDT -----  Please call patient and let her know that her labs have all resulted as expected. Her kidney function is at baseline, electrolytes are normal, and she has no evidence of infection or anemia. Thanks!   SUE Borrero CNP on 3/29/2023 at 8:10 AM

## 2023-03-31 NOTE — TELEPHONE ENCOUNTER
Patient notified of provider's message as written. Patient verbalized understanding.    Encouraged patient to call the clinic with further questions/concerns.     Jerome Pate RN  [facility]

## 2023-04-14 ENCOUNTER — OFFICE VISIT (OUTPATIENT)
Dept: FAMILY MEDICINE | Facility: CLINIC | Age: 83
End: 2023-04-14
Payer: COMMERCIAL

## 2023-04-14 VITALS
TEMPERATURE: 98.2 F | BODY MASS INDEX: 33.93 KG/M2 | HEART RATE: 60 BPM | HEIGHT: 63 IN | DIASTOLIC BLOOD PRESSURE: 87 MMHG | SYSTOLIC BLOOD PRESSURE: 173 MMHG | WEIGHT: 191.5 LBS | RESPIRATION RATE: 20 BRPM | OXYGEN SATURATION: 95 %

## 2023-04-14 DIAGNOSIS — E66.09 CLASS 1 OBESITY DUE TO EXCESS CALORIES WITH SERIOUS COMORBIDITY AND BODY MASS INDEX (BMI) OF 34.0 TO 34.9 IN ADULT: ICD-10-CM

## 2023-04-14 DIAGNOSIS — N18.32 STAGE 3B CHRONIC KIDNEY DISEASE (H): ICD-10-CM

## 2023-04-14 DIAGNOSIS — N18.31 TYPE 2 DIABETES MELLITUS WITH STAGE 3A CHRONIC KIDNEY DISEASE, WITH LONG-TERM CURRENT USE OF INSULIN (H): Primary | ICD-10-CM

## 2023-04-14 DIAGNOSIS — I10 ESSENTIAL (PRIMARY) HYPERTENSION: ICD-10-CM

## 2023-04-14 DIAGNOSIS — Z23 NEED FOR SHINGLES VACCINE: ICD-10-CM

## 2023-04-14 DIAGNOSIS — E11.22 TYPE 2 DIABETES MELLITUS WITH STAGE 3A CHRONIC KIDNEY DISEASE, WITH LONG-TERM CURRENT USE OF INSULIN (H): Primary | ICD-10-CM

## 2023-04-14 DIAGNOSIS — E78.5 HYPERLIPIDEMIA LDL GOAL <100: ICD-10-CM

## 2023-04-14 DIAGNOSIS — E66.811 CLASS 1 OBESITY DUE TO EXCESS CALORIES WITH SERIOUS COMORBIDITY AND BODY MASS INDEX (BMI) OF 34.0 TO 34.9 IN ADULT: ICD-10-CM

## 2023-04-14 DIAGNOSIS — Z79.4 TYPE 2 DIABETES MELLITUS WITH STAGE 3A CHRONIC KIDNEY DISEASE, WITH LONG-TERM CURRENT USE OF INSULIN (H): Primary | ICD-10-CM

## 2023-04-14 DIAGNOSIS — R10.9 ABDOMINAL WALL PAIN: ICD-10-CM

## 2023-04-14 DIAGNOSIS — R60.0 BILATERAL EDEMA OF LOWER EXTREMITY: ICD-10-CM

## 2023-04-14 DIAGNOSIS — R42 DIZZINESS: ICD-10-CM

## 2023-04-14 DIAGNOSIS — I10 HTN, GOAL BELOW 140/90: ICD-10-CM

## 2023-04-14 LAB
ANION GAP SERPL CALCULATED.3IONS-SCNC: 15 MMOL/L (ref 7–15)
BUN SERPL-MCNC: 20.2 MG/DL (ref 8–23)
CALCIUM SERPL-MCNC: 10 MG/DL (ref 8.8–10.2)
CHLORIDE SERPL-SCNC: 100 MMOL/L (ref 98–107)
CHOLEST SERPL-MCNC: 128 MG/DL
CREAT SERPL-MCNC: 1.19 MG/DL (ref 0.51–0.95)
DEPRECATED HCO3 PLAS-SCNC: 27 MMOL/L (ref 22–29)
GFR SERPL CREATININE-BSD FRML MDRD: 45 ML/MIN/1.73M2
GLUCOSE SERPL-MCNC: 174 MG/DL (ref 70–99)
HBA1C MFR BLD: 7.2 % (ref 0–5.6)
HDLC SERPL-MCNC: 60 MG/DL
HOLD SPECIMEN: NORMAL
LDLC SERPL CALC-MCNC: 40 MG/DL
NONHDLC SERPL-MCNC: 68 MG/DL
POTASSIUM SERPL-SCNC: 4.6 MMOL/L (ref 3.4–5.3)
SODIUM SERPL-SCNC: 142 MMOL/L (ref 136–145)
TRIGL SERPL-MCNC: 138 MG/DL

## 2023-04-14 PROCEDURE — 36415 COLL VENOUS BLD VENIPUNCTURE: CPT | Performed by: FAMILY MEDICINE

## 2023-04-14 PROCEDURE — 80048 BASIC METABOLIC PNL TOTAL CA: CPT | Performed by: FAMILY MEDICINE

## 2023-04-14 PROCEDURE — 99214 OFFICE O/P EST MOD 30 MIN: CPT | Performed by: FAMILY MEDICINE

## 2023-04-14 PROCEDURE — 80061 LIPID PANEL: CPT | Performed by: FAMILY MEDICINE

## 2023-04-14 PROCEDURE — 83036 HEMOGLOBIN GLYCOSYLATED A1C: CPT | Performed by: FAMILY MEDICINE

## 2023-04-14 RX ORDER — METOPROLOL SUCCINATE 50 MG/1
50 TABLET, EXTENDED RELEASE ORAL DAILY
Qty: 30 TABLET | Refills: 2 | Status: SHIPPED | OUTPATIENT
Start: 2023-04-14 | End: 2023-05-09

## 2023-04-14 RX ORDER — FUROSEMIDE 40 MG
40 TABLET ORAL DAILY
Qty: 90 TABLET | Refills: 1 | Status: SHIPPED | OUTPATIENT
Start: 2023-04-14 | End: 2024-03-11

## 2023-04-14 ASSESSMENT — ENCOUNTER SYMPTOMS: CONSTITUTIONAL NEGATIVE: 1

## 2023-04-14 NOTE — LETTER
April 15, 2023      Milagro Wallace  [address] [facility] [address]        Dear ,    We are writing to inform you of your test results.    Your test results fall within the expected range(s) or remain unchanged from previous results.  Please continue with current treatment plan.    Resulted Orders   Hemoglobin A1c   Result Value Ref Range    Hemoglobin A1C 7.2 (H) 0.0 - 5.6 %      Comment:      Normal <5.7%   Prediabetes 5.7-6.4%    Diabetes 6.5% or higher     Note: Adopted from ADA consensus guidelines.   Basic metabolic panel  (Ca, Cl, CO2, Creat, Gluc, K, Na, BUN)   Result Value Ref Range    Sodium 142 136 - 145 mmol/L    Potassium 4.6 3.4 - 5.3 mmol/L    Chloride 100 98 - 107 mmol/L    Carbon Dioxide (CO2) 27 22 - 29 mmol/L    Anion Gap 15 7 - 15 mmol/L    Urea Nitrogen 20.2 8.0 - 23.0 mg/dL    Creatinine 1.19 (H) 0.51 - 0.95 mg/dL    Calcium 10.0 8.8 - 10.2 mg/dL    Glucose 174 (H) 70 - 99 mg/dL    GFR Estimate 45 (L) >60 mL/min/1.73m2      Comment:      eGFR calculated using 2021 CKD-EPI equation.   Lipid panel reflex to direct LDL Non-fasting   Result Value Ref Range    Cholesterol 128 <200 mg/dL    Triglycerides 138 <150 mg/dL    Direct Measure HDL 60 >=50 mg/dL    LDL Cholesterol Calculated 40 <=100 mg/dL    Non HDL Cholesterol 68 <130 mg/dL    Narrative    Cholesterol  Desirable:  <200 mg/dL    Triglycerides  Normal:  Less than 150 mg/dL  Borderline High:  150-199 mg/dL  High:  200-499 mg/dL  Very High:  Greater than or equal to 500 mg/dL    Direct Measure HDL  Female:  Greater than or equal to 50 mg/dL   Male:  Greater than or equal to 40 mg/dL    LDL Cholesterol  Desirable:  <100mg/dL  Above Desirable:  100-129 mg/dL   Borderline High:  130-159 mg/dL   High:  160-189 mg/dL   Very High:  >= 190 mg/dL    Non HDL Cholesterol  Desirable:  130 mg/dL  Above Desirable:  130-159 mg/dL  Borderline High:  160-189 mg/dL  High:  190-219 mg/dL  Very High:  Greater than or equal to 220 mg/dL        If you have any questions or concerns, please call the clinic at the number listed above.       Sincerely,      Christian López MD

## 2023-04-14 NOTE — PROGRESS NOTES
Assessment & Plan   Problem List Items Addressed This Visit     Hyperlipidemia LDL goal <100    Relevant Orders    Lipid panel reflex to direct LDL Non-fasting (Completed)    Type 2 diabetes mellitus with stage 3 chronic kidney disease (H) - Primary     Diabetic control is stable.   her episodes of dizziness do not seem to be consistent with episodes of hypoglycemia.  She is on no medications that would result in hypoglycemia.  Blood pressure elevated today as discussed elsewhere.         Relevant Orders    Hemoglobin A1c (Completed)    Basic metabolic panel  (Ca, Cl, CO2, Creat, Gluc, K, Na, BUN) (Completed)    Lipid panel reflex to direct LDL Non-fasting (Completed)    RESOLVED: Abdominal wall pain     Not a concern at this time.         CKD (chronic kidney disease) stage 3, GFR 30-59 ml/min (H)     Stable.  Lasix use.  Chronic dehydration.  She has been drinking more water.  We will continue to follow kidney function.         Relevant Orders    Albumin Random Urine Quantitative with Creat Ratio    Basic metabolic panel  (Ca, Cl, CO2, Creat, Gluc, K, Na, BUN) (Completed)    Class 1 obesity due to excess calories with serious comorbidity and body mass index (BMI) of 34.0 to 34.9 in adult    Dizziness     Some improvement of symptoms with the recommendation of increased water consumption.  Unclear if dose adjustment of furosemide resulted in any changes.  Pulse remains low although this appears to be chronic and baseline.  We are limited in beta-blocker dosing.  If she has additional complaints of dizziness we will complete a cardiovascular work-up (EKG, echo etc.).         Relevant Medications    furosemide (LASIX) 40 MG tablet    HTN, goal below 140/90     Blood pressure unexpectedly elevated today.  She appears euvolemic on exam although she describes herself as feeling swollen in her lower extremities.  - Resume furosemide 40 mg dosing (no history of cardiomyopathy that I am aware of).  I believe furosemide  "was originally prescribed because of complaints of lower extremity swelling.  She had hyponatremia with hydrochlorothiazide.  Thiazide diuretics relatively contraindicated.  - We reviewed use of metoprolol which has been prescribed as an antihypertensive for a number of years for this individual.  It appears that she was previously on metoprolol succinate 200 mg and was changed in an effort to reduce medication cost back in 2018.  Metoprolol tartrate was dosed twice daily.  The patient is unsure if she has been taking it once or twice a day.  She states that she takes 2 pills at nighttime and describes taking her statin and gabapentin).  I think she may be under treating.  We will consolidate back to metoprolol succinate.  Given episodes of dizziness however dose will be 50 mg.  - Continue lisinopril.  History of chronic kidney disease.  - Check electrolytes today.  - Given elevation today, I have asked the patient return to clinic next week for a nurse visit and med reconciliation.        Other Visit Diagnoses     Need for shingles vaccine        Essential (primary) hypertension        Relevant Medications    metoprolol succinate ER (TOPROL XL) 50 MG 24 hr tablet    Bilateral edema of lower extremity        Relevant Medications    furosemide (LASIX) 40 MG tablet            BMI:   Estimated body mass index is 34.47 kg/m  as calculated from the following:    Height as of this encounter: 1.588 m (5' 2.5\").    Weight as of this encounter: 86.9 kg (191 lb 8 oz).   Weight management plan: Discussed healthy diet and exercise guidelines    Chrisitan López MD  [facility] CenterJACQUELINE Humphrey is a 83 year old, presenting for the following health issues:  Diabetes (Follow-up. Pt. Nonfasting./)        4/14/2023     1:18 PM   Additional Questions   Roomed by SAC   Accompanied by self         4/14/2023     1:18 PM   Patient Reported Additional Medications   Patient reports taking the following new " "medications no     Follow-up:   - recent episode of dizziness.  Medications were adjusted recently (decreased lasix, decreased gabapentin).  She has a pain in her foot.   - BP up today. She is genearlly immobile throughout the day. More swelling at her ankles.   -  passed away ~4 months ago.   - dizziness in the morning that gets better as the day progresses.     DM:   - no concerns today.   - she rarely checks her BG.  Not sure her results    Review of Systems   Constitutional: Negative.    All other systems reviewed and are negative.         Objective    BP (!) 173/87 (BP Location: Left arm, Patient Position: Sitting, Cuff Size: Adult Large)   Pulse 60   Temp 98.2  F (36.8  C) (Oral)   Resp 20   Ht 1.588 m (5' 2.5\")   Wt 86.9 kg (191 lb 8 oz)   LMP 09/25/1979 (Approximate)   SpO2 95%   BMI 34.47 kg/m    Body mass index is 34.47 kg/m .  Physical Exam  Nursing note reviewed.   Constitutional:       General: She is not in acute distress.     Appearance: Normal appearance. She is not ill-appearing.   HENT:      Head: Normocephalic and atraumatic.   Eyes:      Extraocular Movements: Extraocular movements intact.      Conjunctiva/sclera: Conjunctivae normal.   Cardiovascular:      Rate and Rhythm: Bradycardia present.      Heart sounds: No murmur heard.  Pulmonary:      Effort: Pulmonary effort is normal.      Breath sounds: Normal breath sounds. No wheezing.   Neurological:      Mental Status: She is alert and oriented to person, place, and time.   Psychiatric:         Attention and Perception: Attention normal.         Mood and Affect: Mood normal.         Speech: Speech normal.         Thought Content: Thought content normal.                    "

## 2023-04-15 PROBLEM — R10.9 ABDOMINAL WALL PAIN: Status: RESOLVED | Noted: 2022-09-28 | Resolved: 2023-04-15

## 2023-04-15 NOTE — ASSESSMENT & PLAN NOTE
Diabetic control is stable.   her episodes of dizziness do not seem to be consistent with episodes of hypoglycemia.  She is on no medications that would result in hypoglycemia.  Blood pressure elevated today as discussed elsewhere.

## 2023-04-15 NOTE — ASSESSMENT & PLAN NOTE
Blood pressure unexpectedly elevated today.  She appears euvolemic on exam although she describes herself as feeling swollen in her lower extremities.  - Resume furosemide 40 mg dosing (no history of cardiomyopathy that I am aware of).  I believe furosemide was originally prescribed because of complaints of lower extremity swelling.  She had hyponatremia with hydrochlorothiazide.  Thiazide diuretics relatively contraindicated.  - We reviewed use of metoprolol which has been prescribed as an antihypertensive for a number of years for this individual.  It appears that she was previously on metoprolol succinate 200 mg and was changed in an effort to reduce medication cost back in 2018.  Metoprolol tartrate was dosed twice daily.  The patient is unsure if she has been taking it once or twice a day.  She states that she takes 2 pills at nighttime and describes taking her statin and gabapentin).  I think she may be under treating.  We will consolidate back to metoprolol succinate.  Given episodes of dizziness however dose will be 50 mg.  - Continue lisinopril.  History of chronic kidney disease.  - Check electrolytes today.  - Given elevation today, I have asked the patient return to clinic next week for a nurse visit and med reconciliation.

## 2023-04-15 NOTE — ASSESSMENT & PLAN NOTE
Stable.  Lasix use.  Chronic dehydration.  She has been drinking more water.  We will continue to follow kidney function.

## 2023-04-15 NOTE — ASSESSMENT & PLAN NOTE
Some improvement of symptoms with the recommendation of increased water consumption.  Unclear if dose adjustment of furosemide resulted in any changes.  Pulse remains low although this appears to be chronic and baseline.  We are limited in beta-blocker dosing.  If she has additional complaints of dizziness we will complete a cardiovascular work-up (EKG, echo etc.).

## 2023-04-21 ENCOUNTER — ALLIED HEALTH/NURSE VISIT (OUTPATIENT)
Dept: FAMILY MEDICINE | Facility: CLINIC | Age: 83
End: 2023-04-21
Payer: COMMERCIAL

## 2023-04-21 VITALS — DIASTOLIC BLOOD PRESSURE: 83 MMHG | HEART RATE: 75 BPM | SYSTOLIC BLOOD PRESSURE: 138 MMHG

## 2023-04-21 DIAGNOSIS — I10 HTN, GOAL BELOW 140/90: Primary | ICD-10-CM

## 2023-04-21 PROCEDURE — 99207 PR NO CHARGE NURSE ONLY: CPT

## 2023-04-21 NOTE — PROGRESS NOTES
I met with Milagro Wallace at the request of Dr López to recheck her blood pressure.  Blood pressure medications on the med list were reviewed with patient.    Patient has taken all medications as per usual regimen: Yes  Patient reports tolerating them without any issues or concerns: Yes    Vitals:    04/21/23 1312 04/21/23 1319   BP: 138/62 138/83   BP Location: Left arm    Patient Position: Sitting    Cuff Size: Adult Large    Pulse: 75        Blood pressure was taken, previous encounter was reviewed, recorded blood pressure below 140/90.  Patient was discharged and the note will be sent to the provider for final review.

## 2023-04-23 NOTE — PROGRESS NOTES
Blood pressure measurement reviewed.  At target.  Assuming no ongoing symptoms, will plan to continue current therapy.

## 2023-05-08 DIAGNOSIS — I10 ESSENTIAL (PRIMARY) HYPERTENSION: ICD-10-CM

## 2023-05-08 DIAGNOSIS — G62.9 PERIPHERAL POLYNEUROPATHY: ICD-10-CM

## 2023-05-09 RX ORDER — METOPROLOL SUCCINATE 50 MG/1
TABLET, EXTENDED RELEASE ORAL
Qty: 30 TABLET | Refills: 2 | OUTPATIENT
Start: 2023-05-09

## 2023-05-09 RX ORDER — METOPROLOL SUCCINATE 50 MG/1
50 TABLET, EXTENDED RELEASE ORAL DAILY
Qty: 90 TABLET | Refills: 1 | Status: SHIPPED | OUTPATIENT
Start: 2023-05-09 | End: 2023-12-06

## 2023-05-09 RX ORDER — GABAPENTIN 100 MG/1
200 CAPSULE ORAL AT BEDTIME
Qty: 180 CAPSULE | Refills: 1 | Status: SHIPPED | OUTPATIENT
Start: 2023-05-09 | End: 2023-11-13

## 2023-05-09 NOTE — TELEPHONE ENCOUNTER
"Routing refill request to provider for review/approval because:  Drug not on the [facility] [place]     Last Written Prescription Date:  10/14/22  Last Fill Quantity: 180  # refills: 1   Last office visit provider: 4/14/23    Requested Prescriptions   Pending Prescriptions Disp Refills     gabapentin (NEURONTIN) 100 MG capsule [Pharmacy Med Name: GABAPENTIN 100 MG CAPSULE] 180 capsule 1     Sig: TAKE 2 CAPSULES (200 MG) BY MOUTH AT BEDTIME       There is no refill protocol information for this order      Refused Prescriptions Disp Refills     metoprolol succinate ER (TOPROL XL) 50 MG 24 hr tablet [Pharmacy Med Name: METOPROLOL SUCC ER 50 MG TAB] 30 tablet 2     Sig: TAKE 1 TABLET BY MOUTH EVERY DAY       Beta-Blockers Protocol Passed - 5/8/2023 12:34 PM        Passed - Blood pressure under 140/90 in past 12 months     BP Readings from Last 3 Encounters:   04/21/23 138/83   04/14/23 (!) 173/87   03/28/23 (!) 159/75                 Passed - Patient is age 6 or older        Passed - Recent (12 mo) or future (30 days) visit within the authorizing provider's specialty     Patient has had an office visit with the authorizing provider or a provider within the authorizing providers department within the previous 12 mos or has a future within next 30 days. See \"Patient Info\" tab in inbasket, or \"Choose Columns\" in Meds & Orders section of the refill encounter.              Passed - Medication is active on med list             Sudha Zimmerman RN 05/09/23 4:03 PM  "

## 2023-05-10 NOTE — CONFIDENTIAL NOTE
Metoprolol succinate is new medication.  Refill request appropriate for larger dispense.  Both medications sent to pharmacy.

## 2023-10-16 ENCOUNTER — OFFICE VISIT (OUTPATIENT)
Dept: FAMILY MEDICINE | Facility: CLINIC | Age: 83
End: 2023-10-16
Payer: COMMERCIAL

## 2023-10-16 VITALS
SYSTOLIC BLOOD PRESSURE: 137 MMHG | HEART RATE: 61 BPM | RESPIRATION RATE: 16 BRPM | TEMPERATURE: 98.4 F | DIASTOLIC BLOOD PRESSURE: 57 MMHG | HEIGHT: 63 IN | WEIGHT: 189.7 LBS | OXYGEN SATURATION: 96 % | BODY MASS INDEX: 33.61 KG/M2

## 2023-10-16 DIAGNOSIS — N18.32 STAGE 3B CHRONIC KIDNEY DISEASE (H): ICD-10-CM

## 2023-10-16 DIAGNOSIS — E11.22 TYPE 2 DIABETES MELLITUS WITH STAGE 3A CHRONIC KIDNEY DISEASE, WITHOUT LONG-TERM CURRENT USE OF INSULIN (H): Primary | ICD-10-CM

## 2023-10-16 DIAGNOSIS — I10 HTN, GOAL BELOW 140/90: ICD-10-CM

## 2023-10-16 DIAGNOSIS — N18.31 TYPE 2 DIABETES MELLITUS WITH STAGE 3A CHRONIC KIDNEY DISEASE, WITHOUT LONG-TERM CURRENT USE OF INSULIN (H): Primary | ICD-10-CM

## 2023-10-16 DIAGNOSIS — B35.1 ONYCHOMYCOSIS: ICD-10-CM

## 2023-10-16 LAB
HBA1C MFR BLD: 7 % (ref 0–5.6)
HOLD SPECIMEN: NORMAL

## 2023-10-16 PROCEDURE — 90480 ADMN SARSCOV2 VAC 1/ONLY CMP: CPT | Performed by: FAMILY MEDICINE

## 2023-10-16 PROCEDURE — G0008 ADMIN INFLUENZA VIRUS VAC: HCPCS | Performed by: FAMILY MEDICINE

## 2023-10-16 PROCEDURE — 82570 ASSAY OF URINE CREATININE: CPT | Performed by: FAMILY MEDICINE

## 2023-10-16 PROCEDURE — 82043 UR ALBUMIN QUANTITATIVE: CPT | Performed by: FAMILY MEDICINE

## 2023-10-16 PROCEDURE — 84460 ALANINE AMINO (ALT) (SGPT): CPT | Performed by: FAMILY MEDICINE

## 2023-10-16 PROCEDURE — 99214 OFFICE O/P EST MOD 30 MIN: CPT | Mod: 25 | Performed by: FAMILY MEDICINE

## 2023-10-16 PROCEDURE — 36415 COLL VENOUS BLD VENIPUNCTURE: CPT | Performed by: FAMILY MEDICINE

## 2023-10-16 PROCEDURE — 99207 PR FOOT EXAM NO CHARGE: CPT | Performed by: FAMILY MEDICINE

## 2023-10-16 PROCEDURE — 90662 IIV NO PRSV INCREASED AG IM: CPT | Performed by: FAMILY MEDICINE

## 2023-10-16 PROCEDURE — 91320 SARSCV2 VAC 30MCG TRS-SUC IM: CPT | Performed by: FAMILY MEDICINE

## 2023-10-16 PROCEDURE — 80048 BASIC METABOLIC PNL TOTAL CA: CPT | Performed by: FAMILY MEDICINE

## 2023-10-16 PROCEDURE — 83036 HEMOGLOBIN GLYCOSYLATED A1C: CPT | Performed by: FAMILY MEDICINE

## 2023-10-16 RX ORDER — RESPIRATORY SYNCYTIAL VIRUS VACCINE 120MCG/0.5
0.5 KIT INTRAMUSCULAR ONCE
Qty: 1 EACH | Refills: 0 | Status: CANCELLED | OUTPATIENT
Start: 2023-10-16 | End: 2023-10-16

## 2023-10-16 ASSESSMENT — ENCOUNTER SYMPTOMS: CONSTITUTIONAL NEGATIVE: 1

## 2023-10-16 NOTE — ASSESSMENT & PLAN NOTE
Diabetic control is stable for age.  Weight is also stable.  Her nutrition is generally low in calories overall but the majority of her food is processed carbohydrates, pastries.  She is struggling to shop after the death of her  who previously was the person who purchased food for the family.  She is not naturally inclined to eat much protein.  We did review nutrition.  Her blood sugars have improved overall.  I think protein would help maintain lean mass as well which would theoretically improve mobility and decrease fall.  For now, continue current therapies.  She is tolerant to all medications.  Recheck in 6 months recommended.

## 2023-10-16 NOTE — LETTER
October 17, 2023      Milagro Wallace  [address] [facility] [address]        Dear ,    We are writing to inform you of your test results.    Your test results fall within the expected range(s) or remain unchanged from previous results.  Please continue with current treatment plan.    Resulted Orders   Albumin Random Urine Quantitative with Creat Ratio   Result Value Ref Range    Creatinine Urine mg/dL 29.6 mg/dL      Comment:      The reference ranges have not been established in urine creatinine. The results should be integrated into the clinical context for interpretation.    Albumin Urine mg/L <12.0 mg/L      Comment:      The reference ranges have not been established in urine albumin. The results should be integrated into the clinical context for interpretation.    Albumin Urine mg/g Cr        Comment:      Unable to calculate, urine albumin and/or urine creatinine is outside detectable limits.  Microalbuminuria is defined as an albumin:creatinine ratio of 17 to 299 for males and 25 to 299 for females. A ratio of albumin:creatinine of 300 or higher is indicative of overt proteinuria.  Due to biologic variability, positive results should be confirmed by a second, first-morning random or 24-hour timed urine specimen. If there is discrepancy, a third specimen is recommended. When 2 out of 3 results are in the microalbuminuria range, this is evidence for incipient nephropathy and warrants increased efforts at glucose control, blood pressure control, and institution of therapy with an angiotensin-converting-enzyme (ACE) inhibitor (if the patient can tolerate it).     Hemoglobin A1c   Result Value Ref Range    Hemoglobin A1C 7.0 (H) 0.0 - 5.6 %      Comment:      Normal <5.7%   Prediabetes 5.7-6.4%    Diabetes 6.5% or higher     Note: Adopted from ADA consensus guidelines.   Basic metabolic panel  (Ca, Cl, CO2, Creat, Gluc, K, Na, BUN)   Result Value Ref Range    Sodium 140 135 - 145 mmol/L       Comment:      Reference intervals for this test were updated on 09/26/2023 to more accurately reflect our healthy population. There may be differences in the flagging of prior results with similar values performed with this method. Interpretation of those prior results can be made in the context of the updated reference intervals.     Potassium 4.8 3.4 - 5.3 mmol/L    Chloride 101 98 - 107 mmol/L    Carbon Dioxide (CO2) 26 22 - 29 mmol/L    Anion Gap 13 7 - 15 mmol/L    Urea Nitrogen 23.0 8.0 - 23.0 mg/dL    Creatinine 1.30 (H) 0.51 - 0.95 mg/dL    GFR Estimate 41 (L) >60 mL/min/1.73m2    Calcium 9.6 8.8 - 10.2 mg/dL    Glucose 167 (H) 70 - 99 mg/dL   ALT   Result Value Ref Range    ALT 16 0 - 50 U/L      Comment:      Reference intervals for this test were updated on 6/12/2023 to more accurately reflect our healthy population. There may be differences in the flagging of prior results with similar values performed with this method. Interpretation of those prior results can be made in the context of the updated reference intervals.         If you have any questions or concerns, please call the clinic at the number listed above.       Sincerely,      Christian López MD

## 2023-10-16 NOTE — ASSESSMENT & PLAN NOTE
Onychomycosis is mild.  She has had podiatrist trim her nails for a number of years but has struggled to find a cost effective solution.  She does not have evidence of neuropathy.  Referral placed for podiatry.  I told her that I am not sure that the [facility] podiatrist would trim her nails.

## 2023-10-16 NOTE — PROGRESS NOTES
"DM follow-up:   - looking for podiatry service.  No longer able to get this through her living situation. Concerned about cost.     - mild intermittent numbness.  Depends on shows.  She has    - wound on back of left calf.     - drinking less juice. No longer eating cookie after dinner. \"Galss of wine each day for my heart.\"    - she has not had low blood glucose measurements.   - she uses BP cough and BG monitor but does not remember the results.    - nutrition: fish once per week.  Poultry.  Rare beef.  She eats rice, pasta, cereal and donut in the morning.  Some milk.     - Energy: \"okay.\"  She naps in the afternoon.    -  passed away recently.  Children live in the [facility].     - she states that she has had to learn to drive and do the shopping.    "

## 2023-10-16 NOTE — PROGRESS NOTES
"  Assessment & Plan   Problem List Items Addressed This Visit       Type 2 diabetes mellitus with stage 3 chronic kidney disease (H) - Primary     Diabetic control is stable for age.  Weight is also stable.  Her nutrition is generally low in calories overall but the majority of her food is processed carbohydrates, pastries.  She is struggling to shop after the death of her  who previously was the person who purchased food for the family.  She is not naturally inclined to eat much protein.  We did review nutrition.  Her blood sugars have improved overall.  I think protein would help maintain lean mass as well which would theoretically improve mobility and decrease fall.  For now, continue current therapies.  She is tolerant to all medications.  Recheck in 6 months recommended.         Relevant Orders    Hemoglobin A1c (Completed)    Basic metabolic panel  (Ca, Cl, CO2, Creat, Gluc, K, Na, BUN)    ALT    FOOT EXAM (Completed)    CKD (chronic kidney disease) stage 3, GFR 30-59 ml/min (H)    Relevant Orders    Basic metabolic panel  (Ca, Cl, CO2, Creat, Gluc, K, Na, BUN)    ALT    HTN, goal below 140/90     Blood pressure within normal limits.  Check basic metabolic panel.  She appears euvolemic on exam with the exception of 1+ edema at the calves bilaterally.  Continue furosemide.  Check electrolytes.         Onychomycosis     Onychomycosis is mild.  She has had podiatrist trim her nails for a number of years but has struggled to find a cost effective solution.  She does not have evidence of neuropathy.  Referral placed for podiatry.  I told her that I am not sure that the [facility] podiatrist would trim her nails.         Relevant Orders    Orthopedic  Referral        BMI:   Estimated body mass index is 34.14 kg/m  as calculated from the following:    Height as of this encounter: 1.588 m (5' 2.5\").    Weight as of this encounter: 86 kg (189 lb 11.2 oz).   Weight management plan: Discussed " "healthy diet and exercise guidelines    Christian López MD  [facility] JUAN PABLO Humphrey is a 83 year old, presenting for the following health issues:  Diabetes        10/16/2023     1:18 PM   Additional Questions   Roomed by ac   Accompanied by self         DM follow-up:   - looking for podiatry service.  No longer able to get this through her living situation. Concerned about cost.     - mild intermittent numbness.  Depends on shows.  She has    - wound on back of left calf.     - drinking less juice. No longer eating cookie after dinner. \"Galss of wine each day for my heart.\"    - she has not had low blood glucose measurements.   - she uses BP cough and BG monitor but does not remember the results.    - nutrition: fish once per week.  Poultry.  Rare beef.  She eats rice, pasta, cereal and donut in the morning.  Some milk.     - Energy: \"okay.\"  She naps in the afternoon.    -  passed away recently.  Children live in the [facility].     - she states that she has had to learn to drive and do the shopping.          History of Present Illness       Diabetes:   She presents for follow up of diabetes.  She is checking home blood glucose a few times a month.   She checks blood glucose before meals.  Blood glucose is never over 200 and never under 70. She is aware of hypoglycemia symptoms including none.    She has no concerns regarding her diabetes at this time.  She is having numbness in feet.  The patient has not had a diabetic eye exam in the last 12 months.          She eats 2-3 servings of fruits and vegetables daily.She consumes 1 sweetened beverage(s) daily.She exercises with enough effort to increase her heart rate 9 or less minutes per day.  She exercises with enough effort to increase her heart rate 3 or less days per week.   She is taking medications regularly.      Review of Systems   Constitutional: Negative.    All other systems reviewed and are negative.       " "  Objective    /57 (BP Location: Left arm, Patient Position: Sitting, Cuff Size: Adult Large)   Pulse 61   Temp 98.4  F (36.9  C) (Oral)   Resp 16   Ht 1.588 m (5' 2.5\")   Wt 86 kg (189 lb 11.2 oz)   LMP 09/25/1979 (Approximate)   SpO2 96%   BMI 34.14 kg/m    Body mass index is 34.14 kg/m .  Physical Exam  Nursing note reviewed.   Constitutional:       General: She is not in acute distress.     Appearance: Normal appearance. She is not ill-appearing.   HENT:      Head: Normocephalic and atraumatic.   Eyes:      Extraocular Movements: Extraocular movements intact.      Conjunctiva/sclera: Conjunctivae normal.   Pulmonary:      Effort: Pulmonary effort is normal.   Skin:     Comments: Diabetic foot exam: normal DP and PT pulses, no trophic changes or ulcerative lesions, normal sensory exam, normal monofilament exam, onychomycosis, and 1+ edema to the midcalf bilaterally.  Normal vibratory sense bilaterally.  She has a small area of granulation tissue back of the left calf without surrounding erythema.  The superficial skin is freely mobile.  Nontender.     Neurological:      Mental Status: She is alert and oriented to person, place, and time.   Psychiatric:         Attention and Perception: Attention normal.         Mood and Affect: Mood normal.         Speech: Speech normal.         Thought Content: Thought content normal.        Results for orders placed or performed in visit on 10/16/23 (from the past 24 hour(s))   Hemoglobin A1c   Result Value Ref Range    Hemoglobin A1C 7.0 (H) 0.0 - 5.6 %   Extra Tube    Narrative    The following orders were created for panel order Extra Tube.  Procedure                               Abnormality         Status                     ---------                               -----------         ------                     Extra Red Top Tube[[id number]]                               Final result                 Please view results for these tests on the individual orders. "   Extra Red Top Tube   Result Value Ref Range    Hold Specimen JIC

## 2023-10-16 NOTE — ASSESSMENT & PLAN NOTE
Blood pressure within normal limits.  Check basic metabolic panel.  She appears euvolemic on exam with the exception of 1+ edema at the calves bilaterally.  Continue furosemide.  Check electrolytes.

## 2023-10-17 LAB
ALT SERPL W P-5'-P-CCNC: 16 U/L (ref 0–50)
ANION GAP SERPL CALCULATED.3IONS-SCNC: 13 MMOL/L (ref 7–15)
BUN SERPL-MCNC: 23 MG/DL (ref 8–23)
CALCIUM SERPL-MCNC: 9.6 MG/DL (ref 8.8–10.2)
CHLORIDE SERPL-SCNC: 101 MMOL/L (ref 98–107)
CREAT SERPL-MCNC: 1.3 MG/DL (ref 0.51–0.95)
CREAT UR-MCNC: 29.6 MG/DL
DEPRECATED HCO3 PLAS-SCNC: 26 MMOL/L (ref 22–29)
EGFRCR SERPLBLD CKD-EPI 2021: 41 ML/MIN/1.73M2
GLUCOSE SERPL-MCNC: 167 MG/DL (ref 70–99)
MICROALBUMIN UR-MCNC: <12 MG/L
MICROALBUMIN/CREAT UR: NORMAL MG/G{CREAT}
POTASSIUM SERPL-SCNC: 4.8 MMOL/L (ref 3.4–5.3)
SODIUM SERPL-SCNC: 140 MMOL/L (ref 135–145)

## 2023-10-24 DIAGNOSIS — I10 ESSENTIAL HYPERTENSION WITH GOAL BLOOD PRESSURE LESS THAN 140/90: ICD-10-CM

## 2023-10-24 RX ORDER — LISINOPRIL 40 MG/1
40 TABLET ORAL DAILY
Qty: 90 TABLET | Refills: 3 | Status: SHIPPED | OUTPATIENT
Start: 2023-10-24

## 2023-11-10 DIAGNOSIS — E78.5 HYPERLIPIDEMIA LDL GOAL <100: ICD-10-CM

## 2023-11-10 DIAGNOSIS — G62.9 PERIPHERAL POLYNEUROPATHY: ICD-10-CM

## 2023-11-10 DIAGNOSIS — H69.92 DYSFUNCTION OF LEFT EUSTACHIAN TUBE: ICD-10-CM

## 2023-11-10 DIAGNOSIS — E11.22 TYPE 2 DIABETES MELLITUS WITH STAGE 3A CHRONIC KIDNEY DISEASE, WITHOUT LONG-TERM CURRENT USE OF INSULIN (H): ICD-10-CM

## 2023-11-10 DIAGNOSIS — N18.31 TYPE 2 DIABETES MELLITUS WITH STAGE 3A CHRONIC KIDNEY DISEASE, WITHOUT LONG-TERM CURRENT USE OF INSULIN (H): ICD-10-CM

## 2023-11-10 RX ORDER — SIMVASTATIN 20 MG
20 TABLET ORAL AT BEDTIME
Qty: 90 TABLET | Refills: 1 | Status: SHIPPED | OUTPATIENT
Start: 2023-11-10 | End: 2024-05-16

## 2023-11-13 RX ORDER — FLUTICASONE PROPIONATE 50 MCG
SPRAY, SUSPENSION (ML) NASAL
Qty: 48 ML | Refills: 3 | Status: SHIPPED | OUTPATIENT
Start: 2023-11-13

## 2023-11-13 RX ORDER — GABAPENTIN 100 MG/1
200 CAPSULE ORAL AT BEDTIME
Qty: 180 CAPSULE | Refills: 1 | Status: SHIPPED | OUTPATIENT
Start: 2023-11-13 | End: 2024-05-16

## 2023-12-06 DIAGNOSIS — I10 ESSENTIAL (PRIMARY) HYPERTENSION: ICD-10-CM

## 2023-12-06 RX ORDER — METOPROLOL SUCCINATE 50 MG/1
50 TABLET, EXTENDED RELEASE ORAL DAILY
Qty: 90 TABLET | Refills: 2 | Status: SHIPPED | OUTPATIENT
Start: 2023-12-06 | End: 2024-06-11

## 2024-02-01 ENCOUNTER — TELEPHONE (OUTPATIENT)
Dept: FAMILY MEDICINE | Facility: CLINIC | Age: 84
End: 2024-02-01
Payer: COMMERCIAL

## 2024-02-01 NOTE — TELEPHONE ENCOUNTER
Patient Quality Outreach    Patient is due for the following:   Physical Annual Wellness Visit    Next Steps:   Schedule a Annual Wellness Visit  NOTE: Pt already had an office visit scheduled for 4/16/24 and if she okay to do AWV please change to a 40 min appointment slots.  Type of outreach:    Phone, left message for patient/parent to call back.      Questions for provider review:    None           Annie Stephens MA  Chart routed to Care Team.

## 2024-03-05 ENCOUNTER — TELEPHONE (OUTPATIENT)
Dept: FAMILY MEDICINE | Facility: CLINIC | Age: 84
End: 2024-03-05
Payer: COMMERCIAL

## 2024-03-05 NOTE — TELEPHONE ENCOUNTER
Called Daughter and discussed 40 minute appointment on 3/11/24.  She stated her mother does notwake up until late morning so anything before noon will not work for her.

## 2024-03-05 NOTE — TELEPHONE ENCOUNTER
Daughter Mary is calling in to be advise on 4/16/24 appointment.  She is coming with her mother to this appointment and would like to have these topics discussed:  - Referral for eyes  -Discuss driving, getting dress and socialization/possible depression or anxiety- Milagro is very hesitant to leave home  -needs a referral for toe nails.  - has been dealing with vertigo.  -DM, needs new meter  - the most pressing issue is Milagro's memory    Discussed that most of these issues will be fine to discuss in Annual OV but some may need secondary or more time to discuss.      OV scheduled for 3/11/24 to discuss memory and social isolation.  Daughter stated that her mom may decline to come to appointment but Mary will call and cancel if this happens.

## 2024-03-06 NOTE — TELEPHONE ENCOUNTER
Spoke with Dr. López 1:20 and rescheduled to 1:40. Milagro rescheduled to 40-min AWV and concerns listed in MyChart message.     Patient's daughter notified.

## 2024-03-11 ENCOUNTER — OFFICE VISIT (OUTPATIENT)
Dept: FAMILY MEDICINE | Facility: CLINIC | Age: 84
End: 2024-03-11
Payer: COMMERCIAL

## 2024-03-11 VITALS
RESPIRATION RATE: 16 BRPM | BODY MASS INDEX: 36.17 KG/M2 | WEIGHT: 191.6 LBS | TEMPERATURE: 97.8 F | DIASTOLIC BLOOD PRESSURE: 81 MMHG | HEART RATE: 83 BPM | SYSTOLIC BLOOD PRESSURE: 142 MMHG | OXYGEN SATURATION: 91 % | HEIGHT: 61 IN

## 2024-03-11 DIAGNOSIS — H61.21 IMPACTED CERUMEN OF RIGHT EAR: ICD-10-CM

## 2024-03-11 DIAGNOSIS — E66.812 CLASS 2 SEVERE OBESITY DUE TO EXCESS CALORIES WITH SERIOUS COMORBIDITY AND BODY MASS INDEX (BMI) OF 35.0 TO 35.9 IN ADULT (H): ICD-10-CM

## 2024-03-11 DIAGNOSIS — E78.5 HYPERLIPIDEMIA LDL GOAL <100: ICD-10-CM

## 2024-03-11 DIAGNOSIS — N18.32 STAGE 3B CHRONIC KIDNEY DISEASE (H): ICD-10-CM

## 2024-03-11 DIAGNOSIS — I10 ESSENTIAL HYPERTENSION: ICD-10-CM

## 2024-03-11 DIAGNOSIS — R60.0 BILATERAL EDEMA OF LOWER EXTREMITY: ICD-10-CM

## 2024-03-11 DIAGNOSIS — Z00.00 ENCOUNTER FOR MEDICARE ANNUAL WELLNESS EXAM: Primary | ICD-10-CM

## 2024-03-11 DIAGNOSIS — E11.22 TYPE 2 DIABETES MELLITUS WITH STAGE 3B CHRONIC KIDNEY DISEASE, WITHOUT LONG-TERM CURRENT USE OF INSULIN (H): ICD-10-CM

## 2024-03-11 DIAGNOSIS — R55 PRE-SYNCOPE: ICD-10-CM

## 2024-03-11 DIAGNOSIS — N18.32 TYPE 2 DIABETES MELLITUS WITH STAGE 3B CHRONIC KIDNEY DISEASE, WITHOUT LONG-TERM CURRENT USE OF INSULIN (H): ICD-10-CM

## 2024-03-11 DIAGNOSIS — E66.01 CLASS 2 SEVERE OBESITY DUE TO EXCESS CALORIES WITH SERIOUS COMORBIDITY AND BODY MASS INDEX (BMI) OF 35.0 TO 35.9 IN ADULT (H): ICD-10-CM

## 2024-03-11 LAB
ATRIAL RATE - MUSE: 77 BPM
DIASTOLIC BLOOD PRESSURE - MUSE: NORMAL MMHG
HBA1C MFR BLD: 7.3 % (ref 0–5.6)
HGB BLD-MCNC: 12.3 G/DL (ref 11.7–15.7)
HOLD SPECIMEN: NORMAL
HOLD SPECIMEN: NORMAL
INTERPRETATION ECG - MUSE: NORMAL
P AXIS - MUSE: 108 DEGREES
PR INTERVAL - MUSE: 202 MS
QRS DURATION - MUSE: 76 MS
QT - MUSE: 382 MS
QTC - MUSE: 432 MS
R AXIS - MUSE: 56 DEGREES
SYSTOLIC BLOOD PRESSURE - MUSE: NORMAL MMHG
T AXIS - MUSE: 55 DEGREES
VENTRICULAR RATE- MUSE: 77 BPM

## 2024-03-11 PROCEDURE — 99214 OFFICE O/P EST MOD 30 MIN: CPT | Mod: 25 | Performed by: FAMILY MEDICINE

## 2024-03-11 PROCEDURE — 83036 HEMOGLOBIN GLYCOSYLATED A1C: CPT | Performed by: FAMILY MEDICINE

## 2024-03-11 PROCEDURE — 36415 COLL VENOUS BLD VENIPUNCTURE: CPT | Performed by: FAMILY MEDICINE

## 2024-03-11 PROCEDURE — 69209 REMOVE IMPACTED EAR WAX UNI: CPT | Mod: RT | Performed by: FAMILY MEDICINE

## 2024-03-11 PROCEDURE — 93010 ELECTROCARDIOGRAM REPORT: CPT | Performed by: INTERNAL MEDICINE

## 2024-03-11 PROCEDURE — 84460 ALANINE AMINO (ALT) (SGPT): CPT | Performed by: FAMILY MEDICINE

## 2024-03-11 PROCEDURE — G0439 PPPS, SUBSEQ VISIT: HCPCS | Performed by: FAMILY MEDICINE

## 2024-03-11 PROCEDURE — 82607 VITAMIN B-12: CPT | Performed by: FAMILY MEDICINE

## 2024-03-11 PROCEDURE — 85018 HEMOGLOBIN: CPT | Performed by: FAMILY MEDICINE

## 2024-03-11 PROCEDURE — 80048 BASIC METABOLIC PNL TOTAL CA: CPT | Performed by: FAMILY MEDICINE

## 2024-03-11 PROCEDURE — 90670 PCV13 VACCINE IM: CPT | Performed by: FAMILY MEDICINE

## 2024-03-11 PROCEDURE — 93005 ELECTROCARDIOGRAM TRACING: CPT | Performed by: FAMILY MEDICINE

## 2024-03-11 PROCEDURE — G0009 ADMIN PNEUMOCOCCAL VACCINE: HCPCS | Performed by: FAMILY MEDICINE

## 2024-03-11 PROCEDURE — 84450 TRANSFERASE (AST) (SGOT): CPT | Performed by: FAMILY MEDICINE

## 2024-03-11 RX ORDER — FUROSEMIDE 40 MG
40 TABLET ORAL DAILY
Qty: 90 TABLET | Refills: 1 | Status: SHIPPED | OUTPATIENT
Start: 2024-03-11 | End: 2024-09-05

## 2024-03-11 RX ORDER — RESPIRATORY SYNCYTIAL VIRUS VACCINE 120MCG/0.5
0.5 KIT INTRAMUSCULAR ONCE
Qty: 1 EACH | Refills: 0 | Status: CANCELLED | OUTPATIENT
Start: 2024-03-11 | End: 2024-03-11

## 2024-03-11 RX ORDER — LANCETS
EACH MISCELLANEOUS
Qty: 50 EACH | Refills: 5 | Status: SHIPPED | OUTPATIENT
Start: 2024-03-11

## 2024-03-11 SDOH — HEALTH STABILITY: PHYSICAL HEALTH: ON AVERAGE, HOW MANY DAYS PER WEEK DO YOU ENGAGE IN MODERATE TO STRENUOUS EXERCISE (LIKE A BRISK WALK)?: 1 DAY

## 2024-03-11 ASSESSMENT — ENCOUNTER SYMPTOMS
EYE PAIN: 0
HEMATURIA: 0
BACK PAIN: 0
COLOR CHANGE: 0
CHILLS: 0
PALPITATIONS: 0
SEIZURES: 0
DYSURIA: 0
VOMITING: 0
ARTHRALGIAS: 0
SORE THROAT: 0
SHORTNESS OF BREATH: 0
COUGH: 0
FEVER: 0
ABDOMINAL PAIN: 0

## 2024-03-11 ASSESSMENT — SOCIAL DETERMINANTS OF HEALTH (SDOH): HOW OFTEN DO YOU GET TOGETHER WITH FRIENDS OR RELATIVES?: ONCE A WEEK

## 2024-03-11 NOTE — PROGRESS NOTES
Preventive Care Visit  [facility]  Christian López MD, Family Medicine  Mar 11, 2024    Assessment & Plan   Problem List Items Addressed This Visit       Hyperlipidemia LDL goal <100     Continue statin therapy.         Type 2 diabetes mellitus with stage 3 chronic kidney disease, without long-term current use of insulin (H)     Diabetic control is stable and sufficient for age.  Hemoglobin A1c reviewed.  No symptoms consistent with hypoglycemia.  CKD: Check basic metabolic panel and consider adjusting metformin accordingly.  She is on a statin.  She takes an aspirin.  Non-smoker.  Blood pressure slightly elevated but given some of the presyncopal symptoms recently we will not change her medications at this time.         Relevant Medications    blood glucose (NO BRAND SPECIFIED) test strip    thin (NO BRAND SPECIFIED) lancets    Other Relevant Orders    Hemoglobin A1c (Completed)    Vitamin B12    Adult Eye  Referral    Orthopedic  Referral    Class 2 severe obesity due to excess calories with serious comorbidity and body mass index (BMI) of 35.0 to 35.9 in adult (H)    Dizziness     Vertigo versus presyncope.  When we spoke last time it sounded more like presyncope.  Today, I think she is describing symptoms of vertigo.  EKG completed today without significant abnormalities comparison to a study that was done 3 years ago (and 18 years ago).  Her symptoms are significantly improved.  For now, no intervention.  Tolerant of higher than target range blood pressure.  Consider echocardiogram or referral to cardiology if symptoms continue.         Relevant Medications    furosemide (LASIX) 40 MG tablet    Encounter for Medicare annual wellness exam - Primary     Annual exam.  Other health concerns were reviewed as part of today's visit.  She is accompanied by her daughter who expresses concern about safety with driving as well as memory.  Check she did quite well on the  cognitive screening as part of the Medicare visit.  They describe getting lost as well as forgetting things.  The patient is quite defensive when we are discussing her abilities to drive.  She has not had any accidents that have been reported.  I suggested the Shyp course and test.  Family also had describes some social isolation.  She moved to [place] couple of years ago.  Her  has now passed away and she lives independently.  The patient herself says that she is doing okay and declines additional conversation.  Her PHQ 2 is a 0.  No changes to plan were completed today based on the patient's feedback.  Of asked her to check in with me in a couple of weeks.  Blood pressure, diabetes and vertigo as discussed elsewhere.    The patient had an excess cerumen in the right external auditory canal.  This was cleared with irrigation performed by clinic staff.         Essential hypertension     Pressure slightly elevated today.  Given concerns for presyncopal symptoms no changes to medications placed.  Check blood serum electrolytes.         Relevant Orders    Basic metabolic panel  (Ca, Cl, CO2, Creat, Gluc, K, Na, BUN)    ALT    AST    Stage 3b chronic kidney disease (H)     Check basic metabolic panel.  If there is a decline we will have to consider alterations of medication regimen.         Relevant Orders    Hemoglobin (Completed)    Basic metabolic panel  (Ca, Cl, CO2, Creat, Gluc, K, Na, BUN)     Other Visit Diagnoses       Bilateral edema of lower extremity        Relevant Medications    furosemide (LASIX) 40 MG tablet    Impacted cerumen of right ear        Relevant Orders    REMOVE IMPACTED CERUMEN (Completed)             Patient has been advised of split billing requirements and indicates understanding: Yes     Counseling  Appropriate preventive services were discussed with this patient, including applicable screening as appropriate for fall prevention, nutrition, physical activity,  "Tobacco-use cessation, weight loss and cognition.  Checklist reviewing preventive services available has been given to the patient.  Reviewed patient's diet, addressing concerns and/or questions.   She is at risk for lack of exercise and has been provided with information to increase physical activity for the benefit of her well-being.   The patient was instructed to see the dentist every 6 months.   She is at risk for psychosocial distress and has been provided with information to reduce risk.   Updated plan of care.  Patient reported difficulty with activities of daily living were addressed today.The patient was provided with written information regarding signs of hearing loss.   Information on urinary incontinence and treatment options given to patient.       Victor Manuel Humphrey is a 83 year old, presenting for the following:  Wellness Visit (Fasting )        3/11/2024     1:04 PM   Additional Questions   Roomed by xl   Accompanied by Daughter         Health Care Directive  Patient has a Health Care Directive on file  Advance care planning document is on file and is current.    Here with daughter:    Vertigo symptoms:  - worse over the past 2 weeks.     - she notices with head movements and in bed.   - comes and goes.     - daughter this that this could be seasonal.   - flonase: daily   - she tries to make sure she gets enough water.     Cognition:   - daughter concerned about driving.  Gets lost at time. \"[place] town.\"    - she has not been doing her own shopping   - questions of glasses being correct.   - frustrated by care at [facility]    Social isolation:   - PHQ2:0   - declines any concerns about mood.     DM:    - has seen podiatry earlier this year.   - custom shoes: \"Ugly\" and did not fit.          3/11/2024   General Health   How would you rate your overall physical health? Good   Feel stress (tense, anxious, or unable to sleep) Only a little   (!) STRESS CONCERN      3/11/2024   Nutrition   Diet: " Low salt         3/11/2024   Exercise   Days per week of moderate/strenous exercise 1 day   (!) EXERCISE CONCERN      3/11/2024   Social Factors   Frequency of gathering with friends or relatives Once a week   Worry food won't last until get money to buy more No   Food not last or not have enough money for food? No   Do you have housing?  Yes   Are you worried about losing your housing? No   Lack of transportation? Yes   Unable to get utilities (heat,electricity)? No    (!) TRANSPORTATION CONCERN PRESENT      10/16/2023   Fall Risk   Fallen 2 or more times in the past year? No          3/11/2024   Activities of Daily Living- Home Safety   Needs help with the following daily activites Transportation    Shopping   Safety concerns in the home None of the above         3/11/2024   Dental   Dentist two times every year? (!) NO         3/11/2024   Hearing Screening   Hearing concerns? (!) I FEEL THAT PEOPLE ARE MUMBLING OR NOT SPEAKING CLEARLY.    (!) I NEED TO ASK PEOPLE TO SPEAK UP OR REPEAT THEMSELVES.    (!) IT'S HARD TO FOLLOW A CONVERSATION IN A NOISY RESTAURANT OR CROWDED ROOM.         3/11/2024   Driving Risk Screening   Patient/family members have concerns about driving (!) DECLINE         3/11/2024   General Alertness/Fatigue Screening   Have you been more tired than usual lately? (!) DECLINE         3/11/2024   Urinary Incontinence Screening   Bothered by leaking urine in past 6 months Yes            Today's PHQ-2 Score:       3/11/2024     1:20 PM   PHQ-2 ( 1999 [employer])   Q1: Little interest or pleasure in doing things 0   Q2: Feeling down, depressed or hopeless 0   PHQ-2 Score 0   Q1: Little interest or pleasure in doing things Not at all   Q2: Feeling down, depressed or hopeless Not at all   PHQ-2 Score 0           3/11/2024   Substance Use   Alcohol more than 3/day or more than 7/wk No   Do you have a current opioid prescription? No   How severe/bad is pain from 1 to 10? 0/10 (No Pain)   Do you use any  other substances recreationally? (!) ALCOHOL     Social History     Tobacco Use    Smoking status: Former     Packs/day: 0.70     Years: 26.00     Additional pack years: 0.00     Total pack years: 18.20     Types: Cigarettes     Quit date: 1/16/2009     Years since quitting: 15.1    Smokeless tobacco: Never    Tobacco comments:         Vaping Use    Vaping Use: Never used   Substance Use Topics    Alcohol use: Yes     Comment: occ    Drug use: No          Reviewed and updated as needed this visit by Provider   Tobacco  Allergies  Meds  Problems  Med Hx  Surg Hx  Fam Hx            Current providers sharing in care for this patient include:  Patient Care Team:  Christian López MD as PCP - General (Family Medicine)  Christian López MD as Assigned PCP    The following health maintenance items are reviewed in Epic and correct as of today:  Health Maintenance   Topic Date Due    PARATHYROID  Never done    ZOSTER IMMUNIZATION (1 of 2) Never done    RSV VACCINE (Pregnancy & 60+) (1 - 1-dose 60+ series) Never done    DEXA  09/28/2021    EYE EXAM  09/02/2022    LIPID  04/14/2024    ANNUAL REVIEW OF HM ORDERS  04/14/2024    MICROALBUMIN  04/16/2024    A1C  09/11/2024    BMP  10/16/2024    DIABETIC FOOT EXAM  10/16/2024    FALL RISK ASSESSMENT  10/16/2024    MEDICARE ANNUAL WELLNESS VISIT  03/11/2025    HEMOGLOBIN  03/11/2025    DTAP/TDAP/TD IMMUNIZATION (2 - Td or Tdap) 09/01/2026    ADVANCE CARE PLANNING  03/11/2029    PHOSPHORUS  Completed    PHQ-2 (once per calendar year)  Completed    INFLUENZA VACCINE  Completed    Pneumococcal Vaccine: 65+ Years  Completed    URINALYSIS  Completed    ALK PHOS  Completed    COVID-19 Vaccine  Completed    IPV IMMUNIZATION  Aged Out    HPV IMMUNIZATION  Aged Out    MENINGITIS IMMUNIZATION  Aged Out    RSV MONOCLONAL ANTIBODY  Aged Out    URINE DRUG SCREEN  Discontinued       Review of Systems   Constitutional:  Negative for chills and fever.        Review of systems negative  "except as noted in the HPI.   HENT:  Negative for ear pain and sore throat.    Eyes:  Negative for pain and visual disturbance.   Respiratory:  Negative for cough and shortness of breath.    Cardiovascular:  Negative for chest pain and palpitations.   Gastrointestinal:  Negative for abdominal pain and vomiting.   Genitourinary:  Negative for dysuria and hematuria.   Musculoskeletal:  Negative for arthralgias and back pain.   Skin:  Negative for color change and rash.   Neurological:  Negative for seizures.        Vertigo   All other systems reviewed and are negative.         Objective    Exam  BP (!) 142/81 (BP Location: Left arm, Patient Position: Sitting, Cuff Size: Adult Large)   Pulse 83   Temp 97.8  F (36.6  C) (Oral)   Resp 16   Ht 1.556 m (5' 1.25\")   Wt 86.9 kg (191 lb 9.6 oz)   LMP 09/25/1979 (Approximate)   SpO2 91%   BMI 35.91 kg/m     Estimated body mass index is 35.91 kg/m  as calculated from the following:    Height as of this encounter: 1.556 m (5' 1.25\").    Weight as of this encounter: 86.9 kg (191 lb 9.6 oz).    Physical Exam  Vitals reviewed.   Constitutional:       General: She is not in acute distress.     Appearance: Normal appearance. She is not ill-appearing.   HENT:      Head: Normocephalic and atraumatic.      Right Ear: External ear normal. There is impacted cerumen.      Left Ear: External ear normal.      Nose: Nose normal.      Mouth/Throat:      Pharynx: Oropharynx is clear. No oropharyngeal exudate or posterior oropharyngeal erythema.   Eyes:      General: No scleral icterus.        Right eye: No discharge.         Left eye: No discharge.      Extraocular Movements: Extraocular movements intact.      Conjunctiva/sclera: Conjunctivae normal.      Pupils: Pupils are equal, round, and reactive to light.   Neck:      Comments: No thyromegaly.  Cardiovascular:      Rate and Rhythm: Normal rate and regular rhythm.      Heart sounds: Normal heart sounds. No murmur heard.     No " friction rub. No gallop.   Pulmonary:      Effort: Pulmonary effort is normal. No respiratory distress.      Breath sounds: Normal breath sounds. No wheezing or rales.   Abdominal:      General: There is no distension.      Palpations: Abdomen is soft. There is no mass.      Tenderness: There is no abdominal tenderness.   Musculoskeletal:         General: No signs of injury. Normal range of motion.      Cervical back: Normal range of motion.      Right lower leg: No edema.      Left lower leg: No edema.   Lymphadenopathy:      Cervical: No cervical adenopathy.   Skin:     General: Skin is warm.      Coloration: Skin is not jaundiced.      Findings: No rash.   Neurological:      General: No focal deficit present.      Mental Status: She is alert and oriented to person, place, and time.      Cranial Nerves: No cranial nerve deficit.      Motor: Weakness present.      Deep Tendon Reflexes: Reflexes normal.      Comments: Patient walks with a cane.  She is not strong enough to step up onto the step and then sit on the examination table.   Psychiatric:         Mood and Affect: Mood normal.       EKG: Normal sinus rhythm.  No changes in comparison to 2006 study.  No ST changes.  No T wave inversions.  Question of age-indeterminate infarct in the septum.  (Mitral rotation)        3/11/2024   Mini Cog   Clock Draw Score 2 Normal   3 Item Recall 3 objects recalled   Mini Cog Total Score 5            Signed Electronically by: Christian López MD

## 2024-03-11 NOTE — LETTER
March 14, 2024      Milagro Rogersjosefinajenny  [address] [facility] [address]        Dear ,    We are writing to inform you of your test results.    Diabetes: Stable.    Kidney function: Also stable.    Vitamin B12: Elevated.  You should reduce supplementation.  Perhaps take what ever supplementation you are currently taking 5 days out of the week.    Resulted Orders   Hemoglobin A1c   Result Value Ref Range    Hemoglobin A1C 7.3 (H) 0.0 - 5.6 %      Comment:      Normal <5.7%   Prediabetes 5.7-6.4%    Diabetes 6.5% or higher     Note: Adopted from ADA consensus guidelines.   EKG 12-lead, tracing only   Result Value Ref Range    Systolic Blood Pressure  mmHg    Diastolic Blood Pressure  mmHg    Ventricular Rate 77 BPM    Atrial Rate 77 BPM    TX Interval 202 ms    QRS Duration 76 ms     ms    QTc 432 ms    P Axis 108 degrees    R AXIS 56 degrees    T Axis 55 degrees    Interpretation ECG       Sinus rhythm  Septal infarct , age undetermined  Abnormal ECG  No previous ECGs available  Confirmed by MARIANA GREGG MD LOC: ([id number]) on 3/11/2024 2:35:52 PM     ALT   Result Value Ref Range    ALT 17 0 - 50 U/L   AST   Result Value Ref Range    AST 30 0 - 45 U/L   Basic metabolic panel  (Ca, Cl, CO2, Creat, Gluc, K, Na, BUN)   Result Value Ref Range    Sodium 140 135 - 145 mmol/L      Comment:      Reference intervals for this test were updated on 09/26/2023 to more accurately reflect our healthy population. There may be differences in the flagging of prior results with similar values performed with this method. Interpretation of those prior results can be made in the context of the updated reference intervals.     Potassium 4.6 3.4 - 5.3 mmol/L    Chloride 100 98 - 107 mmol/L    Carbon Dioxide (CO2) 29 22 - 29 mmol/L    Anion Gap 11 7 - 15 mmol/L    Urea Nitrogen 29.8 (H) 8.0 - 23.0 mg/dL    Creatinine 1.28 (H) 0.51 - 0.95 mg/dL    GFR Estimate 42 (L) >60 mL/min/1.73m2    Calcium 9.6 8.8 - 10.2 mg/dL     Glucose 145 (H) 70 - 99 mg/dL   Vitamin B12   Result Value Ref Range    Vitamin B12 1,510 (H) 232 - 1,245 pg/mL   Hemoglobin   Result Value Ref Range    Hemoglobin 12.3 11.7 - 15.7 g/dL       If you have any questions or concerns, please call the clinic at the number listed above.       Sincerely,      Christian López MD

## 2024-03-11 NOTE — COMMUNITY RESOURCES LIST (ENGLISH)
03/11/2024   [facility] [employer]  N/A  For questions about this resource list or additional care needs, please contact your primary care clinic or care manager.  Phone: [phone]   Email: N/A   Address: [address] [place] [id number]   Hours: N/A        Transportation       Free or low-cost transportation  1  [employer] - Free or low-cost transportation Distance: 8.82 miles      In-Person   [address] [place] [address]  Language: English  Hours: Mon - Fri 9:00 AM - 5:00 PM  Fees: Free   Phone: (227) 112-7230 Email: info@[employer] Website: https://www.[employer]/     2  [facility]  Office - [facility] - Gas vouchers and transportation assistance - Free or low-cost transportation Distance: 10.44 miles      In-Person, Phone/Virtual   [address] [place] [address]  Language: English  Hours: Mon - Fri 8:00 AM - 12:00 PM , Mon - Fri 1:00 PM - 4:00 PM  Fees: Free   Phone: (792) 930-3546 Email: [email or web address] Website: [email or web address]-services-office-[email or web address]     Transportation to medical appointments  3  [facility] Distance: 1.18 miles      In-Person   [address] [place] [address]  Language: English  Hours: Mon - Fri 9:00 AM - 4:00 PM  Fees: Free   Phone: (658) 876-4782 Email: contact@[employer].org Website: http://[employer].org     4  [employer] Distance: 6.81 miles      [address] [place] [address]  Language: English, Greek  Hours: Mon - Sun 7:00 AM - 5:30 PM  Fees: Self Pay   Phone: (327) 222-8498 Website: http://www.[employer]/          Important Numbers & Websites       Emergency Services   911  City Services   311  Poison Control   (976) 888-9142  Suicide Prevention Lifeline   (339) 490-8447 (TALK)  Child Abuse Hotline   (336) 964-9827  (4-A-Child)  Sexual Assault Hotline   (729) 851-6010 (HOPE)  National Runaway Safeline   (842) 653-2692 (RUNAWAY)  All-Options Talkline   (576) 672-6031  Substance Abuse Referral   (647) 492-1332 (HELP)

## 2024-03-11 NOTE — PATIENT INSTRUCTIONS
Preventive Care Advice   This is general advice given by our system to help you stay healthy. However, your care team may have specific advice just for you. Please talk to your care team about your preventive care needs.  Nutrition  Eat 5 or more servings of fruits and vegetables each day.  Try wheat bread, brown rice and whole grain pasta (instead of white bread, rice, and pasta).  Get enough calcium and vitamin D. Check the label on foods and aim for 100% of the RDA (recommended daily allowance).  Lifestyle  Exercise at least 150 minutes each week   (30 minutes a day, 5 days a week).  Do muscle strengthening activities 2 days a week. These help control your weight and prevent disease.  No smoking.  Wear sunscreen to prevent skin cancer.  Have a dental exam and cleaning every 6 months.  Yearly exams  See your health care team every year to talk about:  Any changes in your health.  Any medicines your care team has prescribed.  Preventive care, family planning, and ways to prevent chronic diseases.  Shots (vaccines)   HPV shots (up to age 26), if you've never had them before.  Hepatitis B shots (up to age 59), if you've never had them before.  COVID-19 shot: Get this shot when it's due.  Flu shot: Get a flu shot every year.  Tetanus shot: Get a tetanus shot every 10 years.  Pneumococcal, hepatitis A, and RSV shots: Ask your care team if you need these based on your risk.  Shingles shot (for age 50 and up).  General health tests  Diabetes screening:  Starting at age 35, Get screened for diabetes at least every 3 years.  If you are younger than age 35, ask your care team if you should be screened for diabetes.  Cholesterol test: At age 39, start having a cholesterol test every 5 years, or more often if advised.  Bone density scan (DEXA): At age 50, ask your care team if you should have this scan for osteoporosis (brittle bones).  Hepatitis C: Get tested at least once in your life.  STIs (sexually transmitted  infections)  Before age 24: Ask your care team if you should be screened for STIs.  After age 24: Get screened for STIs if you're at risk. You are at risk for STIs (including HIV) if:  You are sexually active with more than one person.  You don't use condoms every time.  You or a partner was diagnosed with a sexually transmitted infection.  If you are at risk for HIV, ask about PrEP medicine to prevent HIV.  Get tested for HIV at least once in your life, whether you are at risk for HIV or not.  Cancer screening tests  Cervical cancer screening: If you have a cervix, begin getting regular cervical cancer screening tests at age 21. Most people who have regular screenings with normal results can stop after age 65. Talk about this with your provider.  Breast cancer scan (mammogram): If you've ever had breasts, begin having regular mammograms starting at age 40. This is a scan to check for breast cancer.  Colon cancer screening: It is important to start screening for colon cancer at age 45.  Have a colonoscopy test every 10 years (or more often if you're at risk) Or, ask your provider about stool tests like a FIT test every year or Cologuard test every 3 years.  To learn more about your testing options, visit: https://www.[employer]/554333.pdf.  For help making a decision, visit: https://bit.ly/id40460.  Prostate cancer screening test: If you have a prostate and are age 55 to 69, ask your provider if you would benefit from a yearly prostate cancer screening test.  Lung cancer screening: If you are a current or former smoker age 50 to 80, ask your care team if ongoing lung cancer screenings are right for you.  For informational purposes only. Not to replace the advice of your health care provider. Copyright   2023 [employer]. All rights reserved. Clinically reviewed by the [facility] Transitions Program. [employer] 388594 - REV 01/24.

## 2024-03-11 NOTE — LETTER
"March 14, 2024      Milagro ALFREDO Rodrigo  [address] [facility] [address]        Dear ,    We are writing to inform you of your test results.    Your EKG was similar to past measurements.  The finding of \"septal infarct\" is something I was able to find in an EKG from 2006.  Given stability, this is a reassuring study.    Resulted Orders   EKG 12-lead, tracing only   Result Value Ref Range    Systolic Blood Pressure  mmHg    Diastolic Blood Pressure  mmHg    Ventricular Rate 77 BPM    Atrial Rate 77 BPM    [place] Interval 202 ms    QRS Duration 76 ms     ms    QTc 432 ms    P Axis 108 degrees    R AXIS 56 degrees    T Axis 55 degrees    Interpretation ECG       Sinus rhythm  Septal infarct , age undetermined  Abnormal ECG  No previous ECGs available  Confirmed by MARIANA GREGG MD LOC:JN ([id number]) on 3/11/2024 2:35:52 PM         If you have any questions or concerns, please call the clinic at the number listed above.       Sincerely,      Christian López MD            "

## 2024-03-12 LAB
ALT SERPL W P-5'-P-CCNC: 17 U/L (ref 0–50)
ANION GAP SERPL CALCULATED.3IONS-SCNC: 11 MMOL/L (ref 7–15)
AST SERPL W P-5'-P-CCNC: 30 U/L (ref 0–45)
BUN SERPL-MCNC: 29.8 MG/DL (ref 8–23)
CALCIUM SERPL-MCNC: 9.6 MG/DL (ref 8.8–10.2)
CHLORIDE SERPL-SCNC: 100 MMOL/L (ref 98–107)
CREAT SERPL-MCNC: 1.28 MG/DL (ref 0.51–0.95)
DEPRECATED HCO3 PLAS-SCNC: 29 MMOL/L (ref 22–29)
EGFRCR SERPLBLD CKD-EPI 2021: 42 ML/MIN/1.73M2
GLUCOSE SERPL-MCNC: 145 MG/DL (ref 70–99)
POTASSIUM SERPL-SCNC: 4.6 MMOL/L (ref 3.4–5.3)
SODIUM SERPL-SCNC: 140 MMOL/L (ref 135–145)
VIT B12 SERPL-MCNC: 1510 PG/ML (ref 232–1245)

## 2024-03-12 NOTE — ASSESSMENT & PLAN NOTE
Vertigo versus presyncope.  When we spoke last time it sounded more like presyncope.  Today, I think she is describing symptoms of vertigo.  EKG completed today without significant abnormalities comparison to a study that was done 3 years ago (and 18 years ago).  Her symptoms are significantly improved.  For now, no intervention.  Tolerant of higher than target range blood pressure.  Consider echocardiogram or referral to cardiology if symptoms continue.

## 2024-03-12 NOTE — ASSESSMENT & PLAN NOTE
Diabetic control is stable and sufficient for age.  Hemoglobin A1c reviewed.  No symptoms consistent with hypoglycemia.  CKD: Check basic metabolic panel and consider adjusting metformin accordingly.  She is on a statin.  She takes an aspirin.  Non-smoker.  Blood pressure slightly elevated but given some of the presyncopal symptoms recently we will not change her medications at this time.

## 2024-03-12 NOTE — ASSESSMENT & PLAN NOTE
Check basic metabolic panel.  If there is a decline we will have to consider alterations of medication regimen.

## 2024-03-12 NOTE — ASSESSMENT & PLAN NOTE
Pressure slightly elevated today.  Given concerns for presyncopal symptoms no changes to medications placed.  Check blood serum electrolytes.

## 2024-03-14 ENCOUNTER — TELEPHONE (OUTPATIENT)
Dept: FAMILY MEDICINE | Facility: CLINIC | Age: 84
End: 2024-03-14
Payer: COMMERCIAL

## 2024-03-14 NOTE — TELEPHONE ENCOUNTER
Left message to call back for: patient   Information to relay to patient: See the 2 results letters dated 3/14/24 and relay both notes from provider to patient.   Print and mail letters as well.

## 2024-03-15 NOTE — TELEPHONE ENCOUNTER
Left message to call back for: pt  Information to relay to patient: See the 2 results letters dated 3/14/24 and relay both notes from provider to patient.

## 2024-04-10 ENCOUNTER — TRANSFERRED RECORDS (OUTPATIENT)
Dept: HEALTH INFORMATION MANAGEMENT | Facility: CLINIC | Age: 84
End: 2024-04-10
Payer: COMMERCIAL

## 2024-05-16 DIAGNOSIS — E78.5 HYPERLIPIDEMIA LDL GOAL <100: ICD-10-CM

## 2024-05-16 DIAGNOSIS — G62.9 PERIPHERAL POLYNEUROPATHY: ICD-10-CM

## 2024-05-16 RX ORDER — SIMVASTATIN 20 MG
20 TABLET ORAL AT BEDTIME
Qty: 90 TABLET | Refills: 3 | Status: SHIPPED | OUTPATIENT
Start: 2024-05-16

## 2024-05-16 RX ORDER — GABAPENTIN 100 MG/1
200 CAPSULE ORAL AT BEDTIME
Qty: 180 CAPSULE | Refills: 1 | Status: SHIPPED | OUTPATIENT
Start: 2024-05-16

## 2024-05-19 DIAGNOSIS — E11.22 TYPE 2 DIABETES MELLITUS WITH STAGE 3A CHRONIC KIDNEY DISEASE, WITHOUT LONG-TERM CURRENT USE OF INSULIN (H): ICD-10-CM

## 2024-05-19 DIAGNOSIS — N18.31 TYPE 2 DIABETES MELLITUS WITH STAGE 3A CHRONIC KIDNEY DISEASE, WITHOUT LONG-TERM CURRENT USE OF INSULIN (H): ICD-10-CM

## 2024-06-10 ENCOUNTER — TELEPHONE (OUTPATIENT)
Dept: FAMILY MEDICINE | Facility: CLINIC | Age: 84
End: 2024-06-10
Payer: COMMERCIAL

## 2024-06-10 DIAGNOSIS — I10 ESSENTIAL (PRIMARY) HYPERTENSION: ICD-10-CM

## 2024-06-10 NOTE — TELEPHONE ENCOUNTER
Patient Quality Outreach    Patient is due for the following:   Hypertension -  BP check    Next Steps:   Spoke with pt today and she will check her BP later when her daughter is at her house to help her and call us back with her BP reading later. xl    Type of outreach:    Phone, spoke to patient/parent. pt      Questions for provider review:    None           Annie Stephens MA  Chart routed to Care Team.

## 2024-06-10 NOTE — TELEPHONE ENCOUNTER
Patient returning call with update on BP readings taken this evening using her electronic wrist cuff.      Took 3 readings ~ 30 seconds apart, 133/76, 128/73, and 124/71.    Patient had no further questions/concerns.       Amanda Marie RN  [facility]

## 2024-06-11 RX ORDER — METOPROLOL SUCCINATE 50 MG/1
50 TABLET, EXTENDED RELEASE ORAL DAILY
Qty: 90 TABLET | Refills: 2 | Status: SHIPPED | OUTPATIENT
Start: 2024-06-11

## 2024-07-17 ENCOUNTER — TRANSFERRED RECORDS (OUTPATIENT)
Dept: HEALTH INFORMATION MANAGEMENT | Facility: CLINIC | Age: 84
End: 2024-07-17
Payer: COMMERCIAL

## 2024-09-04 DIAGNOSIS — R60.0 BILATERAL EDEMA OF LOWER EXTREMITY: ICD-10-CM

## 2024-09-05 RX ORDER — FUROSEMIDE 40 MG
40 TABLET ORAL DAILY
Qty: 90 TABLET | Refills: 1 | Status: SHIPPED | OUTPATIENT
Start: 2024-09-05

## 2024-10-01 ENCOUNTER — TELEPHONE (OUTPATIENT)
Dept: FAMILY MEDICINE | Facility: CLINIC | Age: 84
End: 2024-10-01
Payer: COMMERCIAL

## 2024-10-01 NOTE — TELEPHONE ENCOUNTER
Reason for Call:  Request for results:    Name of test or procedure: Vitamin B12 was elevated    Date of test of procedure: 3-11-24    Location of the test or procedure: [facility]    OK to leave the result message on voice mail or with a family member? NO    Phone number Patient can be reached at:  Home number on file [phone] (home)    Additional comments: Please call to discuss. Thank you.    Call taken on 10/1/2024 at 2:53 PM by Carine Dutton

## 2024-10-02 NOTE — CONFIDENTIAL NOTE
Letter generated 3/14.  Please review with patient. She is due for a patient (every 6 months with diabetes mellitus).

## 2024-10-02 NOTE — TELEPHONE ENCOUNTER
"  General Call    Contacts       Contact Date/Time Type Contact Phone/Fax    10/01/2024 02:50 PM CDT Phone (Incoming) Milagro [place] (Self) [phone] (H)    10/02/2024 02:38 PM CDT Phone (Incoming) Milagro [place] (Self) [phone] (H)          Reason for Call:  patient called back and I did get her scheduled for Monday October 21st. Patient was VERY adamant that no one told her that she needs to return every 6 months. Read the message from Dr. Childs to her and she told me she had both letters in front of her from march and neither of them indicate that she needed to come back in 6 months. When I read over the letters from March of this year I could not find anything on them either to indicate she needed to return in 6 months from the \"letter\" section in her chart. Patient was upset and told me she never misses appointments and that she would have made this months ago if she was aware.       Okay to leave a detailed message?: Yes at Cell number on file:    No relevant phone numbers on file.      "

## 2024-10-02 NOTE — TELEPHONE ENCOUNTER
Spoke with patient. Provider message/result letter relayed. Patient will call back to schedule office visit.

## 2024-10-11 DIAGNOSIS — I10 ESSENTIAL HYPERTENSION WITH GOAL BLOOD PRESSURE LESS THAN 140/90: ICD-10-CM

## 2024-10-11 RX ORDER — LISINOPRIL 40 MG/1
40 TABLET ORAL DAILY
Qty: 90 TABLET | Refills: 0 | Status: SHIPPED | OUTPATIENT
Start: 2024-10-11

## 2024-11-08 DIAGNOSIS — E11.22 TYPE 2 DIABETES MELLITUS WITH STAGE 3A CHRONIC KIDNEY DISEASE, WITHOUT LONG-TERM CURRENT USE OF INSULIN (H): ICD-10-CM

## 2024-11-08 DIAGNOSIS — N18.31 TYPE 2 DIABETES MELLITUS WITH STAGE 3A CHRONIC KIDNEY DISEASE, WITHOUT LONG-TERM CURRENT USE OF INSULIN (H): ICD-10-CM

## 2024-11-12 DIAGNOSIS — H69.92 DYSFUNCTION OF LEFT EUSTACHIAN TUBE: ICD-10-CM

## 2024-11-12 RX ORDER — FLUTICASONE PROPIONATE 50 MCG
SPRAY, SUSPENSION (ML) NASAL
Qty: 48 ML | Refills: 3 | Status: SHIPPED | OUTPATIENT
Start: 2024-11-12

## 2024-11-15 DIAGNOSIS — G62.9 PERIPHERAL POLYNEUROPATHY: ICD-10-CM

## 2024-11-16 RX ORDER — GABAPENTIN 100 MG/1
200 CAPSULE ORAL AT BEDTIME
Qty: 180 CAPSULE | Refills: 1 | Status: SHIPPED | OUTPATIENT
Start: 2024-11-16

## 2024-11-29 ENCOUNTER — OFFICE VISIT (OUTPATIENT)
Dept: FAMILY MEDICINE | Facility: CLINIC | Age: 84
End: 2024-11-29
Payer: COMMERCIAL

## 2024-11-29 VITALS
RESPIRATION RATE: 16 BRPM | SYSTOLIC BLOOD PRESSURE: 160 MMHG | WEIGHT: 186.4 LBS | BODY MASS INDEX: 35.19 KG/M2 | OXYGEN SATURATION: 96 % | HEART RATE: 85 BPM | HEIGHT: 61 IN | TEMPERATURE: 97.9 F | DIASTOLIC BLOOD PRESSURE: 80 MMHG

## 2024-11-29 DIAGNOSIS — E53.8 VITAMIN B12 DEFICIENCY (NON ANEMIC): ICD-10-CM

## 2024-11-29 DIAGNOSIS — N18.31 TYPE 2 DIABETES MELLITUS WITH STAGE 3A CHRONIC KIDNEY DISEASE, WITHOUT LONG-TERM CURRENT USE OF INSULIN (H): Primary | ICD-10-CM

## 2024-11-29 DIAGNOSIS — E11.22 TYPE 2 DIABETES MELLITUS WITH STAGE 3A CHRONIC KIDNEY DISEASE, WITHOUT LONG-TERM CURRENT USE OF INSULIN (H): Primary | ICD-10-CM

## 2024-11-29 DIAGNOSIS — I10 ESSENTIAL HYPERTENSION: ICD-10-CM

## 2024-11-29 DIAGNOSIS — N18.32 STAGE 3B CHRONIC KIDNEY DISEASE (H): ICD-10-CM

## 2024-11-29 LAB
ALT SERPL W P-5'-P-CCNC: 14 U/L (ref 0–50)
ANION GAP SERPL CALCULATED.3IONS-SCNC: 13 MMOL/L (ref 7–15)
BUN SERPL-MCNC: 22 MG/DL (ref 8–23)
CALCIUM SERPL-MCNC: 9.7 MG/DL (ref 8.8–10.4)
CHLORIDE SERPL-SCNC: 101 MMOL/L (ref 98–107)
CREAT SERPL-MCNC: 1.19 MG/DL (ref 0.51–0.95)
EGFRCR SERPLBLD CKD-EPI 2021: 45 ML/MIN/1.73M2
EST. AVERAGE GLUCOSE BLD GHB EST-MCNC: 157 MG/DL
GLUCOSE SERPL-MCNC: 166 MG/DL (ref 70–99)
HBA1C MFR BLD: 7.1 % (ref 0–5.6)
HCO3 SERPL-SCNC: 27 MMOL/L (ref 22–29)
HOLD SPECIMEN: NORMAL
HOLD SPECIMEN: NORMAL
POTASSIUM SERPL-SCNC: 5.1 MMOL/L (ref 3.4–5.3)
PTH-INTACT SERPL-MCNC: 103 PG/ML (ref 15–65)
SODIUM SERPL-SCNC: 141 MMOL/L (ref 135–145)
VIT B12 SERPL-MCNC: 2210 PG/ML (ref 232–1245)

## 2024-11-29 PROCEDURE — G0008 ADMIN INFLUENZA VIRUS VAC: HCPCS | Performed by: FAMILY MEDICINE

## 2024-11-29 PROCEDURE — 84460 ALANINE AMINO (ALT) (SGPT): CPT | Performed by: FAMILY MEDICINE

## 2024-11-29 PROCEDURE — 80048 BASIC METABOLIC PNL TOTAL CA: CPT | Performed by: FAMILY MEDICINE

## 2024-11-29 PROCEDURE — 82607 VITAMIN B-12: CPT | Performed by: FAMILY MEDICINE

## 2024-11-29 PROCEDURE — 36415 COLL VENOUS BLD VENIPUNCTURE: CPT | Performed by: FAMILY MEDICINE

## 2024-11-29 PROCEDURE — 83036 HEMOGLOBIN GLYCOSYLATED A1C: CPT | Performed by: FAMILY MEDICINE

## 2024-11-29 PROCEDURE — 83970 ASSAY OF PARATHORMONE: CPT | Performed by: FAMILY MEDICINE

## 2024-11-29 PROCEDURE — 90662 IIV NO PRSV INCREASED AG IM: CPT | Performed by: FAMILY MEDICINE

## 2024-11-29 PROCEDURE — 99214 OFFICE O/P EST MOD 30 MIN: CPT | Mod: 25 | Performed by: FAMILY MEDICINE

## 2024-11-29 RX ORDER — ANTIOX #8/OM3/DHA/EPA/LUT/ZEAX 250-2.5 MG
CAPSULE ORAL
COMMUNITY
Start: 2024-11-29

## 2024-11-29 NOTE — PROGRESS NOTES
Assessment & Plan   Problem List Items Addressed This Visit       Essential hypertension     Blood pressure today is elevated.  She has taken her medicines although she missed furosemide yesterday.  She believes that she ate quite well yesterday but I wonder if she had more sodium than normal and if the combination of Thanksgiving dinner plus missing a dose of furosemide has resulted in her blood pressure today.  She will return to clinic in 7-10 days for recheck.  If it remains elevated we will either go up on metoprolol or add 2.5 mg of amlodipine.  She does struggle with swelling at times but she has previously done well on amlodipine most recently 5 years ago.  Will plan to recheck in clinic assuming we had a medicine in about 3-4 weeks.         Relevant Orders    ALT    Stage 3b chronic kidney disease (H)     Reviewed labs.  I anticipate that her renal function will be sufficient to continue metformin.         Relevant Orders    Albumin Random Urine Quantitative with Creat Ratio    Parathyroid Hormone Intact    Basic metabolic panel  (Ca, Cl, CO2, Creat, Gluc, K, Na, BUN)    Type 2 diabetes mellitus with stage 3 chronic kidney disease, without long-term current use of insulin (H) - Primary     A1c is stable.  She takes an aspirin and a statin.  Blood pressure is unexpectedly elevated today.  Plan for that is discussed elsewhere.  No hypoglycemic episodes.  Continue current plan.   - The patient has scabbed rash on her right gluteal cleft.  No odor.  No drainage.  No subcutaneous abnormality.  No tenderness.  I suspect that this might have been a fungal infection or even a small abscess that has now resolved.  Apply emollient.  Anticipate complete resolution.         Relevant Orders    Hemoglobin A1c (Completed)    Basic metabolic panel  (Ca, Cl, CO2, Creat, Gluc, K, Na, BUN)    ALT     Other Visit Diagnoses       Vitamin B12 deficiency (non anemic)        Relevant Orders    Vitamin B12          BMI  Estimated  "body mass index is 34.93 kg/m  as calculated from the following:    Height as of this encounter: 1.556 m (5' 1.25\").    Weight as of this encounter: 84.6 kg (186 lb 6.4 oz).   Weight management plan: Discussed healthy diet and exercise guidelines    The longitudinal plan of care for the diagnosis(es)/condition(s) as documented were addressed during this visit. Due to the added complexity in care, I will continue to support Milagro in the subsequent management and with ongoing continuity of care.    Subjective   Milagro is a 84 year old, presenting for the following health issues:  Follow Up (A1C and DM)        11/29/2024     3:25 PM   Additional Questions   Roomed by Campbell Benavides MA   Accompanied by Daughter         11/29/2024     3:25 PM   Patient Reported Additional Medications   Patient reports taking the following new medications None     Rash - Right posterior  buttocks.  Present for months.     Macular degeneration: getting injections through [facility].     She has not been driving.      DM:   - less desert after dinner   -  she feels well.     Dizzy:   - worse recently.   - water helps with vertigo symptoms    BP:   - she skipped furosemide yesterday and today.   - it makes her need to use the bathroom   - she does not check at home.    History of Present Illness       CKD: She is not using over the counter pain medicine.     Diabetes:   She presents for follow up of diabetes.    She is not checking blood glucose.         She has no concerns regarding her diabetes at this time.  She is having numbness in feet, blurry vision and weight gain.            Reason for visit:  Follow up    She eats 2-3 servings of fruits and vegetables daily.She consumes 1 sweetened beverage(s) daily.She exercises with enough effort to increase her heart rate 9 or less minutes per day.  She exercises with enough effort to increase her heart rate 3 or less days per week.   She is taking medications regularly.          Objective    BP " "(!) 160/80   Pulse 85   Temp 97.9  F (36.6  C) (Oral)   Resp 16   Ht 1.556 m (5' 1.25\")   Wt 84.6 kg (186 lb 6.4 oz)   LMP 09/25/1979 (Approximate)   SpO2 96%   BMI 34.93 kg/m    Body mass index is 34.93 kg/m .  Physical Exam  Vitals reviewed.   Constitutional:       General: She is not in acute distress.     Appearance: Normal appearance. She is not ill-appearing.   HENT:      Head: Normocephalic and atraumatic.      Right Ear: External ear normal.      Left Ear: External ear normal.      Nose: Nose normal.      Mouth/Throat:      Pharynx: Oropharynx is clear. No oropharyngeal exudate or posterior oropharyngeal erythema.   Eyes:      General: No scleral icterus.        Right eye: No discharge.         Left eye: No discharge.      Extraocular Movements: Extraocular movements intact.      Conjunctiva/sclera: Conjunctivae normal.      Pupils: Pupils are equal, round, and reactive to light.   Neck:      Comments: No thyromegaly.  Cardiovascular:      Rate and Rhythm: Normal rate and regular rhythm.      Heart sounds: Normal heart sounds. No murmur heard.     No friction rub. No gallop.   Pulmonary:      Effort: Pulmonary effort is normal. No respiratory distress.      Breath sounds: Normal breath sounds. No wheezing or rales.   Abdominal:      General: There is no distension.      Palpations: Abdomen is soft. There is no mass.      Tenderness: There is no abdominal tenderness.   Musculoskeletal:         General: No signs of injury. Normal range of motion.      Cervical back: Normal range of motion.      Right lower leg: No edema.      Left lower leg: No edema.   Lymphadenopathy:      Cervical: No cervical adenopathy.   Skin:     General: Skin is warm.      Coloration: Skin is not jaundiced.      Findings: No rash.   Neurological:      General: No focal deficit present.      Mental Status: She is alert and oriented to person, place, and time.      Cranial Nerves: No cranial nerve deficit.      Deep Tendon " Reflexes: Reflexes normal.   Psychiatric:         Mood and Affect: Mood normal.             Signed Electronically by: Christian López MD

## 2024-11-29 NOTE — ASSESSMENT & PLAN NOTE
A1c is stable.  She takes an aspirin and a statin.  Blood pressure is unexpectedly elevated today.  Plan for that is discussed elsewhere.  No hypoglycemic episodes.  Continue current plan.   - The patient has scabbed rash on her right gluteal cleft.  No odor.  No drainage.  No subcutaneous abnormality.  No tenderness.  I suspect that this might have been a fungal infection or even a small abscess that has now resolved.  Apply emollient.  Anticipate complete resolution.

## 2024-11-29 NOTE — ASSESSMENT & PLAN NOTE
Blood pressure today is elevated.  She has taken her medicines although she missed furosemide yesterday.  She believes that she ate quite well yesterday but I wonder if she had more sodium than normal and if the combination of Thanksgiving dinner plus missing a dose of furosemide has resulted in her blood pressure today.  She will return to clinic in 7-10 days for recheck.  If it remains elevated we will either go up on metoprolol or add 2.5 mg of amlodipine.  She does struggle with swelling at times but she has previously done well on amlodipine most recently 5 years ago.  Will plan to recheck in clinic assuming we had a medicine in about 3-4 weeks.

## 2024-12-13 ENCOUNTER — TELEPHONE (OUTPATIENT)
Dept: FAMILY MEDICINE | Facility: CLINIC | Age: 84
End: 2024-12-13

## 2024-12-13 NOTE — TELEPHONE ENCOUNTER
Patient would like clarification on directions regarding B12 supplementation. See 11/29 B12 result and note to decrease supplementation.     She is currently taking B12 1000mcg 5x/week. Also noted to have b12 in multivitamin.    Please clarify.

## 2024-12-14 NOTE — CONFIDENTIAL NOTE
She has 2 options:  - Take her vitamin B12 supplementation every other day  - Find a 500 mcg/day dose over-the-counter.    Either would be appropriate.

## 2024-12-16 ENCOUNTER — TELEPHONE (OUTPATIENT)
Dept: FAMILY MEDICINE | Facility: CLINIC | Age: 84
End: 2024-12-16
Payer: COMMERCIAL

## 2024-12-16 NOTE — TELEPHONE ENCOUNTER
Left message to call back for: Patient  Information to relay to patient: Please relay provider message below

## 2024-12-16 NOTE — TELEPHONE ENCOUNTER
Left message to call back for: Patient  Information to relay to patient: Please see provider message below and assist patient with scheduling. Okay to use reserved slots       Christian López MD  3+ pitting edema is noted by Jerome in the message that she sent to me with this visit is a change for this individual.  Recommend clinic visit to review next week if at all possible.     Blood pressure at target range.

## 2024-12-20 PROBLEM — R60.0 BILATERAL LOWER EXTREMITY EDEMA: Status: ACTIVE | Noted: 2024-12-20

## 2024-12-23 ENCOUNTER — PATIENT OUTREACH (OUTPATIENT)
Dept: CARE COORDINATION | Facility: CLINIC | Age: 84
End: 2024-12-23
Payer: COMMERCIAL

## 2024-12-23 NOTE — PROGRESS NOTES
Clinic Care Coordination Contact  [facility]/Voicemail    Clinical Data: Care Coordinator Outreach    Outreach Documentation Number of Outreach Attempt   12/23/2024   9:29 AM 1     Left message on patients sanchez Hernandez's voicemail with call back information and requested return call.    Overview  SW assessment - medical transportation options  Referral notes to call daughter or son; no CTC on file    ** On referral is asked for CCC to call patients daughter or son. I called patients sanchez Hernandez today and LM. When I speak with Mary I will see if we can do a 3 way call to patient to get verbal permission to speak with Mary.     Plan: Care Coordinator will try to reach patient again in 1-2 business days.    Lorena Frias  Community Health Worker  [facility] Care Coordination   IrenePranav chawla, [place], [place], [place] and [facility]  Office: [phone]

## 2024-12-23 NOTE — LETTER
[facility] CARE COORDINATION  [address]  [place] [address]    December 24, 2024    Milagro [place]  [address] [facility] [address]      Dear Milagro,    I am a clinic community health worker who works with Christian López MD with the [facility]. I wanted to thank you for spending the time to talk with me.  Below is a description of clinic care coordination and how I can further assist you.       The clinic care coordination team is made up of a registered nurse, , financial resource worker and community health worker who understand the health care system. The goal of clinic care coordination is to help you manage your health and improve access to the health care system. Our team works alongside your provider to assist you in determining your health and social needs. We can help you obtain health care and community resources, providing you with necessary information and education. We can work with you through any barriers and develop a care plan that helps coordinate and strengthen the communication between you and your care team.  Our services are voluntary and are offered without charge to you personally.    Please feel free to contact me with any questions or concerns regarding care coordination and what we can offer.      We are focused on providing you with the highest-quality healthcare experience possible.    Sincerely,     Lorena Frias  Community Health Worker  [facility] Care Coordination   Pranav Kitchen, [place], [place], [facility]  Office: [phone]

## 2024-12-24 NOTE — PROGRESS NOTES
Clinic Care Coordination Contact  Community Health Worker Initial Outreach    Patient accepts CC: No, needs at this time. Patient will be sent Care Coordination introduction letter for future reference.     On referral is asked for CCC to call patients daughter or son. I called patients daughter Mary today I vagally spoke about CCC. Mary spoke about Milagro and that she has not needs at this time.     Lorena Frias  [facility] Health Worker  [facility]  Clinic Care Coordination   Pranav Kitchen, [place], [place], [facility]  Office: [phone]

## 2024-12-30 DIAGNOSIS — I10 ESSENTIAL HYPERTENSION WITH GOAL BLOOD PRESSURE LESS THAN 140/90: ICD-10-CM

## 2024-12-30 RX ORDER — LISINOPRIL 40 MG/1
40 TABLET ORAL DAILY
Qty: 90 TABLET | Refills: 1 | Status: SHIPPED | OUTPATIENT
Start: 2024-12-30

## 2025-02-10 DIAGNOSIS — R60.0 BILATERAL EDEMA OF LOWER EXTREMITY: ICD-10-CM

## 2025-02-10 RX ORDER — FUROSEMIDE 40 MG/1
40 TABLET ORAL DAILY
Qty: 90 TABLET | Refills: 1 | Status: SHIPPED | OUTPATIENT
Start: 2025-02-10

## 2025-02-17 ENCOUNTER — TRANSFERRED RECORDS (OUTPATIENT)
Dept: HEALTH INFORMATION MANAGEMENT | Facility: CLINIC | Age: 85
End: 2025-02-17

## 2025-02-17 LAB — RETINOPATHY: POSITIVE

## 2025-03-14 PROBLEM — R42 DIZZINESS: Status: RESOLVED | Noted: 2023-03-28 | Resolved: 2025-03-14

## 2025-04-01 ENCOUNTER — TELEPHONE (OUTPATIENT)
Dept: FAMILY MEDICINE | Facility: CLINIC | Age: 85
End: 2025-04-01
Payer: COMMERCIAL

## 2025-04-01 NOTE — TELEPHONE ENCOUNTER
"Called Milagro.    Discussed symptoms of scabs that are spreading near her vaginal area.  She states they are around her perineum and vagina.  Milagro stated that Dr López examined this area about 6 months ago.  Scabs do not hurt, no drainage, no bleeding.  Patient states that it appears to be shingles-like without the discomfort.  Milagro would like to know next steps.  OV 11/29/25 noted: \"Rash - Right posterior buttocks. Present for months.\"     Milagro was updated that we have not received her medical records from [facility].  Plan to have Milagro request medical records be sent to clinic at her next appointment on Wednesday 4/9/25.  Clinic Fax number given.  "

## 2025-04-01 NOTE — TELEPHONE ENCOUNTER
Patient Returning Call    Reason for call:  Pt requesting call back for confirmation & medical advice     Information relayed to patient:  N/A    Patient has additional questions:  Yes    What are your questions/concerns:  Pt is wondering if they received their eye exam report, pcp has been requesting for a copy of them and they have asked to have it faxed over. Pt is also wanting medical advice on the scabs along their vagina that pcp is aware of. Pt is wondering what the next steps are to addressing them and who they should go see next (referral to specialty).     Who does the patient want to speak with:  Provider    Is an  needed?:  No      Okay to leave a detailed message?: Yes at Home number on file [phone] (home)

## 2025-04-01 NOTE — TELEPHONE ENCOUNTER
Routing to RN pool for symptoms.    Of note, eye exam is documented in  from claims derived visits. However, no medical records have been received if provider would like to review.

## 2025-04-02 NOTE — TELEPHONE ENCOUNTER
Spoke with patient. Provider message relayed. Dr. López next reserved slot is 4/14/25. Patient declined sooner appointment with other providers in clinic.     Patient will need to confirm medical ride before scheduling. OK to use reserved slots with Dr. López when patient returns call.

## 2025-04-02 NOTE — CONFIDENTIAL NOTE
What I remember from the exam on 3/14 is that she had some lesions that had mostly improved/resolved.  It was not a focus of the discussion.  If she is having scabs that are spreading around the vulva, she probably needs a more thorough exam.    Please help her to schedule a follow-up with any available provider.

## 2025-04-14 ENCOUNTER — OFFICE VISIT (OUTPATIENT)
Dept: FAMILY MEDICINE | Facility: CLINIC | Age: 85
End: 2025-04-14
Payer: COMMERCIAL

## 2025-04-14 VITALS
OXYGEN SATURATION: 89 % | HEART RATE: 87 BPM | WEIGHT: 176.7 LBS | HEIGHT: 61 IN | DIASTOLIC BLOOD PRESSURE: 79 MMHG | TEMPERATURE: 98.3 F | SYSTOLIC BLOOD PRESSURE: 166 MMHG | BODY MASS INDEX: 33.36 KG/M2 | RESPIRATION RATE: 20 BRPM

## 2025-04-14 DIAGNOSIS — R21 GROIN RASH: Primary | ICD-10-CM

## 2025-04-14 PROCEDURE — 3078F DIAST BP <80 MM HG: CPT | Performed by: FAMILY MEDICINE

## 2025-04-14 PROCEDURE — 3077F SYST BP >= 140 MM HG: CPT | Performed by: FAMILY MEDICINE

## 2025-04-14 PROCEDURE — 99214 OFFICE O/P EST MOD 30 MIN: CPT | Performed by: FAMILY MEDICINE

## 2025-04-14 PROCEDURE — G2211 COMPLEX E/M VISIT ADD ON: HCPCS | Performed by: FAMILY MEDICINE

## 2025-04-14 RX ORDER — LISINOPRIL 40 MG/1
40 TABLET ORAL DAILY
Qty: 90 TABLET | Refills: 3 | Status: SHIPPED | OUTPATIENT
Start: 2025-04-14

## 2025-04-14 RX ORDER — TRIAMCINOLONE ACETONIDE 1 MG/G
CREAM TOPICAL 2 TIMES DAILY
Qty: 30 G | Refills: 1 | Status: SHIPPED | OUTPATIENT
Start: 2025-04-14

## 2025-04-14 RX ORDER — KETOCONAZOLE 20 MG/G
CREAM TOPICAL 2 TIMES DAILY
Qty: 30 G | Refills: 1 | Status: SHIPPED | OUTPATIENT
Start: 2025-04-14 | End: 2025-04-17

## 2025-04-14 NOTE — PROGRESS NOTES
Assessment & Plan   Problem List Items Addressed This Visit       Groin rash - Primary     This patient had described a rash in her groin when I saw her last month.  At that time it had mostly subsided.  It has been now worse.  She initially described it as a blistering phenomenon.  Her family was quite worried.  She has 2 streaks that are approximately 3 to 4 mm in length.  It does not have a well-demarcated border.  It is not raised.  It might be consistent with skin breakage associated with tinea cruris without adequate recovery.  Given the gray appearance, it also could be consistent with a inflammatory condition/dermatitis.  - Initial therapy: Topical ketoconazole.  - If not responsive to the initial therapy we would switch to a topical moderate potency steroid such as triamcinolone.  This was prescribed.  - If not improved/resolved will arrange for the patient to see dermatology.  She lives in [place] and is hesitant to leave this community.  Referral placed for Dr. Marian Mccarthy         Relevant Medications    ketoconazole (NIZORAL) 2 % external cream    triamcinolone (KENALOG) 0.1 % external cream    Other Relevant Orders    Adult Dermatology  Referral      The longitudinal plan of care for the diagnosis(es)/condition(s) as documented were addressed during this visit. Due to the added complexity in care, I will continue to support Milagro in the subsequent management and with ongoing continuity of care.    31 minutes spent by me on the date of the encounter doing chart review, history and exam, documentation and further activities per the note           Subjective   Milagro is a 85 year old, presenting for the following health issues:  Derm Problem (Rash on buttocks)        4/14/2025     3:02 PM   Additional Questions   Roomed by xl   Accompanied by daughter     Patient presents with groin concern.  She has a rash that has worsened since I last saw her.  It does not itch nor does it cause her  "pain.  Family speculates that this might be a pruritic rash as a result of kidney dysfunction.  The patient describes it as blistering.  She tries to keep it dry.  Her daughter-in-law accompanies her to clinic today and notes that she has urinary incontinence.  The patient expresses some frustration that she has to use the bathroom so much when she is on Lasix.  She otherwise feels well.    History of Present Illness       Reason for visit:  Rash on buttocks/perineum  Symptom onset:  More than a month  Symptoms include:  Rash  Symptom intensity:  Moderate  Symptom progression:  Worsening  Had these symptoms before:  No  What makes it worse:  No  What makes it better:  No   She is taking medications regularly.              Objective    BP (!) 166/79 (BP Location: Left arm, Patient Position: Sitting, Cuff Size: Adult Regular)   Pulse 87   Temp 98.3  F (36.8  C) (Oral)   Resp 20   Ht 1.556 m (5' 1.25\")   Wt 80.2 kg (176 lb 11.2 oz)   LMP 09/25/1979 (Approximate)   SpO2 (!) 89%   BMI 33.12 kg/m    Body mass index is 33.12 kg/m .  Physical Exam   General: No acute distress walks with cane.  Accompanied by daughter-in-law.  Skin: The right inguinal crease approaching the gluteal cleft there are 2 parallel streaks of skin discoloration.  They are gray and heterogeneous but not raised.  It does not have a well-demarcated border.  Nontender.  No drainage.  No fluctuance.            Signed Electronically by: Christian López MD    "

## 2025-04-14 NOTE — ASSESSMENT & PLAN NOTE
This patient had described a rash in her groin when I saw her last month.  At that time it had mostly subsided.  It has been now worse.  She initially described it as a blistering phenomenon.  Her family was quite worried.  She has 2 streaks that are approximately 3 to 4 mm in length.  It does not have a well-demarcated border.  It is not raised.  It might be consistent with skin breakage associated with tinea cruris without adequate recovery.  Given the gray appearance, it also could be consistent with a inflammatory condition/dermatitis.  - Initial therapy: Topical ketoconazole.  - If not responsive to the initial therapy we would switch to a topical moderate potency steroid such as triamcinolone.  This was prescribed.  - If not improved/resolved will arrange for the patient to see dermatology.  She lives in [place] and is hesitant to leave this community.  Referral placed for Dr. Marian Mccarthy

## 2025-04-17 ENCOUNTER — HOSPITAL ENCOUNTER (INPATIENT)
Facility: HOSPITAL | Age: 85
LOS: 4 days | Discharge: HOME HEALTH | DRG: 291 | End: 2025-04-21
Attending: EMERGENCY MEDICINE | Admitting: INTERNAL MEDICINE
Payer: COMMERCIAL

## 2025-04-17 ENCOUNTER — NURSE TRIAGE (OUTPATIENT)
Dept: FAMILY MEDICINE | Facility: CLINIC | Age: 85
End: 2025-04-17
Payer: COMMERCIAL

## 2025-04-17 ENCOUNTER — APPOINTMENT (OUTPATIENT)
Dept: CT IMAGING | Facility: HOSPITAL | Age: 85
DRG: 291 | End: 2025-04-17
Attending: EMERGENCY MEDICINE
Payer: COMMERCIAL

## 2025-04-17 ENCOUNTER — OFFICE VISIT (OUTPATIENT)
Dept: URGENT CARE | Facility: URGENT CARE | Age: 85
End: 2025-04-17
Payer: COMMERCIAL

## 2025-04-17 VITALS
RESPIRATION RATE: 18 BRPM | HEART RATE: 78 BPM | SYSTOLIC BLOOD PRESSURE: 158 MMHG | HEIGHT: 61 IN | DIASTOLIC BLOOD PRESSURE: 74 MMHG | TEMPERATURE: 98 F | BODY MASS INDEX: 32.85 KG/M2 | OXYGEN SATURATION: 96 % | WEIGHT: 174 LBS

## 2025-04-17 DIAGNOSIS — J96.02 ACUTE RESPIRATORY FAILURE WITH HYPOXIA AND HYPERCAPNIA (H): ICD-10-CM

## 2025-04-17 DIAGNOSIS — J96.01 ACUTE RESPIRATORY FAILURE WITH HYPOXIA AND HYPERCAPNIA (H): ICD-10-CM

## 2025-04-17 DIAGNOSIS — Z87.891 HISTORY OF SMOKING: ICD-10-CM

## 2025-04-17 DIAGNOSIS — R09.02 HYPOXIA: ICD-10-CM

## 2025-04-17 DIAGNOSIS — J44.1 COPD EXACERBATION (H): ICD-10-CM

## 2025-04-17 DIAGNOSIS — R60.0 PERIPHERAL EDEMA: ICD-10-CM

## 2025-04-17 DIAGNOSIS — R06.00 DYSPNEA, UNSPECIFIED TYPE: Primary | ICD-10-CM

## 2025-04-17 DIAGNOSIS — G47.09 OTHER INSOMNIA: Primary | ICD-10-CM

## 2025-04-17 DIAGNOSIS — R06.09 EXERTIONAL DYSPNEA: ICD-10-CM

## 2025-04-17 LAB
ALBUMIN SERPL BCG-MCNC: 3.7 G/DL (ref 3.5–5.2)
ALP SERPL-CCNC: 104 U/L (ref 40–150)
ALT SERPL W P-5'-P-CCNC: 13 U/L (ref 0–50)
ANION GAP SERPL CALCULATED.3IONS-SCNC: 10 MMOL/L (ref 7–15)
AST SERPL W P-5'-P-CCNC: 25 U/L (ref 0–45)
BASE EXCESS BLDV CALC-SCNC: 4.1 MMOL/L (ref -3–3)
BASOPHILS # BLD AUTO: 0.1 10E3/UL (ref 0–0.2)
BASOPHILS NFR BLD AUTO: 1 %
BILIRUB DIRECT SERPL-MCNC: 0.17 MG/DL (ref 0–0.3)
BILIRUB SERPL-MCNC: 0.3 MG/DL
BUN SERPL-MCNC: 24.7 MG/DL (ref 8–23)
CALCIUM SERPL-MCNC: 9.5 MG/DL (ref 8.8–10.4)
CHLORIDE SERPL-SCNC: 101 MMOL/L (ref 98–107)
CREAT SERPL-MCNC: 1.31 MG/DL (ref 0.51–0.95)
CRP SERPL-MCNC: 35.8 MG/L
EGFRCR SERPLBLD CKD-EPI 2021: 40 ML/MIN/1.73M2
EOSINOPHIL # BLD AUTO: 0.3 10E3/UL (ref 0–0.7)
EOSINOPHIL NFR BLD AUTO: 3 %
ERYTHROCYTE [DISTWIDTH] IN BLOOD BY AUTOMATED COUNT: 12.7 % (ref 10–15)
FLUAV RNA SPEC QL NAA+PROBE: NEGATIVE
FLUBV RNA RESP QL NAA+PROBE: NEGATIVE
GLUCOSE BLDC GLUCOMTR-MCNC: 247 MG/DL (ref 70–99)
GLUCOSE SERPL-MCNC: 175 MG/DL (ref 70–99)
HCO3 BLDV-SCNC: 32 MMOL/L (ref 21–28)
HCO3 SERPL-SCNC: 31 MMOL/L (ref 22–29)
HCT VFR BLD AUTO: 35.9 % (ref 35–47)
HGB BLD-MCNC: 11.8 G/DL (ref 11.7–15.7)
HOLD SPECIMEN: NORMAL
IMM GRANULOCYTES # BLD: 0 10E3/UL
IMM GRANULOCYTES NFR BLD: 0 %
INR PPP: 1.14 (ref 0.85–1.15)
LACTATE SERPL-SCNC: 1.4 MMOL/L (ref 0.7–2)
LYMPHOCYTES # BLD AUTO: 1.7 10E3/UL (ref 0.8–5.3)
LYMPHOCYTES NFR BLD AUTO: 18 %
MAGNESIUM SERPL-MCNC: 2 MG/DL (ref 1.7–2.3)
MCH RBC QN AUTO: 30.6 PG (ref 26.5–33)
MCHC RBC AUTO-ENTMCNC: 32.9 G/DL (ref 31.5–36.5)
MCV RBC AUTO: 93 FL (ref 78–100)
MONOCYTES # BLD AUTO: 0.9 10E3/UL (ref 0–1.3)
MONOCYTES NFR BLD AUTO: 9 %
NEUTROPHILS # BLD AUTO: 6.8 10E3/UL (ref 1.6–8.3)
NEUTROPHILS NFR BLD AUTO: 70 %
NRBC # BLD AUTO: 0 10E3/UL
NRBC BLD AUTO-RTO: 0 /100
NT-PROBNP SERPL-MCNC: 1041 PG/ML (ref 0–1800)
O2/TOTAL GAS SETTING VFR VENT: 28 %
OXYHGB MFR BLDV: 37 % (ref 70–75)
PCO2 BLDV: 63 MM HG (ref 40–50)
PH BLDV: 7.31 [PH] (ref 7.32–7.43)
PLATELET # BLD AUTO: 382 10E3/UL (ref 150–450)
PO2 BLDV: 25 MM HG (ref 25–47)
POTASSIUM SERPL-SCNC: 4.8 MMOL/L (ref 3.4–5.3)
PROT SERPL-MCNC: 7.2 G/DL (ref 6.4–8.3)
RBC # BLD AUTO: 3.86 10E6/UL (ref 3.8–5.2)
RSV RNA SPEC NAA+PROBE: NEGATIVE
SAO2 % BLDV: 37.3 % (ref 70–75)
SARS-COV-2 RNA RESP QL NAA+PROBE: NEGATIVE
SODIUM SERPL-SCNC: 142 MMOL/L (ref 135–145)
TROPONIN T SERPL HS-MCNC: 35 NG/L
TROPONIN T SERPL HS-MCNC: 38 NG/L
TSH SERPL DL<=0.005 MIU/L-ACNC: 1.55 UIU/ML (ref 0.3–4.2)
WBC # BLD AUTO: 9.8 10E3/UL (ref 4–11)

## 2025-04-17 PROCEDURE — 87637 SARSCOV2&INF A&B&RSV AMP PRB: CPT | Performed by: EMERGENCY MEDICINE

## 2025-04-17 PROCEDURE — 250N000011 HC RX IP 250 OP 636: Performed by: EMERGENCY MEDICINE

## 2025-04-17 PROCEDURE — 83880 ASSAY OF NATRIURETIC PEPTIDE: CPT | Performed by: EMERGENCY MEDICINE

## 2025-04-17 PROCEDURE — 93005 ELECTROCARDIOGRAM TRACING: CPT | Performed by: STUDENT IN AN ORGANIZED HEALTH CARE EDUCATION/TRAINING PROGRAM

## 2025-04-17 PROCEDURE — 36415 COLL VENOUS BLD VENIPUNCTURE: CPT | Performed by: EMERGENCY MEDICINE

## 2025-04-17 PROCEDURE — 84155 ASSAY OF PROTEIN SERUM: CPT | Performed by: EMERGENCY MEDICINE

## 2025-04-17 PROCEDURE — 84443 ASSAY THYROID STIM HORMONE: CPT | Performed by: EMERGENCY MEDICINE

## 2025-04-17 PROCEDURE — 250N000011 HC RX IP 250 OP 636: Performed by: INTERNAL MEDICINE

## 2025-04-17 PROCEDURE — 84484 ASSAY OF TROPONIN QUANT: CPT | Performed by: EMERGENCY MEDICINE

## 2025-04-17 PROCEDURE — 82962 GLUCOSE BLOOD TEST: CPT

## 2025-04-17 PROCEDURE — 86140 C-REACTIVE PROTEIN: CPT | Performed by: EMERGENCY MEDICINE

## 2025-04-17 PROCEDURE — 120N000004 HC R&B MS OVERFLOW

## 2025-04-17 PROCEDURE — 99285 EMERGENCY DEPT VISIT HI MDM: CPT | Mod: 25

## 2025-04-17 PROCEDURE — 82248 BILIRUBIN DIRECT: CPT | Performed by: EMERGENCY MEDICINE

## 2025-04-17 PROCEDURE — 250N000009 HC RX 250: Performed by: EMERGENCY MEDICINE

## 2025-04-17 PROCEDURE — 83735 ASSAY OF MAGNESIUM: CPT | Performed by: EMERGENCY MEDICINE

## 2025-04-17 PROCEDURE — 71275 CT ANGIOGRAPHY CHEST: CPT

## 2025-04-17 PROCEDURE — 250N000011 HC RX IP 250 OP 636

## 2025-04-17 PROCEDURE — 85610 PROTHROMBIN TIME: CPT | Performed by: EMERGENCY MEDICINE

## 2025-04-17 PROCEDURE — 99223 1ST HOSP IP/OBS HIGH 75: CPT | Performed by: INTERNAL MEDICINE

## 2025-04-17 PROCEDURE — 93005 ELECTROCARDIOGRAM TRACING: CPT | Performed by: EMERGENCY MEDICINE

## 2025-04-17 PROCEDURE — 83605 ASSAY OF LACTIC ACID: CPT | Performed by: EMERGENCY MEDICINE

## 2025-04-17 PROCEDURE — 85004 AUTOMATED DIFF WBC COUNT: CPT | Performed by: EMERGENCY MEDICINE

## 2025-04-17 PROCEDURE — 82310 ASSAY OF CALCIUM: CPT | Performed by: EMERGENCY MEDICINE

## 2025-04-17 PROCEDURE — 93005 ELECTROCARDIOGRAM TRACING: CPT | Performed by: INTERNAL MEDICINE

## 2025-04-17 PROCEDURE — 94640 AIRWAY INHALATION TREATMENT: CPT

## 2025-04-17 PROCEDURE — 82805 BLOOD GASES W/O2 SATURATION: CPT | Performed by: EMERGENCY MEDICINE

## 2025-04-17 PROCEDURE — 80048 BASIC METABOLIC PNL TOTAL CA: CPT | Performed by: EMERGENCY MEDICINE

## 2025-04-17 PROCEDURE — 250N000013 HC RX MED GY IP 250 OP 250 PS 637: Performed by: INTERNAL MEDICINE

## 2025-04-17 PROCEDURE — 250N000011 HC RX IP 250 OP 636: Mod: JZ | Performed by: EMERGENCY MEDICINE

## 2025-04-17 PROCEDURE — 96374 THER/PROPH/DIAG INJ IV PUSH: CPT | Mod: 59

## 2025-04-17 RX ORDER — CALCIUM CARBONATE 500 MG/1
1000 TABLET, CHEWABLE ORAL 4 TIMES DAILY PRN
Status: DISCONTINUED | OUTPATIENT
Start: 2025-04-17 | End: 2025-04-21 | Stop reason: HOSPADM

## 2025-04-17 RX ORDER — DEXTROSE MONOHYDRATE 25 G/50ML
25-50 INJECTION, SOLUTION INTRAVENOUS
Status: DISCONTINUED | OUTPATIENT
Start: 2025-04-17 | End: 2025-04-21 | Stop reason: HOSPADM

## 2025-04-17 RX ORDER — METHYLPREDNISOLONE SODIUM SUCCINATE 40 MG/ML
40 INJECTION INTRAMUSCULAR; INTRAVENOUS EVERY 24 HOURS
Status: DISCONTINUED | OUTPATIENT
Start: 2025-04-18 | End: 2025-04-21 | Stop reason: HOSPADM

## 2025-04-17 RX ORDER — VIT C/E/ZN/COPPR/LUTEIN/ZEAXAN 60 MG-6 MG
1 CAPSULE ORAL DAILY
Status: DISCONTINUED | OUTPATIENT
Start: 2025-04-18 | End: 2025-04-21 | Stop reason: HOSPADM

## 2025-04-17 RX ORDER — AMOXICILLIN 250 MG
2 CAPSULE ORAL 2 TIMES DAILY PRN
Status: DISCONTINUED | OUTPATIENT
Start: 2025-04-17 | End: 2025-04-21 | Stop reason: HOSPADM

## 2025-04-17 RX ORDER — GABAPENTIN 100 MG/1
200 CAPSULE ORAL AT BEDTIME
Status: DISCONTINUED | OUTPATIENT
Start: 2025-04-17 | End: 2025-04-21 | Stop reason: HOSPADM

## 2025-04-17 RX ORDER — IPRATROPIUM BROMIDE AND ALBUTEROL SULFATE 2.5; .5 MG/3ML; MG/3ML
3 SOLUTION RESPIRATORY (INHALATION) EVERY 4 HOURS PRN
Status: DISCONTINUED | OUTPATIENT
Start: 2025-04-17 | End: 2025-04-21 | Stop reason: HOSPADM

## 2025-04-17 RX ORDER — GUAIFENESIN 600 MG/1
600 TABLET, EXTENDED RELEASE ORAL 2 TIMES DAILY
Status: COMPLETED | OUTPATIENT
Start: 2025-04-17 | End: 2025-04-20

## 2025-04-17 RX ORDER — IPRATROPIUM BROMIDE AND ALBUTEROL SULFATE 2.5; .5 MG/3ML; MG/3ML
3 SOLUTION RESPIRATORY (INHALATION) ONCE
Status: COMPLETED | OUTPATIENT
Start: 2025-04-17 | End: 2025-04-17

## 2025-04-17 RX ORDER — LIDOCAINE 40 MG/G
CREAM TOPICAL
Status: DISCONTINUED | OUTPATIENT
Start: 2025-04-17 | End: 2025-04-21 | Stop reason: HOSPADM

## 2025-04-17 RX ORDER — NICOTINE POLACRILEX 4 MG
15-30 LOZENGE BUCCAL
Status: DISCONTINUED | OUTPATIENT
Start: 2025-04-17 | End: 2025-04-21 | Stop reason: HOSPADM

## 2025-04-17 RX ORDER — FUROSEMIDE 10 MG/ML
40 INJECTION INTRAMUSCULAR; INTRAVENOUS EVERY 12 HOURS
Status: DISCONTINUED | OUTPATIENT
Start: 2025-04-17 | End: 2025-04-17

## 2025-04-17 RX ORDER — METOPROLOL SUCCINATE 50 MG/1
50 TABLET, EXTENDED RELEASE ORAL DAILY
Status: DISCONTINUED | OUTPATIENT
Start: 2025-04-18 | End: 2025-04-21 | Stop reason: HOSPADM

## 2025-04-17 RX ORDER — IOPAMIDOL 755 MG/ML
90 INJECTION, SOLUTION INTRAVASCULAR ONCE
Status: COMPLETED | OUTPATIENT
Start: 2025-04-17 | End: 2025-04-17

## 2025-04-17 RX ORDER — AMOXICILLIN 250 MG
1 CAPSULE ORAL 2 TIMES DAILY PRN
Status: DISCONTINUED | OUTPATIENT
Start: 2025-04-17 | End: 2025-04-21 | Stop reason: HOSPADM

## 2025-04-17 RX ORDER — SIMVASTATIN 10 MG
20 TABLET ORAL AT BEDTIME
Status: DISCONTINUED | OUTPATIENT
Start: 2025-04-17 | End: 2025-04-21 | Stop reason: HOSPADM

## 2025-04-17 RX ORDER — FUROSEMIDE 10 MG/ML
40 INJECTION INTRAMUSCULAR; INTRAVENOUS EVERY 12 HOURS
Status: DISCONTINUED | OUTPATIENT
Start: 2025-04-18 | End: 2025-04-21 | Stop reason: HOSPADM

## 2025-04-17 RX ORDER — METHYLPREDNISOLONE SODIUM SUCCINATE 125 MG/2ML
125 INJECTION INTRAMUSCULAR; INTRAVENOUS ONCE
Status: COMPLETED | OUTPATIENT
Start: 2025-04-17 | End: 2025-04-17

## 2025-04-17 RX ADMIN — GUAIFENESIN 600 MG: 600 TABLET ORAL at 21:14

## 2025-04-17 RX ADMIN — METHYLPREDNISOLONE SODIUM SUCCINATE 125 MG: 125 INJECTION, POWDER, FOR SOLUTION INTRAMUSCULAR; INTRAVENOUS at 17:37

## 2025-04-17 RX ADMIN — SIMVASTATIN 20 MG: 10 TABLET, FILM COATED ORAL at 21:15

## 2025-04-17 RX ADMIN — IPRATROPIUM BROMIDE AND ALBUTEROL SULFATE 3 ML: .5; 3 SOLUTION RESPIRATORY (INHALATION) at 17:32

## 2025-04-17 RX ADMIN — GABAPENTIN 200 MG: 100 CAPSULE ORAL at 21:15

## 2025-04-17 RX ADMIN — IOPAMIDOL 75 ML: 755 INJECTION, SOLUTION INTRAVENOUS at 16:05

## 2025-04-17 RX ADMIN — IPRATROPIUM BROMIDE AND ALBUTEROL SULFATE 3 ML: .5; 3 SOLUTION RESPIRATORY (INHALATION) at 17:34

## 2025-04-17 RX ADMIN — FUROSEMIDE 40 MG: 10 INJECTION, SOLUTION INTRAMUSCULAR; INTRAVENOUS at 19:56

## 2025-04-17 ASSESSMENT — COLUMBIA-SUICIDE SEVERITY RATING SCALE - C-SSRS
6. HAVE YOU EVER DONE ANYTHING, STARTED TO DO ANYTHING, OR PREPARED TO DO ANYTHING TO END YOUR LIFE?: NO
1. IN THE PAST MONTH, HAVE YOU WISHED YOU WERE DEAD OR WISHED YOU COULD GO TO SLEEP AND NOT WAKE UP?: NO
2. HAVE YOU ACTUALLY HAD ANY THOUGHTS OF KILLING YOURSELF IN THE PAST MONTH?: NO

## 2025-04-17 ASSESSMENT — ACTIVITIES OF DAILY LIVING (ADL)
ADLS_ACUITY_SCORE: 46
ADLS_ACUITY_SCORE: 41
ADLS_ACUITY_SCORE: 46
ADLS_ACUITY_SCORE: 46
ADLS_ACUITY_SCORE: 41

## 2025-04-17 NOTE — TELEPHONE ENCOUNTER
Nurse Triage SBAR    Is this a 2nd Level Triage? NO    Situation: Pt and dtr calling with symptoms.     Background: OV this week and pt did not report her breathing problems. Pt states for the past few weeks, she is having difficulty breathing in the morning and at night when lying down. Pt stated yesterday morning her breathing was so bad that she almost called 911. Cold like illness last week. Pt wants an inhaler.     Assessment: No breathing difficulty now. Has a cough, bringing up some mucous. Pt states she purse lipped breathing this morning and was helpful. No pain in the chest when not coughing. Denies wheezing, fever, chest pain.     Protocol Recommended Disposition:   Go To Office Now    Recommendation: No appointments available. Dtr will take pt to urgent care. Pt/dtr will call if questions or symptoms.          Does the patient meet one of the following criteria for ADS visit consideration? No   Reason for Disposition   Patient wants to be seen    Additional Information   Negative: SEVERE difficulty breathing (e.g., struggling for each breath, speaks in single words, pulse > 120)   Negative: Breathing stopped and hasn't returned   Negative: Choking on something   Negative: Bluish (or gray) lips or face   Negative: Difficult to awaken or acting confused (e.g., disoriented, slurred speech)   Negative: Passed out (e.g., fainted, lost consciousness, blacked out and was not responding)   Negative: Wheezing started suddenly after medicine, an allergic food, or bee sting   Negative: Stridor (harsh sound while breathing in)   Negative: Slow, shallow and weak breathing   Negative: Sounds like a life-threatening emergency to the triager   Negative: Chest pain   Negative: Wheezing (high pitched whistling sound) and previous asthma attacks or use of asthma medicines   Negative: Breathing difficulty and within 14 days of COVID-19 EXPOSURE (close contact) with someone diagnosed with COVID-19 (e.g., COVID test  "positive)   Negative: Breathing difficulty and COVID-19 is widespread in the community   Negative: Breathing diffculty and only present when coughing   Negative: Breathing difficulty and only from stuffy nose   Negative: Breathing diffculty and only from stuffy nose or runny nose from common cold   Negative: MODERATE difficulty breathing (e.g., speaks in phrases, SOB even at rest, pulse 100-120) of new-onset or worse than normal   Negative: Oxygen level (e.g., pulse oximetry) 90% or lower   Negative: Wheezing can be heard across the room   Negative: Drooling or spitting out saliva (because can't swallow)   Negative: Any history of prior \"blood clot\" in leg or lungs   Negative: Illness requiring prolonged bedrest in past month (e.g., immobilization, long hospital stay)   Negative: Hip or leg fracture (broken bone) in past month (or had cast on leg or ankle in past month)   Negative: Major surgery in the past month   Negative: Long-distance travel in past month (e.g., car, bus, train, plane; with trip lasting 6 or more hours)   Negative: Cancer treatment in past six months (or has cancer now)   Negative: Extra heartbeats, irregular heart beating, or heart is beating very fast (i.e., 'palpitations')   Negative: Fever > 103 F (39.4 C)   Negative: Fever > 101 F (38.3 C) and over 60 years of age   Negative: Fever > 100 F (37.8 C) and bedridden (e.g., nursing home patient, stroke, chronic illness, recovering from surgery)   Negative: Fever > 100 F (37.8 C) and diabetes mellitus or weak immune system (e.g., HIV positive, cancer chemo, splenectomy, organ transplant, chronic steroids)   Negative: Periods where breathing stops and then resumes normally and bedridden (e.g., nursing home patient, CVA)   Negative: Pregnant or postpartum (from 0 to 6 weeks after delivery)   Negative: Patient sounds very sick or weak to the triager    Answer Assessment - Initial Assessment Questions  1. RESPIRATORY STATUS: \"Describe your " "breathing?\" (e.g., wheezing, shortness of breath, unable to speak, severe coughing)       Difficulty breathing in the morning when waking up and when lying down  2. ONSET: \"When did this breathing problem begin?\"       Couple of weeks ago  3. PATTERN \"Does the difficult breathing come and go, or has it been constant since it started?\"       Happening every morning and night  4. SEVERITY: \"How bad is your breathing?\" (e.g., mild, moderate, severe)       No breathing difficulty now. Pt states her breathing was very bad yesterday morning and almost called 911.   5. RECURRENT SYMPTOM: \"Have you had difficulty breathing before?\" If Yes, ask: \"When was the last time?\" and \"What happened that time?\"       no  6. CARDIAC HISTORY: \"Do you have any history of heart disease?\" (e.g., heart attack, angina, bypass surgery, angioplasty)       HTN  7. LUNG HISTORY: \"Do you have any history of lung disease?\"  (e.g., pulmonary embolus, asthma, emphysema)      no  8. CAUSE: \"What do you think is causing the breathing problem?\"       unsure  9. OTHER SYMPTOMS: \"Do you have any other symptoms?\" (e.g., chest pain, cough, dizziness, fever, runny nose)      Cough. Pt had a cold like illness last week.   10. O2 SATURATION MONITOR:  \"Do you use an oxygen saturation monitor (pulse oximeter) at home?\" If Yes, ask: \"What is your reading (oxygen level) today?\" \"What is your usual oxygen saturation reading?\" (e.g., 95%)        no  11. PREGNANCY: \"Is there any chance you are pregnant?\" \"When was your last menstrual period?\"        N/a  12. TRAVEL: \"Have you traveled out of the country in the last month?\" (e.g., travel history, exposures)        no    Protocols used: Breathing Difficulty-A-[place]    "

## 2025-04-17 NOTE — ED TRIAGE NOTES
Patient presents to triage from Urgent Care for evaluation of shortness of breath and low O2 sats. At urgent care it was noted O2 sats were low requiring O2. In triage O2 sats on RA 87%. 2L applied via nasal cannula, O2 sats now 97%. Per daughter, she noted exertional dyspnea today. Per patient, dyspnea has been going on in the morning and in the evening. Bilateral lower extremities are swollen, per patient this is usually normal.     Triage Assessment (Adult)       Row Name 04/17/25 3300          Triage Assessment    Airway WDL WDL     Additional Documentation Breath Sounds (Group)        Respiratory WDL    Respiratory WDL X;rhythm/pattern     Rhythm/Pattern, Respiratory dyspnea upon exertion        Breath Sounds    Breath Sounds RLL;LLL     LLL Breath Sounds diminished     RLL Breath Sounds diminished        Skin Circulation/Temperature WDL    Skin Circulation/Temperature WDL WDL        Cardiac WDL    Cardiac WDL WDL        Peripheral/Neurovascular WDL    Peripheral Neurovascular WDL WDL        Cognitive/Neuro/Behavioral WDL    Cognitive/Neuro/Behavioral WDL WDL

## 2025-04-17 NOTE — ED PROVIDER NOTES
"EMERGENCY DEPARTMENT ENCOUNTER      NAME: Milagro Wallace  AGE: 85 year old female  YOB: 1940  MRN: [id number]  EVALUATION DATE & TIME: No admission date for patient encounter.    PCP: Christian López    ED PROVIDER: Angus Marcano M.D.      Chief Complaint   Patient presents with    Shortness of Breath         IMPRESSION  1. Acute respiratory failure with hypoxia and hypercapnia (H)    2. COPD exacerbation (H)    3. Exertional dyspnea    4. Peripheral edema    5. History of smoking        PLAN  - admit to hospitalist for further care; med/surg tele admit    ED COURSE & MEDICAL DECISION MAKING    ED Course as of 04/17/25 1744   Thu Apr 17, 2025   1610 CT chest independently reviewed & interpreted by me: no PE, no pneumothorax, no lobar infiltrate, no pleural effusion, mild emphysematous changes.   1734 85yoF with history of HTN, HLD, T2DM (not on insulin), obesity, peripheral edema presenting for evaluation of worsening shortness of breath over the past couple weeks. Reports \"years\" of mild shortness of breath worse in the morning, at night, and with exertion. Notable worsening dyspnea on exertion over the past week or so. Mild dry cough. No fevers, sweats, chills, chest pain. States her chronic bilateral leg edema is baseline; taking her diuretic with good urine output. Went to Urgent Care and noted to have O2 in 80%s; thus sent to the ED.    Satting 85% on room air on presentation (normalized on 2L NC) with otherwise normal vitals. Calm on exam with mild diffuse expiratory wheezing, normal work of breathing with no distress, benign abdomen, clear mentation, 1+ nontender pitting edema up to knees bilaterally.    CT chest with no PE, pulmonary edema, pneumonia, pneumothorax; does have COPD changes to lungs. Patient notes she stopped smoking in 2008; suspect has had mild ongoing undiagnosed COPD which is now flaring. No other explanatory pathology for hypoxia at this time. Does have mild " hypercapnea on VBG with pCO2 63, pH 7.31. Given Duoenbs, steroids here in the ED. COVID/influenza/RSV swab negative. EKG with PVCs; high-sensitivity troponin x2 reassuring against ACS. BNP within normal limits; doubt acute decompensated CHF. Labs otherwise with no anemia, no leukocytosis, creatinine 1.3 with no glaring electrolyte abnormality, unremarkable LFTs.    Warrants admission given hypoxia; stable on 2L NC here in the ED. Fine for floor admission. Paging hospitalist at this time.   1741 Consulted hospitalist for admission; they agreed. Patient understood and agreed with the plan; no further questions at the time of admission.       --------------------------------------------------------------------------------   --------------------------------------------------------------------------------     5:08 PM I met with the patient for the initial interview and physical examination. Discussed plan for treatment and workup in the ED.  5:39 PM Spoke with the hospitalist Dr. Barcenas who accepts the patient for admission.      This patient involved a high degree of complexity in medical decision making, as significant risks were present and assessed. Recent encounters & results in medical record reviewed by me.    All workup (i.e. any EKG/labs/imaging as per charting below) reviewed and independently interpreted by me. See respective sections for details.        See additional MDM below if interested.      MEDICATIONS GIVEN IN THE EMERGENCY DEPARTMENT  Medications   iopamidol (ISOVUE-370) solution 90 mL (75 mLs Intravenous $Given 4/17/25 7520)   ipratropium - albuterol 0.5 mg/2.5 mg/3 mL (DUONEB) neb solution 3 mL (3 mLs Nebulization $Given 4/17/25 2769)   methylPREDNISolone Na Suc (solu-MEDROL) injection 125 mg (125 mg Intravenous $Given 4/17/25 0215)   ipratropium - albuterol 0.5 mg/2.5 mg/3 mL (DUONEB) neb solution 3 mL (3 mLs Nebulization $Given 4/17/25 3807)              =================================================================      HPI  Use of : N/A         Milagro Wallace is a 85 year old female with a pertinent history of DM2, CKD 3b, HTN, HLD, who presents to this ED via private vehicle for evaluation of shortness of breath.    Per chart review:  -4/17/2025 (today): Patient was seen at [facility] for shortness of breath with exertion x1 week. O2 sats were 85% on RA. She was advised to go to the ED for further evaluation.    Patient reports within the past week, she's had persistent shortness of breath. Notes that shortness of breath is worse in the morning, evenings, and with exertion (only 20 ft of walking exacerbates symptoms). Due to persistent symptoms, daughter took her to UR today. UR noted that she was only at 85% O2 sats on RA and advised her to go to the ED for further evaluation. She denies previous history of COPD or emphysema. She's never used an inhaler. She is a former smoker (quit in 2008). There were no other concerns/complaints at this time.      --------------- MEDICAL HISTORY ---------------  PAST MEDICAL HISTORY:  Reviewed independently by me.  Past Medical History:   Diagnosis Date    Difficult intubation     Dizziness 03/28/2023    Hypertension     Malignant hyperthermia     PONV (postoperative nausea and vomiting)     Tobacco use disorder     Type 2 diabetes mellitus without complications (H)      Patient Active Problem List   Diagnosis    Essential hypertension    Encounter for screening for osteoporosis    Family history of other cardiovascular diseases    Hyperlipidemia LDL goal <100    Type 2 diabetes mellitus with stage 3 chronic kidney disease, without long-term current use of insulin (H)    Encounter for Medicare annual wellness exam    Senile nuclear sclerosis    Stage 3b chronic kidney disease (H)    Class 1 obesity due to excess calories with serious comorbidity and body mass index (BMI) of 34.0 to 34.9  in adult    Onychomycosis    Bilateral lower extremity edema    Groin rash    Peripheral edema    Exertional dyspnea    History of smoking    COPD exacerbation (H)    Acute respiratory failure with hypoxia and hypercapnia (H)       PAST SURGICAL HISTORY:  Reviewed independently by me.  Past Surgical History:   Procedure Laterality Date    COLONOSCOPY      COLONOSCOPY N/A 10/29/2015    Procedure: COLONOSCOPY;  Surgeon: Adan De Santiago MD;  Location: [facility]    EYE SURGERY      PHACOEMULSIFICATION WITH STANDARD INTRAOCULAR LENS IMPLANT  2014    Procedure: PHACOEMULSIFICATION WITH STANDARD INTRAOCULAR LENS IMPLANT;  Surgeon: Jj Payne MD;  Location: [facility]    SURGICAL HISTORY OF -           SURGICAL HISTORY OF -       hysterectomy after C section       CURRENT MEDICATIONS:    Reviewed independently by me.  No current facility-administered medications for this encounter.    Current Outpatient Medications:     ASPIRIN 81 MG OR TABS, Take 81 mg by mouth daily., Disp: , Rfl:     Cyanocobalamin (VITAMIN B-12 PO), Take 1,000 mcg by mouth twice a week. Take only on Monday and Friday, Disp: , Rfl:     fluticasone (FLONASE) 50 MCG/ACT nasal spray, INHALE 1-2 SPRAYS INTO EACH NOSTRIL DAILY, Disp: 48 mL, Rfl: 3    furosemide (LASIX) 40 MG tablet, Take 1 tablet (40 mg) by mouth daily., Disp: 90 tablet, Rfl: 3    gabapentin (NEURONTIN) 100 MG capsule, Take 2 capsules (200 mg) by mouth at bedtime., Disp: 180 capsule, Rfl: 3    lisinopril (ZESTRIL) 40 MG tablet, Take 1 tablet (40 mg) by mouth daily., Disp: 90 tablet, Rfl: 3    metFORMIN (GLUCOPHAGE) 500 MG tablet, Take 1 tablet (500 mg) by mouth 2 times daily (with meals)., Disp: 180 tablet, Rfl: 1    metoprolol succinate ER (TOPROL XL) 50 MG 24 hr tablet, Take 1 tablet (50 mg) by mouth daily., Disp: 90 tablet, Rfl: 2    MULTIPLE VITAMIN PO, Take 1 tablet by mouth daily., Disp: , Rfl:     Multiple Vitamins-Minerals (PRESERVISION AREDS 2) CAPS, Take 1  capsule by mouth daily., Disp: , Rfl:     simvastatin (ZOCOR) 20 MG tablet, Take 1 tablet (20 mg) by mouth at bedtime., Disp: 90 tablet, Rfl: 3    triamcinolone (KENALOG) 0.1 % external cream, Apply topically 2 times daily. Apply to affected area (left inguinal crease) twice daily.  Do not use for more than 14 days., Disp: 30 g, Rfl: 1    blood glucose (NO BRAND SPECIFIED) test strip, Use to test blood sugar 1-2 times daily or as directed. To accompany: Blood Glucose Monitor Brands: per insurance., Disp: 50 strip, Rfl: 6    thin (NO BRAND SPECIFIED) lancets, Use to test blood sugar 1-2 times daily or as directed. To accompany: Blood Glucose Monitor Brands: per insurance., Disp: 50 each, Rfl: 5    ALLERGIES:  Reviewed independently by me.  Allergies   Allergen Reactions    Hctz      Hyponatremia    Lisinopril Cough       FAMILY HISTORY:  Reviewed independently by me.  Family History   Problem Relation Age of Onset    Alzheimer Disease Mother     C.A.D. Mother           age 75    Cerebrovascular Disease Father     Hypertension Sister         age 58         SOCIAL HISTORY:   Reviewed independently by me.  Social History     Socioeconomic History    Marital status:    Tobacco Use    Smoking status: Former     Current packs/day: 0.00     Average packs/day: 0.7 packs/day for 26.0 years (18.2 ttl pk-yrs)     Types: Cigarettes     Start date: 1983     Quit date: 2009     Years since quittin.2     Passive exposure: Past    Smokeless tobacco: Never    Tobacco comments:         Vaping Use    Vaping status: Never Used   Substance and Sexual Activity    Alcohol use: Yes     Comment: occ    Drug use: No    Sexual activity: Never     Partners: Male   Other Topics Concern    Parent/sibling w/ CABG, MI or angioplasty before 65F 55M? No     Social Drivers of Health     Financial Resource Strain: Low Risk  (3/14/2025)    Financial Resource Strain     Within the past 12 months, have you or your family members you  live with been unable to get utilities (heat, electricity) when it was really needed?: No   Food Insecurity: Low Risk  (3/14/2025)    Food Insecurity     Within the past 12 months, did you worry that your food would run out before you got money to buy more?: No     Within the past 12 months, did the food you bought just not last and you didn t have money to get more?: No   Transportation Needs: Low Risk  (3/14/2025)    Transportation Needs     Within the past 12 months, has lack of transportation kept you from medical appointments, getting your medicines, non-medical meetings or appointments, work, or from getting things that you need?: No   Physical Activity: Unknown (3/14/2025)    Exercise Vital Sign     Days of Exercise per Week: 0 days   Stress: No Stress Concern Present (3/14/2025)    [employer] [employer] - Occupational Stress Questionnaire     Feeling of Stress : Not at all   Social Connections: Unknown (3/14/2025)    Social Connection and Isolation Panel [NHANES]     Frequency of Social Gatherings with Friends and Family: Once a week   Interpersonal Safety: Low Risk  (10/16/2023)    Interpersonal Safety     Do you feel physically and emotionally safe where you currently live?: Yes     Within the past 12 months, have you been hit, slapped, kicked or otherwise physically hurt by someone?: No     Within the past 12 months, have you been humiliated or emotionally abused in other ways by your partner or ex-partner?: No   Housing Stability: Low Risk  (3/14/2025)    Housing Stability     Do you have housing? : Yes     Are you worried about losing your housing?: No       --------------- PHYSICAL EXAM ---------------  Nursing notes and vitals independently reviewed by me.  VITALS:  Vitals:    04/17/25 1354 04/17/25 1615 04/17/25 1715   BP: (!) 174/70 (!) 190/74 (!) 173/121   Pulse: 75 72 74   Resp: 22  (!) 33   Temp: 98.1  F (36.7  C)     TempSrc: Oral     SpO2: (!) 87% 92% 97%   Weight: 78.9 kg  "(174 lb)     Height: 1.588 m (5' 2.5\")         PHYSICAL EXAM:    General:  alert, interactive, no distress  Eyes:  conjunctivae clear, conjugate gaze  HENT:  atraumatic, nose with no rhinorrhea, oropharynx clear  Neck:  no meningismus  Cardiovascular:  HR 70's during exam, regular rhythm, no murmurs, brisk cap refill  Chest:  no chest wall tenderness  Pulmonary:  no stridor, normal phonation, normal work of breathing, mild diffuse expiratory wheezing.  Abdomen:  soft, nondistended, nontender  :  no CVA tenderness  Back:  no midline spinal tenderness  Musculoskeletal:  1+ non tender pitting edema up to knees bilaterally, no calf tenderness. Gross ROM intact to joints of extremities with no obvious deformities.  Skin:  warm, dry, no rash  Neuro:  awake, alert, answers questions appropriately, follows commands, moves all limbs  Psych:  calm, normal affect      --------------- RESULTS ---------------  EKG:    Reviewed and independently interpreted by me.  - NSR at 61bpm, frequent PVCs, no ST or T wave changes, normal intervals  - increased PVCs from prior on 3/11/24 with otherwise no changes  My read.    LAB:  Reviewed and independently interpreted by me.  Results for orders placed or performed during the hospital encounter of 04/17/25   CT Chest Pulmonary Embolism w Contrast    Impression    IMPRESSION:  1.  No pulmonary artery embolism.  2.  Mild emphysema and moderate bronchiolitis.  3.  No pneumonic consolidation or pleural effusion.     Influenza A/B, RSV and SARS-CoV2 PCR (COVID-19) Nasopharyngeal    Specimen: Nasopharyngeal; Swab   Result Value Ref Range    Influenza A PCR Negative Negative    Influenza B PCR Negative Negative    RSV PCR Negative Negative    SARS CoV2 PCR Negative Negative   Extra Blue Top Tube   Result Value Ref Range    Hold Specimen [facility]    Extra Red Top Tube   Result Value Ref Range    Hold Specimen JIC    Extra Green Top (Lithium Heparin) Tube   Result Value Ref Range    Hold Specimen JIC  "   Extra Purple Top Tube   Result Value Ref Range    Hold Specimen [facility]    Basic metabolic panel   Result Value Ref Range    Sodium 142 135 - 145 mmol/L    Potassium 4.8 3.4 - 5.3 mmol/L    Chloride 101 98 - 107 mmol/L    Carbon Dioxide (CO2) 31 (H) 22 - 29 mmol/L    Anion Gap 10 7 - 15 mmol/L    Urea Nitrogen 24.7 (H) 8.0 - 23.0 mg/dL    Creatinine 1.31 (H) 0.51 - 0.95 mg/dL    GFR Estimate 40 (L) >60 mL/min/1.73m2    Calcium 9.5 8.8 - 10.4 mg/dL    Glucose 175 (H) 70 - 99 mg/dL   Result Value Ref Range    Troponin T, High Sensitivity 38 (H) <=14 ng/L   Hepatic function panel   Result Value Ref Range    Protein Total 7.2 6.4 - 8.3 g/dL    Albumin 3.7 3.5 - 5.2 g/dL    Bilirubin Total 0.3 <=1.2 mg/dL    Alkaline Phosphatase 104 40 - 150 U/L    AST 25 0 - 45 U/L    ALT 13 0 - 50 U/L    Bilirubin Direct 0.17 0.00 - 0.30 mg/dL   Blood gas venous   Result Value Ref Range    pH Venous 7.31 (L) 7.32 - 7.43    pCO2 Venous 63 (H) 40 - 50 mm Hg    pO2 Venous 25 25 - 47 mm Hg    Bicarbonate Venous 32 (H) 21 - 28 mmol/L    Base Excess/Deficit Venous 4.1 (H) -3.0 - 3.0 mmol/L    FIO2 28     Oxyhemoglobin Venous 37 (L) 70 - 75 %    O2 Sat, Venous 37.3 (L) 70.0 - 75.0 %   Lactic acid whole blood with 1x repeat in 2 hr when >2   Result Value Ref Range    Lactic Acid, Initial 1.4 0.7 - 2.0 mmol/L   Result Value Ref Range    Magnesium 2.0 1.7 - 2.3 mg/dL   TSH with free T4 reflex   Result Value Ref Range    TSH 1.55 0.30 - 4.20 uIU/mL   Nt probnp inpatient (BNP)   Result Value Ref Range    N terminal Pro BNP Inpatient 1,041 0 - 1,800 pg/mL   Result Value Ref Range    CRP Inflammation 35.80 (H) <5.00 mg/L   Result Value Ref Range    INR 1.14 0.85 - 1.15   CBC with platelets and differential   Result Value Ref Range    WBC Count 9.8 4.0 - 11.0 10e3/uL    RBC Count 3.86 3.80 - 5.20 10e6/uL    Hemoglobin 11.8 11.7 - 15.7 g/dL    Hematocrit 35.9 35.0 - 47.0 %    MCV 93 78 - 100 fL    MCH 30.6 26.5 - 33.0 pg    MCHC 32.9 31.5 - 36.5 g/dL     RDW 12.7 10.0 - 15.0 %    Platelet Count 382 150 - 450 10e3/uL    % Neutrophils 70 %    % Lymphocytes 18 %    % Monocytes 9 %    % Eosinophils 3 %    % Basophils 1 %    % Immature Granulocytes 0 %    NRBCs per 100 WBC 0 <1 /100    Absolute Neutrophils 6.8 1.6 - 8.3 10e3/uL    Absolute Lymphocytes 1.7 0.8 - 5.3 10e3/uL    Absolute Monocytes 0.9 0.0 - 1.3 10e3/uL    Absolute Eosinophils 0.3 0.0 - 0.7 10e3/uL    Absolute Basophils 0.1 0.0 - 0.2 10e3/uL    Absolute Immature Granulocytes 0.0 <=0.4 10e3/uL    Absolute NRBCs 0.0 10e3/uL   Result Value Ref Range    Troponin T, High Sensitivity 35 (H) <=14 ng/L       RADIOLOGY:  Reviewed and independently interpreted by me. Please see official radiology report.  Recent Results (from the past 24 hours)   CT Chest Pulmonary Embolism w Contrast    Narrative    EXAM: CT CHEST PULMONARY EMBOLISM W CONTRAST  LOCATION: [facility]  DATE: 4/17/2025    INDICATION: Shortness of breath, hypoxia  COMPARISON: CT 9/20/2022  TECHNIQUE: CT chest pulmonary angiogram during arterial phase injection of IV contrast. Multiplanar reformats and MIP reconstructions were performed. Dose reduction techniques were used.   CONTRAST: isovue 370 75ml    FINDINGS:  ANGIOGRAM CHEST: Pulmonary arteries are normal caliber and negative for pulmonary emboli. Thoracic aorta is negative for dissection. No CT evidence of right heart strain.    LUNGS AND PLEURA: The central airways are clear. Moderate bronchial wall thickening with multifocal endobronchial mucoid impaction. Mild emphysema. Stable lingular scarring/chronic subsegmental atelectasis. No pneumonic consolidation or pleural effusion.    MEDIASTINUM/AXILLAE: Prominent likely reactive mediastinal lymph nodes. Normal heart size and no pericardial effusion. Mitral annulus calcifications.    CORONARY ARTERY CALCIFICATION: Moderate to severe    UPPER ABDOMEN: Pancreatic atrophy. Left renal atrophy. Right renal cortical cyst. No  follow-up is indicated.    MUSCULOSKELETAL: Spinal degenerative changes. Stable mild L1 vertebral body compression deformity      Impression    IMPRESSION:  1.  No pulmonary artery embolism.  2.  Mild emphysema and moderate bronchiolitis.  3.  No pneumonic consolidation or pleural effusion.           PROCEDURES:   Procedures   --------------------------------------------------------------------------------   Cardiac telemetry monitoring ordered by me secondary to the patient's history of dyspnea and to monitor the patient for dysrhythmia. Reviewed & independently interpreted by me. Revealed normal sinus rhythm with PVCs.  --------------------------------------------------------------------------------       Critical Care     Performed by:   Angus Marcano MD   Authorized by:   Angus Marcano MD  Total critical care time: 105 minutes (Critical care time was exclusive of separately billable procedures and treating other patients.)    Critical care was necessary to treat or prevent imminent or life-threatening deterioration of the following conditions: Respiratory failure with hypoxia & hypercapnia requiring supplemental oxygen, Duoenbs, IV steroids, telemetry monitoring, admission    Critical care was time spent personally by me on the following activities:  - obtaining history from patient or surrogate  - examination of patient  - development of treatment plan with patient or surrogate  - ordering and performing treatments and interventions  - ordering and review of laboratory studies  - ordering and review of radiographic studies  - re-evaluation of patient's condition  - monitoring for potential decompensation  - discussion with consultants      ---------------------------------------------------------------------------------------------------------------------  ---------------------------------------------------------------------------------------------------------------------                ---------------  ADDITIONAL MDM ---------------  Sepsis/STEMI/Stroke Measures:  None    MIPS (CTPE, dental pain, Hendrix, sinusitis, asthma/COPD, head trauma):  CT Pulmonary Angiogram:Patient is moderate to high risk for PE.    History:  - I considered systemic symptoms of the presenting illness.  - Supplemental history from:       -- patient, family (daughter)  - External Record(s) reviewed:       -- Inpatient/outpatient record (Urgent Care visit 4/17/25), prior labs (blood 3/14/25), prior imaging (CT chest/abdomen/pelvis 9/28/22)       -- see above ED course & MDM for further details    Workup:  - Chart documentation above includes differential considered and my independent interpretation any EKGs, labs tests, and/or imaging  - emergent/severe conditions considered and evaluated for: see above differential & MDM    Independent Interpretation:  - Independent interpretation of ECG and images noted in documentation, when applicable.    External Consultation:  - Discussion of management with another provider:       -- ED pharmacist re: meds       -- see above ED course & MDM for additional    Complicating Factors:  - Care impacted by chronic illness:       -- see above MDM, past medical history, & problem list    Disposition Considerations:  - Admit             I, Monique Roberts, am serving as a scribe to document services personally performed by Dr. Angus Marcano based on my observation and the provider's statements to me. I, Angus Marcano MD attest that Monique Roberts is acting in a scribe capacity, has observed my performance of the services and has documented them in accordance with my direction.      Angus Marcano MD  04/17/25  Emergency Medicine  [facility] EMERGENCY DEPARTMENT  [address] [place] [id number]  [phone]  Dept: [phone]     Angus Marcano MD  04/17/25 0770

## 2025-04-17 NOTE — ED NOTES
Expected Patient Referral to ED  1:31 PM Spoke with [facility] IV[facility].    Referring Clinic/Provider:  [facility]    Reason for referral/Clinical facts:  Patient coming in by private vehicle after refusing EMS transport for hypoxia, shortness of breath, and dyspnea on exertion.  Patient is satting at 85% on room air.  She had improved to 96% on 2 L nasal cannula.  She has a history of diabetes, hypertension, no pulmonary history.  Of note, patient did have a cold over the last week.  Otherwise, denies any current chest pain.  She does note a 10 pound weight loss in the last 1 month. Patient had appointment 3 days ago and SpO2 89% at that time, not addressed.     Recommendations provided:  Send to ED for further evaluation    Caller was informed that this institution does possess the capabilities and/or resources to provide for patient and should be transferred to our facility.    Discussed that if direct admit is sought and any hurdles are encountered, this ED would be happy to see the patient and evaluate.    Informed caller that recommendations provided are recommendations based only on the facts provided and that they responsible to accept or reject the advice, or to seek a formal in person consultation as needed and that this ED will see/treat patient should they arrive.      Hailey Boyce PA-C  [facility] EMERGENCY DEPARTMENT  [facility] [facility] [id number] [phone]       Hailey Boyce PA-C  04/17/25 2083

## 2025-04-17 NOTE — PHARMACY-ADMISSION MEDICATION HISTORY
Pharmacist Admission Medication History    Admission medication history is complete. The information provided in this note is only as accurate as the sources available at the time of the update.    Information Source(s): Patient, Clinic records, and CareEverywhere/SureScripts via in-person    Pertinent Information: Patient was able to confirm current medications and last known administration times    Changes made to PTA medication list:  Added: None  Deleted: None  Changed: None    Allergies reviewed with patient and updates made in EHR: yes    Medication History Completed By: Benjie Hidalgo [facility] 4/17/2025 4:36 PM    PTA Med List   Medication Sig Note Last Dose/Taking    ASPIRIN 81 MG OR TABS Take 81 mg by mouth daily.  4/17/2025 Morning    Cyanocobalamin (VITAMIN B-12 PO) Take 1,000 mcg by mouth twice a week. Take only on Monday and Friday 4/14/2025    fluticasone (FLONASE) 50 MCG/ACT nasal spray INHALE 1-2 SPRAYS INTO EACH NOSTRIL DAILY  4/16/2025 Evening    furosemide (LASIX) 40 MG tablet Take 1 tablet (40 mg) by mouth daily.  4/17/2025 Morning    gabapentin (NEURONTIN) 100 MG capsule Take 2 capsules (200 mg) by mouth at bedtime.  4/16/2025 Bedtime    lisinopril (ZESTRIL) 40 MG tablet Take 1 tablet (40 mg) by mouth daily.  4/17/2025 Morning    metFORMIN (GLUCOPHAGE) 500 MG tablet Take 1 tablet (500 mg) by mouth 2 times daily (with meals).  4/17/2025 Morning    metoprolol succinate ER (TOPROL XL) 50 MG 24 hr tablet Take 1 tablet (50 mg) by mouth daily.  4/17/2025 Morning    MULTIPLE VITAMIN PO Take 1 tablet by mouth daily.  4/17/2025 Morning    Multiple Vitamins-Minerals (PRESERVISION AREDS 2) CAPS Take 1 capsule by mouth daily.  4/17/2025 Morning    simvastatin (ZOCOR) 20 MG tablet Take 1 tablet (20 mg) by mouth at bedtime.  4/16/2025 Bedtime    triamcinolone (KENALOG) 0.1 % external cream Apply topically 2 times daily. Apply to affected area (left inguinal crease) twice daily.  Do not use for more than 14 days.  4/17/2025: Has not started yet  Taking

## 2025-04-17 NOTE — PROGRESS NOTES
"Assessment & Plan     There are no diagnoses linked to this encounter.   {2021 E&M time (Optional):607064}    {Provider  Link to [facility] Help Grid :731546}    No follow-ups on file.    Belinda Conner PA-C  [facility] MIRELLA Humphrey is a 85 year old female who presents to clinic today for the following health issues:  Chief Complaint   Patient presents with     Shortness of Breath     X 1 year, has not been seen for this before. Requesting inhaler. Trouble breathing in the mornings and evening and during physical exertion.          4/17/2025    12:58 PM   Additional Questions   Roomed by ailyn   Accompanied by daughter     HPI    ***  {UC Conditions (Optional):166130}    Review of Systems  {ROS COMP (Optional):631015}      Objective    BP (!) 158/74 (BP Location: Right arm, Patient Position: Sitting, Cuff Size: Adult Regular)   Pulse 79   Temp 98  F (36.7  C) (Oral)   Resp 18   Ht 1.556 m (5' 1.26\")   Wt 79.8 kg (176 lb)   LMP 09/25/1979 (Approximate)   SpO2 (!) 85%   BMI 32.97 kg/m    Physical Exam   {Exam List (Optional):964511}      Wt Readings from Last 3 Encounters:   04/17/25 79.8 kg (176 lb)   04/14/25 80.2 kg (176 lb 11.2 oz)   03/14/25 83.6 kg (184 lb 4.8 oz)       {Diagnostic Test Results (Optional):427498}      "

## 2025-04-18 ENCOUNTER — APPOINTMENT (OUTPATIENT)
Dept: OCCUPATIONAL THERAPY | Facility: HOSPITAL | Age: 85
DRG: 291 | End: 2025-04-18
Attending: INTERNAL MEDICINE
Payer: COMMERCIAL

## 2025-04-18 ENCOUNTER — APPOINTMENT (OUTPATIENT)
Dept: CARDIOLOGY | Facility: HOSPITAL | Age: 85
DRG: 291 | End: 2025-04-18
Attending: INTERNAL MEDICINE
Payer: COMMERCIAL

## 2025-04-18 ENCOUNTER — TELEPHONE (OUTPATIENT)
Dept: CARDIOLOGY | Facility: CLINIC | Age: 85
End: 2025-04-18

## 2025-04-18 ENCOUNTER — APPOINTMENT (OUTPATIENT)
Dept: ULTRASOUND IMAGING | Facility: HOSPITAL | Age: 85
DRG: 291 | End: 2025-04-18
Attending: PHYSICIAN ASSISTANT
Payer: COMMERCIAL

## 2025-04-18 DIAGNOSIS — I50.9 ACUTE DECOMPENSATED HEART FAILURE (H): Primary | ICD-10-CM

## 2025-04-18 LAB
ANION GAP SERPL CALCULATED.3IONS-SCNC: 10 MMOL/L (ref 7–15)
ATRIAL RATE - MUSE: 77 BPM
BUN SERPL-MCNC: 25.6 MG/DL (ref 8–23)
CALCIUM SERPL-MCNC: 9.4 MG/DL (ref 8.8–10.4)
CHLORIDE SERPL-SCNC: 100 MMOL/L (ref 98–107)
CREAT SERPL-MCNC: 1.24 MG/DL (ref 0.51–0.95)
D DIMER PPP FEU-MCNC: 2.08 UG/ML FEU (ref 0–0.5)
DIASTOLIC BLOOD PRESSURE - MUSE: 73 MMHG
EGFRCR SERPLBLD CKD-EPI 2021: 43 ML/MIN/1.73M2
GLUCOSE BLDC GLUCOMTR-MCNC: 269 MG/DL (ref 70–99)
GLUCOSE BLDC GLUCOMTR-MCNC: 278 MG/DL (ref 70–99)
GLUCOSE BLDC GLUCOMTR-MCNC: 297 MG/DL (ref 70–99)
GLUCOSE BLDC GLUCOMTR-MCNC: 312 MG/DL (ref 70–99)
GLUCOSE BLDC GLUCOMTR-MCNC: 318 MG/DL (ref 70–99)
GLUCOSE SERPL-MCNC: 282 MG/DL (ref 70–99)
HCO3 SERPL-SCNC: 29 MMOL/L (ref 22–29)
HOLD SPECIMEN: NORMAL
INTERPRETATION ECG - MUSE: NORMAL
P AXIS - MUSE: 61 DEGREES
POTASSIUM SERPL-SCNC: 4.7 MMOL/L (ref 3.4–5.3)
PR INTERVAL - MUSE: 176 MS
QRS DURATION - MUSE: 70 MS
QT - MUSE: 404 MS
QTC - MUSE: 457 MS
R AXIS - MUSE: 73 DEGREES
SODIUM SERPL-SCNC: 139 MMOL/L (ref 135–145)
SYSTOLIC BLOOD PRESSURE - MUSE: 144 MMHG
T AXIS - MUSE: 68 DEGREES
VENTRICULAR RATE- MUSE: 77 BPM

## 2025-04-18 PROCEDURE — 250N000013 HC RX MED GY IP 250 OP 250 PS 637: Performed by: INTERNAL MEDICINE

## 2025-04-18 PROCEDURE — 120N000004 HC R&B MS OVERFLOW

## 2025-04-18 PROCEDURE — 97535 SELF CARE MNGMENT TRAINING: CPT | Mod: GO

## 2025-04-18 PROCEDURE — 250N000011 HC RX IP 250 OP 636: Performed by: INTERNAL MEDICINE

## 2025-04-18 PROCEDURE — 250N000013 HC RX MED GY IP 250 OP 250 PS 637: Performed by: STUDENT IN AN ORGANIZED HEALTH CARE EDUCATION/TRAINING PROGRAM

## 2025-04-18 PROCEDURE — 93970 EXTREMITY STUDY: CPT

## 2025-04-18 PROCEDURE — 36415 COLL VENOUS BLD VENIPUNCTURE: CPT | Performed by: INTERNAL MEDICINE

## 2025-04-18 PROCEDURE — 82962 GLUCOSE BLOOD TEST: CPT

## 2025-04-18 PROCEDURE — 99232 SBSQ HOSP IP/OBS MODERATE 35: CPT | Performed by: INTERNAL MEDICINE

## 2025-04-18 PROCEDURE — 85379 FIBRIN DEGRADATION QUANT: CPT | Performed by: INTERNAL MEDICINE

## 2025-04-18 PROCEDURE — 250N000011 HC RX IP 250 OP 636: Mod: JZ | Performed by: INTERNAL MEDICINE

## 2025-04-18 PROCEDURE — 97166 OT EVAL MOD COMPLEX 45 MIN: CPT | Mod: GO

## 2025-04-18 PROCEDURE — 80048 BASIC METABOLIC PNL TOTAL CA: CPT | Performed by: INTERNAL MEDICINE

## 2025-04-18 PROCEDURE — 250N000012 HC RX MED GY IP 250 OP 636 PS 637: Performed by: INTERNAL MEDICINE

## 2025-04-18 PROCEDURE — 250N000011 HC RX IP 250 OP 636

## 2025-04-18 PROCEDURE — 93306 TTE W/DOPPLER COMPLETE: CPT | Mod: 26 | Performed by: INTERNAL MEDICINE

## 2025-04-18 PROCEDURE — 99222 1ST HOSP IP/OBS MODERATE 55: CPT | Mod: FS | Performed by: INTERNAL MEDICINE

## 2025-04-18 PROCEDURE — 255N000002 HC RX 255 OP 636: Performed by: INTERNAL MEDICINE

## 2025-04-18 RX ORDER — HYDRALAZINE HYDROCHLORIDE 20 MG/ML
10 INJECTION INTRAMUSCULAR; INTRAVENOUS EVERY 6 HOURS PRN
Status: DISCONTINUED | OUTPATIENT
Start: 2025-04-18 | End: 2025-04-21 | Stop reason: HOSPADM

## 2025-04-18 RX ORDER — HYDRALAZINE HYDROCHLORIDE 20 MG/ML
15 INJECTION INTRAMUSCULAR; INTRAVENOUS ONCE
Status: COMPLETED | OUTPATIENT
Start: 2025-04-18 | End: 2025-04-18

## 2025-04-18 RX ORDER — FLUTICASONE PROPIONATE 50 MCG
1 SPRAY, SUSPENSION (ML) NASAL DAILY
Status: DISCONTINUED | OUTPATIENT
Start: 2025-04-18 | End: 2025-04-21 | Stop reason: HOSPADM

## 2025-04-18 RX ORDER — ENOXAPARIN SODIUM 100 MG/ML
40 INJECTION SUBCUTANEOUS EVERY 24 HOURS
Status: DISCONTINUED | OUTPATIENT
Start: 2025-04-18 | End: 2025-04-19

## 2025-04-18 RX ADMIN — SIMVASTATIN 20 MG: 10 TABLET, FILM COATED ORAL at 20:05

## 2025-04-18 RX ADMIN — INSULIN ASPART 4 UNITS: 100 INJECTION, SOLUTION INTRAVENOUS; SUBCUTANEOUS at 12:04

## 2025-04-18 RX ADMIN — GABAPENTIN 200 MG: 100 CAPSULE ORAL at 20:06

## 2025-04-18 RX ADMIN — Medication 3 MG: at 02:23

## 2025-04-18 RX ADMIN — INSULIN ASPART 3 UNITS: 100 INJECTION, SOLUTION INTRAVENOUS; SUBCUTANEOUS at 08:11

## 2025-04-18 RX ADMIN — INSULIN ASPART 3 UNITS: 100 INJECTION, SOLUTION INTRAVENOUS; SUBCUTANEOUS at 17:40

## 2025-04-18 RX ADMIN — METHYLPREDNISOLONE SODIUM SUCCINATE 40 MG: 40 INJECTION INTRAMUSCULAR; INTRAVENOUS at 10:07

## 2025-04-18 RX ADMIN — Medication 10 MG: at 22:26

## 2025-04-18 RX ADMIN — ENOXAPARIN SODIUM 40 MG: 40 INJECTION SUBCUTANEOUS at 17:41

## 2025-04-18 RX ADMIN — POLYETHYLENE GLYCOL 400 AND PROPYLENE GLYCOL 1 DROP: 4; 3 SOLUTION/ DROPS OPHTHALMIC at 22:22

## 2025-04-18 RX ADMIN — FUROSEMIDE 40 MG: 10 INJECTION, SOLUTION INTRAMUSCULAR; INTRAVENOUS at 20:06

## 2025-04-18 RX ADMIN — HYDRALAZINE HYDROCHLORIDE 15 MG: 20 INJECTION INTRAMUSCULAR; INTRAVENOUS at 11:58

## 2025-04-18 RX ADMIN — GUAIFENESIN 600 MG: 600 TABLET ORAL at 08:14

## 2025-04-18 RX ADMIN — GUAIFENESIN 600 MG: 600 TABLET ORAL at 20:04

## 2025-04-18 RX ADMIN — PERFLUTREN 2 ML: 6.52 INJECTION, SUSPENSION INTRAVENOUS at 09:46

## 2025-04-18 RX ADMIN — METOPROLOL SUCCINATE 50 MG: 50 TABLET, EXTENDED RELEASE ORAL at 08:14

## 2025-04-18 RX ADMIN — HYDRALAZINE HYDROCHLORIDE 10 MG: 20 INJECTION INTRAMUSCULAR; INTRAVENOUS at 18:11

## 2025-04-18 RX ADMIN — Medication 1 CAPSULE: at 08:13

## 2025-04-18 RX ADMIN — FLUTICASONE PROPIONATE 1 SPRAY: 50 SPRAY, METERED NASAL at 22:22

## 2025-04-18 ASSESSMENT — ACTIVITIES OF DAILY LIVING (ADL)
ADLS_ACUITY_SCORE: 60
ADLS_ACUITY_SCORE: 60
ADLS_ACUITY_SCORE: 50
ADLS_ACUITY_SCORE: 46
ADLS_ACUITY_SCORE: 56
ADLS_ACUITY_SCORE: 60
ADLS_ACUITY_SCORE: 56
ADLS_ACUITY_SCORE: 46
DEPENDENT_IADLS:: CLEANING;SHOPPING;TRANSPORTATION
ADLS_ACUITY_SCORE: 56
ADLS_ACUITY_SCORE: 56
ADLS_ACUITY_SCORE: 46
ADLS_ACUITY_SCORE: 46
ADLS_ACUITY_SCORE: 60
ADLS_ACUITY_SCORE: 60
ADLS_ACUITY_SCORE: 56
ADLS_ACUITY_SCORE: 60
ADLS_ACUITY_SCORE: 56
ADLS_ACUITY_SCORE: 46
ADLS_ACUITY_SCORE: 60
ADLS_ACUITY_SCORE: 60
ADLS_ACUITY_SCORE: 46
ADLS_ACUITY_SCORE: 50
ADLS_ACUITY_SCORE: 56

## 2025-04-18 NOTE — PLAN OF CARE
Goal Outcome Evaluation:      Plan of Care Reviewed With: patient, family    Overall Patient Progress: improving    SUBJECTIVE:  A/o with intermittent confusion on RA sat at 90s. US pending on BLE. BG before meals and bedtime. Possible hospital at home candidate.  Byron Florez RN

## 2025-04-18 NOTE — PROGRESS NOTES
[facility]    Medicine Progress Note - Hospitalist Service    Date of Admission:  4/17/2025    Assessment & Plan   Milagro Wallace is a 85 year old female admitted on 4/17/2025. PMH is significant for hypertension, diabetes and previous smoking who presented with progressive dyspnea on exertion associated with orthopnea, bilateral leg swelling. CT of the chest shows possible emphysema bronchiolitis.       Acute respiratory failure with Hypoxia  Progressive dyspnea on exertion   Chest pain  --Although BNP is elevated I think she has combination of acute CHF and COPD exacerbation  --CT chest was negative for PE. It did show some mild emphysema and mild bronchiolitis  -Trops 38-->35. EKG- sinus rhythm with frequent PACs in the form of bigemy  --Echocardiogram- normal EF. Stress test from 2008 negative for ischemia. Consult cardiology  --Cont IV diuresis with close monitoring of I/Os and renal function  --Cont IV methylprednisone 40 mg once daily and prn nebs  --Cont Mucinex 600 mg twice daily     Leg swelling  --Left worse than right. Likely due to combination of venous insufficiency and CHF  -- Check d-dimer. If elevated, will order venous duplex ultrasouns    Addendum:  -- Elevated d-dimer noted. Will order venous duplex ultrasound    Previous smoker  -- Quit smoking in 2008.  Has not had any pulmonary function test or spirometry  --Will refer to pulm clinic upon discharge     Non-insulin-dependent diabetes  -- Hold metformin in the hospital and ordered sliding-scale insulin     Hypertension   --Sub-optimal control  --Lasix as above. Cont home BB. Hold home lisinoprilAdded IV hydralazine prn     CKD 3  -- Hold lisinopril in the hospital allow adequate diuresis  -- Monitor renal function closely    Mild cognitive impairment  -- Risk for delirium in the hospital  -- Patient's daughter informed  -- Order as needed Seroquel 12.5 mg at bedtime (patient refused this)     Peripheral neuropathy  --  "Continue gabapentin    Insomnia  -- 3 mg of melatonin didn't help. Will try 10mg tonight  --Patient is hesitant to try any other meds          Diet: 2 Gram Sodium Diet  Fluid restriction 1800 ML FLUID    DVT Prophylaxis: Enoxaparin (Lovenox) SQ  Hendrix Catheter: Not present  Lines: None     Cardiac Monitoring: ACTIVE order. Indication: Acute decompensated heart failure (48 hours)  Code Status: Full Code      Clinically Significant Risk Factors Present on Admission                 # Drug Induced Platelet Defect: home medication list includes an antiplatelet medication   # Hypertension: Noted on problem list          # DMII: A1C = 7.1 % (Ref range: 0.0 - 5.6 %) within past 6 months    # Obesity: Estimated body mass index is 31.32 kg/m  as calculated from the following:    Height as of this encounter: 1.588 m (5' 2.5\").    Weight as of this encounter: 78.9 kg (174 lb).       # Financial/Environmental Concerns: none         Social Drivers of Health    Tobacco Use: Medium Risk (4/17/2025)    Patient History     Smoking Tobacco Use: Former     Smokeless Tobacco Use: Never     Passive Exposure: Past   Physical Activity: Unknown (3/14/2025)    Exercise Vital Sign     Days of Exercise per Week: 0 days   Social Connections: Unknown (3/14/2025)    Social Connection and Isolation Panel [NHANES]     Frequency of Social Gatherings with Friends and Family: Once a week          Disposition Plan     Medically Ready for Discharge: Anticipated in 2-4 Days             VIJAYA Lucas  Hospitalist Service  [facility]  Securely message with [employer] (more info)  Text page via [employer] Paging/Directory   ______________________________________________________________________    Interval History   Patient is new to me today. Chart reviewed. Daughter and other family members in room during my visit.    Patient reports leg swelling for years, worse recently. About 2-3 wks ago she had left sided chest pain while " "vacuuming her house. Endorsed worsening COTTER. No cough, fever or chills.     Discussed the option of going home on home hospital. The patient and daughter, Mary told me that they are not interested. Patient didn't want the idea of \"people coming to my house\"    Physical Exam   Vital Signs: Temp: 97.8  F (36.6  C) Temp src: Oral BP: (!) 185/79 Pulse: 78   Resp: 30 SpO2: 95 % O2 Device: Nasal cannula Oxygen Delivery: 2 LPM  Weight: 174 lbs 0 oz      General: Not in obvious distress.  HEENT: NC, AT   Chest: Clear to auscultation bilaterally  Heart: S1S2 normal, regular. No M/R/G  Abdomen: Soft. NT, ND. Bowel sounds- active.  Extremities: 1-2+ leg swelling, left >right.   Neuro: Awake, grossly non-focal      Medical Decision Making             Data     I have personally reviewed the following data over the past 24 hrs:    9.8  \   11.8   / 382     139 100 25.6 (H) /  312 (H)   4.7 29 1.24 (H) \     ALT: 13 AST: 25 AP: 104 TBILI: 0.3   ALB: 3.7 TOT PROTEIN: 7.2 LIPASE: N/A     Trop: 35 (H) BNP: 1,041     TSH: 1.55 T4: N/A A1C: N/A     Procal: N/A CRP: 35.80 (H) Lactic Acid: 1.4       INR:  1.14 PTT:  N/A   D-dimer:  2.08 (H) Fibrinogen:  N/A       Imaging results reviewed over the past 24 hrs:   Recent Results (from the past 24 hours)   CT Chest Pulmonary Embolism w Contrast    Narrative    EXAM: CT CHEST PULMONARY EMBOLISM W CONTRAST  LOCATION: [facility]  DATE: 4/17/2025    INDICATION: Shortness of breath, hypoxia  COMPARISON: CT 9/20/2022  TECHNIQUE: CT chest pulmonary angiogram during arterial phase injection of IV contrast. Multiplanar reformats and MIP reconstructions were performed. Dose reduction techniques were used.   CONTRAST: isovue 370 75ml    FINDINGS:  ANGIOGRAM CHEST: Pulmonary arteries are normal caliber and negative for pulmonary emboli. Thoracic aorta is negative for dissection. No CT evidence of right heart strain.    LUNGS AND PLEURA: The central airways are clear. Moderate " bronchial wall thickening with multifocal endobronchial mucoid impaction. Mild emphysema. Stable lingular scarring/chronic subsegmental atelectasis. No pneumonic consolidation or pleural effusion.    MEDIASTINUM/AXILLAE: Prominent likely reactive mediastinal lymph nodes. Normal heart size and no pericardial effusion. Mitral annulus calcifications.    CORONARY ARTERY CALCIFICATION: Moderate to severe    UPPER ABDOMEN: Pancreatic atrophy. Left renal atrophy. Right renal cortical cyst. No follow-up is indicated.    MUSCULOSKELETAL: Spinal degenerative changes. Stable mild L1 vertebral body compression deformity      Impression    IMPRESSION:  1.  No pulmonary artery embolism.  2.  Mild emphysema and moderate bronchiolitis.  3.  No pneumonic consolidation or pleural effusion.     Echocardiogram Complete    Narrative    [id number]  [id number]  [id number]  [id number]^LAVELLE^[place] [address]     Name: MODESTA ÁLVAREZ  MRN: [id number]  : 1940  Study Date: 2025 09:12 AM  Age: 85 yrs  Gender: Female  Patient Location: [facility]  Reason For Study: Heart Failure  Ordering Physician: BRIELLE VALDERRAMA  Performed By: LV/MB     BSA: 1.8 m2  Height: 62 in  Weight: 174 lb  HR: 73  BP: 138/62 mmHg  ______________________________________________________________________________  Procedure  Echocardiogram with two-dimensional, color and spectral Doppler. Definity (NDC  #08211-324) given intravenously.  ______________________________________________________________________________  Interpretation Summary     1. Normal left ventricular size and systolic performance. The visually  estimated ejection fraction is 60-65%.  2. No significant valvular heart disease is identified on this study.  3. Normal right ventricular size and systolic performance.  ______________________________________________________________________________  Left ventricle:  Normal left ventricular size and systolic  performance. The visually estimated  ejection fraction is 60-65%. There is normal regional wall motion. Left  ventricular wall thickness is normal.     Assessment of LV Diastolic Function: The cumulative findings are indeterminate  in the evaluation of diastolic function [The septal e' velocity is < 7 cm/s &  lateral e' velocity is < 10 cm/s. The average E/e' is >14. The TR velocity  cannot be determined due to insufficient tricuspid insufficiency signal. Left  atrial volume index is less than 34 mL/mÂ ].     Right ventricle:  Normal right ventricular size and systolic performance.     Left atrium:  The left atrium is of normal size.     Right atrium:  The right atrium is of normal size.     IVC:  The IVC is of normal caliber.     Aortic valve:  The aortic valve is comprised of three cusps. No significant aortic stenosis  or aortic insufficiency is detected on this study.     Mitral valve:  The mitral valve appears morphologically normal. There is trace mitral  insufficiency.     Tricuspid valve:  The tricuspid valve is grossly morphologically normal. There is trace  tricuspid insufficiency.     Pulmonic valve:  The pulmonic valve is grossly morphologically normal.     Thoracic aorta:  The aortic root and proximal ascending aorta are of normal dimension.     Pericardium:  There is no significant pericardial effusion.  ______________________________________________________________________________  ______________________________________________________________________________  MMode/2D Measurements & Calculations  IVSd: 1.2 cm  LVIDd: 4.0 cm  LVIDs: 2.3 cm  LVPWd: 1.1 cm  FS: 41.9 %  LV mass(C)d: 152.4 grams  LV mass(C)dI: 84.6 grams/m2  Ao root diam: 3.4 cm  LA dimension: 3.2 cm  asc Aorta Diam: 3.2 cm  LA/Ao: 0.94  LVOT diam: 2.1 cm  LVOT area: 3.4 cm2  Ao root diam index Ht(cm/m): 2.2  Ao root diam index BSA (cm/m2): 1.9  Asc Ao diam index BSA (cm/m2): 1.8  Asc Ao diam index Ht(cm/m): 2.0  EF Biplane: 75.6 %  LA  Volume (BP): 43.2 ml     LA Volume Index (BP): 24.0 ml/m2  LA Volume Indexed (AL/bp): 25.7 ml/m2  RV Base: 3.8 cm  RWT: 0.55     TAPSE: 1.7 cm     Time Measurements  MM HR: 66.0 BPM     Doppler Measurements & Calculations  MV E max singh: 116.0 cm/sec  MV A max singh: 147.4 cm/sec  MV E/A: 0.79  MV max PG: 10.9 mmHg  MV mean PG: 3.5 mmHg  MV V2 VTI: 52.5 cm  MVA(VTI): 1.8 cm2  MV dec slope: 653.8 cm/sec2  MV dec time: 0.18 sec  Ao V2 max: 115.6 cm/sec  Ao max P.0 mmHg  Ao V2 mean: 88.0 cm/sec  Ao mean PG: 3.2 mmHg  Ao V2 VTI: 31.9 cm  BARBARA(I,D): 2.9 cm2  BARBARA(V,D): 3.2 cm2  LV V1 max P.9 mmHg  LV V1 max: 110.4 cm/sec  LV V1 VTI: 27.9 cm  SV(LVOT): 94.1 ml  SI(LVOT): 52.2 ml/m2  PA V2 max: 97.7 cm/sec  PA max PG: 3.8 mmHg  PA acc time: 0.13 sec  AV Singh Ratio (DI): 0.95  BARBARA Index (cm2/m2): 1.6  E/E': 20.0     E/E' av.2  Lateral E/e': 18.2  Medial E/e': 20.2  Peak E' Singh: 5.8 cm/sec  RV S Singh: 12.2 cm/sec     ______________________________________________________________________________  Report approved by: Sravan Green MD on 2025 10:53 AM

## 2025-04-18 NOTE — CONSULTS
Care Management Initial Consult    General Information  Assessment completed with: Patient, Children,    Type of CM/SW Visit: Initial Assessment    Primary Care Provider verified and updated as needed: Yes   Readmission within the last 30 days: no previous admission in last 30 days      Reason for Consult: discharge planning  Advance Care Planning: Advance Care Planning Reviewed: no concerns identified          Communication Assessment  Patient's communication style: spoken language (English or Bilingual)             Cognitive  Cognitive/Neuro/Behavioral: WDL                      Living Environment:   People in home: alone     Current living Arrangements: town home      Able to return to prior arrangements: yes       Family/Social Support:  Care provided by: self, child(lj)  Provides care for: no one  Marital Status:   Support system:            Description of Support System:           Current Resources:   Patient receiving home care services: No        Community Resources: None  Equipment currently used at home: cane, straight  Supplies currently used at home: Incontinence Supplies    Employment/Financial:  Employment Status: retired        Financial Concerns: none           Does the patient's insurance plan have a 3 day qualifying hospital stay waiver?  Yes     Which insurance plan 3 day waiver is available? Alternative insurance waiver    Will the waiver be used for post-acute placement? Undetermined at this time    Lifestyle & Psychosocial Needs:  Social Drivers of Health     Food Insecurity: Low Risk  (3/14/2025)    Food Insecurity     Within the past 12 months, did you worry that your food would run out before you got money to buy more?: No     Within the past 12 months, did the food you bought just not last and you didn t have money to get more?: No   Depression: Not at risk (3/14/2025)    PHQ-2     PHQ-2 Score: 0   Housing Stability: Low Risk  (3/14/2025)    Housing Stability     Do you have housing?  : Yes     Are you worried about losing your housing?: No   Tobacco Use: Medium Risk (4/17/2025)    Patient History     Smoking Tobacco Use: Former     Smokeless Tobacco Use: Never     Passive Exposure: Past   Financial Resource Strain: Low Risk  (3/14/2025)    Financial Resource Strain     Within the past 12 months, have you or your family members you live with been unable to get utilities (heat, electricity) when it was really needed?: No   Alcohol Use: Not on file   Transportation Needs: Low Risk  (3/14/2025)    Transportation Needs     Within the past 12 months, has lack of transportation kept you from medical appointments, getting your medicines, non-medical meetings or appointments, work, or from getting things that you need?: No   Physical Activity: Unknown (3/14/2025)    Exercise Vital Sign     Days of Exercise per Week: 0 days     Minutes of Exercise per Session: Not on file   Interpersonal Safety: Low Risk  (10/16/2023)    Interpersonal Safety     Do you feel physically and emotionally safe where you currently live?: Yes     Within the past 12 months, have you been hit, slapped, kicked or otherwise physically hurt by someone?: No     Within the past 12 months, have you been humiliated or emotionally abused in other ways by your partner or ex-partner?: No   Stress: No Stress Concern Present (3/14/2025)    [employer] [place] [employer] - Occupational Stress Questionnaire     Feeling of Stress : Not at all   Social Connections: Unknown (3/14/2025)    Social Connection and Isolation Panel [NHANES]     Frequency of Communication with Friends and Family: Not on file     Frequency of Social Gatherings with Friends and Family: Once a week     Attends Latter day Services: Not on file     Active Member of Clubs or Organizations: Not on file     Attends Club or Organization Meetings: Not on file     Marital Status: Not on file   Health Literacy: Not on file       Functional Status:  Prior to admission  patient needed assistance:   Dependent ADLs:: Independent, Ambulation-cane  Dependent IADLs:: Cleaning, Shopping, Transportation       Mental Health Status:  Mental Health Status: No Current Concerns       Chemical Dependency Status:  Chemical Dependency Status: No Current Concerns             Values/Beliefs:  Spiritual, Cultural Beliefs, Denominational Practices, Values that affect care: no               Discussed  Partnership in Safe Discharge Planning  document with patient/family: No    Additional Information:  CM met with pt, dtr, Mary  in room to discuss discharge planning and complete initial assessment. Demographics confirmed and updated on facesheet.     Pt lives  in a [facility] alone . Pt uses cane for ambulation. She is independent with ADLs but struggles with some IADls such as cleaning, shopping, and transportation. Pt's children assist with these IADLs as needed. Pt does not have any home or community services.     Therapy rec is home OT. CM discussed this with pt. Pt has also met with hospital at home team and was having trouble understanding the difference between home care and hospital at home. CM explained difference to pt and dtr. Pt stated she was trying to make decision about hospital at home and would like to discuss home care at a later time. CM made plan to come back and discuss home care after pt has made decision about hospital home. Pt agreeable with this plan.     Next Steps: discuss home care RN/OT with pt.     SONAL Santos

## 2025-04-18 NOTE — PROGRESS NOTES
"   04/18/25 0840   Appointment Info   Signing Clinician's Name / Credentials (OT) Gina Huitroncharito, OTR/L   Living Environment   People in Home alone   Current Living Arrangements other (see comments)  ([facility])   Home Accessibility no concerns   Self-Care   Equipment Currently Used at Home cane, straight   Fall history within last six months no   Activity/Exercise/Self-Care Comment Pt reports independent with ADLs. Daughter reports pt has recently been having urgency with toileting. Pt states that she sometimes forgets to use her cane in the home.   Instrumental Activities of Daily Living (IADL)   IADL Comments Independent with IADLs per pt report. Pt reports she still drives.   General Information   Onset of Illness/Injury or Date of Surgery 04/17/25   Referring Physician Frank Barcenas MD   Patient/Family Therapy Goal Statement (OT) none stated   Additional Occupational Profile Info/Pertinent History of Current Problem Per chart review, pt \"is a 85 year old female admitted on 4/17/2025. She with hypertension diabetes and previous smoking who presented with progressive dyspnea on exertion associated with orthopnea, bilateral leg swelling, A-fib on telemetry, JVD.  CT of the chest shows possible emphysema bronchiolitis.  BNP is not elevated but could be falsely normal in obese patients.  Ordered echocardiogram and IV diuretics.  Will also give low-dose steroid and nebs.  Patient is on chronic beta-blocker and therefore would continue the same.  Troponins are not elevated.  Patient has dementia and likely would have delirium in the hospital and therefore would give\"   Existing Precautions/Restrictions fall;oxygen therapy device and L/min  (2 LPM via NC during eval)   Limitations/Impairments safety/cognitive   General Observations and Info fatigue and minimal SOB with exertion   Cognitive Status Examination   Orientation Status orientation to person, place and time  (A&Ox3)   Cognitive Status Comments Baseline dementia " per chart. Pt may be poor historian. Difficulty with sequencing/problem-solving noted.   Pain Assessment   Patient Currently in Pain No   Range of Motion Comprehensive   General Range of Motion no range of motion deficits identified   Strength Comprehensive (MMT)   Comment, General Manual Muscle Testing (MMT) Assessment Slight generalized weakness noted functionally.   Transfers   Transfers sit-stand transfer;toilet transfer   Sit-Stand Transfer   Sit-Stand [place] (Transfers) contact guard   Assistive Device (Sit-Stand Transfers) cane, straight   Toilet Transfer   [place] Level (Toilet Transfer) not tested   Toilet Transfer Comments Per clinical reasoning, anticipate pt would require SBA-CGA for toilet transfer.   Balance   Balance Comments Slightly unsteady on feet initially, requiring CGA. Improved as walking progressed, SBA-CGA using SEC for balance.   Activities of Daily Living   BADL Assessment/Intervention lower body dressing;grooming   Lower Body Dressing Assessment/Training   Position (Lower Body Dressing) edge of bed sitting   [place] Level (Lower Body Dressing) minimum assist (75% patient effort)   Grooming Assessment/Training   [place] Level (Grooming) not tested   Comment, (Grooming) Per clinical reasoning, anticipate pt would require SBA-CGA for standing G/H   Clinical Impression   Criteria for Skilled Therapeutic Interventions Met (OT) Yes, treatment indicated   OT Diagnosis Impaired ability to perform ADLs, IADLs, and functional mobility.   Influenced by the following impairments acute respiratory failure   OT Problem List-Impairments impacting ADL problems related to;activity tolerance impaired;cognition;balance;mobility;strength   Assessment of Occupational Performance 3-5 Performance Deficits   Identified Performance Deficits toileting, grooming/hygiene, cognition, lower body dressing, functional endurance   Planned Therapy Interventions (OT) ADL  retraining;strengthening;transfer training;home program guidelines;progressive activity/exercise;risk factor education;cognition;IADL retraining   Clinical Decision Making Complexity (OT) detailed assessment/moderate complexity   Risk & Benefits of therapy have been explained evaluation/treatment results reviewed;care plan/treatment goals reviewed;risks/benefits reviewed;current/potential barriers reviewed;participants voiced agreement with care plan;participants included;patient   OT Total Evaluation Time   OT Eval, Moderate Complexity Minutes (29553) 15   OT Goals   Therapy Frequency (OT) 6 times/week   OT Predicted Duration/Target Date for Goal Attainment 04/25/25   OT Goals Hygiene/Grooming;Lower Body Dressing;Toilet Transfer/Toileting;Cognition;Aerobic Activity   OT: Hygiene/Grooming modified independent;using adaptive equipment;within precautions;while standing   OT: Lower Body Dressing Modified independent;using adaptive equipment   OT: Toilet Transfer/Toileting Modified independent;toilet transfer;cleaning and garment management;using adaptive equipment   OT: Cognitive Patient/caregiver will verbalize understanding of cognitive assessment results/recommendations as needed for safe discharge planning   OT: Perform aerobic activity with stable cardiovascular response continuous activity;10 minutes   Self-Care/Home Management   Self-Care/Home Mgmt/ADL, Compensatory, Meal Prep Minutes (13766) 10   Symptoms Noted During/After Treatment (Meal Preparation/Planning Training) fatigue   Treatment Detail/Skilled Intervention Pt ambulated in hallway with SBA-CGA using ' on 2 LPM via NC. Pt satting 94% prior to walk and 92% following walk, slightly fatigued. Discussed recommendation to ambulate to bathroom during day vs using purewick, pt required encouragement - nursing staff verbalized understanding. Pt left in room seated EOB with direct handoff to nursing.   OT Discharge Planning   OT Plan SLUMS, safety with  ambulatory ADLs, functional endurance (askew walk)   OT Discharge Recommendation (DC Rec) home with home care occupational therapy;home with assist  (daily check-ins from family)   OT Rationale for DC Rec Pt ambulating/performing ADLs close to baseline. Pt slightly deconditioned and has baseline cognitive deficits. Pt would benefit from home care therapy and daily check-ins from family.   OT Brief overview of current status SBA-CGA   OT Total Distance Amb During Session (feet) 220   Total Session Time   Timed Code Treatment Minutes 10   Total Session Time (sum of timed and untimed services) 25

## 2025-04-18 NOTE — PLAN OF CARE
Goal Outcome Evaluation:      Plan of Care Reviewed With: patient, child          Outcome Evaluation: hospital at home vs home with home care

## 2025-04-18 NOTE — H&P
[facility]    History and Physical - Hospitalist Service       Date of Admission:  4/17/2025    Assessment & Plan      Milagro Wallace is a 85 year old female admitted on 4/17/2025. She with hypertension diabetes and previous smoking who presented with progressive dyspnea on exertion associated with orthopnea, bilateral leg swelling, A-fib on telemetry, JVD.  CT of the chest shows possible emphysema bronchiolitis.  BNP is not elevated but could be falsely normal in obese patients.  Ordered echocardiogram and IV diuretics.  Will also give low-dose steroid and nebs.  Patient is on chronic beta-blocker and therefore would continue the same.  Troponins are not elevated.  Patient has dementia and likely would have delirium in the hospital and therefore would give    Acute respiratory failure with progressive dyspnea on ambulation for the past 2 years worse in the last day.  --Although BNP is elevated I think she has combination of acute CHF and COPD exacerbation  --Ordered echocardiogram  --Ordered IV Lasix  --Order IV methylprednisone 40 mg once daily  --Ordered nebs as needed since patient is not actively wheezing.  More ever nebs would precipitate atrial fibrillation  -- Ordered Mucinex 600 mg twice daily    Paroxysmal atrial fibrillation on telemetry  --Ordered EKG which has not been done yet to confirm the same  -- [id number] score greater than 2 and therefore would be candidate for anticoagulation if this is positive    Previous smoker  -- Quit smoking in 2008.  Has not had any pulmonary function test or spirometry    Non-insulin-dependent diabetes  -- Hold metformin in the hospital and ordered sliding-scale insulin    Hypertension controlled    CKD 3  -- Hold lisinopril in the hospital allow adequate diuresis  Creatinine   Date Value Ref Range Status   04/17/2025 1.31 (H) 0.51 - 0.95 mg/dL Final   04/28/2021 1.25 (H) 0.52 - 1.04 mg/dL Final     Mild cognitive impairment  -- Risk for  "delirium in the hospital  -- Patient's daughter informed  -- Order as needed Seroquel 12.5 mg at bedtime    Peripheral neuropathy  -- Continue gabapentin        Diet: 2 Gram Sodium Diet    DVT Prophylaxis: Low Risk/Ambulatory with no VTE prophylaxis indicated  Hendrix Catheter: Not present  Lines: None     Cardiac Monitoring: None  Code Status:  Full    Clinically Significant Risk Factors Present on Admission                 # Drug Induced Platelet Defect: home medication list includes an antiplatelet medication   # Hypertension: Noted on problem list          # DMII: A1C = 7.1 % (Ref range: 0.0 - 5.6 %) within past 6 months    # Obesity: Estimated body mass index is 31.32 kg/m  as calculated from the following:    Height as of this encounter: 1.588 m (5' 2.5\").    Weight as of this encounter: 78.9 kg (174 lb).              Disposition Plan     Medically Ready for Discharge: Anticipated in 2-4 Days           Frank Barcenas MD  Hospitalist Service  [facility]  Securely message with [employer] (more info)  Text page via [employer] Paging/Directory     ______________________________________________________________________    Chief Complaint   Worsening shortness of breath and hypoxia at her urgent care    History is obtained from the patient and her daughter at bedside.  Patient has memory lapse and cannot give reliable information    History of Present Illness   Milagro Wallace is a 85 year old female with history of hypertension, insulin-dependent diabetes, previous smoker who has had worsening exertional shortness of breath for the past 2 years.  She does endorse that she has difficulty sleeping flat in the bed and would sometimes sleep in the recliner.  She takes Lasix for bilateral pedal edema but has not been diagnosed with CHF.  She quit smoking in 2008.  She was noted to have hypoxia of 85% at the urgency room and therefore sent to the ER.  Patient was put on 2 L of oxygen and has had nebulizer " and steroid.   During my interview, patient's history kept on changing and this was filled in by the daughter.  Daughter is concerned about heart failure since her father also had heart failure.  Ordered EKG and IV Lasix.  Review of system is negative for chest pain, palpitations and dizziness.  She denies any nausea vomiting diarrhea fever and chills.  No recent exposure to chemicals or travel  CT of the chest reviewed which showed possible bronchiolitis.  Patient does not watch her salt and likes to eat at [employer].    Past Medical History    Past Medical History:   Diagnosis Date    Difficult intubation     Dizziness 2023    Hypertension     Malignant hyperthermia     PONV (postoperative nausea and vomiting)     Tobacco use disorder     Type 2 diabetes mellitus without complications (H)        Past Surgical History   Past Surgical History:   Procedure Laterality Date    COLONOSCOPY      COLONOSCOPY N/A 10/29/2015    Procedure: COLONOSCOPY;  Surgeon: Adan De Santiago MD;  Location: [facility]    EYE SURGERY      PHACOEMULSIFICATION WITH STANDARD INTRAOCULAR LENS IMPLANT  2014    Procedure: PHACOEMULSIFICATION WITH STANDARD INTRAOCULAR LENS IMPLANT;  Surgeon: Jj Payne MD;  Location: [facility]    SURGICAL HISTORY OF -           SURGICAL HISTORY OF -       hysterectomy after C section       Prior to Admission Medications   Prior to Admission Medications   Prescriptions Last Dose Informant Patient Reported? Taking?   ASPIRIN 81 MG OR TABS 2025 Morning  Yes Yes   Sig: Take 81 mg by mouth daily.   Cyanocobalamin (VITAMIN B-12 PO) 2025  Yes Yes   Sig: Take 1,000 mcg by mouth twice a week. Take only on Monday and Friday   MULTIPLE VITAMIN PO 2025 Morning  Yes Yes   Sig: Take 1 tablet by mouth daily.   Multiple Vitamins-Minerals (PRESERVISION AREDS 2) CAPS 2025 Morning  Yes Yes   Sig: Take 1 capsule by mouth daily.   blood glucose (NO BRAND SPECIFIED) test strip   No No    Sig: Use to test blood sugar 1-2 times daily or as directed. To accompany: Blood Glucose Monitor Brands: per insurance.   fluticasone (FLONASE) 50 MCG/ACT nasal spray 2025 Evening  No Yes   Sig: INHALE 1-2 SPRAYS INTO EACH NOSTRIL DAILY   furosemide (LASIX) 40 MG tablet 2025 Morning  No Yes   Sig: Take 1 tablet (40 mg) by mouth daily.   gabapentin (NEURONTIN) 100 MG capsule 2025 Bedtime  No Yes   Sig: Take 2 capsules (200 mg) by mouth at bedtime.   lisinopril (ZESTRIL) 40 MG tablet 2025 Morning  No Yes   Sig: Take 1 tablet (40 mg) by mouth daily.   metFORMIN (GLUCOPHAGE) 500 MG tablet 2025 Morning  No Yes   Sig: Take 1 tablet (500 mg) by mouth 2 times daily (with meals).   metoprolol succinate ER (TOPROL XL) 50 MG 24 hr tablet 2025 Morning  No Yes   Sig: Take 1 tablet (50 mg) by mouth daily.   simvastatin (ZOCOR) 20 MG tablet 2025 Bedtime  No Yes   Sig: Take 1 tablet (20 mg) by mouth at bedtime.   thin (NO BRAND SPECIFIED) lancets   No No   Sig: Use to test blood sugar 1-2 times daily or as directed. To accompany: Blood Glucose Monitor Brands: per insurance.   triamcinolone (KENALOG) 0.1 % external cream   No Yes   Sig: Apply topically 2 times daily. Apply to affected area (left inguinal crease) twice daily.  Do not use for more than 14 days.      Facility-Administered Medications: None        Social History   I have reviewed this patient's social history and updated it with pertinent information if needed.  Social History     Tobacco Use    Smoking status: Former     Current packs/day: 0.00     Average packs/day: 0.7 packs/day for 26.0 years (18.2 ttl pk-yrs)     Types: Cigarettes     Start date: 1983     Quit date: 2009     Years since quittin.2     Passive exposure: Past    Smokeless tobacco: Never    Tobacco comments:         Vaping Use    Vaping status: Never Used   Substance Use Topics    Alcohol use: Yes     Comment: occ    Drug use: No    CODE STATUS  discussed and wants to be full code    Physical Exam   Vital Signs: Temp: 98.4  F (36.9  C) Temp src: Oral BP: (!) 144/73 Pulse: 77   Resp: 27 SpO2: 96 % O2 Device: None (Room air) Oxygen Delivery: 2 LPM  Weight: 174 lbs 0 oz    Confused and has memory lapse  Bilateral pitting edema more on the left side  JVD  Heart sounds are distant  Bilateral inspiratory crackles all over the lung fields  Did not appreciate collapsing pulse in the right arm    Medical Decision Making       75 MINUTES SPENT BY ME on the date of service doing chart review, history, exam, documentation & further activities per the note.  MANAGEMENT DISCUSSED with the following over the past 24 hours: Patient's daughter   NOTE(S)/MEDICAL RECORDS REVIEWED over the past 24 hours: ER notes       Data     I have personally reviewed the following data over the past 24 hrs:    9.8  \   11.8   / 382     142 101 24.7 (H) /  175 (H)   4.8 31 (H) 1.31 (H) \     ALT: 13 AST: 25 AP: 104 TBILI: 0.3   ALB: 3.7 TOT PROTEIN: 7.2 LIPASE: N/A     Trop: 35 (H) BNP: 1,041     TSH: 1.55 T4: N/A A1C: N/A     Procal: N/A CRP: 35.80 (H) Lactic Acid: 1.4       INR:  1.14 PTT:  N/A   D-dimer:  N/A Fibrinogen:  N/A       Imaging results reviewed over the past 24 hrs:   Recent Results (from the past 24 hours)   CT Chest Pulmonary Embolism w Contrast    Narrative    EXAM: CT CHEST PULMONARY EMBOLISM W CONTRAST  LOCATION: [facility]  DATE: 4/17/2025    INDICATION: Shortness of breath, hypoxia  COMPARISON: CT 9/20/2022  TECHNIQUE: CT chest pulmonary angiogram during arterial phase injection of IV contrast. Multiplanar reformats and MIP reconstructions were performed. Dose reduction techniques were used.   CONTRAST: isovue 370 75ml    FINDINGS:  ANGIOGRAM CHEST: Pulmonary arteries are normal caliber and negative for pulmonary emboli. Thoracic aorta is negative for dissection. No CT evidence of right heart strain.    LUNGS AND PLEURA: The central airways are  clear. Moderate bronchial wall thickening with multifocal endobronchial mucoid impaction. Mild emphysema. Stable lingular scarring/chronic subsegmental atelectasis. No pneumonic consolidation or pleural effusion.    MEDIASTINUM/AXILLAE: Prominent likely reactive mediastinal lymph nodes. Normal heart size and no pericardial effusion. Mitral annulus calcifications.    CORONARY ARTERY CALCIFICATION: Moderate to severe    UPPER ABDOMEN: Pancreatic atrophy. Left renal atrophy. Right renal cortical cyst. No follow-up is indicated.    MUSCULOSKELETAL: Spinal degenerative changes. Stable mild L1 vertebral body compression deformity      Impression    IMPRESSION:  1.  No pulmonary artery embolism.  2.  Mild emphysema and moderate bronchiolitis.  3.  No pneumonic consolidation or pleural effusion.

## 2025-04-18 NOTE — CONSULTS
[facility] [address], [place] [address]   [email or web address]   OFFICE: [phone]     CARDIOLOGY INPATIENT CONSULT NOTE     Impression and Plan     ASSESSMENT  Dyspnea on exertion  Acute respiratory failure with progressive dyspnea  Normal LVEF, no significant valve disease, no LVH.  NT-proBNP normal.  Received IV Lasix 40 mg x 1 yesterday with -1 L (1.5 L urine).  On exam appears euvolemic with normal JVP.  Has chronic lower extremity edema that is improved compared to her baseline per her son.  Troponin mildly elevated but flat at 38-35, no ischemic changes on ECG, moderate to severe coronary calcifications  CT negative for PE, mild emphysema and moderate bronchiolitis no effusion or pneumonia  Patient notes significant improvement with nebulizer   PACs  Rhythm on ECG sinus rhythm with PACs, not A-fib  Coronary artery calcifications  On simvastatin 20 mg and aspirin at home    Other active problems:  Former smoker  Non-insulin-dependent diabetes  CKD stage III  Mild cognitive impairment  Peripheral neuropathy    PLAN  Patient to be seen by Dr. Mack before final recommendations.    Symptoms likely related to COPD exacerbation from viral illness improved with nebulizer  Echo and physical exam reassuring, no need for further diuresis, okay to resume home dose of 40 mg Lasix daily  No need for further ischemic workup, continue aspirin and simvastatin  Okay to discharge from cardiology perspective    Cardiology will sign off, please call with any further questions. Thank you for allowing us to partake in the care of this patient.       Follow up: PCP    Primary Cardiologist: None    History of Present Illness      Ms. Milagro Wallace is a 85 year old female with past medical history of HTN, diabetes, former smoker, CKD stage III, mild cognitive impairment who presented for progressive COTTER associated with orthopnea and leg swelling.  Telemetry showed  sinus rhythm with PACs.  CT chest with possible emphysema bronchiolitis but no effusions or pneumonia.  NT-proBNP was normal but patient purely overloaded so IV diuretics were ordered.  Patient also started on low-dose steroid and nebs.    She reports a viral infection over the last few weeks and weight gain and presented to urgent care where her O2 saturations were in the 80s and she was sent to the ED.  She reports significant improvement in her symptoms following nebulizers.  Currently feeling well and eager to go home.  Denies any orthopnea, shortness of breath, increased edema, chest pain.    Other than noted above, Ms. Wallace denies any chest pain/pressure/tightness, shortness of breath at rest or with exertion, light headedness/dizziness, pre-syncope, syncope, lower extremity swelling, palpitations, paroxysmal nocturnal dyspnea (PND), or orthopnea.          Review of Systems:  Further review of systems is otherwise negative/noncontributory (based on review of medical record (admission H&P) and 13 point review of systems reviewed. Pertinent positives noted).    Cardiac Diagnostics     ECG: Personally reviewed and interpreted:   ECG, 4/17/2025: Sinus rhythm with PAC no ischemic changes    Telemetry (personally reviewed): NSR with PACs    Most recent:  Echocardiogram (results reviewed):   TTE, 4/18/2025  1. Normal left ventricular size and systolic performance. The visually  estimated ejection fraction is 60-65%.  2. No significant valvular heart disease is identified on this study.  3. Normal right ventricular size and systolic performance.            Medical History  Surgical History Family History Social History   Past Medical History:   Diagnosis Date    Difficult intubation     Dizziness 03/28/2023    Hypertension     Malignant hyperthermia     PONV (postoperative nausea and vomiting)     Tobacco use disorder     Type 2 diabetes mellitus without complications (H)      Past Surgical History:   Procedure  Laterality Date    COLONOSCOPY      COLONOSCOPY N/A 10/29/2015    Procedure: COLONOSCOPY;  Surgeon: Adan De Santiago MD;  Location: [facility]    EYE SURGERY      PHACOEMULSIFICATION WITH STANDARD INTRAOCULAR LENS IMPLANT  2014    Procedure: PHACOEMULSIFICATION WITH STANDARD INTRAOCULAR LENS IMPLANT;  Surgeon: Jj Payne MD;  Location: [facility]    SURGICAL HISTORY OF 1979        SURGICAL HISTORY OF 1979    hysterectomy after C section     Family History   Problem Relation Age of Onset    Alzheimer Disease Mother     C.A.D. Mother           age 75    Cerebrovascular Disease Father     Hypertension Sister         age 58           Social History     Socioeconomic History    Marital status:      Spouse name: Not on file    Number of children: Not on file    Years of education: Not on file    Highest education level: Not on file   Occupational History    Not on file   Tobacco Use    Smoking status: Former     Current packs/day: 0.00     Average packs/day: 0.7 packs/day for 26.0 years (18.2 ttl pk-yrs)     Types: Cigarettes     Start date: 1983     Quit date: 2009     Years since quittin.2     Passive exposure: Past    Smokeless tobacco: Never    Tobacco comments:         Vaping Use    Vaping status: Never Used   Substance and Sexual Activity    Alcohol use: Yes     Comment: occ    Drug use: No    Sexual activity: Never     Partners: Male   Other Topics Concern    Parent/sibling w/ CABG, MI or angioplasty before 65F 55M? No   Social History Narrative    Not on file     Social Drivers of Health     Financial Resource Strain: Low Risk  (3/14/2025)    Financial Resource Strain     Within the past 12 months, have you or your family members you live with been unable to get utilities (heat, electricity) when it was really needed?: No   Food Insecurity: Low Risk  (3/14/2025)    Food Insecurity     Within the past 12 months, did you worry that your food would run out before you got money  "to buy more?: No     Within the past 12 months, did the food you bought just not last and you didn t have money to get more?: No   Transportation Needs: Low Risk  (3/14/2025)    Transportation Needs     Within the past 12 months, has lack of transportation kept you from medical appointments, getting your medicines, non-medical meetings or appointments, work, or from getting things that you need?: No   Physical Activity: Unknown (3/14/2025)    Exercise Vital Sign     Days of Exercise per Week: 0 days     Minutes of Exercise per Session: Not on file   Stress: No Stress Concern Present (3/14/2025)    [employer] [place] - Occupational Stress Questionnaire     Feeling of Stress : Not at all   Social Connections: Unknown (3/14/2025)    Social Connection and Isolation Panel [NHANES]     Frequency of Communication with Friends and Family: Not on file     Frequency of Social Gatherings with Friends and Family: Once a week     Attends Gnosticist Services: Not on file     Active Member of Clubs or Organizations: Not on file     Attends Club or Organization Meetings: Not on file     Marital Status: Not on file   Interpersonal Safety: Low Risk  (10/16/2023)    Interpersonal Safety     Do you feel physically and emotionally safe where you currently live?: Yes     Within the past 12 months, have you been hit, slapped, kicked or otherwise physically hurt by someone?: No     Within the past 12 months, have you been humiliated or emotionally abused in other ways by your partner or ex-partner?: No   Housing Stability: Low Risk  (3/14/2025)    Housing Stability     Do you have housing? : Yes     Are you worried about losing your housing?: No             Physical Examination   VITALS: BP (!) 196/84   Pulse 77   Temp 97.8  F (36.6  C) (Oral)   Resp 20   Ht 1.588 m (5' 2.5\")   Wt 78.9 kg (174 lb)   LMP 09/25/1979 (Approximate)   SpO2 96%   BMI 31.32 kg/m    BMI: Body mass index is 31.32 kg/m .  Wt Readings " "from Last 3 Encounters:   04/17/25 78.9 kg (174 lb)   04/17/25 78.9 kg (174 lb)   04/14/25 80.2 kg (176 lb 11.2 oz)       Intake/Output Summary (Last 24 hours) at 4/18/2025 1141  Last data filed at 4/18/2025 1112  Gross per 24 hour   Intake 660 ml   Output 1600 ml   Net -940 ml       General: pleasant female. No acute distress.   HENT: external ears normal. Nares patent. Mucous membranes moist.  Eyes: perrla, extraocular muscles intact. No scleral icterus.   Neck: No JVD  Lungs: clear to auscultation  COR:  regular rate and rhythm, No murmurs, rubs, or gallops  Abd: nondistended, BS present  Extrem: No edema         Non-cardiac Imaging Studies Reviewed      CT chest 4/17/2025  IMPRESSION:  1.  No pulmonary artery embolism.  2.  Mild emphysema and moderate bronchiolitis.  3.  No pneumonic consolidation or pleural effusion       Lab Results Reviewed    Chemistry/lipid CBC Cardiac Enzymes/BNP/TSH/INR   Recent Labs   Lab Test 03/14/25  1348   CHOL 124   HDL 53   LDL 44   TRIG 136     Recent Labs   Lab Test 03/14/25  1348 04/14/23  1321 04/28/21  1157   LDL 44 40 32     Recent Labs   Lab Test 04/18/25  0752 04/18/25  0651   NA  --  139   POTASSIUM  --  4.7   CHLORIDE  --  100   CO2  --  29   * 282*   BUN  --  25.6*   CR  --  1.24*   GFRESTIMATED  --  43*   TERESA  --  9.4     Recent Labs   Lab Test 04/18/25  0651 04/17/25  1422 03/14/25  1348   CR 1.24* 1.31* 1.26*     Recent Labs   Lab Test 03/14/25  1348 11/29/24  1532 03/11/24  1304   A1C 7.1* 7.1* 7.3*          Recent Labs   Lab Test 04/17/25  1422   WBC 9.8   HGB 11.8   HCT 35.9   MCV 93        Recent Labs   Lab Test 04/17/25  1422 03/14/25  1348 03/11/24  1304   HGB 11.8 12.5 12.3    No results for input(s): \"TROPONINI\" in the last 32041 hours.  Recent Labs   Lab Test 04/17/25  1422   NTBNPI 1,041     Recent Labs   Lab Test 04/17/25  1422   TSH 1.55     Recent Labs   Lab Test 04/17/25  1422   INR 1.14           Current Inpatient Scheduled Medications "   Scheduled Meds:  Current Facility-Administered Medications   Medication Dose Route Frequency Provider Last Rate Last Admin    furosemide (LASIX) injection 40 mg  40 mg Intravenous Q12H Charu Hogue MD        gabapentin (NEURONTIN) capsule 200 mg  200 mg Oral At Bedtime Frank Barcenas MD   200 mg at 04/17/25 2115    guaiFENesin (MUCINEX) 12 hr tablet 600 mg  600 mg Oral BID Frank Barcenas MD   600 mg at 04/18/25 0814    insulin aspart (NovoLOG) injection (RAPID ACTING)  1-7 Units Subcutaneous TID AC Frank Barcenas MD   3 Units at 04/18/25 0811    insulin aspart (NovoLOG) injection (RAPID ACTING)  1-5 Units Subcutaneous At Bedtime Frank Barcenas MD   1 Units at 04/17/25 2114    methylPREDNISolone sodium succinate (SOLU-MEDROL) injection 40 mg  40 mg Intravenous Q24H Frank Barcenas MD   40 mg at 04/18/25 1007    metoprolol succinate ER (TOPROL XL) 24 hr tablet 50 mg  50 mg Oral Daily Frank Barcenas MD   50 mg at 04/18/25 0814    multivitamin  with lutein (OCUVITE WITH LUTEIN) per capsule 1 capsule  1 capsule Oral Daily Frank Barcenas MD   1 capsule at 04/18/25 0813    simvastatin (ZOCOR) tablet 20 mg  20 mg Oral At Bedtime Frank Barcenas MD   20 mg at 04/17/25 2115    sodium chloride (PF) 0.9% PF flush 3 mL  3 mL Intracatheter Q8H [facility] Frank Barcenas MD         Continuous Infusions:  Current Facility-Administered Medications   Medication Dose Route Frequency Provider Last Rate Last Admin    - MEDICATION INSTRUCTIONS -   Does not apply DOES NOT GO TO Frank Jordan MD        Continuing ACE inhibitor/ARB/ARNI from home medication list OR ACE inhibitor/ARB/ARNI order already placed during this visit   Does not apply DOES NOT GO TO Frank Jordan MD        Continuing beta blocker from home medication list OR beta blocker order already placed during this visit   Does not apply DOES NOT GO TO Frank Jordan MD           Current Outpatient Medications   Medication Sig Dispense Refill    ASPIRIN 81 MG OR TABS  Take 81 mg by mouth daily.      Cyanocobalamin (VITAMIN B-12 PO) Take 1,000 mcg by mouth twice a week. Take only on Monday and Friday      fluticasone (FLONASE) 50 MCG/ACT nasal spray INHALE 1-2 SPRAYS INTO EACH NOSTRIL DAILY 48 mL 3    furosemide (LASIX) 40 MG tablet Take 1 tablet (40 mg) by mouth daily. 90 tablet 3    gabapentin (NEURONTIN) 100 MG capsule Take 2 capsules (200 mg) by mouth at bedtime. 180 capsule 3    lisinopril (ZESTRIL) 40 MG tablet Take 1 tablet (40 mg) by mouth daily. 90 tablet 3    metFORMIN (GLUCOPHAGE) 500 MG tablet Take 1 tablet (500 mg) by mouth 2 times daily (with meals). 180 tablet 1    metoprolol succinate ER (TOPROL XL) 50 MG 24 hr tablet Take 1 tablet (50 mg) by mouth daily. 90 tablet 2    MULTIPLE VITAMIN PO Take 1 tablet by mouth daily.      Multiple Vitamins-Minerals (PRESERVISION AREDS 2) CAPS Take 1 capsule by mouth daily.      simvastatin (ZOCOR) 20 MG tablet Take 1 tablet (20 mg) by mouth at bedtime. 90 tablet 3    triamcinolone (KENALOG) 0.1 % external cream Apply topically 2 times daily. Apply to affected area (left inguinal crease) twice daily.  Do not use for more than 14 days. 30 g 1    blood glucose (NO BRAND SPECIFIED) test strip Use to test blood sugar 1-2 times daily or as directed. To accompany: Blood Glucose Monitor Brands: per insurance. 50 strip 6    thin (NO BRAND SPECIFIED) lancets Use to test blood sugar 1-2 times daily or as directed. To accompany: Blood Glucose Monitor Brands: per insurance. 50 each 5          Medications Prior to Admission   Prior to Admission medications    Medication Sig Start Date End Date Taking? Authorizing Provider   ASPIRIN 81 MG OR TABS Take 81 mg by mouth daily.   Yes Reported, Patient   Cyanocobalamin (VITAMIN B-12 PO) Take 1,000 mcg by mouth twice a week. Take only on Monday and Friday   Yes Reported, Patient   fluticasone (FLONASE) 50 MCG/ACT nasal spray INHALE 1-2 SPRAYS INTO EACH NOSTRIL DAILY 3/14/25  Yes Christian López MD    furosemide (LASIX) 40 MG tablet Take 1 tablet (40 mg) by mouth daily. 3/14/25  Yes Christian López MD   gabapentin (NEURONTIN) 100 MG capsule Take 2 capsules (200 mg) by mouth at bedtime. 3/14/25  Yes Christian López MD   lisinopril (ZESTRIL) 40 MG tablet Take 1 tablet (40 mg) by mouth daily. 4/14/25  Yes Christian López MD   metFORMIN (GLUCOPHAGE) 500 MG tablet Take 1 tablet (500 mg) by mouth 2 times daily (with meals). 3/14/25  Yes Christian López MD   metoprolol succinate ER (TOPROL XL) 50 MG 24 hr tablet Take 1 tablet (50 mg) by mouth daily. 3/14/25  Yes Christian López MD   MULTIPLE VITAMIN PO Take 1 tablet by mouth daily.   Yes Reported, Patient   Multiple Vitamins-Minerals (PRESERVISION AREDS 2) CAPS Take 1 capsule by mouth daily. 11/29/24  Yes Christian López MD   simvastatin (ZOCOR) 20 MG tablet Take 1 tablet (20 mg) by mouth at bedtime. 3/14/25  Yes Christian López MD   triamcinolone (KENALOG) 0.1 % external cream Apply topically 2 times daily. Apply to affected area (left inguinal crease) twice daily.  Do not use for more than 14 days. 4/14/25  Yes Christian López MD   blood glucose (NO BRAND SPECIFIED) test strip Use to test blood sugar 1-2 times daily or as directed. To accompany: Blood Glucose Monitor Brands: per insurance. 3/11/24   Christian López MD   thin (NO BRAND SPECIFIED) lancets Use to test blood sugar 1-2 times daily or as directed. To accompany: Blood Glucose Monitor Brands: per insurance. 3/11/24   Christian López MD Daniel L. Oress, PA-C  April 18, 2025    This note was partially generated using Dragon voice recognition system, and there may be some incorrect words,  spellings, and punctuation that were not noted in checking the note before saving.      Clinically Significant Risk Factors Present on Admission                 # Drug Induced Platelet Defect: home medication list includes an antiplatelet medication   # Hypertension: Noted on problem  "list          # DMII: A1C = 7.1 % (Ref range: 0.0 - 5.6 %) within past 6 months    # Obesity: Estimated body mass index is 31.32 kg/m  as calculated from the following:    Height as of this encounter: 1.588 m (5' 2.5\").    Weight as of this encounter: 78.9 kg (174 lb).       # Financial/Environmental Concerns: none      "

## 2025-04-18 NOTE — PLAN OF CARE
Problem: Adult Inpatient Plan of Care  Goal: Optimal Comfort and Wellbeing  Outcome: Progressing   Goal Outcome Evaluation:         Pt is A/O x4. Denies pain. Uses a cane to ambulate. Purewick in place. Melatonin given.   No overnight concerns.

## 2025-04-19 ENCOUNTER — APPOINTMENT (OUTPATIENT)
Dept: PHYSICAL THERAPY | Facility: HOSPITAL | Age: 85
DRG: 291 | End: 2025-04-19
Attending: INTERNAL MEDICINE
Payer: COMMERCIAL

## 2025-04-19 ENCOUNTER — APPOINTMENT (OUTPATIENT)
Dept: OCCUPATIONAL THERAPY | Facility: HOSPITAL | Age: 85
DRG: 291 | End: 2025-04-19
Attending: PHYSICIAN ASSISTANT
Payer: COMMERCIAL

## 2025-04-19 LAB
ANION GAP SERPL CALCULATED.3IONS-SCNC: 10 MMOL/L (ref 7–15)
BUN SERPL-MCNC: 41.4 MG/DL (ref 8–23)
CALCIUM SERPL-MCNC: 9 MG/DL (ref 8.8–10.4)
CHLORIDE SERPL-SCNC: 97 MMOL/L (ref 98–107)
CREAT SERPL-MCNC: 1.48 MG/DL (ref 0.51–0.95)
EGFRCR SERPLBLD CKD-EPI 2021: 35 ML/MIN/1.73M2
ERYTHROCYTE [DISTWIDTH] IN BLOOD BY AUTOMATED COUNT: 12.5 % (ref 10–15)
GLUCOSE BLDC GLUCOMTR-MCNC: 171 MG/DL (ref 70–99)
GLUCOSE BLDC GLUCOMTR-MCNC: 194 MG/DL (ref 70–99)
GLUCOSE BLDC GLUCOMTR-MCNC: 201 MG/DL (ref 70–99)
GLUCOSE BLDC GLUCOMTR-MCNC: 338 MG/DL (ref 70–99)
GLUCOSE SERPL-MCNC: 201 MG/DL (ref 70–99)
HCO3 SERPL-SCNC: 31 MMOL/L (ref 22–29)
HCT VFR BLD AUTO: 32.6 % (ref 35–47)
HGB BLD-MCNC: 11.1 G/DL (ref 11.7–15.7)
MCH RBC QN AUTO: 30.7 PG (ref 26.5–33)
MCHC RBC AUTO-ENTMCNC: 34 G/DL (ref 31.5–36.5)
MCV RBC AUTO: 90 FL (ref 78–100)
PLATELET # BLD AUTO: 388 10E3/UL (ref 150–450)
POTASSIUM SERPL-SCNC: 4.2 MMOL/L (ref 3.4–5.3)
RBC # BLD AUTO: 3.62 10E6/UL (ref 3.8–5.2)
SODIUM SERPL-SCNC: 138 MMOL/L (ref 135–145)
WBC # BLD AUTO: 16.9 10E3/UL (ref 4–11)

## 2025-04-19 PROCEDURE — 97129 THER IVNTJ 1ST 15 MIN: CPT | Mod: GO

## 2025-04-19 PROCEDURE — 80048 BASIC METABOLIC PNL TOTAL CA: CPT | Performed by: INTERNAL MEDICINE

## 2025-04-19 PROCEDURE — 250N000011 HC RX IP 250 OP 636: Mod: JZ | Performed by: INTERNAL MEDICINE

## 2025-04-19 PROCEDURE — 250N000013 HC RX MED GY IP 250 OP 250 PS 637: Performed by: INTERNAL MEDICINE

## 2025-04-19 PROCEDURE — 36415 COLL VENOUS BLD VENIPUNCTURE: CPT | Performed by: INTERNAL MEDICINE

## 2025-04-19 PROCEDURE — 97161 PT EVAL LOW COMPLEX 20 MIN: CPT | Mod: GP

## 2025-04-19 PROCEDURE — 99232 SBSQ HOSP IP/OBS MODERATE 35: CPT | Performed by: INTERNAL MEDICINE

## 2025-04-19 PROCEDURE — 85027 COMPLETE CBC AUTOMATED: CPT | Performed by: INTERNAL MEDICINE

## 2025-04-19 PROCEDURE — 97535 SELF CARE MNGMENT TRAINING: CPT | Mod: GO

## 2025-04-19 PROCEDURE — 120N000004 HC R&B MS OVERFLOW

## 2025-04-19 RX ORDER — ENOXAPARIN SODIUM 100 MG/ML
30 INJECTION SUBCUTANEOUS EVERY 24 HOURS
Status: DISCONTINUED | OUTPATIENT
Start: 2025-04-19 | End: 2025-04-21 | Stop reason: HOSPADM

## 2025-04-19 RX ADMIN — METOPROLOL SUCCINATE 50 MG: 50 TABLET, EXTENDED RELEASE ORAL at 08:34

## 2025-04-19 RX ADMIN — Medication 10 MG: at 20:50

## 2025-04-19 RX ADMIN — GABAPENTIN 200 MG: 100 CAPSULE ORAL at 20:30

## 2025-04-19 RX ADMIN — SIMVASTATIN 20 MG: 10 TABLET, FILM COATED ORAL at 20:30

## 2025-04-19 RX ADMIN — POLYETHYLENE GLYCOL 400 AND PROPYLENE GLYCOL 1 DROP: 4; 3 SOLUTION/ DROPS OPHTHALMIC at 08:47

## 2025-04-19 RX ADMIN — FLUTICASONE PROPIONATE 1 SPRAY: 50 SPRAY, METERED NASAL at 08:35

## 2025-04-19 RX ADMIN — INSULIN ASPART 1 UNITS: 100 INJECTION, SOLUTION INTRAVENOUS; SUBCUTANEOUS at 08:43

## 2025-04-19 RX ADMIN — METHYLPREDNISOLONE SODIUM SUCCINATE 40 MG: 40 INJECTION INTRAMUSCULAR; INTRAVENOUS at 11:20

## 2025-04-19 RX ADMIN — INSULIN ASPART 2 UNITS: 100 INJECTION, SOLUTION INTRAVENOUS; SUBCUTANEOUS at 17:18

## 2025-04-19 RX ADMIN — GUAIFENESIN 600 MG: 600 TABLET ORAL at 08:34

## 2025-04-19 RX ADMIN — ENOXAPARIN SODIUM 30 MG: 30 INJECTION SUBCUTANEOUS at 20:29

## 2025-04-19 RX ADMIN — INSULIN ASPART 2 UNITS: 100 INJECTION, SOLUTION INTRAVENOUS; SUBCUTANEOUS at 11:56

## 2025-04-19 RX ADMIN — Medication 1 CAPSULE: at 08:34

## 2025-04-19 RX ADMIN — HYDRALAZINE HYDROCHLORIDE 10 MG: 20 INJECTION INTRAMUSCULAR; INTRAVENOUS at 01:21

## 2025-04-19 RX ADMIN — POLYETHYLENE GLYCOL 400 AND PROPYLENE GLYCOL 1 DROP: 4; 3 SOLUTION/ DROPS OPHTHALMIC at 20:34

## 2025-04-19 RX ADMIN — GUAIFENESIN 600 MG: 600 TABLET ORAL at 20:30

## 2025-04-19 ASSESSMENT — ACTIVITIES OF DAILY LIVING (ADL)
ADLS_ACUITY_SCORE: 51
ADLS_ACUITY_SCORE: 50
ADLS_ACUITY_SCORE: 51
ADLS_ACUITY_SCORE: 50
ADLS_ACUITY_SCORE: 50
ADLS_ACUITY_SCORE: 51
ADLS_ACUITY_SCORE: 50
ADLS_ACUITY_SCORE: 51
ADLS_ACUITY_SCORE: 50

## 2025-04-19 NOTE — CARE PLAN
Heart Failure Care Map  GOALS TO BE MET BEFORE DISCHARGE:    1. Decrease congestion and/or edema with diuretic therapy to achieve near optimal volume status.     Dyspnea improved: Yes, satisfactory for discharge.   Edema improved: No, further care required to meet this goal. Please explain left leg has mild edema, right leg has trace edema         Last 24 hour I/O:   Intake/Output Summary (Last 24 hours) at 4/19/2025 1419  Last data filed at 4/19/2025 1300  Gross per 24 hour   Intake 1093 ml   Output 1000 ml   Net 93 ml           Net I/O and Weights since admission:   03/20 1500 - 04/19 1459  In: 1753 [P.O.:1750; I.V.:3]  Out: 2600 [Urine:2600]  Net: -847     Vitals:    04/17/25 1354 04/18/25 1706 04/19/25 0603   Weight: 78.9 kg (174 lb) 79.1 kg (174 lb 6.4 oz) 78.9 kg (174 lb)       2.  O2 sats > 90% on room air, or at prior home O2 therapy level.      Able to wean O2 this shift to keep sats above 90%?: No, further care required to meet this goal. Please explain needs supplemental O2 to keep O2 sats > 90.   Does patient use Home O2? No          Current oxygenation status:   SpO2: (!) 91 %  to 95 %   O2 Device: Nasal cannula, Oxygen Delivery: 1 LPM    3.  Tolerates ambulation and mobility near baseline.     Ambulation: Yes, satisfactory for discharge.   Times patient ambulated with staff this shift: 1    Please review the Heart Failure Care Map for additional HF goal outcomes.    Edwina Quinteros RN  4/19/2025

## 2025-04-19 NOTE — PROGRESS NOTES
[facility]    Medicine Progress Note - Hospitalist Service    Date of Admission:  4/17/2025    Assessment & Plan   Milagro Wallace is a 85 year old female admitted on 4/17/2025. PMH is significant for hypertension, diabetes and previous smoking who presented with progressive dyspnea on exertion associated with orthopnea, bilateral leg swelling. CT of the chest shows possible emphysema bronchiolitis.       Acute respiratory failure with Hypoxia  Progressive dyspnea on exertion   Chest pain  --Although BNP is elevated I think she has combination of acute CHF and COPD exacerbation  --CT chest was negative for PE. It did show some mild emphysema and mild bronchiolitis  -Trops 38-->35. EKG- sinus rhythm with frequent PACs in the form of bigemy  --Echocardiogram- normal EF. Stress test from 2008 negative for ischemia. Consult cardiology  --Received couple of doses of IV diuresis. Breathing is better, leg swelling is better. Hold IV diuresis for slight bump in creatinine  -Monitor I/Os and renal function  --Cont steroid x 5 days (total), Cont prn nebs  --Cont Mucinex 600 mg twice daily     Leg swelling  --Left worse than right. Likely due to combination of venous insufficiency and CHF  -- Checked d-dimer, which was elevated. Venous duplex ultrasound- negative for DVT    Previous smoker  -- Quit smoking in 2008.  Has not had any pulmonary function test or spirometry  --Will refer to pulm clinic upon discharge     Non-insulin-dependent diabetes  -- Hold metformin in the hospital and ordered sliding-scale insulin     Hypertension   --Sub-optimal control  --Lasix as above. Cont home BB. Hold home lisinoprilAdded IV hydralazine prn     CKD 3  -- Hold lisinopril in the hospital allow adequate diuresis  -- Monitor renal function closely    Mild cognitive impairment  -- Risk for delirium in the hospital  -- Order as needed Seroquel 12.5 mg at bedtime (patient refused this)       Peripheral neuropathy  --  "Continue gabapentin    Insomnia  -- 3 mg of melatonin didn't help. Slept well witih 10mg of melatonin.  --Patient is hesitant to try any other meds          Diet: 2 Gram Sodium Diet  Fluid restriction 1800 ML FLUID    DVT Prophylaxis: Enoxaparin (Lovenox) SQ  Hendrix Catheter: Not present  Lines: None     Cardiac Monitoring: ACTIVE order. Indication: Acute decompensated heart failure (48 hours)  Code Status: Full Code      Clinically Significant Risk Factors          # Hypochloremia: Lowest Cl = 97 mmol/L in last 2 days, will monitor as appropriate          # Hypertension: Noted on problem list           # DMII: A1C = 7.1 % (Ref range: 0.0 - 5.6 %) within past 6 months, PRESENT ON ADMISSION  # Obesity: Estimated body mass index is 31.32 kg/m  as calculated from the following:    Height as of this encounter: 1.588 m (5' 2.5\").    Weight as of this encounter: 78.9 kg (174 lb)., PRESENT ON ADMISSION       # Financial/Environmental Concerns: none         Social Drivers of Health    Tobacco Use: Medium Risk (4/17/2025)    Patient History     Smoking Tobacco Use: Former     Smokeless Tobacco Use: Never     Passive Exposure: Past   Physical Activity: Unknown (3/14/2025)    Exercise Vital Sign     Days of Exercise per Week: 0 days   Social Connections: Unknown (3/14/2025)    Social Connection and Isolation Panel [NHANES]     Frequency of Social Gatherings with Friends and Family: Once a week          Disposition Plan     Medically Ready for Discharge: Anticipated in 2-4 Days             VIJAYA Lucas  Hospitalist Service  [facility]  Securely message with [employer] (more info)  Text page via [employer] Paging/Directory   ______________________________________________________________________    Interval History   Patient seen and examined. Patient reported doing better. Leg swelling has come down. Breathing is better, only on 1LPM. DaughterTammy was at bedside during my visit.     Physical Exam "   Vital Signs: Temp: 97.8  F (36.6  C) Temp src: Oral BP: 136/60 Pulse: 73   Resp: 18 SpO2: 95 % O2 Device: Nasal cannula Oxygen Delivery: 1 LPM  Weight: 174 lbs 0 oz      General: Not in obvious distress.  HEENT: NC, AT   Chest: Clear to auscultation bilaterally  Heart: S1S2 normal, regular. No M/R/G  Abdomen: Soft. NT, ND. Bowel sounds- active.  Extremities: Trace to 1+ leg swelling, left >right.   Neuro: Awake, grossly non-focal      Medical Decision Making             Data     I have personally reviewed the following data over the past 24 hrs:    16.9 (H)  \   11.1 (L)   / 388     138 97 (L) 41.4 (H) /  194 (H)   4.2 31 (H) 1.48 (H) \       Imaging results reviewed over the past 24 hrs:   Recent Results (from the past 24 hours)   US Lower Extremity Venous Duplex Bilateral    Narrative    EXAM: US LOWER EXTREMITY VENOUS DUPLEX BILATERAL  LOCATION: [facility]  DATE: 4/18/2025    INDICATION: Asymmetric leg swelling  COMPARISON: None.  TECHNIQUE: Venous Duplex ultrasound of bilateral lower extremities with and without compression, augmentation and duplex. Color flow and spectral Doppler with waveform analysis performed.    FINDINGS: Exam includes the common femoral, femoral, popliteal veins as well as segmentally visualized deep calf veins and greater saphenous vein.     RIGHT: No deep vein thrombosis. No superficial thrombophlebitis. No popliteal cyst.    LEFT: No deep vein thrombosis. No superficial thrombophlebitis. No popliteal cyst.      Impression    IMPRESSION:  1.  No deep venous thrombosis in the bilateral lower extremities.

## 2025-04-19 NOTE — CARE PLAN
Heart Failure Care Map  GOALS TO BE MET BEFORE DISCHARGE:    1. Decrease congestion and/or edema with diuretic therapy to achieve near optimal volume status.     Dyspnea improved: Yes, satisfactory for discharge.   Edema improved: No, further care required to meet this goal. Please explain has mild edema  in BLE        Last 24 hour I/O:   Intake/Output Summary (Last 24 hours) at 4/18/2025 2300  Last data filed at 4/18/2025 2200  Gross per 24 hour   Intake 1190 ml   Output 1300 ml   Net -110 ml           Net I/O and Weights since admission:   03/20 0700 - 04/19 0659  In: 1390 [P.O.:1390]  Out: 1900 [Urine:1900]  Net: -510     Vitals:    04/17/25 1354 04/18/25 1706   Weight: 78.9 kg (174 lb) 79.1 kg (174 lb 6.4 oz)       2.  O2 sats > 90% on room air, or at prior home O2 therapy level.      Able to wean O2 this shift to keep sats above 90%?: No, further care required to meet this goal. Please explain O2 saturation dropped to 88 to 89% on RA at HS   Does patient use Home O2? No          Current oxygenation status:   SpO2: 95 %     O2 Device: Nasal cannula, Oxygen Delivery: 1 LPM    3.  Tolerates ambulation and mobility near baseline.     Ambulation: No, further care required to meet this goal. Please explain pt feels tired tonight   Times patient ambulated with staff this shift: 0    Please review the Heart Failure Care Map for additional HF goal outcomes.    Edwina Quinteros RN  4/18/2025

## 2025-04-19 NOTE — PROGRESS NOTES
04/19/25 1130   Appointment Info   Signing Clinician's Name / Credentials (PT) Zoila Melendrez PT   Rehab Comments (PT) Patient in bed .Daughter present.   Living Environment   People in Home alone   Current Living Arrangements house  (town home)   Home Accessibility no concerns   Transportation Anticipated family or friend will provide   Self-Care   Usual Activity Tolerance good   Current Activity Tolerance moderate   Equipment Currently Used at Home cane, straight   Fall history within last six months no   Activity/Exercise/Self-Care Comment patient independent with mobility and ADL/IADL .Drives short distance,Daughter assists as needed   General Information   Onset of Illness/Injury or Date of Surgery 04/17/25   Referring Physician Rayray Norris   Patient/Family Therapy Goals Statement (PT) return home   Pertinent History of Current Problem (include personal factors and/or comorbidities that impact the POC) Admitted withacute respiratory failure with hypoxia.Patient has ahx of CHF,HTN,DM ,former smoker   Existing Precautions/Restrictions cardiac;fall   Weight-Bearing Status - LLE weight-bearing as tolerated   Weight-Bearing Status - RLE weight-bearing as tolerated   Cognition   Affect/Mental Status (Cognition) confused  (some confusion noted)   Orientation Status (Cognition) oriented x 4   Follows Commands (Cognition) WFL   Pain Assessment   Patient Currently in Pain No   Range of Motion (ROM)   Range of Motion ROM is WFL   Strength (Manual Muscle Testing)   Strength (Manual Muscle Testing) strength is WFL   Bed Mobility   Bed Mobility no deficits identified   Transfers   Transfers no deficits identified   Gait/Stairs (Locomotion)   [place] Level (Gait) supervision   Assistive Device (Gait) cane, straight   Distance in Feet (Gait) 250 feet   Pattern (Gait) step-through   Maintains Weight-bearing Status (Gait) able to maintain   Comment, (Gait/Stairs) good technique with SEC,steady   Clinical Impression    Criteria for Skilled Therapeutic Intervention Evaluation only   Clinical Presentation (PT Evaluation Complexity) stable   Clinical Presentation Rationale patient preesnts as med diagnosed   Clinical Decision Making (Complexity) low complexity   Risk & Benefits of therapy have been explained evaluation/treatment results reviewed;patient;daughter   PT Total Evaluation Time   PT Eval, Low Complexity Minutes (76010) 20   Physical Therapy Goals   PT Frequency One time eval and treatment only   PT Predicted Duration/Target Date for Goal Attainment 04/19/25   PT Goals Bed Mobility;Transfers;Gait   PT: Bed Mobility Independent;Supine to/from sit;Completed   PT: Transfers Supervision/stand-by assist;Assistive device;Completed   PT: Gait Supervision/stand-by assist;Straight cane;Greater than 200 feet;Completed   Interventions   Interventions Quick Adds Gait Training   Gait Training   Treatment Detail/Skilled Intervention ) 2 sats on 1 l per NC 95 % at rest ,90 % with activity   PT Discharge Planning   PT Plan d/c PT-no skilled needs   PT Discharge Recommendation (DC Rec) home with assist;home with home care physical therapy   PT Rationale for DC Rec Moving well ,recommend home PT for home assessement as pt lives alone   PT Brief overview of current status SBA mobility nad amb x 250 feet with SEC   PT Total Distance Amb During Session (feet) 250   PT Equipment Needed at Discharge cane, straight  (pt has one)   Physical Therapy Time and Intention   Total Session Time (sum of timed and untimed services) 20

## 2025-04-19 NOTE — PLAN OF CARE
Problem: Heart Failure  Goal: Optimal Coping  Outcome: Progressing  Goal: Optimal Cardiac Output  Outcome: Progressing  Goal: Stable Heart Rate and Rhythm  Outcome: Progressing  Goal: Fluid and Electrolyte Balance  Outcome: Progressing  Goal: Optimal Functional Ability  Outcome: Progressing  Intervention: Optimize Functional Ability  Recent Flowsheet Documentation  Taken 4/19/2025 1200 by Edwina Quinteros RN  Activity Management: activity adjusted per tolerance  Taken 4/19/2025 0830 by Edwina Quinteros RN  Activity Management: activity adjusted per tolerance  Goal: Improved Oral Intake  Outcome: Progressing  Goal: Effective Oxygenation and Ventilation  Outcome: Progressing  Intervention: Promote Airway Secretion Clearance  Recent Flowsheet Documentation  Taken 4/19/2025 1200 by Edwina Quinteros RN  Cough And Deep Breathing: done independently per patient  Activity Management: activity adjusted per tolerance  Taken 4/19/2025 0830 by Edwina Quinteros RN  Cough And Deep Breathing: done independently per patient  Activity Management: activity adjusted per tolerance  Intervention: Optimize Oxygenation and Ventilation  Recent Flowsheet Documentation  Taken 4/19/2025 1200 by Edwina Quinteros RN  Head of Bed (HOB) Positioning: HOB at 20-30 degrees  Taken 4/19/2025 0830 by Edwina Quinteros RN  Head of Bed (HOB) Positioning: HOB at 20-30 degrees  Goal: Effective Breathing Pattern During Sleep  Outcome: Progressing  Intervention: Monitor and Manage Obstructive Sleep Apnea  Recent Flowsheet Documentation  Taken 4/19/2025 1200 by Edwina Quinteros RN  Medication Review/Management: medications reviewed  Taken 4/19/2025 0830 by Edwina Quinteros RN  Medication Review/Management: medications reviewed   Goal Outcome Evaluation:       Pt is alert and oriented x 3, forgetful at times. Lung sound clear, diminished in the bases. She denies SOB during the walk with PT. She tolerated  about 250 ft. On O2 at 1 LPM per nasal cannula. SpO2 dropped to 85 to 86% on RA when tried to wean off from O2 this morning. Lasix held today due to DEJAH per MD.Assist of 1 with transfer using cane. OT and PT came to work with the pt this morning.

## 2025-04-19 NOTE — PLAN OF CARE
Problem: Heart Failure  Goal: Optimal Coping  Outcome: Progressing  Goal: Optimal Cardiac Output  Outcome: Progressing  Goal: Stable Heart Rate and Rhythm  Outcome: Progressing  Goal: Fluid and Electrolyte Balance  Outcome: Progressing  Goal: Optimal Functional Ability  Outcome: Progressing  Intervention: Optimize Functional Ability  Recent Flowsheet Documentation  Taken 4/18/2025 2100 by Edwina Quinteros RN  Activity Management: activity adjusted per tolerance  Taken 4/18/2025 1700 by Edwina Quinteros RN  Activity Management: activity adjusted per tolerance  Goal: Improved Oral Intake  Outcome: Progressing  Goal: Effective Oxygenation and Ventilation  Outcome: Progressing  Intervention: Promote Airway Secretion Clearance  Recent Flowsheet Documentation  Taken 4/18/2025 2100 by Edwina Quinteros RN  Activity Management: activity adjusted per tolerance  Taken 4/18/2025 1700 by Edwina Quinteros RN  Activity Management: activity adjusted per tolerance  Intervention: Optimize Oxygenation and Ventilation  Recent Flowsheet Documentation  Taken 4/18/2025 2100 by Edwina Quinteros RN  Head of Bed (HOB) Positioning: HOB at 30 degrees  Taken 4/18/2025 1700 by Edwina Quinteros RN  Head of Bed (HOB) Positioning: HOB at 30 degrees  Goal: Effective Breathing Pattern During Sleep  Outcome: Progressing  Intervention: Monitor and Manage Obstructive Sleep Apnea  Recent Flowsheet Documentation  Taken 4/18/2025 2100 by Edwina Quinteros RN  Medication Review/Management: medications reviewed  Taken 4/18/2025 1700 by Edwina Quinteros RN  Medication Review/Management: medications reviewed   Goal Outcome Evaluation:       Pt is alert and oriented x 4, forgetful at times. She denies pain, has SOB with activity. Lung sounds diminished, on RA. O2 at 1 LPM per nasal cannula applied at HS, SpO2 dropped to 88 to 89%. HR in the 70's to 90's, NSR with freq PAC's. Purewick in place, on IV Lasix  40 mg q 12 H. On 1800 ml/ day  fluid restrictions, pt is cooperative.

## 2025-04-19 NOTE — PLAN OF CARE
Problem: Heart Failure  Goal: Stable Heart Rate and Rhythm  Outcome: Progressing       Problem: Adult Inpatient Plan of Care  Goal: Absence of Hospital-Acquired Illness or Injury  Intervention: Prevent Infection  Recent Flowsheet Documentation  Taken 4/19/2025 0056 by Renuka Peters RN  Infection Prevention: rest/sleep promoted       Goal Outcome Evaluation:  Patient is aox3. Occasionally forgetful. VSS except BP elevated to 170s/70s. Patient asymptomatic. Prn IV hydralazine given once and effective. Patient has dyspnea with exertion. Denied SOB at rest, chest pain, and dizziness. Using 1L oxygen via NC during the night. Has SR with frequent PACs. Patient is assist x1 with a cane. Using purewick. Tolerating fluid restrictions. Call-light within reach. Bed alarm on.

## 2025-04-20 ENCOUNTER — APPOINTMENT (OUTPATIENT)
Dept: OCCUPATIONAL THERAPY | Facility: HOSPITAL | Age: 85
DRG: 291 | End: 2025-04-20
Attending: INTERNAL MEDICINE
Payer: COMMERCIAL

## 2025-04-20 LAB
ANION GAP SERPL CALCULATED.3IONS-SCNC: 8 MMOL/L (ref 7–15)
BUN SERPL-MCNC: 48.2 MG/DL (ref 8–23)
CALCIUM SERPL-MCNC: 8.9 MG/DL (ref 8.8–10.4)
CHLORIDE SERPL-SCNC: 99 MMOL/L (ref 98–107)
CREAT SERPL-MCNC: 1.43 MG/DL (ref 0.51–0.95)
EGFRCR SERPLBLD CKD-EPI 2021: 36 ML/MIN/1.73M2
ERYTHROCYTE [DISTWIDTH] IN BLOOD BY AUTOMATED COUNT: 12.9 % (ref 10–15)
GLUCOSE BLDC GLUCOMTR-MCNC: 164 MG/DL (ref 70–99)
GLUCOSE BLDC GLUCOMTR-MCNC: 200 MG/DL (ref 70–99)
GLUCOSE BLDC GLUCOMTR-MCNC: 269 MG/DL (ref 70–99)
GLUCOSE BLDC GLUCOMTR-MCNC: 332 MG/DL (ref 70–99)
GLUCOSE SERPL-MCNC: 172 MG/DL (ref 70–99)
HCO3 SERPL-SCNC: 30 MMOL/L (ref 22–29)
HCT VFR BLD AUTO: 33.5 % (ref 35–47)
HGB BLD-MCNC: 11.2 G/DL (ref 11.7–15.7)
MCH RBC QN AUTO: 30.3 PG (ref 26.5–33)
MCHC RBC AUTO-ENTMCNC: 33.4 G/DL (ref 31.5–36.5)
MCV RBC AUTO: 91 FL (ref 78–100)
PLATELET # BLD AUTO: 393 10E3/UL (ref 150–450)
POTASSIUM SERPL-SCNC: 4.2 MMOL/L (ref 3.4–5.3)
RBC # BLD AUTO: 3.7 10E6/UL (ref 3.8–5.2)
SODIUM SERPL-SCNC: 137 MMOL/L (ref 135–145)
WBC # BLD AUTO: 15.8 10E3/UL (ref 4–11)

## 2025-04-20 PROCEDURE — 97535 SELF CARE MNGMENT TRAINING: CPT | Mod: GO

## 2025-04-20 PROCEDURE — 250N000011 HC RX IP 250 OP 636: Mod: JZ | Performed by: INTERNAL MEDICINE

## 2025-04-20 PROCEDURE — 120N000004 HC R&B MS OVERFLOW

## 2025-04-20 PROCEDURE — 82310 ASSAY OF CALCIUM: CPT | Performed by: INTERNAL MEDICINE

## 2025-04-20 PROCEDURE — 99232 SBSQ HOSP IP/OBS MODERATE 35: CPT | Performed by: INTERNAL MEDICINE

## 2025-04-20 PROCEDURE — 250N000013 HC RX MED GY IP 250 OP 250 PS 637: Performed by: INTERNAL MEDICINE

## 2025-04-20 PROCEDURE — 36415 COLL VENOUS BLD VENIPUNCTURE: CPT | Performed by: INTERNAL MEDICINE

## 2025-04-20 PROCEDURE — 85014 HEMATOCRIT: CPT | Performed by: INTERNAL MEDICINE

## 2025-04-20 PROCEDURE — 80048 BASIC METABOLIC PNL TOTAL CA: CPT | Performed by: INTERNAL MEDICINE

## 2025-04-20 RX ADMIN — METHYLPREDNISOLONE SODIUM SUCCINATE 40 MG: 40 INJECTION INTRAMUSCULAR; INTRAVENOUS at 10:00

## 2025-04-20 RX ADMIN — HYDRALAZINE HYDROCHLORIDE 10 MG: 20 INJECTION INTRAMUSCULAR; INTRAVENOUS at 00:16

## 2025-04-20 RX ADMIN — INSULIN ASPART 3 UNITS: 100 INJECTION, SOLUTION INTRAVENOUS; SUBCUTANEOUS at 17:57

## 2025-04-20 RX ADMIN — METOPROLOL SUCCINATE 50 MG: 50 TABLET, EXTENDED RELEASE ORAL at 08:18

## 2025-04-20 RX ADMIN — ENOXAPARIN SODIUM 30 MG: 30 INJECTION SUBCUTANEOUS at 20:21

## 2025-04-20 RX ADMIN — POLYETHYLENE GLYCOL 400 AND PROPYLENE GLYCOL 1 DROP: 4; 3 SOLUTION/ DROPS OPHTHALMIC at 22:06

## 2025-04-20 RX ADMIN — INSULIN ASPART 1 UNITS: 100 INJECTION, SOLUTION INTRAVENOUS; SUBCUTANEOUS at 08:18

## 2025-04-20 RX ADMIN — POLYETHYLENE GLYCOL 400 AND PROPYLENE GLYCOL 1 DROP: 4; 3 SOLUTION/ DROPS OPHTHALMIC at 08:21

## 2025-04-20 RX ADMIN — SIMVASTATIN 20 MG: 10 TABLET, FILM COATED ORAL at 22:06

## 2025-04-20 RX ADMIN — Medication 1 CAPSULE: at 08:18

## 2025-04-20 RX ADMIN — INSULIN ASPART 2 UNITS: 100 INJECTION, SOLUTION INTRAVENOUS; SUBCUTANEOUS at 12:13

## 2025-04-20 RX ADMIN — GABAPENTIN 200 MG: 100 CAPSULE ORAL at 22:05

## 2025-04-20 RX ADMIN — GUAIFENESIN 600 MG: 600 TABLET ORAL at 08:18

## 2025-04-20 RX ADMIN — FLUTICASONE PROPIONATE 1 SPRAY: 50 SPRAY, METERED NASAL at 08:19

## 2025-04-20 ASSESSMENT — ACTIVITIES OF DAILY LIVING (ADL)
ADLS_ACUITY_SCORE: 50

## 2025-04-20 NOTE — CARE PLAN
Heart Failure Care Map  GOALS TO BE MET BEFORE DISCHARGE:    1. Decrease congestion and/or edema with diuretic therapy to achieve near optimal volume status.     Dyspnea improved: Yes, satisfactory for discharge.   Edema improved: No, further care required to meet this goal. Please explain mild edema in the left leg, trace edema in the right leg        Last 24 hour I/O:   Intake/Output Summary (Last 24 hours) at 4/19/2025 2249  Last data filed at 4/19/2025 2102  Gross per 24 hour   Intake 726 ml   Output 1100 ml   Net -374 ml           Net I/O and Weights since admission:   03/20 2300 - 04/19 2259  In: 2116 [P.O.:2110; I.V.:6]  Out: 3000 [Urine:3000]  Net: -884     Vitals:    04/17/25 1354 04/18/25 1706 04/19/25 0603   Weight: 78.9 kg (174 lb) 79.1 kg (174 lb 6.4 oz) 78.9 kg (174 lb)       2.  O2 sats > 90% on room air, or at prior home O2 therapy level.      Able to wean O2 this shift to keep sats above 90%?: No, further care required to meet this goal. Please explain still needs O2 at 1 LPM   Does patient use Home O2? No          Current oxygenation status:   SpO2: 93 %     O2 Device: Nasal cannula, Oxygen Delivery: 1 LPM    3.  Tolerates ambulation and mobility near baseline.     Ambulation: Yes, satisfactory for discharge.   Times patient ambulated with staff this shift: 1    Please review the Heart Failure Care Map for additional HF goal outcomes.    Edwina Quinteros RN  4/19/2025

## 2025-04-20 NOTE — PLAN OF CARE
Problem: Heart Failure  Goal: Optimal Coping  4/19/2025 2244 by Edwina Quinteros RN  Outcome: Progressing  4/19/2025 1357 by Edwina Quinteros RN  Outcome: Progressing  Goal: Optimal Cardiac Output  4/19/2025 2244 by Edwina Quinteros RN  Outcome: Progressing  4/19/2025 1357 by Edwina Quinteros RN  Outcome: Progressing  Goal: Stable Heart Rate and Rhythm  4/19/2025 2244 by Edwina Quinteros RN  Outcome: Progressing  4/19/2025 1357 by Edwina Quinteros RN  Outcome: Progressing  Goal: Fluid and Electrolyte Balance  4/19/2025 2244 by Edwina Quinteros RN  Outcome: Progressing  4/19/2025 1357 by Edwina Quinteros RN  Outcome: Progressing  Goal: Optimal Functional Ability  4/19/2025 2244 by Edwina Quinteros RN  Outcome: Progressing  4/19/2025 1357 by Edwina Quinteros RN  Outcome: Progressing  Intervention: Optimize Functional Ability  Recent Flowsheet Documentation  Taken 4/19/2025 2040 by Edwina Quinteros RN  Activity Management: activity adjusted per tolerance  Taken 4/19/2025 1830 by Edwina Quinteros RN  Activity Management: ambulated outside room  Taken 4/19/2025 1500 by Edwina Quinteros RN  Activity Management: activity adjusted per tolerance  Taken 4/19/2025 1200 by Edwina Quinteros RN  Activity Management: activity adjusted per tolerance  Taken 4/19/2025 1145 by Edwina Quinteros RN  Activity Management: (with PT) ambulated outside room  Goal: Improved Oral Intake  4/19/2025 2244 by Edwina Quinteros RN  Outcome: Progressing  4/19/2025 1357 by Edwina Quinteros RN  Outcome: Progressing  Goal: Effective Oxygenation and Ventilation  4/19/2025 2244 by Edwina Quinteros RN  Outcome: Progressing  4/19/2025 1357 by Edwina Quinteros, RN  Outcome: Progressing  Intervention: Promote Airway Secretion Clearance  Recent Flowsheet Documentation  Taken 4/19/2025 2040 by Edwina Quinteros, RN  Cough  And Deep Breathing: done independently per patient  Activity Management: activity adjusted per tolerance  Taken 4/19/2025 1830 by Edwina Quinteros RN  Activity Management: ambulated outside room  Taken 4/19/2025 1500 by Edwina Quinteros RN  Cough And Deep Breathing: done independently per patient  Activity Management: activity adjusted per tolerance  Taken 4/19/2025 1200 by Edwina Quinteros RN  Cough And Deep Breathing: done independently per patient  Activity Management: activity adjusted per tolerance  Taken 4/19/2025 1145 by Edwina Quinteros RN  Activity Management: (with PT) ambulated outside room  Intervention: Optimize Oxygenation and Ventilation  Recent Flowsheet Documentation  Taken 4/19/2025 2040 by Edwina Quinteros RN  Head of Bed (HOB) Positioning: HOB at 20-30 degrees  Taken 4/19/2025 1500 by Edwina Quinteros RN  Head of Bed (HOB) Positioning: HOB at 20-30 degrees  Taken 4/19/2025 1200 by Edwina Quinteros RN  Head of Bed (HOB) Positioning: HOB at 20-30 degrees  Goal: Effective Breathing Pattern During Sleep  Outcome: Progressing  Intervention: Monitor and Manage Obstructive Sleep Apnea  Recent Flowsheet Documentation  Taken 4/19/2025 2040 by Edwina Quinteros RN  Medication Review/Management: medications reviewed  Taken 4/19/2025 1500 by Edwina Quinteros RN  Medication Review/Management: medications reviewed  Taken 4/19/2025 1200 by Edwina Quinteros RN  Medication Review/Management: medications reviewed   Goal Outcome Evaluation:       Pt is alert and oriented x 4, forgetful at times. Lung sounds diminished, fine crackles in the bases, on O2 at 1 LPM per nasal cannula,  SpO2 93 to 95%. HR in the 70's to 80's, NSR with freq PAC's. Assist of 1 with transfer using gait belt and a cane. HS cares done. Ambulated in the hallway , tolerated about 250 ft. She denies SOB during the walk.

## 2025-04-20 NOTE — PROGRESS NOTES
[facility]    Medicine Progress Note - Hospitalist Service    Date of Admission:  4/17/2025    Assessment & Plan   Milagro Wallace is a 85 year old female admitted on 4/17/2025. PMH is significant for hypertension, diabetes and previous smoking who presented with progressive dyspnea on exertion associated with orthopnea, bilateral leg swelling. CT of the chest shows possible emphysema bronchiolitis.       Acute respiratory failure with Hypoxia  Progressive dyspnea on exertion   Chest pain  --Although BNP is elevated I think she has combination of acute CHF and COPD exacerbation  --CT chest was negative for PE. It did show some mild emphysema and mild bronchiolitis  -Trops 38-->35. EKG- sinus rhythm with frequent PACs in the form of bigemy  --Echocardiogram- normal EF. Stress test from 2008 negative for ischemia. Consult cardiology  --Received couple of doses of IV diuresis. Breathing is better, leg swelling is better. Hold IV diuresis for slight bump in creatinine  -Monitor I/Os and renal function  --Cont steroid x 5 days (total), Cont prn nebs  --Cont Mucinex 600 mg twice daily     Leg swelling  --Left worse than right. Likely due to combination of venous insufficiency and CHF  -- Checked d-dimer, which was elevated. Venous duplex ultrasound- negative for DVT    Previous smoker  -- Quit smoking in 2008.  Has not had any pulmonary function test or spirometry  --Will refer to pulm clinic upon discharge     Non-insulin-dependent diabetes  -- Hold metformin in the hospital and ordered sliding-scale insulin     Hypertension   --Sub-optimal control  --Lasix as above. Cont home BB. Hold home lisinopril. Added IV hydralazine prn     CKD 3  -- Hold lisinopril in the hospital allow adequate diuresis  -- Monitor renal function closely    Mild cognitive impairment  -- Risk for delirium in the hospital  -- Order as needed Seroquel 12.5 mg at bedtime (patient refused this)  -- Cont melatonin      "  Peripheral neuropathy  -- Continue gabapentin    Insomnia  -- 3 mg of melatonin didn't help. Slept well witih 10mg of melatonin.  --Patient is hesitant to try any other meds          Diet: 2 Gram Sodium Diet  Fluid restriction 1800 ML FLUID    DVT Prophylaxis: Enoxaparin (Lovenox) SQ  Hendrix Catheter: Not present  Lines: None     Cardiac Monitoring: None  Code Status: Full Code      Clinically Significant Risk Factors          # Hypochloremia: Lowest Cl = 97 mmol/L in last 2 days, will monitor as appropriate          # Hypertension: Noted on problem list           # DMII: A1C = 7.1 % (Ref range: 0.0 - 5.6 %) within past 6 months, PRESENT ON ADMISSION  # Obesity: Estimated body mass index is 31.44 kg/m  as calculated from the following:    Height as of this encounter: 1.588 m (5' 2.5\").    Weight as of this encounter: 79.2 kg (174 lb 11.2 oz)., PRESENT ON ADMISSION       # Financial/Environmental Concerns: none         Social Drivers of Health    Tobacco Use: Medium Risk (4/17/2025)    Patient History     Smoking Tobacco Use: Former     Smokeless Tobacco Use: Never     Passive Exposure: Past   Physical Activity: Unknown (3/14/2025)    Exercise Vital Sign     Days of Exercise per Week: 0 days   Social Connections: Unknown (3/14/2025)    Social Connection and Isolation Panel [NHANES]     Frequency of Social Gatherings with Friends and Family: Once a week          Disposition Plan     Medically Ready for Discharge:Likely home tomorrow with [facility]             VIJAYA Lucas  Hospitalist Service  [facility]  Securely message with [employer] (more info)  Text page via [employer] Paging/Directory   ______________________________________________________________________    Interval History   Patient seen and examined this morning. No acute issues overnight. Doing better.     Care plan discussed with daughter Mary over the phone, and answered her questions. Mary then requested that I call patient's " Lashay GUPTA who is an RN. Shannon called and updated.     Physical Exam   Vital Signs: Temp: 97.7  F (36.5  C) Temp src: Oral BP: (!) 143/63 Pulse: 80   Resp: 18 SpO2: 95 % O2 Device: Nasal cannula Oxygen Delivery: 1 LPM  Weight: 174 lbs 11.2 oz      General: Not in obvious distress.  HEENT: NC, AT   Chest: Clear to auscultation bilaterally  Heart: S1S2 normal, regular. No M/R/G  Abdomen: Soft. NT, ND. Bowel sounds- active.  Extremities: Trace to 1+ leg swelling, left >right.   Neuro: Awake, grossly non-focal      Medical Decision Making             Data     I have personally reviewed the following data over the past 24 hrs:    15.8 (H)  \   11.2 (L)   / 393     137 99 48.2 (H) /  200 (H)   4.2 30 (H) 1.43 (H) \       Imaging results reviewed over the past 24 hrs:   No results found for this or any previous visit (from the past 24 hours).

## 2025-04-20 NOTE — PLAN OF CARE
Heart Failure Care Map  GOALS TO BE MET BEFORE DISCHARGE:    1. Decrease congestion and/or edema with diuretic therapy to achieve near optimal volume status.     Dyspnea improved: Yes, satisfactory for discharge. Improving per patient    Edema improved: No, further care required to meet this goal. Please explain Has 1+ on RLE and +2 on LLE        Last 24 hour I/O:   Intake/Output Summary (Last 24 hours) at 4/20/2025 0531  Last data filed at 4/20/2025 0505  Gross per 24 hour   Intake 1026 ml   Output 1600 ml   Net -574 ml           Net I/O and Weights since admission:   03/21 0700 - 04/20 0659  In: 2416 [P.O.:2410; I.V.:6]  Out: 3500 [Urine:3500]  Net: -1084     Vitals:    04/17/25 1354 04/18/25 1706 04/19/25 0603 04/20/25 0459   Weight: 78.9 kg (174 lb) 79.1 kg (174 lb 6.4 oz) 78.9 kg (174 lb) 79.2 kg (174 lb 11.2 oz)       2.  O2 sats > 90% on room air, or at prior home O2 therapy level.      Able to wean O2 this shift to keep sats above 90%?: No, further care required to meet this goal. Please explain On 1L oxygen at night    Does patient use Home O2? No          Current oxygenation status:   SpO2: 92 %     O2 Device: Nasal cannula, Oxygen Delivery: 1 LPM    3.  Tolerates ambulation and mobility near baseline.     Ambulation: Yes, satisfactory for discharge.   Times patient ambulated with staff this shift: 0    Please review the Heart Failure Care Map for additional HF goal outcomes.    Renuka Peters RN  4/20/2025           Goal Outcome Evaluation:  Patient is aox4. VSS except BP elevated overnight. BP improved after prn IV hydralazine x1. Patient asymptomatic with elevated BP. Has SB/SR with frequent PACs. Patient denied chest pain. Patient's tolerating fluid restriction. Voiding using purewick at night. SBA with a cane. Call-light within reach. Bed alarm on.

## 2025-04-20 NOTE — PLAN OF CARE
Heart Failure Care Map  GOALS TO BE MET BEFORE DISCHARGE:    1. Decrease congestion and/or edema with diuretic therapy to achieve near optimal volume status.     Dyspnea improved: No, further care required to meet this goal. Please explain Pt continues w/ dyspnea upon exertion.    Edema improved: Yes, satisfactory for discharge.        Last 24 hour I/O:   Intake/Output Summary (Last 24 hours) at 4/20/2025 1128  Last data filed at 4/20/2025 0505  Gross per 24 hour   Intake 906 ml   Output 1150 ml   Net -244 ml           Net I/O and Weights since admission:   03/21 1500 - 04/20 1459  In: 2416 [P.O.:2410; I.V.:6]  Out: 3500 [Urine:3500]  Net: -1084     Vitals:    04/17/25 1354 04/18/25 1706 04/19/25 0603 04/20/25 0459   Weight: 78.9 kg (174 lb) 79.1 kg (174 lb 6.4 oz) 78.9 kg (174 lb) 79.2 kg (174 lb 11.2 oz)       2.  O2 sats > 90% on room air, or at prior home O2 therapy level.      Able to wean O2 this shift to keep sats above 90%?: No, further care required to meet this goal. Please explain Pt remains on 1L, did attempt to wean off and was unsuccessful.    Does patient use Home O2? No          Current oxygenation status:   SpO2: 95 %     O2 Device: Nasal cannula, Oxygen Delivery: 1 LPM    3.  Tolerates ambulation and mobility near baseline.     Ambulation: Yes, satisfactory for discharge.   Times patient ambulated with staff this shift: 2    Pt A&Ox4, forgetful at times. Pt up SBA w/ cane to chair and bathroom. Pt denies pain, endorses dyspnea upon exertion, LS diminished, was weaned down to RA this afternoon and tolerating well. VSS, no acute changes. Hospitalist planning to discharge tomorrow pending progression.     Please review the Heart Failure Care Map for additional HF goal outcomes.    Gina Faria RN  4/20/2025

## 2025-04-20 NOTE — PROGRESS NOTES
Care Management Follow Up    Length of Stay (days): 3    Expected Discharge Date: 04/21/2025    Anticipated Discharge Plan:  Home Care    Transportation: Anticipate Family/friend    PT Recommendations: home with assist, home with home care physical therapy  OT Recommendations:  home with home care occupational therapy, home with assist (Min cog impairment-family to check on pt daily)     Barriers to Discharge: medical stability    Prior Living Situation: town home with alone    Discussed  Partnership in Safe Discharge Planning  document with patient/family: No     Handoff Completed: No, handoff not indicated or clinically appropriate    Patient/Spokesperson Updated: No    Additional Information: Chart reviewed: Per attending provider, the patient will be ready for discharge tomorrow. SW met with the patient for follow-up on HC recommendations, and she was agreeable to making a referral. She did not have a preferred home care provider. A referral was sent to [facility] for RN/PT/OT. No other needs are noted at this time. The patient notes that one of her three children will provide discharge transportation.      for Home Care to call to arrange visits   Lashay jose e Daughter-in-Law [phone]       Next Steps: Follow on HC referral.     SARINA Toney

## 2025-04-21 ENCOUNTER — TELEPHONE (OUTPATIENT)
Dept: PULMONOLOGY | Facility: CLINIC | Age: 85
End: 2025-04-21
Payer: COMMERCIAL

## 2025-04-21 ENCOUNTER — PATIENT OUTREACH (OUTPATIENT)
Dept: CARE COORDINATION | Facility: CLINIC | Age: 85
End: 2025-04-21
Payer: COMMERCIAL

## 2025-04-21 ENCOUNTER — APPOINTMENT (OUTPATIENT)
Dept: OCCUPATIONAL THERAPY | Facility: HOSPITAL | Age: 85
DRG: 291 | End: 2025-04-21
Attending: INTERNAL MEDICINE
Payer: COMMERCIAL

## 2025-04-21 VITALS
WEIGHT: 175.6 LBS | OXYGEN SATURATION: 90 % | TEMPERATURE: 97.1 F | SYSTOLIC BLOOD PRESSURE: 141 MMHG | BODY MASS INDEX: 31.11 KG/M2 | HEIGHT: 63 IN | RESPIRATION RATE: 24 BRPM | DIASTOLIC BLOOD PRESSURE: 63 MMHG | HEART RATE: 82 BPM

## 2025-04-21 DIAGNOSIS — J44.1 COPD EXACERBATION (H): Primary | ICD-10-CM

## 2025-04-21 LAB
ANION GAP SERPL CALCULATED.3IONS-SCNC: 11 MMOL/L (ref 7–15)
BUN SERPL-MCNC: 49.7 MG/DL (ref 8–23)
CALCIUM SERPL-MCNC: 8.9 MG/DL (ref 8.8–10.4)
CHLORIDE SERPL-SCNC: 101 MMOL/L (ref 98–107)
CREAT SERPL-MCNC: 1.41 MG/DL (ref 0.51–0.95)
EGFRCR SERPLBLD CKD-EPI 2021: 37 ML/MIN/1.73M2
GLUCOSE BLDC GLUCOMTR-MCNC: 143 MG/DL (ref 70–99)
GLUCOSE BLDC GLUCOMTR-MCNC: 271 MG/DL (ref 70–99)
GLUCOSE SERPL-MCNC: 173 MG/DL (ref 70–99)
HCO3 SERPL-SCNC: 29 MMOL/L (ref 22–29)
POTASSIUM SERPL-SCNC: 3.9 MMOL/L (ref 3.4–5.3)
POTASSIUM SERPL-SCNC: 3.9 MMOL/L (ref 3.4–5.3)
SODIUM SERPL-SCNC: 141 MMOL/L (ref 135–145)

## 2025-04-21 PROCEDURE — 84132 ASSAY OF SERUM POTASSIUM: CPT | Performed by: INTERNAL MEDICINE

## 2025-04-21 PROCEDURE — 99239 HOSP IP/OBS DSCHRG MGMT >30: CPT | Performed by: INTERNAL MEDICINE

## 2025-04-21 PROCEDURE — 36415 COLL VENOUS BLD VENIPUNCTURE: CPT | Performed by: INTERNAL MEDICINE

## 2025-04-21 PROCEDURE — 250N000011 HC RX IP 250 OP 636: Mod: JZ | Performed by: INTERNAL MEDICINE

## 2025-04-21 PROCEDURE — 97110 THERAPEUTIC EXERCISES: CPT | Mod: GO

## 2025-04-21 PROCEDURE — 80048 BASIC METABOLIC PNL TOTAL CA: CPT | Performed by: INTERNAL MEDICINE

## 2025-04-21 PROCEDURE — 250N000013 HC RX MED GY IP 250 OP 250 PS 637: Performed by: INTERNAL MEDICINE

## 2025-04-21 PROCEDURE — 97535 SELF CARE MNGMENT TRAINING: CPT | Mod: GO

## 2025-04-21 RX ORDER — ALBUTEROL SULFATE 90 UG/1
2 INHALANT RESPIRATORY (INHALATION) EVERY 6 HOURS PRN
Qty: 18 G | Refills: 1 | Status: SHIPPED | OUTPATIENT
Start: 2025-04-21

## 2025-04-21 RX ORDER — PHENOL 1.4 %
10 AEROSOL, SPRAY (ML) MUCOUS MEMBRANE
COMMUNITY
Start: 2025-04-21

## 2025-04-21 RX ADMIN — METOPROLOL SUCCINATE 50 MG: 50 TABLET, EXTENDED RELEASE ORAL at 08:00

## 2025-04-21 RX ADMIN — INSULIN ASPART 3 UNITS: 100 INJECTION, SOLUTION INTRAVENOUS; SUBCUTANEOUS at 12:16

## 2025-04-21 RX ADMIN — FLUTICASONE PROPIONATE 1 SPRAY: 50 SPRAY, METERED NASAL at 08:00

## 2025-04-21 RX ADMIN — HYDRALAZINE HYDROCHLORIDE 10 MG: 20 INJECTION INTRAMUSCULAR; INTRAVENOUS at 00:26

## 2025-04-21 RX ADMIN — POLYETHYLENE GLYCOL 400 AND PROPYLENE GLYCOL 1 DROP: 4; 3 SOLUTION/ DROPS OPHTHALMIC at 08:09

## 2025-04-21 RX ADMIN — Medication 10 MG: at 00:25

## 2025-04-21 RX ADMIN — INSULIN ASPART 1 UNITS: 100 INJECTION, SOLUTION INTRAVENOUS; SUBCUTANEOUS at 08:01

## 2025-04-21 RX ADMIN — Medication 1 CAPSULE: at 08:00

## 2025-04-21 ASSESSMENT — ACTIVITIES OF DAILY LIVING (ADL)
ADLS_ACUITY_SCORE: 50
ADLS_ACUITY_SCORE: 47
ADLS_ACUITY_SCORE: 50
ADLS_ACUITY_SCORE: 47
ADLS_ACUITY_SCORE: 50
ADLS_ACUITY_SCORE: 50

## 2025-04-21 NOTE — PLAN OF CARE
Goal Outcome Evaluation:      Plan of Care Reviewed With: patient    Overall Patient Progress: improvingOverall Patient Progress: improving       Vitals:    04/20/25 0730 04/20/25 1512 04/20/25 1910 04/20/25 2014   BP: (!) 143/63 (!) 164/73 (!) 140/61    BP Location: Left arm Left arm Left arm    Patient Position:       Cuff Size:       Pulse: 80 78 75 76   Resp: 18 18 18    Temp: 97.7  F (36.5  C) 97.6  F (36.4  C) 97.7  F (36.5  C)    TempSrc: Oral Oral Oral    SpO2: 95% 94% 96% (!) 90%   Weight:       Height:            Denies pain. Repositioned in chair. Placed back in bed at 2200. Dry eyes prn eye drops given. Forgetful at times. Room air. Lungs diminished. Purewick in place   Coughs up some phlegm. Med surg possible discharge in am. Aure Skinner RN

## 2025-04-21 NOTE — PROGRESS NOTES
04/21/25 1000   Home Oxygen Assessment (RN/RT ONLY)   Does patient have oxygen at home? No   1. SpO2 on room air at rest while awake 93   2. SpO2 while at rest on oxygen N/A       Oxygen LPM at rest N/A   3. SpO2 on room air during Activity/with exercise 88   4. SpO2 with oxygen during activity/with exercise N/A       Oxygen LPM during activity/with exercise N/A   Does patient qualify for Home O2? No

## 2025-04-21 NOTE — PLAN OF CARE
Goal Outcome Evaluation:      Plan of Care Reviewed With: patient    Overall Patient Progress: improvingOverall Patient Progress: improving         Vitals:    04/20/25 2014 04/20/25 2305 04/21/25 0026 04/21/25 0300   BP:   (!) 198/82 137/61   BP Location:    Right arm   Patient Position:    Semi-Merino's   Cuff Size:    Adult Regular   Pulse: 76 70 70 85   Resp:  18  18   Temp:  97.7  F (36.5  C)  97.7  F (36.5  C)   TempSrc:  Oral  Oral   SpO2: (!) 90% (!) 91% 93% (!) 90%   Weight:    80.1 kg (176 lb 9.4 oz)   Height:        Denies pain. Unable to sleep well tonight. Room air. Purewick in place. Lungs diminished. Has some shortness of breath with activity but feels much better. Forgetful at times. Plan is to discharge today. Aure Skinner RN

## 2025-04-21 NOTE — DISCHARGE SUMMARY
[facility] MEDICINE  DISCHARGE SUMMARY     Primary Care Physician: Christian López  Admission Date: 4/17/2025   Discharge Provider: VIJAYA Lucas Discharge Date: 4/21/2025   Diet: as below   Code Status: Full Code   Activity: DCACTIVITY: Activity as tolerated        Condition at Discharge: Stable     REASON FOR PRESENTATION(See Admission Note for Details)   Shortness of breath    PRINCIPAL & ACTIVE DISCHARGE DIAGNOSES     Active Problems:    Peripheral edema    Exertional dyspnea    History of smoking    COPD exacerbation (H)    Acute respiratory failure with hypoxia and hypercapnia (H)      PENDING LABS     Unresulted Labs Ordered in the Past 30 Days of this Admission       No orders found from 3/18/2025 to 4/18/2025.              PROCEDURES ( this hospitalization only)          RECOMMENDATIONS TO OUTPATIENT PROVIDER FOR F/U VISIT     Follow-up Appointments       Follow Up      Follow up with Pulm service for new diagnosis of COPD. Referral made upon discharge        Hospital Follow-up with Existing Primary Care Provider (PCP)          Schedule Primary Care visit within: 7 Days                   DISPOSITION     Home with home care    SUMMARY OF HOSPITAL COURSE:      Milagro Wallace is a 85 year old female admitted on 4/17/2025. PMH is significant for hypertension, diabetes and previous smoking who presented with progressive dyspnea on exertion associated with orthopnea, bilateral leg swelling. CT of the chest shows possible emphysema bronchiolitis.       Acute respiratory failure with Hypoxia  Progressive dyspnea on exertion   Acute heart failure with preserved EF  Chest pain  --CT chest was negative for PE. It did show some mild emphysema and mild bronchiolitis  -Trops 38-->35. EKG- sinus rhythm with frequent PACs in the form of bigemy  --Echocardiogram- normal EF. Stress test from 2008 negative for ischemia. Cardiology consulted  --Received couple of doses of IV  diuresis. Breathing is better, leg swelling is better. Held diuretics due to slight bump in creatinine. Resume home dose of lasix upon discharge  --Cont steroid x 5 days (total)- completed  --Albuterol inhaler prn     Leg swelling  --Left worse than right. Likely due to combination of venous insufficiency and CHF  --Checked d-dimer, which was elevated. Venous duplex ultrasound- negative for DVT     Previous smoker  -- Quit smoking in 2008.  Has not had any pulmonary function test or spirometry  -- Referred to pulm clinic upon discharge     Non-insulin-dependent diabetes  -- Hold metformin in the hospital and ordered sliding-scale insulin. Resumed upon discahrge     Hypertension   --Sub-optimal control  --Lasix as above. Cont home BB. Lisinopril resumed     CKD 3  -- Monitor renal function closely  --Creatinine 1.4 at the time of discharge     Mild cognitive impairment  -- SLUMS- 24/30  -- Cont melatonin prn         Peripheral neuropathy  -- Continue gabapentin     Insomnia  -- 3 mg of melatonin didn't help. Slept well witih 10mg of melatonin.  --Patient is hesitant to try any other meds     Care plan discussed with Lashay GUPTA at bedside today prior to discharge.    Discharge Medications with Med changes:     Discharge Medication List as of 4/21/2025 12:40 PM        START taking these medications    Details   albuterol (PROAIR HFA/PROVENTIL HFA/VENTOLIN HFA) 108 (90 Base) MCG/ACT inhaler Inhale 2 puffs into the lungs every 6 hours as needed for shortness of breath, wheezing or cough., Disp-18 g, R-1, E-PrescribePharmacy may dispense brand covered by insurance (Proair, or proventil or ventolin or generic albuterol inhaler)      melatonin 10 MG TABS tablet Take 1 tablet (10 mg) by mouth nightly as needed for sleep., OTC           CONTINUE these medications which have NOT CHANGED    Details   ASPIRIN 81 MG OR TABS Take 81 mg by mouth daily., Historical      Cyanocobalamin (VITAMIN B-12 PO) Take 1,000 mcg by mouth  twice a week. Take only on Monday and Friday, Historical      fluticasone (FLONASE) 50 MCG/ACT nasal spray INHALE 1-2 SPRAYS INTO EACH NOSTRIL DAILY, Disp-48 mL, R-3, E-Prescribe      furosemide (LASIX) 40 MG tablet Take 1 tablet (40 mg) by mouth daily., Disp-90 tablet, R-3, E-Prescribe      gabapentin (NEURONTIN) 100 MG capsule Take 2 capsules (200 mg) by mouth at bedtime., Disp-180 capsule, R-3, E-Prescribe      lisinopril (ZESTRIL) 40 MG tablet Take 1 tablet (40 mg) by mouth daily., Disp-90 tablet, R-3, E-Prescribe      metFORMIN (GLUCOPHAGE) 500 MG tablet Take 1 tablet (500 mg) by mouth 2 times daily (with meals)., Disp-180 tablet, R-1, E-Prescribe      metoprolol succinate ER (TOPROL XL) 50 MG 24 hr tablet Take 1 tablet (50 mg) by mouth daily., Disp-90 tablet, R-2, E-Prescribe      MULTIPLE VITAMIN PO Take 1 tablet by mouth daily., Historical      Multiple Vitamins-Minerals (PRESERVISION AREDS 2) CAPS Take 1 capsule by mouth daily., Historical      simvastatin (ZOCOR) 20 MG tablet Take 1 tablet (20 mg) by mouth at bedtime., Disp-90 tablet, R-3, E-Prescribe      triamcinolone (KENALOG) 0.1 % external cream Apply topically 2 times daily. Apply to affected area (left inguinal crease) twice daily.  Do not use for more than 14 days.Disp-30 g, Q-[id number]-Gurvyikby      blood glucose (NO BRAND SPECIFIED) test strip Use to test blood sugar 1-2 times daily or as directed. To accompany: Blood Glucose Monitor Brands: per insurance., Disp-50 strip, R-6, E-Prescribe      thin (NO BRAND SPECIFIED) lancets Use to test blood sugar 1-2 times daily or as directed. To accompany: Blood Glucose Monitor Brands: per insurance., Disp-50 each, R-5, E-Prescribe                   Rationale for medication changes:      Please see above        Consults   Cardiology       Immunizations given this encounter     Most Recent Immunizations   Administered Date(s) Administered    COVID-19 12+ (Pfizer) 10/16/2023    COVID-19 Bivalent 18+ (Moderna)  11/02/2022    COVID-19 Monovalent 18+ (Moderna) 05/11/2022    Influenza (High Dose) Trivalent,PF (Fluzone) 11/29/2024    Influenza (IIV3) PF 08/19/2009    Influenza Vaccine 65+ (Fluzone HD) 10/16/2023    Pneumo Conj 13-V (2010&after) 03/11/2024    Pneumococcal 23 valent 08/19/2009    TD,PF 7+ (Tenivac) 09/06/2006    TDAP Vaccine (Adacel) 09/01/2016   Deferred Date(s) Deferred    COVID-19 12+ (Pfizer) 11/29/2024           Anticoagulation Information      Recent INR results:   Recent Labs   Lab 04/17/25  1422   INR 1.14     Warfarin doses (if applicable) or name of other anticoagulant: NA      SIGNIFICANT IMAGING FINDINGS     Results for orders placed or performed during the hospital encounter of 04/17/25   CT Chest Pulmonary Embolism w Contrast    Impression    IMPRESSION:  1.  No pulmonary artery embolism.  2.  Mild emphysema and moderate bronchiolitis.  3.  No pneumonic consolidation or pleural effusion.     US Lower Extremity Venous Duplex Bilateral    Impression    IMPRESSION:  1.  No deep venous thrombosis in the bilateral lower extremities.       SIGNIFICANT LABORATORY FINDINGS     Most Recent 3 CBC's:  Recent Labs   Lab Test 04/20/25  0445 04/19/25  0430 04/17/25  1422   WBC 15.8* 16.9* 9.8   HGB 11.2* 11.1* 11.8   MCV 91 90 93    388 382     Most Recent 3 BMP's:  Recent Labs   Lab Test 04/21/25  1155 04/21/25  0750 04/21/25  0422 04/20/25  0732 04/20/25  0445 04/19/25  0843 04/19/25  0430   NA  --   --  141  --  137  --  138   POTASSIUM  --   --  3.9  3.9  --  4.2  --  4.2   CHLORIDE  --   --  101  --  99  --  97*   CO2  --   --  29  --  30*  --  31*   BUN  --   --  49.7*  --  48.2*  --  41.4*   CR  --   --  1.41*  --  1.43*  --  1.48*   ANIONGAP  --   --  11  --  8  --  10   TERESA  --   --  8.9  --  8.9  --  9.0   * 143* 173*   < > 172*   < > 201*    < > = values in this interval not displayed.     Most Recent 2 LFT's:  Recent Labs   Lab Test 04/17/25  1422 11/29/24  1532 03/11/24  1304  "10/16/23  1310 09/28/22  1445   AST 25  --  30  --  30   ALT 13 14 17   < > 12   ALKPHOS 104  --   --   --  93   BILITOTAL 0.3  --   --   --  0.2    < > = values in this interval not displayed.     Most Recent 3 INR's:  Recent Labs   Lab Test 04/17/25  1422   INR 1.14         Discharge Orders        Adult Pulmonary Medicine  Referral      Home Care Referral      Reason for your hospital stay    Shortness of breath     Activity    Your activity upon discharge: activity as tolerated     Follow Up    Follow up with Pulm service for new diagnosis of COPD. Referral made upon discharge     Diet    Follow this diet upon discharge: Current Diet:Orders Placed This Encounter      Fluid restriction 1800 ML FLUID      2 Gram Sodium Diet     Hospital Follow-up with Existing Primary Care Provider (PCP)            Examination   BP (!) 141/63 (BP Location: Right arm)   Pulse 82   Temp 97.1  F (36.2  C)   Resp 24   Ht 1.588 m (5' 2.5\")   Wt 79.7 kg (175 lb 9.6 oz)   LMP 09/25/1979 (Approximate)   SpO2 (!) 90%   BMI 31.61 kg/m        General: Not in obvious distress.  HEENT: NC, AT   Chest: Clear to auscultation bilaterally  Heart: S1S2 normal, regular. No M/R/G  Abdomen: Soft. NT, ND. Bowel sounds- active.  Extremities: 1+ leg swelling, L>R  Neuro: Alert and awake, grossly non-focal      Please see EMR for more detailed significant labs, imaging, consultant notes etc.    I, VIJAYA Lucas, personally saw the patient today and spent greater than 30 minutes discharging this patient.    VIJAYA Lucas  [facility]    CC:Christian López    "

## 2025-04-21 NOTE — PROGRESS NOTES
04/21/25 0942   Appointment Info   Signing Clinician's Name / Credentials (OT) Mehnaz Bishop OT   Self-Care/Home Management   Self-Care/Home Mgmt/ADL, Compensatory, Meal Prep Minutes (78766) 12   Symptoms Noted During/After Treatment (Meal Preparation/Planning Training) none   Treatment Detail/Skilled Intervention CHF education-stoplight tool, daily weights, transfers CGA w/cues to use hands for a safe transfer, G/H CGA standing at sink, LB dress CGA due to decreased standing balance   Therapeutic Procedures/Exercise   Therapeutic Procedure: strength, endurance, ROM, flexibillity minutes (47955) 10   Symptoms Noted During/After Treatment fatigue   Treatment Detail/Skilled Intervention see amb   Treatment Time Includes (CR Only) See specific exercise details intervention group(s)   Ambulation   Workload 200'   Symptoms Denies symptoms   Cardiovascular Response Normal   Exercise Details CGA w/amb w/a RW at a slow pace   Vital Signs Details before /63, HR 80, O2 92 after /57, HR78, O2 95   Cardiac Education   Education Provided Daily weights;Stop light tool   Education Packet Given to Patient Yes   All Patient Education Handouts Reviewed with Patient and/or Family Yes   OT Discharge Planning   OT Plan transfers/toileting, G/H, LB dress, CHF ed, walks   OT Discharge Recommendation (DC Rec) home with home care occupational therapy;home with assist   OT Rationale for DC Rec Pt ambulating/performing ADLs close to baseline. Pt slightly deconditioned and has baseline cognitive deficits. Pt would benefit from home care therapy and daily check-ins from family.   OT Brief overview of current status CGA w/ADLs/mobility

## 2025-04-21 NOTE — PROGRESS NOTES
Care Management Discharge Note    Discharge Date: 04/21/2025       Discharge Disposition: Home Care    Discharge Services: SN, PT/OT    Discharge DME: None    Discharge Transportation: family or friend will provide    Private pay costs discussed: Not applicable    Does the patient's insurance plan have a 3 day qualifying hospital stay waiver?  No    Education Provided on the Discharge Plan: Yes  Persons Notified of Discharge Plans: Patient, family, home care agency  Patient/Family in Agreement with the Plan:  Yes    Handoff Referral Completed: No, handoff not indicated or clinically appropriate    Additional Information:  Patient is ready for discharge today and was accepted by [facility] care [place]. Patient and family had questions when the services will start. MARIBELL inform Milagro that home care agency will contact her to set up the visit, and usually starts with in 24 - 48 years from discharge. SW offered to call the agency to verify exact time, but patient thought that is sufficient information.   Patient reported that [facility] with [facility], but she declined  her services.  Family will provide transport back home. Patient and family had no further questions.  MARIBELL faxed discharge orders to [facility].    Zaina Hess, ALEXEYW

## 2025-04-21 NOTE — PLAN OF CARE
Occupational Therapy Discharge Summary    Reason for therapy discharge:    Discharged to home with home therapy.    Progress towards therapy goal(s). See goals on Care Plan in [facility] electronic health record for goal details.  Goals not met.  Barriers to achieving goals:   discharge from facility.    Therapy recommendation(s):    Continued therapy is recommended.  Rationale/Recommendations:  Pt is going home w/ Home OT and family support.

## 2025-04-21 NOTE — PROGRESS NOTES
SPIRITUAL HEALTH SERVICES Progress Note    Saw pt Milagro Wallace and offered [employer]ology sacrament of anointing for the healing of the sick.     Fr. Tomas Hugo

## 2025-04-21 NOTE — TELEPHONE ENCOUNTER
[facility] Call Center    Phone Message    May a detailed message be left on voicemail: yes     Reason for Call: Appointment Intake    Referring Provider Name: Dr. Rayray Norris  Diagnosis and/or Symptoms: COPD    Pt is scheduled to establish care 6/2/25 with Dr. Luis Manuel Blair, needs orders placed for PFT's (pended).    Action Taken: Other: Pulm    Travel Screening: Not Applicable

## 2025-04-21 NOTE — PLAN OF CARE
Problem: Adult Inpatient Plan of Care  Goal: Plan of Care Review  Description: The Plan of Care Review/Shift note should be completed every shift.  The Outcome Evaluation is a brief statement about your assessment that the patient is improving, declining, or no change.  This information will be displayed automatically on your shiftnote.  Outcome: Adequate for Care Transition     VSS. Denies pain or SOB. Up with SBA and pts cane. BG covered.     Home O2 Assessment completed see previous note).    Plan: patient to discharge home with family transport this afternoon. AVS review with pt and family.

## 2025-04-21 NOTE — PROGRESS NOTES
[facility]  Hospital Monitoring    Clinical Data: Patient was identified from [facility] payor report and is currently admitted to the inpatient facility below:    Facility Name:  [facility]    Assessment/Plan: Patient will be monitored by Ambulatory Care Management to identify patient's discharge plans/need. Care  will review chart every 2-3 days to monitor for discharge date and disposition.    Megan Curtis  [facility]

## 2025-04-21 NOTE — PROGRESS NOTES
SPIRITUAL HEALTH SERVICES Progress Note    Saw pt Milagro Wallace and offered Gnosticist sacrament of anointing for the healing of the sick.     Fr. Tomas Hugo

## 2025-04-22 ENCOUNTER — TELEPHONE (OUTPATIENT)
Dept: FAMILY MEDICINE | Facility: CLINIC | Age: 85
End: 2025-04-22
Payer: COMMERCIAL

## 2025-04-22 ENCOUNTER — PATIENT OUTREACH (OUTPATIENT)
Dept: CARE COORDINATION | Facility: CLINIC | Age: 85
End: 2025-04-22
Payer: COMMERCIAL

## 2025-04-22 LAB
ATRIAL RATE - MUSE: 79 BPM
DIASTOLIC BLOOD PRESSURE - MUSE: NORMAL MMHG
INTERPRETATION ECG - MUSE: NORMAL
P AXIS - MUSE: 93 DEGREES
PR INTERVAL - MUSE: 172 MS
QRS DURATION - MUSE: 64 MS
QT - MUSE: 396 MS
QTC - MUSE: 398 MS
R AXIS - MUSE: 61 DEGREES
SYSTOLIC BLOOD PRESSURE - MUSE: NORMAL MMHG
T AXIS - MUSE: 54 DEGREES
VENTRICULAR RATE- MUSE: 61 BPM

## 2025-04-22 NOTE — TELEPHONE ENCOUNTER
Left message to call back for: Patient  Information to relay to patient: Please assist patient with scheduling hospital follow up visit within 7 days per discharge instructions

## 2025-04-22 NOTE — PROGRESS NOTES
[facility]  Hospital Discharge    Clinical Data: Per chart review, patient has discharged from [facility] to home/community setting.    Assessment/Plan: Transitional Care Management (TCM) program initiated to prompt post-discharge outreach call by CCR team member.     Megan Curtis  [facility] Care Resource [facility]

## 2025-04-23 ENCOUNTER — PATIENT OUTREACH (OUTPATIENT)
Dept: CARE COORDINATION | Facility: CLINIC | Age: 85
End: 2025-04-23
Payer: COMMERCIAL

## 2025-04-23 ENCOUNTER — TELEPHONE (OUTPATIENT)
Dept: FAMILY MEDICINE | Facility: CLINIC | Age: 85
End: 2025-04-23
Payer: COMMERCIAL

## 2025-04-23 NOTE — TELEPHONE ENCOUNTER
Left message x2  Information to relay to patient: Please assist patient with scheduling hospital follow up visit within 7 days per discharge instructions

## 2025-04-23 NOTE — TELEPHONE ENCOUNTER
Home Care is calling regarding an established patient with [facility].  Requesting orders from: Christian López RN APPROVED: RN able to provide verbal orders.  Home Care will send orders for signature.  RN will close encounter.  Is this a request for a temporary pause in the home care episode?  No    Orders Requested    Skilled Nursing  Request for initial certification (first set of orders)   Frequency: 2x/week for 1 week, 1 week for 4 weeks and 1xEOW for 3 weeks  RN gave verbal order: Yes      Physical Therapy  Request for initial evaluation and treatment (one time)   RN gave verbal order: Yes      Phone number Home Care can be reached at: [phone] Remedios Echevarria to leave a detailed message?: Yes    Contacts       Contact Date/Time Type Contact Phone/Fax    04/23/2025 09:23 AM CDT Phone (Incoming) Remedios CONTI from [facility] [phone]          Aure Ellis, RN

## 2025-04-23 NOTE — PROGRESS NOTES
Connected Care Resource Center: [facility]    Background: Transitional Care Management program identified per system criteria and reviewed by [facility] Resource [place] team for possible outreach.    Assessment: Upon chart review, CCR Team member will not proceed with patient outreach related to this episode of Transitional Care Management program due to reason below:    Patient has active communication with a nurse, provider or care team for reason of post-hospital follow up plan.  Outreach call by CCRC team not indicated to minimize duplicative efforts.     Plan: Transitional Care Management episode addressed appropriately per reason noted above.      Verito Galindo  Community Health Worker  [facility]  [phone]     *Connected Care Resource Team does NOT follow patient ongoing. Referrals are identified based on internal discharge reports and the outreach is to ensure patient has an understanding of their discharge instructions.

## 2025-04-24 NOTE — TELEPHONE ENCOUNTER
Left message x3. [employer] is not active. See forms request in separate encounter, routed to provider for visit clarification.

## 2025-04-28 ENCOUNTER — TELEPHONE (OUTPATIENT)
Dept: FAMILY MEDICINE | Facility: CLINIC | Age: 85
End: 2025-04-28
Payer: COMMERCIAL

## 2025-04-28 NOTE — TELEPHONE ENCOUNTER
Home Care is calling regarding an established patient with [facility].  Requesting orders from: Christian López RN APPROVED: RN able to provide verbal orders.  Home Care will send orders for signature.  RN will close encounter.  Is this a request for a temporary pause in the home care episode?  No    Orders Requested    Physical Therapy  Request for initial certification (first set of orders)   Frequency: 1xWx4W, [id number]  RN gave verbal order: Yes        Phone number Home Care can be reached at: [phone]  Okay to leave a detailed message?: Yes    Contacts       Contact Date/Time Type Contact Phone/Fax    04/28/2025 01:50 PM CDT Phone (Incoming) MARTHA Hairston (Home Care) [phone]     [facility]          Jerome Pate RN

## 2025-05-01 DIAGNOSIS — I10 ESSENTIAL (PRIMARY) HYPERTENSION: ICD-10-CM

## 2025-05-01 DIAGNOSIS — E78.5 HYPERLIPIDEMIA LDL GOAL <100: ICD-10-CM

## 2025-05-01 RX ORDER — SIMVASTATIN 20 MG
20 TABLET ORAL AT BEDTIME
Qty: 90 TABLET | Refills: 2 | OUTPATIENT
Start: 2025-05-01

## 2025-05-01 RX ORDER — METOPROLOL SUCCINATE 50 MG/1
50 TABLET, EXTENDED RELEASE ORAL DAILY
Qty: 90 TABLET | Refills: 2 | OUTPATIENT
Start: 2025-05-01

## 2025-05-01 NOTE — TELEPHONE ENCOUNTER
03/14/2025   metoprolol succinate ER (TOPROL XL) 50 MG 24 hr tablet   Dispense: 90 tablet  Refills: 2 ordered    03/14/2025   simvastatin (ZOCOR) 20 MG tablet        20 mg, AT BEDTIME  Dispense: 90 tablet  Refills: 3 ordered

## 2025-05-07 ENCOUNTER — TELEPHONE (OUTPATIENT)
Dept: FAMILY MEDICINE | Facility: CLINIC | Age: 85
End: 2025-05-07
Payer: COMMERCIAL

## 2025-05-07 NOTE — TELEPHONE ENCOUNTER
So if I am understanding correctly it sounds like asymptomatic patient with new drop in oxygen saturation.  Would recommend urgent care to double check oxygen saturation and evaluate further.  Of note patients do not always have shortness of breath with hypoxemia and so that cannot be relied upon.  The drop in O2 sat from baseline, is concerning to me and maybe not a big deal but does warrant further evaluation before that can be determined.

## 2025-05-07 NOTE — TELEPHONE ENCOUNTER
Called and left detailed message for MAINE Lewis at [facility] with Dr. Zavala's instructions.  Encouraged to call back with any questions.      Amanda Marie RN  [facility]

## 2025-05-07 NOTE — TELEPHONE ENCOUNTER
S-(situation):   Home care physical therapist assistance updating PCP on patients O2 sat today. 88-90 %. Home care protocol to update PCP if O2 is less than 92%.    B-(background):   Patients previous O2 sats with home care have consistently been between 90-95%.     Prior to PTA arriving for patients PT appt. Patient checked their own O2- 80%. Patients daughter was with patient at the time and was able to slightly raise to 84%.     04/17/2025: ED. Dx: COPD    A-(assessment):   PT was able to increase O2 sat to 88-90 post deep breathing, chair yoga, pursed lip breathing, etc.    Patient continues to have productive cough.    R-(recommendations):   Does PCP have any concerns with O2 sat 88-90 if no SOB?      Patient has follow up appt with PCP on Monday, 05/12/2025.    Belinda Valentin RN

## 2025-05-12 ENCOUNTER — DOCUMENTATION ONLY (OUTPATIENT)
Dept: OTHER | Facility: CLINIC | Age: 85
End: 2025-05-12

## 2025-05-12 ENCOUNTER — ANCILLARY PROCEDURE (OUTPATIENT)
Dept: GENERAL RADIOLOGY | Facility: CLINIC | Age: 85
End: 2025-05-12
Attending: FAMILY MEDICINE
Payer: COMMERCIAL

## 2025-05-12 ENCOUNTER — OFFICE VISIT (OUTPATIENT)
Dept: FAMILY MEDICINE | Facility: CLINIC | Age: 85
End: 2025-05-12
Payer: COMMERCIAL

## 2025-05-12 ENCOUNTER — TRANSFERRED RECORDS (OUTPATIENT)
Dept: HEALTH INFORMATION MANAGEMENT | Facility: CLINIC | Age: 85
End: 2025-05-12

## 2025-05-12 VITALS
DIASTOLIC BLOOD PRESSURE: 68 MMHG | WEIGHT: 172.3 LBS | SYSTOLIC BLOOD PRESSURE: 112 MMHG | HEIGHT: 63 IN | RESPIRATION RATE: 20 BRPM | HEART RATE: 86 BPM | OXYGEN SATURATION: 88 % | TEMPERATURE: 97.8 F | BODY MASS INDEX: 30.53 KG/M2

## 2025-05-12 DIAGNOSIS — Z09 HOSPITAL DISCHARGE FOLLOW-UP: Primary | ICD-10-CM

## 2025-05-12 DIAGNOSIS — J44.1 COPD EXACERBATION (H): ICD-10-CM

## 2025-05-12 DIAGNOSIS — M25.552 BILATERAL HIP PAIN: ICD-10-CM

## 2025-05-12 DIAGNOSIS — E11.22 TYPE 2 DIABETES MELLITUS WITH STAGE 3A CHRONIC KIDNEY DISEASE, WITHOUT LONG-TERM CURRENT USE OF INSULIN (H): ICD-10-CM

## 2025-05-12 DIAGNOSIS — N18.31 TYPE 2 DIABETES MELLITUS WITH STAGE 3A CHRONIC KIDNEY DISEASE, WITHOUT LONG-TERM CURRENT USE OF INSULIN (H): ICD-10-CM

## 2025-05-12 DIAGNOSIS — R60.0 PERIPHERAL EDEMA: ICD-10-CM

## 2025-05-12 DIAGNOSIS — M25.551 BILATERAL HIP PAIN: ICD-10-CM

## 2025-05-12 PROBLEM — J96.02 ACUTE RESPIRATORY FAILURE WITH HYPOXIA AND HYPERCAPNIA (H): Status: RESOLVED | Noted: 2025-04-17 | Resolved: 2025-05-12

## 2025-05-12 PROBLEM — J96.01 ACUTE RESPIRATORY FAILURE WITH HYPOXIA AND HYPERCAPNIA (H): Status: RESOLVED | Noted: 2025-04-17 | Resolved: 2025-05-12

## 2025-05-12 PROCEDURE — G2211 COMPLEX E/M VISIT ADD ON: HCPCS | Performed by: FAMILY MEDICINE

## 2025-05-12 PROCEDURE — 3074F SYST BP LT 130 MM HG: CPT | Performed by: FAMILY MEDICINE

## 2025-05-12 PROCEDURE — 99214 OFFICE O/P EST MOD 30 MIN: CPT | Performed by: FAMILY MEDICINE

## 2025-05-12 PROCEDURE — 1111F DSCHRG MED/CURRENT MED MERGE: CPT | Performed by: FAMILY MEDICINE

## 2025-05-12 PROCEDURE — 3078F DIAST BP <80 MM HG: CPT | Performed by: FAMILY MEDICINE

## 2025-05-12 RX ORDER — FLUTICASONE FUROATE, UMECLIDINIUM BROMIDE AND VILANTEROL TRIFENATATE 100; 62.5; 25 UG/1; UG/1; UG/1
1 POWDER RESPIRATORY (INHALATION) DAILY
Qty: 30 EACH | Refills: 1 | Status: SHIPPED | OUTPATIENT
Start: 2025-05-12

## 2025-05-12 NOTE — PROGRESS NOTES
Assessment & Plan   Problem List Items Addressed This Visit       Bilateral hip pain    This is actually the patient's chief complaint today.  Over the past month or 2 she has had pain in her hips.  On exam palpation of the greater trochanter actually is quite painful although she has no symptoms at nighttime.  Tylenol in the morning has been very effective at alleviating her pain.  X-ray today consistent with osteoarthritis.  - For now, continue acetaminophen 500 to 1000 mg every morning.  - If her symptoms worsen or if she has nocturnal symptoms I would refer her to sports medicine.  I suspect she would benefit from steroid injections into the hips.         Relevant Orders    XR Hip Left 2-3 Views    XR Hip Right 2-3 Views    XR Pelvis 1/2 Views    COPD exacerbation (H)    The patient continues to use albuterol twice per day.  She says this helps quite a bit with her breathing.  Home O2 remains a bit low.  She is never been on any structured COPD therapies.  Given her symptoms and her subjective improvement while on albuterol I think starting medication would be appropriate.  There is a question of whether or not she would be able to do the inhaler itself.  - Trial of Trelegy.  This was sent to pharmacy.  We discussed that this may be prohibitively expensive and they should let us know what the cost is.  - Patient is aware of appointment on 6/2 with Dr. Blair at the pulmonary clinic         Relevant Medications    Fluticasone-Umeclidin-Vilant (TRELEGY ELLIPTA) 100-62.5-25 MCG/ACT oral inhaler    Other Relevant Orders    [employer] Order for DME - ONLY FOR DME    Peripheral edema    Some puffiness of lower extremities.  Taking furosemide.         Relevant Medications    Fluticasone-Umeclidin-Vilant (TRELEGY ELLIPTA) 100-62.5-25 MCG/ACT oral inhaler    Type 2 diabetes mellitus with stage 3 chronic kidney disease, without long-term current use of insulin (H)    Relevant Orders    [employer] Order for DME - ONLY FOR  "DME     Other Visit Diagnoses         Hospital discharge follow-up    -  Primary    Relevant Orders    [employer] Order for DME - ONLY FOR DME           32 minutes spent by me on the date of the encounter doing chart review, history and exam, documentation and further activities per the note    Results of x-ray communicated via phone to daughter Mary.     MED REC REQUIRED  Post Medication Reconciliation Status:  Discharge medications reconciled and changed, see notes/orders    The longitudinal plan of care for the diagnosis(es)/condition(s) as documented were addressed during this visit. Due to the added complexity in care, I will continue to support Milagro in the subsequent management and with ongoing continuity of care.    Subjective   Milagro is a 85 year old, presenting for the following health issues:  Hospital F/U, Musculoskeletal Problem (Hip pain), and Forms (FMLA form )        5/12/2025    10:33 AM   Additional Questions   Roomed by xl   Accompanied by Daughter     Oxygen saturation   - taking albuterol twice daily. It helps   - she uses IS daily      [employer]: The patient's daughter is concerned that she would not be able to call for help given her frailty overall she may need a lifeline type device.    Hip pain:   - tylenol helps   - \"2 broken hips.\"     - she wonder about arthritis.   - pain for months.            Hospital Follow-up Visit:    Hospital/Nursing Home/IP Rehab Facility: [facility]  Most Recent Admission Date: 4/17/2025   Most Recent Admission Diagnosis: Peripheral edema - R60.0  Exertional dyspnea - R06.09  History of smoking - Z87.891  COPD exacerbation (H) - J44.1  Acute respiratory failure with hypoxia and hypercapnia (H) - J96.01, J96.02     Most Recent Discharge Date: 4/21/2025   Most Recent Discharge Diagnosis: Acute respiratory failure with hypoxia and hypercapnia (H) - J96.01, J96.02  COPD exacerbation (H) - J44.1  Exertional dyspnea - R06.09  Peripheral edema " "- R60.0  History of smoking - Z87.891  Other insomnia - G47.09   Was the patient in the ICU or did the patient experience delirium during hospitalization?  Yes     Do you have any other stressors you would like to discuss with your provider? Health Concerns    Problems taking medications regularly:  None  Medication changes since discharge: none  Problems adhering to non-medication therapy:  None    Summary of hospitalization:  [facility] discharge summary reviewed  Diagnostic Tests/Treatments reviewed.  Follow up needed: COPD evaluation  Other Healthcare Providers Involved in Patient s Care:         Specialist appointment - Dr. Blair  Update since discharge: improved.   Plan of care communicated with patient and family         Objective    /68 (BP Location: Left arm, Patient Position: Sitting, Cuff Size: Adult Large)   Pulse 86   Temp 97.8  F (36.6  C) (Oral)   Resp 20   Ht 1.588 m (5' 2.5\")   Wt 78.2 kg (172 lb 4.8 oz)   LMP 09/25/1979 (Approximate)   SpO2 (!) 88%   BMI 31.01 kg/m    Body mass index is 31.01 kg/m .  Physical Exam  Nursing note reviewed.   Constitutional:       General: She is not in acute distress.     Appearance: Normal appearance. She is not ill-appearing.   HENT:      Head: Normocephalic and atraumatic.   Eyes:      Extraocular Movements: Extraocular movements intact.      Conjunctiva/sclera: Conjunctivae normal.   Pulmonary:      Effort: Pulmonary effort is normal.   Musculoskeletal:      Comments: Patient walks with cane.  The greater trochanters are very painful when palpated.   Neurological:      Mental Status: She is alert and oriented to person, place, and time.   Psychiatric:         Attention and Perception: Attention normal.         Mood and Affect: Mood normal.         Speech: Speech normal.         Thought Content: Thought content normal.          Hip x-ray: Bilateral osteoarthritis of the hips.  Bone spurs.  Femoral artery with calcifications.  My " interpretation           Signed Electronically by: Christian López MD

## 2025-05-12 NOTE — ASSESSMENT & PLAN NOTE
The patient continues to use albuterol twice per day.  She says this helps quite a bit with her breathing.  Home O2 remains a bit low.  She is never been on any structured COPD therapies.  Given her symptoms and her subjective improvement while on albuterol I think starting medication would be appropriate.  There is a question of whether or not she would be able to do the inhaler itself.  - Trial of Trelegy.  This was sent to pharmacy.  We discussed that this may be prohibitively expensive and they should let us know what the cost is.  - Patient is aware of appointment on 6/2 with Dr. Blair at the pulmonary clinic

## 2025-05-12 NOTE — ASSESSMENT & PLAN NOTE
This is actually the patient's chief complaint today.  Over the past month or 2 she has had pain in her hips.  On exam palpation of the greater trochanter actually is quite painful although she has no symptoms at nighttime.  Tylenol in the morning has been very effective at alleviating her pain.  X-ray today consistent with osteoarthritis.  - For now, continue acetaminophen 500 to 1000 mg every morning.  - If her symptoms worsen or if she has nocturnal symptoms I would refer her to sports medicine.  I suspect she would benefit from steroid injections into the hips.

## 2025-05-14 ENCOUNTER — RESULTS FOLLOW-UP (OUTPATIENT)
Dept: FAMILY MEDICINE | Facility: CLINIC | Age: 85
End: 2025-05-14
Payer: COMMERCIAL

## 2025-05-15 ENCOUNTER — TELEPHONE (OUTPATIENT)
Dept: FAMILY MEDICINE | Facility: CLINIC | Age: 85
End: 2025-05-15
Payer: COMMERCIAL

## 2025-05-15 NOTE — TELEPHONE ENCOUNTER
Status Update    Who is Calling: SUSHILA Murillo [facility]    Update:     1- Patient's O2 is at 90%. Required to update physician when O2 sats drop below 92%.     2- RN would like to request an adjustment to oxygen parameters. States patient's O2 is chronically low so request to notify provider when O2 drops below 88% and/or when patient becomes symptomatic.     Does caller want a call/response back: Yes     Okay to leave a detailed message?: Yes at Other phone number: [phone]

## 2025-05-19 NOTE — TELEPHONE ENCOUNTER
Notified SUSHILA Murillo of providers agreement of requested orders via detailed vm. Encouraged call back with questions/concerns.

## 2025-05-23 ENCOUNTER — MEDICAL CORRESPONDENCE (OUTPATIENT)
Dept: HEALTH INFORMATION MANAGEMENT | Facility: CLINIC | Age: 85
End: 2025-05-23
Payer: COMMERCIAL

## 2025-06-02 ENCOUNTER — OFFICE VISIT (OUTPATIENT)
Dept: PULMONOLOGY | Facility: CLINIC | Age: 85
End: 2025-06-02
Payer: COMMERCIAL

## 2025-06-02 VITALS
DIASTOLIC BLOOD PRESSURE: 80 MMHG | SYSTOLIC BLOOD PRESSURE: 126 MMHG | WEIGHT: 173 LBS | HEART RATE: 68 BPM | HEIGHT: 63 IN | BODY MASS INDEX: 30.65 KG/M2 | OXYGEN SATURATION: 96 %

## 2025-06-02 DIAGNOSIS — J44.9 CHRONIC OBSTRUCTIVE PULMONARY DISEASE, UNSPECIFIED COPD TYPE (H): Primary | ICD-10-CM

## 2025-06-02 DIAGNOSIS — J44.1 COPD EXACERBATION (H): ICD-10-CM

## 2025-06-02 LAB — HGB BLD-MCNC: 10.8 G/DL

## 2025-06-02 PROCEDURE — 94729 DIFFUSING CAPACITY: CPT

## 2025-06-02 PROCEDURE — 85018 HEMOGLOBIN: CPT | Mod: QW

## 2025-06-02 PROCEDURE — 94726 PLETHYSMOGRAPHY LUNG VOLUMES: CPT

## 2025-06-02 PROCEDURE — 94060 EVALUATION OF WHEEZING: CPT

## 2025-06-02 RX ORDER — ALBUTEROL SULFATE 90 UG/1
2 INHALANT RESPIRATORY (INHALATION) EVERY 6 HOURS PRN
Qty: 18 G | Refills: 11 | Status: SHIPPED | OUTPATIENT
Start: 2025-06-02

## 2025-06-02 RX ORDER — FLUTICASONE FUROATE, UMECLIDINIUM BROMIDE AND VILANTEROL TRIFENATATE 100; 62.5; 25 UG/1; UG/1; UG/1
1 POWDER RESPIRATORY (INHALATION) DAILY
Qty: 30 EACH | Refills: 11 | Status: SHIPPED | OUTPATIENT
Start: 2025-06-02

## 2025-06-02 NOTE — LETTER
6/2/2025      Milagro Wallace  [address] [address]      Dear Colleague,    Thank you for referring your patient, Milagro Wallace, to the [facility]. Please see a copy of my visit note below.    Pulmonary Clinic Outpatient Consultation    Assessment and Plan:  85 year old female former smoker with a history of DM2, HTN, CKD, obesity, HFpEF, emphysema, possible COPD, presenting for evaluation.    COPD: Milagro likely has COPD, though technically her FEV1/FVC ratio is greater than the demographically adjusted lower limit of normal, albeit less than 0.7. She does have air trapping and DLCO reduction which go along with emphysema, though her recent hospitalization was likely primarily related to acute decompensated heart failure with HFpEF. She feels subjective benefit from low-dose fluticasone-umeclidinium-vilanterol, which we will continue. Has albuterol for as-needed use. No indication for supplemental oxygen. I did recommend pulmonary rehabilitation, which she declines at this point.    Plan:  - continue low-dose fluticasone-umeclidinium-vilanterol one inhalation daily; rinse/gargle/spit water after use  - albuterol HFA as needed  - offered pulmonary rehabilitation, which she declines  - no indication for supplemental oxygen  - ongoing sodium restriction and diuresis  - recommend remaining up to date with respiratory vaccinations  - follow up in 6 months  - encouraged her to contact us with questions or concerning symptoms    Luis Manuel Blair MD  [facility]  Office [phone]  he/him    CCx: COPD    HPI: 85 year old female former smoker with a history of DM2, HTN, CKD, obesity, HFpEF, emphysema, possible COPD, presenting for evaluation. The patient was hospitalized for 4 nights in April 2025 at [facility] with bilateral leg edema, dyspnea, found to have ADHF with HFpEF, also had mild emphysema on CT and some bronchial thickening so treated with  prednisone burst and referred here on discharge. Started smoking age [age], up to 1 ppd, quit in . Sister has COPD. Was started on low-dose Trelegy less than a month ago, feels some benefit from it. PFTs as detailed below.    ROS:  A 12-system review was obtained and was negative with the exception of the symptoms endorsed in the history of present illness.    PMH:  former smoker with a history of DM2, HTN, CKD, obesity, HFpEF, emphysema, possible COPD, presenting for evaluation. The patient was hospitalized for 4 nights in 2025 at [facility] with bilateral leg edema, dyspnea, found to have ADHF with HFpEF    PSH:  Past Surgical History:   Procedure Laterality Date     COLONOSCOPY       COLONOSCOPY N/A 10/29/2015    Procedure: COLONOSCOPY;  Surgeon: Adan De Santiago MD;  Location: [facility]     EYE SURGERY       PHACOEMULSIFICATION WITH STANDARD INTRAOCULAR LENS IMPLANT  2014    Procedure: PHACOEMULSIFICATION WITH STANDARD INTRAOCULAR LENS IMPLANT;  Surgeon: Jj Payne MD;  Location: [facility]     SURGICAL HISTORY OF -            SURGICAL HISTORY OF 1979    hysterectomy after C section       Allergies:  Allergies   Allergen Reactions     Hctz      Hyponatremia       Family HX:  Family History   Problem Relation Age of Onset     Alzheimer Disease Mother      C.A.D. Mother           age 75     Cerebrovascular Disease Father      Hypertension Sister         age 58       Social Hx:  Social History     Socioeconomic History     Marital status:      Spouse name: Not on file     Number of children: Not on file     Years of education: Not on file     Highest education level: Not on file   Occupational History     Not on file   Tobacco Use     Smoking status: Former     Current packs/day: 0.00     Average packs/day: 0.7 packs/day for 26.0 years (18.2 ttl pk-yrs)     Types: Cigarettes     Start date: 1983     Quit date: 2009     Years since quittin.3     Passive exposure:  Past     Smokeless tobacco: Never     Tobacco comments:         Vaping Use     Vaping status: Never Used   Substance and Sexual Activity     Alcohol use: Yes     Comment: occ     Drug use: No     Sexual activity: Never     Partners: Male   Other Topics Concern     Parent/sibling w/ CABG, MI or angioplasty before 65F 55M? No   Social History Narrative     Not on file     Social Drivers of Health     Financial Resource Strain: Low Risk  (4/18/2025)    Financial Resource Strain      Within the past 12 months, have you or your family members you live with been unable to get utilities (heat, electricity) when it was really needed?: No   Food Insecurity: Low Risk  (4/18/2025)    Food Insecurity      Within the past 12 months, did you worry that your food would run out before you got money to buy more?: No      Within the past 12 months, did the food you bought just not last and you didn t have money to get more?: No   Transportation Needs: Low Risk  (4/18/2025)    Transportation Needs      Within the past 12 months, has lack of transportation kept you from medical appointments, getting your medicines, non-medical meetings or appointments, work, or from getting things that you need?: No   Physical Activity: Unknown (3/14/2025)    Exercise Vital Sign      Days of Exercise per Week: 0 days      Minutes of Exercise per Session: Not on file   Stress: No Stress Concern Present (3/14/2025)    [employer] [place] [employer] - Occupational Stress Questionnaire      Feeling of Stress : Not at all   Social Connections: Unknown (3/14/2025)    Social Connection and Isolation Panel [NHANES]      Frequency of Communication with Friends and Family: Not on file      Frequency of Social Gatherings with Friends and Family: Once a week      Attends Yarsani Services: Not on file      Active Member of Clubs or Organizations: Not on file      Attends Club or Organization Meetings: Not on file      Marital Status: Not on file    Interpersonal Safety: Low Risk  (4/18/2025)    Interpersonal Safety      Do you feel physically and emotionally safe where you currently live?: Yes      Within the past 12 months, have you been hit, slapped, kicked or otherwise physically hurt by someone?: No      Within the past 12 months, have you been humiliated or emotionally abused in other ways by your partner or ex-partner?: No   Housing Stability: Low Risk  (4/18/2025)    Housing Stability      Do you have housing? : Yes      Are you worried about losing your housing?: No       Current Meds:  Current Outpatient Medications   Medication Sig Dispense Refill     albuterol (PROAIR HFA/PROVENTIL HFA/VENTOLIN HFA) 108 (90 Base) MCG/ACT inhaler Inhale 2 puffs into the lungs every 6 hours as needed for shortness of breath, wheezing or cough. 18 g 11     ASPIRIN 81 MG OR TABS Take 81 mg by mouth daily.       blood glucose (NO BRAND SPECIFIED) test strip Use to test blood sugar 1-2 times daily or as directed. To accompany: Blood Glucose Monitor Brands: per insurance. 50 strip 6     Cyanocobalamin (VITAMIN B-12 PO) Take 1,000 mcg by mouth twice a week. Take only on Monday and Friday       fluticasone (FLONASE) 50 MCG/ACT nasal spray INHALE 1-2 SPRAYS INTO EACH NOSTRIL DAILY 48 mL 3     Fluticasone-Umeclidin-Vilant (TRELEGY ELLIPTA) 100-62.5-25 MCG/ACT oral inhaler Inhale 1 puff into the lungs daily. 30 each 11     furosemide (LASIX) 40 MG tablet Take 1 tablet (40 mg) by mouth daily. 90 tablet 3     gabapentin (NEURONTIN) 100 MG capsule Take 2 capsules (200 mg) by mouth at bedtime. 180 capsule 3     lisinopril (ZESTRIL) 40 MG tablet Take 1 tablet (40 mg) by mouth daily. 90 tablet 3     metFORMIN (GLUCOPHAGE) 500 MG tablet Take 1 tablet (500 mg) by mouth 2 times daily (with meals). 180 tablet 1     metoprolol succinate ER (TOPROL XL) 50 MG 24 hr tablet Take 1 tablet (50 mg) by mouth daily. 90 tablet 2     MULTIPLE VITAMIN PO Take 1 tablet by mouth daily.       Multiple  "Vitamins-Minerals (PRESERVISION AREDS 2) CAPS Take 1 capsule by mouth daily.       simvastatin (ZOCOR) 20 MG tablet Take 1 tablet (20 mg) by mouth at bedtime. 90 tablet 3     thin (NO BRAND SPECIFIED) lancets Use to test blood sugar 1-2 times daily or as directed. To accompany: Blood Glucose Monitor Brands: per insurance. 50 each 5     triamcinolone (KENALOG) 0.1 % external cream Apply topically 2 times daily. Apply to affected area (left inguinal crease) twice daily.  Do not use for more than 14 days. 30 g 1       Physical Exam:  /80   Pulse 68   Ht 1.588 m (5' 2.5\")   Wt 78.5 kg (173 lb)   LMP 09/25/1979 (Approximate)   SpO2 96%   BMI 31.14 kg/m    Gen: alert, oriented, no distress  HEENT: no cervical or supraclavicular lymphadenopathy  CV: RRR, no M/G/R  Resp: CTAB, no focal crackles or wheezes  Skin: no apparent rashes  Ext: moderate bilateral ankle edema persists  Neuro: alert, nonfocal    Labs:  reviewed    Imaging:  Chest CTA (April 2025):  - images directly reviewed, formal interpretation follows:  FINDINGS:  ANGIOGRAM CHEST: Pulmonary arteries are normal caliber and negative for pulmonary emboli. Thoracic aorta is negative for dissection. No CT evidence of right heart strain.     LUNGS AND PLEURA: The central airways are clear. Moderate bronchial wall thickening with multifocal endobronchial mucoid impaction. Mild emphysema. Stable lingular scarring/chronic subsegmental atelectasis. No pneumonic consolidation or pleural effusion.     MEDIASTINUM/AXILLAE: Prominent likely reactive mediastinal lymph nodes. Normal heart size and no pericardial effusion. Mitral annulus calcifications.     CORONARY ARTERY CALCIFICATION: Moderate to severe     UPPER ABDOMEN: Pancreatic atrophy. Left renal atrophy. Right renal cortical cyst. No follow-up is indicated.     MUSCULOSKELETAL: Spinal degenerative changes. Stable mild L1 vertebral body compression deformity                                                     "                  IMPRESSION:  1.  No pulmonary artery embolism.  2.  Mild emphysema and moderate bronchiolitis.  3.  No pneumonic consolidation or pleural effusion.    Pulmonary Function Testing  June 2025  Mild preserved-ratio impaired spirometry  FEV1/FVC 0.67 (less than 0.7 but greater than LLN)  No significant bronchodilator response  Air trapping without hyperinflation  Moderate reduction in DLCOc    Time spent on chart and image review, meeting with the patient to obtain history, perform physical exam, discuss test results, diagnostic possibilities, further testing options, treatment plan options, and care coordination: 64 minutes    The longitudinal plan of care for the diagnosis(es)/condition(s) as documented were addressed during this visit. Due to the added complexity in care, I will continue to support Milagro in the subsequent management and with ongoing continuity of care.      Again, thank you for allowing me to participate in the care of your patient.        Sincerely,        Luis Manuel Blair MD    Electronically signed

## 2025-06-02 NOTE — PATIENT INSTRUCTIONS
It was good to meet you in clinic. This is what we discussed:    You have some evidence of COPD.  Continue Trelegy one inhalation daily. Rinse, gargle, and spit water after use.  Use the albuterol as needed.  Stay active with exercise as much as possible.  I will see you in 6 months.  Contact me with questions or concerns.    Luis Manuel Blair MD  Pulmonary and Critical Care Medicine  [facility]  Office [phone]

## 2025-06-02 NOTE — PROGRESS NOTES
Pulmonary Clinic Outpatient Consultation    Assessment and Plan:  85 year old female former smoker with a history of DM2, HTN, CKD, obesity, HFpEF, emphysema, possible COPD, presenting for evaluation.    COPD: Milagro likely has COPD, though technically her FEV1/FVC ratio is greater than the demographically adjusted lower limit of normal, albeit less than 0.7. She does have air trapping and DLCO reduction which go along with emphysema, though her recent hospitalization was likely primarily related to acute decompensated heart failure with HFpEF. She feels subjective benefit from low-dose fluticasone-umeclidinium-vilanterol, which we will continue. Has albuterol for as-needed use. No indication for supplemental oxygen. I did recommend pulmonary rehabilitation, which she declines at this point.    Plan:  - continue low-dose fluticasone-umeclidinium-vilanterol one inhalation daily; rinse/gargle/spit water after use  - albuterol HFA as needed  - offered pulmonary rehabilitation, which she declines  - no indication for supplemental oxygen  - ongoing sodium restriction and diuresis  - recommend remaining up to date with respiratory vaccinations  - follow up in 6 months  - encouraged her to contact us with questions or concerning symptoms    Luis Manuel Blair MD  [facility]  Office [phone]  he/him    CCx: COPD    HPI: 85 year old female former smoker with a history of DM2, HTN, CKD, obesity, HFpEF, emphysema, possible COPD, presenting for evaluation. The patient was hospitalized for 4 nights in April 2025 at [facility] with bilateral leg edema, dyspnea, found to have ADHF with HFpEF, also had mild emphysema on CT and some bronchial thickening so treated with prednisone burst and referred here on discharge. Started smoking age [age], up to 1 ppd, quit in 2008. Sister has COPD. Was started on low-dose Trelegy less than a month ago, feels some benefit from it. PFTs as detailed below.    ROS:  A 12-system  review was obtained and was negative with the exception of the symptoms endorsed in the history of present illness.    PMH:  former smoker with a history of DM2, HTN, CKD, obesity, HFpEF, emphysema, possible COPD, presenting for evaluation. The patient was hospitalized for 4 nights in 2025 at [facility] with bilateral leg edema, dyspnea, found to have ADHF with HFpEF    PSH:  Past Surgical History:   Procedure Laterality Date    COLONOSCOPY      COLONOSCOPY N/A 10/29/2015    Procedure: COLONOSCOPY;  Surgeon: Adan De Santiago MD;  Location: [facility]    EYE SURGERY      PHACOEMULSIFICATION WITH STANDARD INTRAOCULAR LENS IMPLANT  2014    Procedure: PHACOEMULSIFICATION WITH STANDARD INTRAOCULAR LENS IMPLANT;  Surgeon: Jj Payne MD;  Location: [facility]    SURGICAL HISTORY OF -           SURGICAL HISTORY OF -       hysterectomy after C section       Allergies:  Allergies   Allergen Reactions    Hctz      Hyponatremia       Family HX:  Family History   Problem Relation Age of Onset    Alzheimer Disease Mother     C.A.D. Mother           age 75    Cerebrovascular Disease Father     Hypertension Sister         age 58       Social Hx:  Social History     Socioeconomic History    Marital status:      Spouse name: Not on file    Number of children: Not on file    Years of education: Not on file    Highest education level: Not on file   Occupational History    Not on file   Tobacco Use    Smoking status: Former     Current packs/day: 0.00     Average packs/day: 0.7 packs/day for 26.0 years (18.2 ttl pk-yrs)     Types: Cigarettes     Start date: 1983     Quit date: 2009     Years since quittin.3     Passive exposure: Past    Smokeless tobacco: Never    Tobacco comments:         Vaping Use    Vaping status: Never Used   Substance and Sexual Activity    Alcohol use: Yes     Comment: occ    Drug use: No    Sexual activity: Never     Partners: Male   Other Topics Concern     Parent/sibling w/ CABG, MI or angioplasty before 65F 55M? No   Social History Narrative    Not on file     Social Drivers of Health     Financial Resource Strain: Low Risk  (4/18/2025)    Financial Resource Strain     Within the past 12 months, have you or your family members you live with been unable to get utilities (heat, electricity) when it was really needed?: No   Food Insecurity: Low Risk  (4/18/2025)    Food Insecurity     Within the past 12 months, did you worry that your food would run out before you got money to buy more?: No     Within the past 12 months, did the food you bought just not last and you didn t have money to get more?: No   Transportation Needs: Low Risk  (4/18/2025)    Transportation Needs     Within the past 12 months, has lack of transportation kept you from medical appointments, getting your medicines, non-medical meetings or appointments, work, or from getting things that you need?: No   Physical Activity: Unknown (3/14/2025)    Exercise Vital Sign     Days of Exercise per Week: 0 days     Minutes of Exercise per Session: Not on file   Stress: No Stress Concern Present (3/14/2025)    [employer] - Occupational Stress Questionnaire     Feeling of Stress : Not at all   Social Connections: Unknown (3/14/2025)    Social Connection and Isolation Panel [NHANES]     Frequency of Communication with Friends and Family: Not on file     Frequency of Social Gatherings with Friends and Family: Once a week     Attends L[employer] day Services: Not on file     Active Member of Clubs or Organizations: Not on file     Attends Club or Organization Meetings: Not on file     Marital Status: Not on file   Interpersonal Safety: Low Risk  (4/18/2025)    Interpersonal Safety     Do you feel physically and emotionally safe where you currently live?: Yes     Within the past 12 months, have you been hit, slapped, kicked or otherwise physically hurt by someone?: No     Within the past 12  months, have you been humiliated or emotionally abused in other ways by your partner or ex-partner?: No   Housing Stability: Low Risk  (4/18/2025)    Housing Stability     Do you have housing? : Yes     Are you worried about losing your housing?: No       Current Meds:  Current Outpatient Medications   Medication Sig Dispense Refill    albuterol (PROAIR HFA/PROVENTIL HFA/VENTOLIN HFA) 108 (90 Base) MCG/ACT inhaler Inhale 2 puffs into the lungs every 6 hours as needed for shortness of breath, wheezing or cough. 18 g 11    ASPIRIN 81 MG OR TABS Take 81 mg by mouth daily.      blood glucose (NO BRAND SPECIFIED) test strip Use to test blood sugar 1-2 times daily or as directed. To accompany: Blood Glucose Monitor Brands: per insurance. 50 strip 6    Cyanocobalamin (VITAMIN B-12 PO) Take 1,000 mcg by mouth twice a week. Take only on Monday and Friday      fluticasone (FLONASE) 50 MCG/ACT nasal spray INHALE 1-2 SPRAYS INTO EACH NOSTRIL DAILY 48 mL 3    Fluticasone-Umeclidin-Vilant (TRELEGY ELLIPTA) 100-62.5-25 MCG/ACT oral inhaler Inhale 1 puff into the lungs daily. 30 each 11    furosemide (LASIX) 40 MG tablet Take 1 tablet (40 mg) by mouth daily. 90 tablet 3    gabapentin (NEURONTIN) 100 MG capsule Take 2 capsules (200 mg) by mouth at bedtime. 180 capsule 3    lisinopril (ZESTRIL) 40 MG tablet Take 1 tablet (40 mg) by mouth daily. 90 tablet 3    metFORMIN (GLUCOPHAGE) 500 MG tablet Take 1 tablet (500 mg) by mouth 2 times daily (with meals). 180 tablet 1    metoprolol succinate ER (TOPROL XL) 50 MG 24 hr tablet Take 1 tablet (50 mg) by mouth daily. 90 tablet 2    MULTIPLE VITAMIN PO Take 1 tablet by mouth daily.      Multiple Vitamins-Minerals (PRESERVISION AREDS 2) CAPS Take 1 capsule by mouth daily.      simvastatin (ZOCOR) 20 MG tablet Take 1 tablet (20 mg) by mouth at bedtime. 90 tablet 3    thin (NO BRAND SPECIFIED) lancets Use to test blood sugar 1-2 times daily or as directed. To accompany: Blood Glucose Monitor  "Brands: per insurance. 50 each 5    triamcinolone (KENALOG) 0.1 % external cream Apply topically 2 times daily. Apply to affected area (left inguinal crease) twice daily.  Do not use for more than 14 days. 30 g 1       Physical Exam:  /80   Pulse 68   Ht 1.588 m (5' 2.5\")   Wt 78.5 kg (173 lb)   LMP 09/25/1979 (Approximate)   SpO2 96%   BMI 31.14 kg/m    Gen: alert, oriented, no distress  HEENT: no cervical or supraclavicular lymphadenopathy  CV: RRR, no M/G/R  Resp: CTAB, no focal crackles or wheezes  Skin: no apparent rashes  Ext: moderate bilateral ankle edema persists  Neuro: alert, nonfocal    Labs:  reviewed    Imaging:  Chest CTA (April 2025):  - images directly reviewed, formal interpretation follows:  FINDINGS:  ANGIOGRAM CHEST: Pulmonary arteries are normal caliber and negative for pulmonary emboli. Thoracic aorta is negative for dissection. No CT evidence of right heart strain.     LUNGS AND PLEURA: The central airways are clear. Moderate bronchial wall thickening with multifocal endobronchial mucoid impaction. Mild emphysema. Stable lingular scarring/chronic subsegmental atelectasis. No pneumonic consolidation or pleural effusion.     MEDIASTINUM/AXILLAE: Prominent likely reactive mediastinal lymph nodes. Normal heart size and no pericardial effusion. Mitral annulus calcifications.     CORONARY ARTERY CALCIFICATION: Moderate to severe     UPPER ABDOMEN: Pancreatic atrophy. Left renal atrophy. Right renal cortical cyst. No follow-up is indicated.     MUSCULOSKELETAL: Spinal degenerative changes. Stable mild L1 vertebral body compression deformity                                                                      IMPRESSION:  1.  No pulmonary artery embolism.  2.  Mild emphysema and moderate bronchiolitis.  3.  No pneumonic consolidation or pleural effusion.    Pulmonary Function Testing  June 2025  Mild preserved-ratio impaired spirometry  FEV1/FVC 0.67 (less than 0.7 but greater than " LLN)  No significant bronchodilator response  Air trapping without hyperinflation  Moderate reduction in DLCOc    Time spent on chart and image review, meeting with the patient to obtain history, perform physical exam, discuss test results, diagnostic possibilities, further testing options, treatment plan options, and care coordination: 64 minutes    The longitudinal plan of care for the diagnosis(es)/condition(s) as documented were addressed during this visit. Due to the added complexity in care, I will continue to support Milagro in the subsequent management and with ongoing continuity of care.

## 2025-06-03 LAB
DLCOCOR-%PRED-PRE: 54 %
DLCOCOR-PRE: 9.39 ML/MIN/MMHG
DLCOUNC-%PRED-PRE: 49 %
DLCOUNC-PRE: 8.54 ML/MIN/MMHG
DLCOUNC-PRED: 17.38 ML/MIN/MMHG
ERV-%PRED-PRE: 35 %
ERV-PRE: 0.26 L
ERV-PRED: 0.74 L
EXPTIME-PRE: 7.96 SEC
FEF2575-%PRED-POST: 39 %
FEF2575-%PRED-PRE: 44 %
FEF2575-POST: 0.52 L/SEC
FEF2575-PRE: 0.59 L/SEC
FEF2575-PRED: 1.32 L/SEC
FEFMAX-%PRED-PRE: 63 %
FEFMAX-PRE: 2.65 L/SEC
FEFMAX-PRED: 4.15 L/SEC
FEV1-%PRED-PRE: 61 %
FEV1-PRE: 1 L
FEV1FEV6-PRE: 67 %
FEV1FEV6-PRED: 77 %
FEV1FVC-PRE: 67 %
FEV1FVC-PRED: 78 %
FEV1SVC-PRE: 61 %
FEV1SVC-PRED: 59 %
FIFMAX-PRE: 1.86 L/SEC
FRCPLETH-%PRED-PRE: 128 %
FRCPLETH-PRE: 3.6 L
FRCPLETH-PRED: 2.79 L
FVC-%PRED-PRE: 69 %
FVC-PRE: 1.48 L
FVC-PRED: 2.12 L
IC-%PRED-PRE: 93 %
IC-PRE: 1.38 L
IC-PRED: 1.48 L
RVPLETH-%PRED-PRE: 150 %
RVPLETH-PRE: 3.34 L
RVPLETH-PRED: 2.22 L
TLCPLETH-%PRED-PRE: 106 %
TLCPLETH-PRE: 4.98 L
TLCPLETH-PRED: 4.69 L
VA-%PRED-PRE: 68 %
VA-PRE: 2.85 L
VC-%PRED-PRE: 60 %
VC-PRE: 1.65 L
VC-PRED: 2.74 L

## 2025-06-25 DIAGNOSIS — N18.32 TYPE 2 DIABETES MELLITUS WITH STAGE 3B CHRONIC KIDNEY DISEASE, WITHOUT LONG-TERM CURRENT USE OF INSULIN (H): ICD-10-CM

## 2025-06-25 DIAGNOSIS — E11.22 TYPE 2 DIABETES MELLITUS WITH STAGE 3B CHRONIC KIDNEY DISEASE, WITHOUT LONG-TERM CURRENT USE OF INSULIN (H): ICD-10-CM

## 2025-06-25 RX ORDER — LANCETS
EACH MISCELLANEOUS
Qty: 50 EACH | Refills: 5 | Status: SHIPPED | OUTPATIENT
Start: 2025-06-25

## 2025-06-25 NOTE — TELEPHONE ENCOUNTER
Incoming call from patient. Patient stated that she has not checked her blood sugar in over a year at home. Last time it was order was March 2024.      Medication passed protocol, however, refill RN could not approve because gap since last refill Provider, please approve or deny the prescription.      Patient wants us to call patient's daughter back when it is signed.       Gina RAMOS RN

## 2025-07-18 ENCOUNTER — TELEPHONE (OUTPATIENT)
Dept: FAMILY MEDICINE | Facility: CLINIC | Age: 85
End: 2025-07-18
Payer: COMMERCIAL

## 2025-07-18 NOTE — TELEPHONE ENCOUNTER
General Call    Contacts       Contact Date/Time Type Contact Phone/Fax    07/18/2025 12:31 PM CDT Phone (Incoming) Mary Wallace (Emergency Contact) [phone] (M)          Reason for Call:  Meclizine    What are your questions or concerns:  take a lot of medications and wants to know if its okay to take meclizine due to dizziness.    Date of last appointment with provider: N/A    Okay to leave a detailed message?: Yes at Home number on file [phone] (home) Please try daughter Mary if not able to lvm as well due to moving.

## 2025-07-20 NOTE — CONFIDENTIAL NOTE
Notes reviewed.  Providers have discussed vertigo with this patient as far back as 2007.  She and I talked about hydration in 2024 as a treatment for vertigo.    She and I have not talked about using this first generation antihistamine such as meclizine for symptoms of vertigo.    How much is she taking?

## 2025-07-21 NOTE — TELEPHONE ENCOUNTER
Patient states she hasn't started the medication yet. She has OTC meclizine 25mg tabs at home. Dizziness is mainly right away in the morning, worse with turning over in bed. Resolves throughout the day. States she only drinks 16oz water/day. Encouraged pushing fluids.

## 2025-07-22 NOTE — CONFIDENTIAL NOTE
She could try 12.5-25 mg meclizine per day.  The challenge is that her symptoms are upon wakening.  The medicine will take a while to work.  It is possible her symptoms would be resolved anyways without the medicine by the time it actually kicks in.    Side effects to watch for: Changes in urination, changes in cognition.

## 2025-07-23 ENCOUNTER — TELEPHONE (OUTPATIENT)
Dept: FAMILY MEDICINE | Facility: CLINIC | Age: 85
End: 2025-07-23
Payer: COMMERCIAL

## 2025-07-23 NOTE — TELEPHONE ENCOUNTER
Outgoing call #1 to patient, No answer. LVMTCB.     When patient calls back, please route to RN line to review Dr. Childs recommendations below.     Gina RAMOS RN

## 2025-07-24 NOTE — TELEPHONE ENCOUNTER
Patient had called back yesterday and message was relayed. See other T.E. 7/23/2025.    Gina RAMOS RN     [id number]

## (undated) RX ORDER — DEXAMETHASONE SODIUM PHOSPHATE 10 MG/ML
INJECTION, SOLUTION INTRAMUSCULAR; INTRAVENOUS
Status: DISPENSED
Start: 2019-01-15

## (undated) RX ORDER — BUPIVACAINE HYDROCHLORIDE 5 MG/ML
INJECTION, SOLUTION EPIDURAL; INTRACAUDAL
Status: DISPENSED
Start: 2019-01-15

## (undated) RX ORDER — METHYLPREDNISOLONE ACETATE 40 MG/ML
INJECTION, SUSPENSION INTRA-ARTICULAR; INTRALESIONAL; INTRAMUSCULAR; SOFT TISSUE
Status: DISPENSED
Start: 2019-01-15